# Patient Record
Sex: MALE | Race: WHITE | NOT HISPANIC OR LATINO | Employment: FULL TIME | URBAN - METROPOLITAN AREA
[De-identification: names, ages, dates, MRNs, and addresses within clinical notes are randomized per-mention and may not be internally consistent; named-entity substitution may affect disease eponyms.]

---

## 2021-07-27 ENCOUNTER — CONSULT (OUTPATIENT)
Dept: SURGERY | Facility: CLINIC | Age: 51
End: 2021-07-27
Payer: COMMERCIAL

## 2021-07-27 VITALS
DIASTOLIC BLOOD PRESSURE: 78 MMHG | HEIGHT: 71 IN | HEART RATE: 104 BPM | BODY MASS INDEX: 22.26 KG/M2 | TEMPERATURE: 98.3 F | WEIGHT: 159 LBS | SYSTOLIC BLOOD PRESSURE: 130 MMHG

## 2021-07-27 DIAGNOSIS — Z00.00 WELLNESS EXAMINATION: ICD-10-CM

## 2021-07-27 DIAGNOSIS — R22.42 SUBCUTANEOUS MASS OF LEFT LOWER LEG: Primary | ICD-10-CM

## 2021-07-27 PROCEDURE — 99203 OFFICE O/P NEW LOW 30 MIN: CPT | Performed by: SURGERY

## 2021-07-27 RX ORDER — IBUPROFEN 200 MG
200 TABLET ORAL EVERY 6 HOURS PRN
COMMUNITY

## 2021-07-27 NOTE — PROGRESS NOTES
Assessment/Plan:    There is a  Mobile rubbery mass, likely lipoma but also could be sebaceous cyst present just below his patella on the left  He would like this excised as it does cause him discomfort when he bumps into things  Can perform excision under local anesthetic in the office  Discussed risks of procedure including bleeding, infection, damage to surrounding structures, poor wound healing, postoperative seroma  He reports understanding and informed consent was obtained  Will schedule for in office procedure next week  He also has never had a colonoscopy and does not have primary care doctor currently so have placed a referral and given him several names for primary care physicians  Also discussed colonoscopy and will readdress this again next week  No problem-specific Assessment & Plan notes found for this encounter  Diagnoses and all orders for this visit:    Subcutaneous mass of left lower leg    Wellness examination  -     Ambulatory referral to [facility]; Future    Other orders  -     ibuprofen (MOTRIN) 200 mg tablet; Take 200 mg by mouth every 6 (six) hours as needed          Subjective:      Patient ID: Ghada George is a 48 y o  male  He presents with a cyst like mass on his left lower leg just  Inferior to the patella  It has been there for many years and does not have pain specifically but when he bumps it against something it is very painful  He said about a year ago it increased in size that has not changed in size since  No other cysts in that region  The following portions of the patient's history were reviewed and updated as appropriate:   He  has no past medical history on file  He There are no problems to display for this patient  He  has no past surgical history on file  His family history is not on file  He  reports that he has never smoked  He has never used smokeless tobacco  He reports previous alcohol use  He reports current drug use   Drug: Marijuana  Current Outpatient Medications   Medication Sig Dispense Refill    ibuprofen (MOTRIN) 200 mg tablet Take 200 mg by mouth every 6 (six) hours as needed       No current facility-administered medications for this visit  He is allergic to penicillins       Review of Systems   All other systems reviewed and are negative  Objective:      /78 (BP Location: Left arm, Patient Position: Sitting, Cuff Size: Adult)   Pulse 104   Temp 98 3 °F (36 8 °C) (Tympanic)   Ht 5' 11" (1 803 m)   Wt 72 1 kg (159 lb)   BMI 22 18 kg/m²          Physical Exam  Vitals reviewed  Constitutional:       General: He is not in acute distress  Appearance: Normal appearance  He is normal weight  HENT:      Head: Normocephalic and atraumatic  Eyes:      Extraocular Movements: Extraocular movements intact  Conjunctiva/sclera: Conjunctivae normal       Pupils: Pupils are equal, round, and reactive to light  Cardiovascular:      Rate and Rhythm: Normal rate and regular rhythm  Pulmonary:      Effort: Pulmonary effort is normal  No respiratory distress  Breath sounds: Normal breath sounds  Musculoskeletal:         General: No swelling or tenderness  Normal range of motion  Cervical back: Normal range of motion and neck supple  Skin:     General: Skin is warm and dry  Neurological:      General: No focal deficit present  Mental Status: He is alert and oriented to person, place, and time  Psychiatric:         Mood and Affect: Mood normal          Behavior: Behavior normal          Thought Content:  Thought content normal          Judgment: Judgment normal

## 2021-08-04 ENCOUNTER — PROCEDURE VISIT (OUTPATIENT)
Dept: SURGERY | Facility: CLINIC | Age: 51
End: 2021-08-04
Payer: COMMERCIAL

## 2021-08-04 VITALS — WEIGHT: 159 LBS | HEIGHT: 71 IN | BODY MASS INDEX: 22.26 KG/M2 | TEMPERATURE: 97.6 F

## 2021-08-04 DIAGNOSIS — R22.42 SUBCUTANEOUS MASS OF LEFT LOWER LEG: Primary | ICD-10-CM

## 2021-08-04 PROCEDURE — 88305 TISSUE EXAM BY PATHOLOGIST: CPT | Performed by: PATHOLOGY

## 2021-08-04 PROCEDURE — 88342 IMHCHEM/IMCYTCHM 1ST ANTB: CPT | Performed by: PATHOLOGY

## 2021-08-04 PROCEDURE — 88341 IMHCHEM/IMCYTCHM EA ADD ANTB: CPT | Performed by: PATHOLOGY

## 2021-08-04 PROCEDURE — 11402 EXC TR-EXT B9+MARG 1.1-2 CM: CPT | Performed by: SURGERY

## 2021-08-04 NOTE — PROGRESS NOTES
Excisional Biopsy Procedure Note    Pre-operative Diagnosis: follicular cyst    Post-operative Diagnosis: same    Locations: Left lower extremity    Indications: discomfort    Anesthesia: Lidocaine 1% with epinephrine without added sodium bicarbonate    Procedure Details   The risks, benefits, indications, potential complications, and alternatives were explained to the patient and informed consent obtained  The lesion and surrounding area was given a sterile prep using chlorhexidine and draped in the usual sterile fashion  A scalpel was used to create a linear incision over the mass  A hemostat was used to dissect around the lesion circumferentially  Scissors were used to sharply dissect the lesion from the surrounding tissue  The cyst was then placed in a specimen container, measuring 2 x 1 5 x 0 5 cm  The wound was closed with 4-0 monocryl using a running subcuticular stitch  Antibiotic ointment and a sterile dressing applied  The specimen was sent for pathologic examination  The patient tolerated the procedure well  EBL: minimal    Findings: sebaceous cyst    Condition: Stable    Complications:  None    Plan:  1  Instructed to keep the wound dry and covered for 24-48 hours and clean thereafter  2  Warning signs of infection were reviewed  3  Recommended that the patient use OTC acetaminophen and OTC ibuprofen as needed for pain     4  Return for wound check in 1-2 weeks

## 2021-08-24 ENCOUNTER — OFFICE VISIT (OUTPATIENT)
Dept: SURGERY | Facility: CLINIC | Age: 51
End: 2021-08-24

## 2021-08-24 VITALS — WEIGHT: 159 LBS | HEIGHT: 70 IN | TEMPERATURE: 97.6 F | BODY MASS INDEX: 22.76 KG/M2 | HEART RATE: 88 BPM

## 2021-08-24 DIAGNOSIS — Z98.890 POST-OPERATIVE STATE: Primary | ICD-10-CM

## 2021-08-24 PROCEDURE — 99024 POSTOP FOLLOW-UP VISIT: CPT | Performed by: SURGERY

## 2021-08-24 NOTE — PROGRESS NOTES
Assessment/Plan:   doing well status post excision left lower extremity lesion  Pathology was reviewed) out was given to him  Advised this is a benign finding that will not recur with complete excision  Advised him to use sunscreen when out in the sun as scars are more sensitive to UV rays  No problem-specific Assessment & Plan notes found for this encounter  Diagnoses and all orders for this visit:    Post-operative state          Subjective:      Patient ID: Margarito Vaz is a 48 y o  male  He presents for postop status post excision of left lower extremity lesion  He is doing well, reports some mild discomfort the 1st day but otherwise is doing fine  The following portions of the patient's history were reviewed and updated as appropriate: allergies, current medications, past family history, past medical history, past social history, past surgical history and problem list     Review of Systems   All other systems reviewed and are negative  Objective:      Pulse 88   Temp 97 6 °F (36 4 °C) (Tympanic)   Ht 5' 10" (1 778 m)   Wt 72 1 kg (159 lb)   BMI 22 81 kg/m²          Physical Exam  Vitals reviewed  Musculoskeletal:         General: Normal range of motion  Skin:     General: Skin is warm and dry

## 2023-01-21 ENCOUNTER — APPOINTMENT (EMERGENCY)
Dept: CT IMAGING | Facility: HOSPITAL | Age: 53
End: 2023-01-21

## 2023-01-21 ENCOUNTER — HOSPITAL ENCOUNTER (INPATIENT)
Facility: HOSPITAL | Age: 53
LOS: 12 days | Discharge: HOME HEALTH | End: 2023-02-03
Attending: INTERNAL MEDICINE | Admitting: INTERNAL MEDICINE

## 2023-01-21 ENCOUNTER — APPOINTMENT (EMERGENCY)
Dept: RADIOLOGY | Facility: HOSPITAL | Age: 53
End: 2023-01-21

## 2023-01-21 DIAGNOSIS — E87.1 HYPONATREMIA: ICD-10-CM

## 2023-01-21 DIAGNOSIS — L89.316 PRESSURE INJURY OF DEEP TISSUE OF RIGHT BUTTOCK: ICD-10-CM

## 2023-01-21 DIAGNOSIS — B19.20 HEPATITIS C VIRUS INFECTION WITHOUT HEPATIC COMA, UNSPECIFIED CHRONICITY: ICD-10-CM

## 2023-01-21 DIAGNOSIS — B18.2 HEPATIC CIRRHOSIS DUE TO CHRONIC HEPATITIS C INFECTION (HCC): ICD-10-CM

## 2023-01-21 DIAGNOSIS — K74.60 CIRRHOSIS OF LIVER WITH ASCITES, UNSPECIFIED HEPATIC CIRRHOSIS TYPE (HCC): ICD-10-CM

## 2023-01-21 DIAGNOSIS — K65.0 ACUTE PERITONITIS (HCC): Primary | ICD-10-CM

## 2023-01-21 DIAGNOSIS — R65.10 SIRS (SYSTEMIC INFLAMMATORY RESPONSE SYNDROME) (HCC): ICD-10-CM

## 2023-01-21 DIAGNOSIS — K74.60 HEPATIC CIRRHOSIS DUE TO CHRONIC HEPATITIS C INFECTION (HCC): ICD-10-CM

## 2023-01-21 DIAGNOSIS — R18.8 CIRRHOSIS OF LIVER WITH ASCITES, UNSPECIFIED HEPATIC CIRRHOSIS TYPE (HCC): ICD-10-CM

## 2023-01-21 LAB
ALBUMIN SERPL BCP-MCNC: 2.8 G/DL (ref 3.5–5)
ALP SERPL-CCNC: 100 U/L (ref 34–104)
ALT SERPL W P-5'-P-CCNC: 61 U/L (ref 7–52)
ANION GAP SERPL CALCULATED.3IONS-SCNC: 9 MMOL/L (ref 4–13)
APTT PPP: 28 SECONDS (ref 23–37)
AST SERPL W P-5'-P-CCNC: 63 U/L (ref 13–39)
BASOPHILS # BLD AUTO: 0.05 THOUSANDS/ÂΜL (ref 0–0.1)
BASOPHILS NFR BLD AUTO: 0 % (ref 0–1)
BILIRUB SERPL-MCNC: 2.17 MG/DL (ref 0.2–1)
BILIRUB UR QL STRIP: NEGATIVE
BUN SERPL-MCNC: 15 MG/DL (ref 5–25)
CALCIUM ALBUM COR SERPL-MCNC: 9.3 MG/DL (ref 8.3–10.1)
CALCIUM SERPL-MCNC: 8.3 MG/DL (ref 8.4–10.2)
CHLORIDE SERPL-SCNC: 84 MMOL/L (ref 96–108)
CLARITY UR: CLEAR
CO2 SERPL-SCNC: 24 MMOL/L (ref 21–32)
COLOR UR: ABNORMAL
CREAT SERPL-MCNC: 0.74 MG/DL (ref 0.6–1.3)
EOSINOPHIL # BLD AUTO: 0.06 THOUSAND/ÂΜL (ref 0–0.61)
EOSINOPHIL NFR BLD AUTO: 0 % (ref 0–6)
ERYTHROCYTE [DISTWIDTH] IN BLOOD BY AUTOMATED COUNT: 12.7 % (ref 11.6–15.1)
FLUAV RNA RESP QL NAA+PROBE: NEGATIVE
FLUBV RNA RESP QL NAA+PROBE: NEGATIVE
GFR SERPL CREATININE-BSD FRML MDRD: 106 ML/MIN/1.73SQ M
GLUCOSE SERPL-MCNC: 201 MG/DL (ref 65–140)
GLUCOSE UR STRIP-MCNC: NEGATIVE MG/DL
HCT VFR BLD AUTO: 37.7 % (ref 36.5–49.3)
HGB BLD-MCNC: 12.9 G/DL (ref 12–17)
HGB UR QL STRIP.AUTO: NEGATIVE
IMM GRANULOCYTES # BLD AUTO: 0.12 THOUSAND/UL (ref 0–0.2)
IMM GRANULOCYTES NFR BLD AUTO: 1 % (ref 0–2)
INR PPP: 1.38 (ref 0.84–1.19)
KETONES UR STRIP-MCNC: NEGATIVE MG/DL
LACTATE SERPL-SCNC: 1.5 MMOL/L (ref 0.5–2)
LACTATE SERPL-SCNC: 2.3 MMOL/L (ref 0.5–2)
LEUKOCYTE ESTERASE UR QL STRIP: NEGATIVE
LYMPHOCYTES # BLD AUTO: 1.21 THOUSANDS/ÂΜL (ref 0.6–4.47)
LYMPHOCYTES NFR BLD AUTO: 8 % (ref 14–44)
MCH RBC QN AUTO: 35 PG (ref 26.8–34.3)
MCHC RBC AUTO-ENTMCNC: 34.2 G/DL (ref 31.4–37.4)
MCV RBC AUTO: 102 FL (ref 82–98)
MONOCYTES # BLD AUTO: 1.11 THOUSAND/ÂΜL (ref 0.17–1.22)
MONOCYTES NFR BLD AUTO: 7 % (ref 4–12)
NEUTROPHILS # BLD AUTO: 13.48 THOUSANDS/ÂΜL (ref 1.85–7.62)
NEUTS SEG NFR BLD AUTO: 84 % (ref 43–75)
NITRITE UR QL STRIP: NEGATIVE
NRBC BLD AUTO-RTO: 0 /100 WBCS
PH UR STRIP.AUTO: 6 [PH]
PLATELET # BLD AUTO: 369 THOUSANDS/UL (ref 149–390)
PMV BLD AUTO: 9.8 FL (ref 8.9–12.7)
POTASSIUM SERPL-SCNC: 4.2 MMOL/L (ref 3.5–5.3)
PROCALCITONIN SERPL-MCNC: 0.42 NG/ML
PROT SERPL-MCNC: 6.8 G/DL (ref 6.4–8.4)
PROT UR STRIP-MCNC: NEGATIVE MG/DL
PROTHROMBIN TIME: 17 SECONDS (ref 11.6–14.5)
RBC # BLD AUTO: 3.69 MILLION/UL (ref 3.88–5.62)
RSV RNA RESP QL NAA+PROBE: NEGATIVE
SARS-COV-2 RNA RESP QL NAA+PROBE: NEGATIVE
SODIUM SERPL-SCNC: 117 MMOL/L (ref 135–147)
SP GR UR STRIP.AUTO: 1.01
UROBILINOGEN UR QL STRIP.AUTO: 0.2 E.U./DL
WBC # BLD AUTO: 16.03 THOUSAND/UL (ref 4.31–10.16)

## 2023-01-21 RX ORDER — METRONIDAZOLE 500 MG/1
500 TABLET ORAL ONCE
Status: COMPLETED | OUTPATIENT
Start: 2023-01-21 | End: 2023-01-21

## 2023-01-21 RX ORDER — FENTANYL CITRATE 50 UG/ML
50 INJECTION, SOLUTION INTRAMUSCULAR; INTRAVENOUS ONCE
Status: COMPLETED | OUTPATIENT
Start: 2023-01-21 | End: 2023-01-21

## 2023-01-21 RX ORDER — ONDANSETRON 2 MG/ML
4 INJECTION INTRAMUSCULAR; INTRAVENOUS ONCE
Status: COMPLETED | OUTPATIENT
Start: 2023-01-21 | End: 2023-01-21

## 2023-01-21 RX ORDER — LEVOFLOXACIN 5 MG/ML
750 INJECTION, SOLUTION INTRAVENOUS ONCE
Status: COMPLETED | OUTPATIENT
Start: 2023-01-21 | End: 2023-01-22

## 2023-01-21 RX ADMIN — LEVOFLOXACIN 750 MG: 750 INJECTION, SOLUTION INTRAVENOUS at 22:28

## 2023-01-21 RX ADMIN — SODIUM CHLORIDE 1000 ML: 0.9 INJECTION, SOLUTION INTRAVENOUS at 22:08

## 2023-01-21 RX ADMIN — ONDANSETRON 4 MG: 2 INJECTION INTRAMUSCULAR; INTRAVENOUS at 22:22

## 2023-01-21 RX ADMIN — FENTANYL CITRATE 50 MCG: 50 INJECTION, SOLUTION INTRAMUSCULAR; INTRAVENOUS at 23:37

## 2023-01-21 RX ADMIN — METRONIDAZOLE 500 MG: 500 TABLET ORAL at 22:36

## 2023-01-21 RX ADMIN — IOHEXOL 100 ML: 350 INJECTION, SOLUTION INTRAVENOUS at 23:06

## 2023-01-21 RX ADMIN — ONDANSETRON 4 MG: 2 INJECTION INTRAMUSCULAR; INTRAVENOUS at 21:28

## 2023-01-21 RX ADMIN — SODIUM CHLORIDE 1000 ML: 0.9 INJECTION, SOLUTION INTRAVENOUS at 21:24

## 2023-01-22 PROBLEM — K65.9 PERITONITIS (HCC): Status: ACTIVE | Noted: 2023-01-22

## 2023-01-22 PROBLEM — K74.60 CIRRHOSIS OF LIVER WITH ASCITES (HCC): Status: ACTIVE | Noted: 2023-01-22

## 2023-01-22 PROBLEM — E87.1 HYPONATREMIA: Status: ACTIVE | Noted: 2023-01-22

## 2023-01-22 PROBLEM — A41.9 SEPSIS (HCC): Status: ACTIVE | Noted: 2023-01-22

## 2023-01-22 PROBLEM — R18.8 CIRRHOSIS OF LIVER WITH ASCITES (HCC): Status: ACTIVE | Noted: 2023-01-22

## 2023-01-22 LAB
ALBUMIN SERPL BCP-MCNC: 2.4 G/DL (ref 3.5–5)
ALP SERPL-CCNC: 80 U/L (ref 34–104)
ALT SERPL W P-5'-P-CCNC: 63 U/L (ref 7–52)
ANION GAP SERPL CALCULATED.3IONS-SCNC: 6 MMOL/L (ref 4–13)
ANION GAP SERPL CALCULATED.3IONS-SCNC: 7 MMOL/L (ref 4–13)
AST SERPL W P-5'-P-CCNC: 74 U/L (ref 13–39)
ATRIAL RATE: 102 BPM
BILIRUB SERPL-MCNC: 1.32 MG/DL (ref 0.2–1)
BUN SERPL-MCNC: 11 MG/DL (ref 5–25)
BUN SERPL-MCNC: 12 MG/DL (ref 5–25)
CALCIUM ALBUM COR SERPL-MCNC: 8.9 MG/DL (ref 8.3–10.1)
CALCIUM SERPL-MCNC: 7.6 MG/DL (ref 8.4–10.2)
CALCIUM SERPL-MCNC: 7.7 MG/DL (ref 8.4–10.2)
CHLORIDE SERPL-SCNC: 91 MMOL/L (ref 96–108)
CHLORIDE SERPL-SCNC: 91 MMOL/L (ref 96–108)
CO2 SERPL-SCNC: 23 MMOL/L (ref 21–32)
CO2 SERPL-SCNC: 23 MMOL/L (ref 21–32)
CORTIS SERPL-MCNC: 31 UG/DL
CREAT SERPL-MCNC: 0.53 MG/DL (ref 0.6–1.3)
CREAT SERPL-MCNC: 0.63 MG/DL (ref 0.6–1.3)
ERYTHROCYTE [DISTWIDTH] IN BLOOD BY AUTOMATED COUNT: 12.6 % (ref 11.6–15.1)
EST. AVERAGE GLUCOSE BLD GHB EST-MCNC: 108 MG/DL
GFR SERPL CREATININE-BSD FRML MDRD: 113 ML/MIN/1.73SQ M
GFR SERPL CREATININE-BSD FRML MDRD: 121 ML/MIN/1.73SQ M
GLUCOSE SERPL-MCNC: 103 MG/DL (ref 65–140)
GLUCOSE SERPL-MCNC: 120 MG/DL (ref 65–140)
HBA1C MFR BLD: 5.4 %
HCT VFR BLD AUTO: 35.3 % (ref 36.5–49.3)
HGB BLD-MCNC: 12 G/DL (ref 12–17)
MCH RBC QN AUTO: 34.2 PG (ref 26.8–34.3)
MCHC RBC AUTO-ENTMCNC: 34 G/DL (ref 31.4–37.4)
MCV RBC AUTO: 101 FL (ref 82–98)
OSMOLALITY UR/SERPL-RTO: 259 MMOL/KG (ref 282–298)
OSMOLALITY UR: 361 MMOL/KG
P AXIS: 55 DEGREES
PLATELET # BLD AUTO: 291 THOUSANDS/UL (ref 149–390)
PMV BLD AUTO: 9 FL (ref 8.9–12.7)
POTASSIUM SERPL-SCNC: 3.8 MMOL/L (ref 3.5–5.3)
POTASSIUM SERPL-SCNC: 3.9 MMOL/L (ref 3.5–5.3)
PR INTERVAL: 154 MS
PROCALCITONIN SERPL-MCNC: 0.41 NG/ML
PROT SERPL-MCNC: 5.6 G/DL (ref 6.4–8.4)
QRS AXIS: 10 DEGREES
QRSD INTERVAL: 94 MS
QT INTERVAL: 334 MS
QTC INTERVAL: 435 MS
RBC # BLD AUTO: 3.51 MILLION/UL (ref 3.88–5.62)
SODIUM 24H UR-SCNC: <5 MOL/L
SODIUM SERPL-SCNC: 120 MMOL/L (ref 135–147)
SODIUM SERPL-SCNC: 121 MMOL/L (ref 135–147)
T WAVE AXIS: 73 DEGREES
TSH SERPL DL<=0.05 MIU/L-ACNC: 3.02 UIU/ML (ref 0.45–4.5)
VENTRICULAR RATE: 102 BPM
WBC # BLD AUTO: 12.47 THOUSAND/UL (ref 4.31–10.16)

## 2023-01-22 RX ORDER — LEVOFLOXACIN 5 MG/ML
750 INJECTION, SOLUTION INTRAVENOUS EVERY 24 HOURS
Status: DISCONTINUED | OUTPATIENT
Start: 2023-01-22 | End: 2023-01-24

## 2023-01-22 RX ORDER — OXYCODONE HYDROCHLORIDE 10 MG/1
10 TABLET ORAL EVERY 4 HOURS PRN
Status: DISCONTINUED | OUTPATIENT
Start: 2023-01-22 | End: 2023-01-28

## 2023-01-22 RX ORDER — ONDANSETRON 2 MG/ML
4 INJECTION INTRAMUSCULAR; INTRAVENOUS EVERY 6 HOURS PRN
Status: DISCONTINUED | OUTPATIENT
Start: 2023-01-22 | End: 2023-02-03 | Stop reason: HOSPADM

## 2023-01-22 RX ORDER — OXYCODONE HYDROCHLORIDE 5 MG/1
5 TABLET ORAL EVERY 4 HOURS PRN
Status: DISCONTINUED | OUTPATIENT
Start: 2023-01-22 | End: 2023-01-28

## 2023-01-22 RX ORDER — METRONIDAZOLE 500 MG/100ML
500 INJECTION, SOLUTION INTRAVENOUS EVERY 8 HOURS
Status: DISCONTINUED | OUTPATIENT
Start: 2023-01-22 | End: 2023-01-24

## 2023-01-22 RX ORDER — ACETAMINOPHEN 325 MG/1
650 TABLET ORAL EVERY 4 HOURS PRN
Status: DISCONTINUED | OUTPATIENT
Start: 2023-01-22 | End: 2023-01-28

## 2023-01-22 RX ORDER — HYDROMORPHONE HCL/PF 1 MG/ML
0.5 SYRINGE (ML) INJECTION EVERY 4 HOURS PRN
Status: DISCONTINUED | OUTPATIENT
Start: 2023-01-22 | End: 2023-01-29

## 2023-01-22 RX ORDER — MAGNESIUM HYDROXIDE/ALUMINUM HYDROXICE/SIMETHICONE 120; 1200; 1200 MG/30ML; MG/30ML; MG/30ML
30 SUSPENSION ORAL EVERY 4 HOURS PRN
Status: DISCONTINUED | OUTPATIENT
Start: 2023-01-22 | End: 2023-01-28

## 2023-01-22 RX ADMIN — METRONIDAZOLE 500 MG: 500 INJECTION, SOLUTION INTRAVENOUS at 21:31

## 2023-01-22 RX ADMIN — LEVOFLOXACIN 750 MG: 750 INJECTION, SOLUTION INTRAVENOUS at 22:19

## 2023-01-22 RX ADMIN — ALUMINUM HYDROXIDE, MAGNESIUM HYDROXIDE, AND SIMETHICONE 30 ML: 200; 200; 20 SUSPENSION ORAL at 20:02

## 2023-01-22 RX ADMIN — METRONIDAZOLE 500 MG: 500 INJECTION, SOLUTION INTRAVENOUS at 13:41

## 2023-01-22 RX ADMIN — METRONIDAZOLE 500 MG: 500 INJECTION, SOLUTION INTRAVENOUS at 06:10

## 2023-01-22 NOTE — CONSULTS
Acute Care Surgery  Consultation    Assessment:  46year old male with Hep C/ alcoholic cirrhosis presents with abdominal pain and findings of ascites  General surgery consulted due to CT read concerning for acute peritonitis  Patient resting comfortably in bed, in no significant distress  He reports minimal abdominal pain on my evaluation  Abdomen is mildly distended without guarding or rebound  WBC: 12 47 (16)  Hgb: 12 (12 )  Na: 120 (117)    Plan:  • Suspect CT read of peritonitis is likely due to irritation of peritoneum secondary to ascites  Patient does not clinically present with peritonitis, has no abdominal pain on exam this morning  No indication of spontaneous bacterial peritonitis  • Agree with GI consult, patient will likely require paracentesis this admission  o Recommended sending paracentesis fluid for cytology/culture for further characterization  • IV Levaquin/Flagyl per primary team  • Hyponatremia treatment per primary team  • No surgical intervention indicated at this time  General surgery will sign off  Please reach out with any questions or concerns  History of Present Illness   HPI:  Kalyan Soliz is a 46 y o  male with a past medical history of hepatitis C, former alcohol abuse (abstained greater than 20 years) who presented with abdominal pain over 1 week  Patient states he had a viral GI bug 2 weeks ago with associated nausea and vomiting  He states once this resolved he continues to have persistent abdominal pain that is waxed and waned over the week  He states this significantly worsened over the last couple of days prompting his presentation to the emergency department  Patient states at that time he was drinking significantly more water with decreased p o  intake but was also having decreased urine output bloating  Patient states the pain has been diffuse throughout the last week  He denies associated nausea or vomiting, reports 1 episode of diarrhea    He states he has not previously had bloating in his abdomen similar to this before, is unaware if he is at previous ascites and denies undergoing previous paracentesis  Patient initially presented with elevated lactic (2 3) which has since cleared  At this time, he denies SOB, chest pain, nausea, vomiting  He reports improvement in his abdominal pain since admission  Review of Systems   Constitutional: Positive for appetite change  Negative for activity change, chills, fatigue and fever  HENT: Negative  Respiratory: Negative for cough, chest tightness and shortness of breath  Cardiovascular: Negative for chest pain, palpitations and leg swelling  Gastrointestinal: Positive for abdominal distention, abdominal pain and diarrhea  Negative for blood in stool, constipation, nausea and vomiting  Genitourinary: Positive for decreased urine volume and difficulty urinating  Negative for dysuria  Musculoskeletal: Negative for arthralgias and myalgias  Skin: Negative for color change  Neurological: Negative for dizziness and weakness  Psychiatric/Behavioral: Negative for agitation, behavioral problems and confusion  All other systems reviewed and are negative        Historical Information   Past Medical History:   Diagnosis Date   • Hepatitis C      Past Surgical History:   Procedure Laterality Date   • HAND SURGERY     • WISDOM TOOTH EXTRACTION       Social History   Social History     Substance and Sexual Activity   Alcohol Use Not Currently    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     Social History     Substance and Sexual Activity   Drug Use Yes   • Types: Marijuana    Comment: medical marijuana     Social History     Tobacco Use   Smoking Status Never   Smokeless Tobacco Never     Family History   Problem Relation Age of Onset   • Diabetes Mother    • No Known Problems Father        Meds/Allergies   current meds:   Current Facility-Administered Medications   Medication Dose Route Frequency   • acetaminophen (TYLENOL) tablet 650 mg  650 mg Oral Q4H PRN   • HYDROmorphone (DILAUDID) injection 0 5 mg  0 5 mg Intravenous Q4H PRN   • influenza vaccine, recombinant, quadrivalent (FLUBLOK) IM injection 0 5 mL  0 5 mL Intramuscular Once   • levofloxacin (LEVAQUIN) IVPB (premix in dextrose) 750 mg 150 mL  750 mg Intravenous Q24H   • metroNIDAZOLE (FLAGYL) IVPB (premix) 500 mg 100 mL  500 mg Intravenous Q8H   • ondansetron (ZOFRAN) injection 4 mg  4 mg Intravenous Q6H PRN   • oxyCODONE (ROXICODONE) immediate release tablet 10 mg  10 mg Oral Q4H PRN   • oxyCODONE (ROXICODONE) IR tablet 5 mg  5 mg Oral Q4H PRN    and PTA meds:   None     Allergies   Allergen Reactions   • Penicillins Hives       Objective   First Vitals:   Blood Pressure: 163/100 (01/21/23 2041)  Pulse: (!) 125 (01/21/23 2041)  Temperature: 99 4 °F (37 4 °C) (01/21/23 2041)  Temp Source: Temporal (01/21/23 2041)  Respirations: 21 (01/21/23 2041)  Height: 5' 11" (180 3 cm) (01/21/23 2041)  Weight - Scale: 72 6 kg (160 lb) (01/21/23 2041)  SpO2: 94 % (01/21/23 2041)    Current Vitals:   Blood Pressure: 125/72 (01/22/23 0700)  Pulse: 86 (01/22/23 0700)  Temperature: 98 7 °F (37 1 °C) (01/22/23 0700)  Temp Source: Oral (01/22/23 0700)  Respirations: 21 (01/22/23 0700)  Height: 5' 11" (180 3 cm) (01/21/23 2041)  Weight - Scale: 72 6 kg (160 lb) (01/21/23 2041)  SpO2: 93 % (01/22/23 0700)      Intake/Output Summary (Last 24 hours) at 1/22/2023 0903  Last data filed at 1/22/2023 0800  Gross per 24 hour   Intake 3268 ml   Output --   Net 3268 ml       Invasive Devices     Peripheral Intravenous Line  Duration           Peripheral IV 01/21/23 Left;Proximal;Ventral (anterior) Forearm <1 day                Physical Exam  Vitals and nursing note reviewed  Constitutional:       General: He is not in acute distress  Appearance: Normal appearance  He is not ill-appearing  HENT:      Head: Normocephalic and atraumatic        Mouth/Throat:      Mouth: Mucous membranes are moist  Pharynx: Oropharynx is clear  Eyes:      Extraocular Movements: Extraocular movements intact  Conjunctiva/sclera: Conjunctivae normal    Cardiovascular:      Rate and Rhythm: Normal rate and regular rhythm  Pulmonary:      Effort: Pulmonary effort is normal  No respiratory distress  Abdominal:      General: Abdomen is flat  There is distension (minimal)  Palpations: Abdomen is soft  Tenderness: There is abdominal tenderness (non tender on my exam this morning)  There is no guarding or rebound  Hernia: No hernia is present  Musculoskeletal:         General: Normal range of motion  Cervical back: Normal range of motion and neck supple  Right lower leg: No edema  Left lower leg: No edema  Skin:     General: Skin is warm and dry  Coloration: Skin is not jaundiced  Neurological:      General: No focal deficit present  Mental Status: He is alert and oriented to person, place, and time  Mental status is at baseline  Psychiatric:         Mood and Affect: Mood normal          Behavior: Behavior normal          Thought Content:  Thought content normal          Judgment: Judgment normal          Lab Results:   CBC:   Lab Results   Component Value Date    WBC 12 47 (H) 01/22/2023    HGB 12 0 01/22/2023    HCT 35 3 (L) 01/22/2023     (H) 01/22/2023     01/22/2023    MCH 34 2 01/22/2023    MCHC 34 0 01/22/2023    RDW 12 6 01/22/2023    MPV 9 0 01/22/2023    NRBC 0 01/21/2023   , CMP:   Lab Results   Component Value Date    SODIUM 120 (L) 01/22/2023    K 3 9 01/22/2023    CL 91 (L) 01/22/2023    CO2 23 01/22/2023    BUN 11 01/22/2023    CREATININE 0 53 (L) 01/22/2023    CALCIUM 7 7 (L) 01/22/2023    AST 63 (H) 01/21/2023    ALT 61 (H) 01/21/2023    ALKPHOS 100 01/21/2023    EGFR 121 01/22/2023     Imaging:   CT abdomen pelvis with contrast   Final Result by Luly Hu MD (01/21 2326)      Moderate to large intra-abdominal and pelvic ascites with diffuse enhancement and thickening of the peritoneal lining most compatible with an acute peritonitis  Clinical correlation is recommended  Peritoneal fluid sampling could be performed for    further evaluation  Mildly nodularity of the liver surface contour suggestive of chronic cirrhotic changes  No free intraperitoneal air  No evidence of large or small bowel obstruction  Partially visualized bilateral pleural effusions with bibasilar atelectasis, left greater than right  I personally discussed this study with [facility] on 1/21/2023 at 11:17 PM                   Workstation performed: [id number]         XR chest 1 view portable   ED Interpretation by Monique Triplett DO (01/21 2220)   No active disease in the chest, no air under the diaphragm        EKG, Pathology, and Other Studies: I have personally reviewed pertinent reports  Counseling / Coordination of Care  Total floor / unit time spent today 25 minutes  Greater than 50% of total time was spent with the patient and / or family counseling and / or coordination of care      Vandana Jennings MD  1/22/2023 9:03 AM

## 2023-01-22 NOTE — ED PROVIDER NOTES
History  Chief Complaint   Patient presents with   • Abdominal Pain     Started about 3 days ago; Had a stomach flu about a week ago but that has subsided; Now has pain in the mid abdomen not radiating; hard to take a full breath in;   • Urinary Retention     States he has been drinking a good amount of fluids with not much urine return     59-year-old male has been sick for the last week with a "stomach bug" over the last 3 to 4 days he notes abdominal distention, he has been making him lose his breath  States he is lost 67 pounds during this interim     In the interim he says he is lost about 6 to 7 pounds  Said no real profuse diarrhea  Actually had a small normal bowel movement today  Just feels abdominal bloating and cramping distention  Alot of nausea but no vomiting  No real significant past medical history  Denies any recent travel  Denies any recent ingestion of what he thought might be bad food  Prior to Admission Medications   Prescriptions Last Dose Informant Patient Reported? Taking?   ibuprofen (MOTRIN) 200 mg tablet Not Taking  Yes No   Sig: Take 200 mg by mouth every 6 (six) hours as needed   Patient not taking: Reported on 8/24/2021      Facility-Administered Medications: None       History reviewed  No pertinent past medical history  History reviewed  No pertinent surgical history  History reviewed  No pertinent family history  I have reviewed and agree with the history as documented  E-Cigarette/Vaping   • E-Cigarette Use Current Every Day User      E-Cigarette/Vaping Substances   • Nicotine Yes      Social History     Tobacco Use   • Smoking status: Never   • Smokeless tobacco: Never   Vaping Use   • Vaping Use: Every day   • Substances: Nicotine   Substance Use Topics   • Alcohol use: Not Currently   • Drug use: Yes     Types: Marijuana       Review of Systems   Constitutional: Positive for chills  Negative for fever  HENT: Negative for rhinorrhea and sore throat  Eyes: Negative for visual disturbance  Respiratory: Negative for cough and shortness of breath  Cardiovascular: Negative for chest pain and leg swelling  Gastrointestinal: Positive for nausea  Negative for abdominal pain, diarrhea and vomiting  Genitourinary: Negative for dysuria  Musculoskeletal: Negative for back pain and myalgias  Skin: Negative for rash  Neurological: Negative for dizziness and headaches  Psychiatric/Behavioral: Negative for confusion  All other systems reviewed and are negative  Physical Exam  Physical Exam  Vitals and nursing note reviewed  Constitutional:       Appearance: He is ill-appearing  Comments: Chronically ill-appearing   HENT:      Head: Normocephalic and atraumatic  Comments: Appears to have some temporal wasting     Nose: Nose normal       Mouth/Throat:      Pharynx: No oropharyngeal exudate  Comments: Mucous membranes, dry oropharynx  Eyes:      General: No scleral icterus  Conjunctiva/sclera: Conjunctivae normal       Pupils: Pupils are equal, round, and reactive to light  Neck:      Vascular: No JVD  Trachea: No tracheal deviation  Cardiovascular:      Rate and Rhythm: Regular rhythm  Tachycardia present  Heart sounds: Normal heart sounds  No murmur heard  Pulmonary:      Effort: Pulmonary effort is normal  No respiratory distress  Breath sounds: Normal breath sounds  No wheezing or rales  Abdominal:      General: Abdomen is flat  Bowel sounds are normal  There is distension  Palpations: Abdomen is soft  Tenderness: There is no abdominal tenderness  There is no guarding  Comments: High-pitched bowel sounds, mild distention, no localized pain   Musculoskeletal:         General: No tenderness  Normal range of motion  Cervical back: Normal range of motion and neck supple  Skin:     General: Skin is warm and dry     Neurological:      Mental Status: He is alert and oriented to person, place, and time  Cranial Nerves: No cranial nerve deficit  Sensory: No sensory deficit  Motor: No abnormal muscle tone  Comments: 5/5 motor, nl sens   Psychiatric:         Behavior: Behavior normal          Vital Signs  ED Triage Vitals [01/21/23 2041]   Temperature Pulse Respirations Blood Pressure SpO2   99 4 °F (37 4 °C) (!) 125 21 163/100 94 %      Temp Source Heart Rate Source Patient Position - Orthostatic VS BP Location FiO2 (%)   Temporal Monitor Sitting Left arm --      Pain Score       6           Vitals:    01/21/23 2128 01/21/23 2158 01/21/23 2228 01/21/23 2258   BP: 147/73 170/89 162/87 165/91   Pulse: 96 96 92    Patient Position - Orthostatic VS:             Visual Acuity      ED Medications  Medications   sodium chloride 0 9 % bolus 1,000 mL (0 mL Intravenous Stopped 1/21/23 2200)     Followed by   sodium chloride 0 9 % bolus 1,000 mL (1,000 mL Intravenous New Bag 1/21/23 2208)     Followed by   sodium chloride 0 9 % bolus 1,000 mL (1,000 mL Intravenous New Bag 1/21/23 2208)   ondansetron (ZOFRAN) injection 4 mg (4 mg Intravenous Given 1/21/23 2128)   levofloxacin (LEVAQUIN) IVPB (premix in dextrose) 750 mg 150 mL (750 mg Intravenous New Bag 1/21/23 2228)   metroNIDAZOLE (FLAGYL) tablet 500 mg (500 mg Oral Given 1/21/23 2236)   ondansetron (ZOFRAN) injection 4 mg (4 mg Intravenous Given 1/21/23 2222)   iohexol (OMNIPAQUE) 350 MG/ML injection (SINGLE-DOSE) 100 mL (100 mL Intravenous Given 1/21/23 2306)   fentanyl citrate (PF) 100 MCG/2ML 50 mcg (50 mcg Intravenous Given 1/21/23 2337)       Diagnostic Studies  Results Reviewed     Procedure Component Value Units Date/Time    Sodium, urine, random [[id number]]     Lab Status: No result Specimen: Urine     Osmolality, urine [[id number]]     Lab Status: No result Specimen: Urine     Osmolality-"If this is regarding a toxic alcohol, STOP  Test is not routinely indicated   Please consult medical  on call for further guidance " [[id number]]     Lab Status: No result Specimen: Blood     Lactic acid 2 Hours [[id number]]  (Normal) Collected: 01/21/23 2324    Lab Status: Final result Specimen: Blood from Arm, Left Updated: 01/21/23 2347     LACTIC ACID 1 5 mmol/L     Narrative:      Result may be elevated if tourniquet was used during collection  Protime-INR [[id number]]  (Abnormal) Collected: 01/21/23 2226    Lab Status: Final result Specimen: Blood from Arm, Right Updated: 01/21/23 2245     Protime 17 0 seconds      INR 1 38    APTT [[id number]]  (Normal) Collected: 01/21/23 2226    Lab Status: Final result Specimen: Blood from Arm, Right Updated: 01/21/23 2245     PTT 28 seconds     UA w Reflex to Microscopic w Reflex to Culture [[id number]]  (Abnormal) Collected: 01/21/23 2226    Lab Status: Final result Specimen: Urine, Clean Catch Updated: 01/21/23 2231     Color, UA [place]     Clarity, UA Clear     Specific Gravity, UA 1 010     pH, UA 6 0     Leukocytes, UA Negative     Nitrite, UA Negative     Protein, UA Negative mg/dl      Glucose, UA Negative mg/dl      Ketones, UA Negative mg/dl      Urobilinogen, UA 0 2 E U /dl      Bilirubin, UA Negative     Occult Blood, UA Negative    FLU/RSV/COVID - if FLU/RSV clinically relevant [[id number]]  (Normal) Collected: 01/21/23 2117    Lab Status: Final result Specimen: Nares from Nasopharyngeal Swab Updated: 01/21/23 2207     SARS-CoV-2 Negative     INFLUENZA A PCR Negative     INFLUENZA B PCR Negative     RSV PCR Negative    Narrative:      FOR PEDIATRIC PATIENTS - copy/paste COVID Guidelines URL to browser: [email or web address] org/  ashx    SARS-CoV-2 assay is a Nucleic Acid Amplification assay intended for the  qualitative detection of nucleic acid from SARS-CoV-2 in nasopharyngeal  swabs  Results are for the presumptive identification of SARS-CoV-2 RNA      Positive results are indicative of infection with SARS-CoV-2, the virus  causing COVID-19, but do not rule out bacterial infection or co-infection  with other viruses  Laboratories within the [place] and its  territories are required to report all positive results to the appropriate  public health authorities  Negative results do not preclude SARS-CoV-2  infection and should not be used as the sole basis for treatment or other  patient management decisions  Negative results must be combined with  clinical observations, patient history, and epidemiological information  This test has not been FDA cleared or approved  This test has been authorized by FDA under an Emergency Use Authorization  (EUA)  This test is only authorized for the duration of time the  declaration that circumstances exist justifying the authorization of the  emergency use of an in vitro diagnostic tests for detection of SARS-CoV-2  virus and/or diagnosis of COVID-19 infection under section 564(b)(1) of  the Act, 21 U  S C  879CTO-6(L)(9), unless the authorization is terminated  or revoked sooner  The test has been validated but independent review by FDA  and CLIA is pending  Test performed using [employer] GeneXpert: This RT-PCR assay targets N2,  a region unique to SARS-CoV-2  A conserved region in the E-gene was chosen  for pan-Sarbecovirus detection which includes SARS-CoV-2  According to CMS-2020-01-R, this platform meets the definition of high-throughput technology  Procalcitonin [[id number]]  (Abnormal) Collected: 01/21/23 2117    Lab Status: Final result Specimen: Blood from Arm, Right Updated: 01/21/23 2155     Procalcitonin 0 42 ng/ml     Lactic acid [[id number]]  (Abnormal) Collected: 01/21/23 2117    Lab Status: Final result Specimen: Blood from Arm, Right Updated: 01/21/23 2154     LACTIC ACID 2 3 mmol/L     Narrative:      Result may be elevated if tourniquet was used during collection      Comprehensive metabolic panel [[id number]]  (Abnormal) Collected: 01/21/23 2117    Lab Status: Final result Specimen: Blood from Arm, Right Updated: 01/21/23 2150     Sodium 117 mmol/L      Potassium 4 2 mmol/L      Chloride 84 mmol/L      CO2 24 mmol/L      ANION GAP 9 mmol/L      BUN 15 mg/dL      Creatinine 0 74 mg/dL      Glucose 201 mg/dL      Calcium 8 3 mg/dL      Corrected Calcium 9 3 mg/dL      AST 63 U/L      ALT 61 U/L      Alkaline Phosphatase 100 U/L      Total Protein 6 8 g/dL      Albumin 2 8 g/dL      Total Bilirubin 2 17 mg/dL      eGFR 106 ml/min/1 73sq m     Narrative:      [place] guidelines for Chronic Kidney Disease (CKD):   •  Stage 1 with normal or high GFR (GFR > 90 mL/min/1 73 square meters)  •  Stage 2 Mild CKD (GFR = 60-89 mL/min/1 73 square meters)  •  Stage 3A Moderate CKD (GFR = 45-59 mL/min/1 73 square meters)  •  Stage 3B Moderate CKD (GFR = 30-44 mL/min/1 73 square meters)  •  Stage 4 Severe CKD (GFR = 15-29 mL/min/1 73 square meters)  •  Stage 5 End Stage CKD (GFR <15 mL/min/1 73 square meters)  Note: GFR calculation is accurate only with a steady state creatinine    CBC and differential [[id number]]  (Abnormal) Collected: 01/21/23 2117    Lab Status: Final result Specimen: Blood from Heel, Right Updated: 01/21/23 2129     WBC 16 03 Thousand/uL      RBC 3 69 Million/uL      Hemoglobin 12 9 g/dL      Hematocrit 37 7 %       fL      MCH 35 0 pg      MCHC 34 2 g/dL      RDW 12 7 %      MPV 9 8 fL      Platelets 498 Thousands/uL      nRBC 0 /100 WBCs      Neutrophils Relative 84 %      Immat GRANS % 1 %      Lymphocytes Relative 8 %      Monocytes Relative 7 %      Eosinophils Relative 0 %      Basophils Relative 0 %      Neutrophils Absolute 13 48 Thousands/µL      Immature Grans Absolute 0 12 Thousand/uL      Lymphocytes Absolute 1 21 Thousands/µL      Monocytes Absolute 1 11 Thousand/µL      Eosinophils Absolute 0 06 Thousand/µL      Basophils Absolute 0 05 Thousands/µL     Blood culture #2 [[id number]] Collected: 01/21/23 2117    Lab Status:  In process Specimen: Blood from Arm, Right Updated: 01/21/23 2124    Blood culture #1 [[id number]] Collected: 01/21/23 2117    Lab Status: In process Specimen: Blood from Arm, Left Updated: 01/21/23 2123                 CT abdomen pelvis with contrast   Final Result by Henok Peralta MD (01/21 2326)      Moderate to large intra-abdominal and pelvic ascites with diffuse enhancement and thickening of the peritoneal lining most compatible with an acute peritonitis  Clinical correlation is recommended  Peritoneal fluid sampling could be performed for    further evaluation  Mildly nodularity of the liver surface contour suggestive of chronic cirrhotic changes  No free intraperitoneal air  No evidence of large or small bowel obstruction  Partially visualized bilateral pleural effusions with bibasilar atelectasis, left greater than right                  I personally discussed this study with Arabella Rosales on 1/21/2023 at 11:17 PM                   Workstation performed: [id number]         XR chest 1 view portable   ED Interpretation by Haider Rayo DO (01/21 2220)   No active disease in the chest, no air under the diaphragm                 Procedures  ECG 12 Lead Documentation Only    Date/Time: 1/21/2023 8:46 PM  Performed by: Haider Rayo DO  Authorized by: Haider Rayo DO     Indications / Diagnosis:  Abdominal bloating, shortness of breath  ECG reviewed by me, the ED Provider: yes    Patient location:  ED  Previous ECG:     Previous ECG:  Unavailable    Comparison to cardiac monitor: Yes    Interpretation:     Interpretation: non-specific    Rate:     ECG rate:  102    ECG rate assessment: tachycardic    Rhythm:     Rhythm: sinus rhythm    Ectopy:     Ectopy: none    QRS:     QRS axis:  Normal    QRS intervals:  Normal  Conduction:     Conduction: normal    ST segments:     ST segments:  Non-specific  T waves:     T waves: normal    Other findings:     Other findings: poor R wave progression    Comments: Forearm progression, possible age-indeterminate septal infarct    CriticalCare Time  Performed by: Caroline Hill DO  Authorized by: Caroline Hill DO     Critical care provider statement:     Critical care time (minutes):  154    Critical care start time:  1/21/2023 9:26 PM    Critical care end time:  1/22/2023 12:00 AM    Critical care was necessary to treat or prevent imminent or life-threatening deterioration of the following conditions:  Sepsis and metabolic crisis    Critical care was time spent personally by me on the following activities:  Examination of patient, evaluation of patient's response to treatment, discussions with consultants, development of treatment plan with patient or surrogate, ordering and review of radiographic studies, re-evaluation of patient's condition, ordering and review of laboratory studies and ordering and performing treatments and interventions    I assumed direction of critical care for this patient from another provider in my specialty: no               ED Course  ED Course as of 01/22/23 0031   Sat Jan 21, 2023   2139 Patient has a white count of 16,000, tachycardia grade temperature  Screen is positive for sepsis  Appears he may have an intestinal obstruction opposed to infection, and therefore holding on antibiotics at this time with regards to administration  2207 Patient's procalcitonin elevated 0 42, will treat empirically for intra-abdominal infection with Levaquin and metronidazole given penicillin allergy   2313 Appears to have extensive ascites  Notably patient has not had a drink in over 20 years   2339 Blood Pressure: 163/100   Sun Jan 22, 2023   0009 Spoke with critical care  They evaluated the patient on the floor here  Stated the patient was okay for a stepdown level to bed  Reaching out to slim                                               [facility]    Disposition  Final diagnoses:   Acute peritonitis ([address] )   Hepatic cirrhosis due to chronic hepatitis C infection ([facility] )   Hyponatremia   SIRS (systemic inflammatory response syndrome) ([facility] )     Time reflects when diagnosis was documented in both MDM as applicable and the Disposition within this note     Time User Action Codes Description Comment    1/22/2023 12:12 AM Doraine Round Add [K65 0] Acute peritonitis ([facility] )     1/22/2023 12:13 AM Doraine Round Add [B18 2,  K74 60] Hepatic cirrhosis due to chronic hepatitis C infection ([facility] )     1/22/2023 12:13 AM Doraine Round Add [E87 1] Hyponatremia     1/22/2023 12:13 AM Doraine Round Add [R65 10] SIRS (systemic inflammatory response syndrome) [facility])       ED Disposition     ED Disposition   Admit    Condition   Stable    Date/Time   Sun Jan 22, 2023 12:12 AM    Comment   Case was discussed with Dr Laura Benedict and Watson Sanabria and the patient's admission status was agreed to be Admission Status: inpatient status to the service of Dr José Miguel Rodriguez   Follow-up Information    None         Patient's Medications   Discharge Prescriptions    No medications on file       No discharge procedures on file      PDMP Review     None          ED Provider  Electronically Signed by           Mushtaq Walker DO  01/22/23 0031

## 2023-01-22 NOTE — ASSESSMENT & PLAN NOTE
· Secondary peritonitis  · Noted by leukocytosis, tachycardia tachypnea mild lactic acidosis present on admission  · Lactic acidosis resolved on follow up  · Status post 3 L IV fluid  · Trend procalcitonin  · Lactic Acid 1 5  · F/u blood cultures

## 2023-01-22 NOTE — CONSULTS
Consultation - [facility]  Patricia Fraire 46 y o  male MRN: [id number]  Unit/Bed#: ICU 02-01 Encounter: [id number]      Inpatient consult to gastroenterology  Consult performed by: Jeffrey Alcala MD  Consult ordered by: Venice Altamirano PA-C          Reason for Consult: Ascites, peritonitis, possible cirrhosis    History of Present Illness:  47 yo male w/ hx of reported chronic HCV, former alcohol use disorder who is admitted with worsening abdominal pain and distension  GI consulted for question of cirrhosis and chronic HCV  Patient reports 2 weeks of nausea, poor appetite, weight loss, and subjective fevers  He reports worsening abdominal distension, bloating, and abdominal cramping over the last 4 days prompting ED evaluation  He was taking a large amount of NSAIDs during this time but denies Tylenol use  He used to drink heavily up until 20 years ago  Many decades ago, he did use IV drugs (possibly shared needles), and he says he was diagnosed with hepatitis C over 10 years ago  Here, vitals afebrile, , otherwise normal  Labs notable for Na 117, Cr 0 74, AST/ALT 63/61, , alb 2 8, Tbili 2 17, WBC 16 03, Hgb 12 9, plts 369, INR 1 38, lactate 2 3, and PCT 0 42  Flu and COVID PCR neg  CT A/P showed a nodular liver and large-volume ascites with peritoneal enhancement concerning for peritonitis      ROS: As per HPI      Historical Information   Past Medical History:   Diagnosis Date   • Hepatitis C      Past Surgical History:   Procedure Laterality Date   • HAND SURGERY     • WISDOM TOOTH EXTRACTION       Social History   Social History     Substance and Sexual Activity   Alcohol Use Not Currently    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     Social History     Substance and Sexual Activity   Drug Use Yes   • Types: Marijuana    Comment: medical marijuana     Social History     Tobacco Use   Smoking Status Never   Smokeless Tobacco Never     Family History   Problem Relation Age of Onset   • Diabetes Mother    • No Known Problems Father        Meds/Allergies     No medications prior to admission  Current Facility-Administered Medications   Medication Dose Route Frequency   • acetaminophen (TYLENOL) tablet 650 mg  650 mg Oral Q4H PRN   • HYDROmorphone (DILAUDID) injection 0 5 mg  0 5 mg Intravenous Q4H PRN   • influenza vaccine, recombinant, quadrivalent (FLUBLOK) IM injection 0 5 mL  0 5 mL Intramuscular Once   • levofloxacin (LEVAQUIN) IVPB (premix in dextrose) 750 mg 150 mL  750 mg Intravenous Q24H   • metroNIDAZOLE (FLAGYL) IVPB (premix) 500 mg 100 mL  500 mg Intravenous Q8H   • ondansetron (ZOFRAN) injection 4 mg  4 mg Intravenous Q6H PRN   • oxyCODONE (ROXICODONE) immediate release tablet 10 mg  10 mg Oral Q4H PRN   • oxyCODONE (ROXICODONE) IR tablet 5 mg  5 mg Oral Q4H PRN       Allergies   Allergen Reactions   • Penicillins Hives         Objective     Blood pressure 125/72, pulse 86, temperature 98 7 °F (37 1 °C), temperature source Oral, resp  rate 21, height 5' 11" (1 803 m), weight 72 6 kg (160 lb), SpO2 93 %        Intake/Output Summary (Last 24 hours) at 1/22/2023 1034  Last data filed at 1/22/2023 0800  Gross per 24 hour   Intake 3268 ml   Output --   Net 3268 ml         Physical Exam:      General: No acute distress  HEENT: No scleral icterus  Abdomen: Soft, non-tender, mild distension w/ tympany to percussion, normoactive bowel sounds, no hepatomegaly  Extremities: No lower extremity edema  Skin: No jaundice  Neuro: Awake, alert, oriented x 3, no asterixis      Lab Results:   Results from last 7 days   Lab Units 01/22/23  0516 01/21/23  2117   WBC Thousand/uL 12 47* 16 03*   HEMOGLOBIN g/dL 12 0 12 9   HEMATOCRIT % 35 3* 37 7   PLATELETS Thousands/uL 291 369   NEUTROS PCT %  --  84*   LYMPHS PCT %  --  8*   MONOS PCT %  --  7   EOS PCT %  --  0     Results from last 7 days   Lab Units 01/22/23  0516 01/21/23  2117   POTASSIUM mmol/L 3 9 4 2   CHLORIDE mmol/L 91* 84*   CO2 mmol/L 23 24   BUN mg/dL 11 15   CREATININE mg/dL 0 53* 0 74   CALCIUM mg/dL 7 7* 8 3*   ALK PHOS U/L  --  100   ALT U/L  --  61*   AST U/L  --  63*     Results from last 7 days   Lab Units 01/21/23  2226   INR  1 38*       Imaging Studies: I have personally reviewed pertinent imaging studies  CT A/P 1/21/23: Moderate to large intra-abdominal and pelvic ascites with diffuse enhancement and thickening of the peritoneal lining most compatible with an acute peritonitis  Clinical correlation is recommended  Peritoneal fluid sampling could be performed for   further evaluation      Mildly nodularity of the liver surface contour suggestive of chronic cirrhotic changes      No free intraperitoneal air      No evidence of large or small bowel obstruction  Assessment and Plan:    45 yo male w/ hx of reported chronic HCV, former alcohol use disorder who is admitted with worsening abdominal pain and distension  GI consulted for question of cirrhosis and chronic HCV  Patient does have hypoalbuminemia, elevated INR, and mild transaminases elevations in combination with a very mildly nodular liver on CT which could be supportive of a new diagnosis of cirrhosis  On the other hand, he is not thrombocytopenic which is somewhat discordant, but perhaps this represents elevation from acute phase reactant  He has new ascites with subjective fevers, leukocytosis, lactic acidosis, elevated PCT, and CT showing peritoneal enhancement concerning for peritonitis  While I agree with the surgical team that there is no indication for surgery, bland ascites typically does not cause peritoneal enhancement, and there is certainly concern for SBP (assuming he has cirrhosis)  He will need a diagnostic paracentesis with cell count, cultures, protein, and albumin  Ascitic fluid SAAG and protein count can be used to verify our suspicion of cirrhosis if it is consistent with cirrhotic portal hypertension      Given that patient does not have any prior liver-related work-up, it is reasonable to send them including hepatitis C     - Can continue levo/flagyl for now  - Follow up blood cultures  - Diagnostic paracentesis  - Iron studies, HCV ab/RNA/genotype, HBV serologies, A1AT  Will forego ceruloplasmin given normal ALP  Will forego autoimmune serologies given very low likelihood, especially if chronic HCV is confirmed  - Avoid NSAIDs    Please reach out with any questions or concerns via [employer]      Tiara Hickman MD  Division of Gastroenterology and Hepatology  [address] [place] [address]

## 2023-01-22 NOTE — QUICK NOTE
Progress Note - Triage Asssessment   Hany Lam 46 y o  male MRN: [id number]    Time Called ( Time): 6414  Date Called: 01/22/23  Room#: ED20  Person requesting evaluation: ED/SLIM    Situation:    Patient is a 47yo M w/ hx of untreated HCV (diagnosed approximately 15yrs ago), former ETOH abuse (hasnt drank in ~20yrs)  He presented to [facility] for evaluation of abdominal pain  Patient states he has been feeling unwell for ~1wk  He hasnt been able to eat anything due to nausea, but he has been drinking upwards of 1/2 gallon of water a day  He states he was urinating less than normal and was only going once or twice a day  He describes his abdominal pain as a bloating type of pain  Labs revealed a sodium level of 117 (corrected is 119), an elevated bilirubin of 2 17, and leukocytosis of 16  Imaging revealed moderate/large ascites w/ enhancement/thickening concerning for peritonitis, L>R pleural effusions and a cirrhotic liver  At time of seeing patient he is in no distress, hemodynamically stable on RA  Interventions:   -Hyponatremia likely 2/2 patient's cirrhosis and hypervolemic hyponatremia  Would send urine studies and consult nephrology for assistance in managing   Continue serial monitoring of Na  -Would consult GI/hepatology for further management of patient's chronic HCV and cirrhosis, and possible OLT candidacy going forward  -Continue treatment for likely peritonitis w/ abx and would consult IR for drainage         Triage Assessment:     Patient to be admitted to step down level of care (SD2)    Recommendations discussed with Dr Alea Quijano and Dr Gerhard Cho

## 2023-01-22 NOTE — ASSESSMENT & PLAN NOTE
· Known hep C  · CT: Mildly nodularity of the liver surface contour suggestive of chronic cirrhotic changes

## 2023-01-22 NOTE — PLAN OF CARE
Problem: Nutrition/Hydration-ADULT  Goal: Nutrient/Hydration intake appropriate for improving, restoring or maintaining nutritional needs  Description: Monitor and assess patient's nutrition/hydration status for malnutrition  Collaborate with interdisciplinary team and initiate plan and interventions as ordered  Monitor patient's weight and dietary intake as ordered or per policy  Utilize nutrition screening tool and intervene as necessary  Determine patient's food preferences and provide high-protein, high-caloric foods as appropriate       INTERVENTIONS:  - Monitor oral intake, urinary output, labs, and treatment plans  - Assess nutrition and hydration status and recommend course of action  - Evaluate amount of meals eaten  - Assist patient with eating if necessary   - Allow adequate time for meals  - Recommend/ encourage appropriate diets, oral nutritional supplements, and vitamin/mineral supplements  - Order, calculate, and assess calorie counts as needed  - Recommend, monitor, and adjust tube feedings and TPN/PPN based on assessed needs  - Assess need for intravenous fluids  - Provide specific nutrition/hydration education as appropriate  - Include patient/family/caregiver in decisions related to nutrition  Outcome: Progressing     Problem: PAIN - ADULT  Goal: Verbalizes/displays adequate comfort level or baseline comfort level  Description: Interventions:  - Encourage patient to monitor pain and request assistance  - Assess pain using appropriate pain scale  - Administer analgesics based on type and severity of pain and evaluate response  - Implement non-pharmacological measures as appropriate and evaluate response  - Consider cultural and social influences on pain and pain management  - Notify physician/advanced practitioner if interventions unsuccessful or patient reports new pain  Outcome: Progressing     Problem: SAFETY ADULT  Goal: Patient will remain free of falls  Description: INTERVENTIONS:  - Educate patient/family on patient safety including physical limitations  - Instruct patient to call for assistance with activity   - Consult OT/PT to assist with strengthening/mobility   - Keep Call bell within reach  - Keep bed low and locked with side rails adjusted as appropriate  - Keep care items and personal belongings within reach  - Initiate and maintain comfort rounds  - Make Fall Risk Sign visible to staff  - Offer Toileting every 2 Hours, in advance of need  - Initiate/Maintain nurse call alarm  - Apply yellow socks and bracelet for high fall risk patients  - Consider moving patient to room near nurses station  Outcome: Progressing  Goal: Maintain or return to baseline ADL function  Description: INTERVENTIONS:  -  Assess patient's ability to carry out ADLs; assess patient's baseline for ADL function and identify physical deficits which impact ability to perform ADLs (bathing, care of mouth/teeth, toileting, grooming, dressing, etc )  - Assess/evaluate cause of self-care deficits   - Assess range of motion  - Assess patient's mobility; develop plan if impaired  - Assess patient's need for assistive devices and provide as appropriate  - Encourage maximum independence but intervene and supervise when necessary  - Involve family in performance of ADLs  - Assess for home care needs following discharge   - Consider OT consult to assist with ADL evaluation and planning for discharge  - Provide patient education as appropriate  Outcome: Progressing  Goal: Maintains/Returns to pre admission functional level  Description: INTERVENTIONS:  - Perform BMAT or MOVE assessment daily    - Set and communicate daily mobility goal to care team and patient/family/caregiver  - Collaborate with rehabilitation services on mobility goals if consulted  - Perform Range of Motion 3 times a day    - Reposition patient, pt is independent   - Out of bed to chair 3 times a day   - Out of bed for meals 3 times a day  - Out of bed for toileting  - Record patient progress and toleration of activity level   Outcome: Progressing     Problem: DISCHARGE PLANNING  Goal: Discharge to home or other facility with appropriate resources  Description: INTERVENTIONS:  - Identify barriers to discharge w/patient and caregiver  - Arrange for needed discharge resources and transportation as appropriate  - Identify discharge learning needs (meds, wound care, etc )  - Arrange for interpretive services to assist at discharge as needed  - Refer to Case Management Department for coordinating discharge planning if the patient needs post-hospital services based on physician/advanced practitioner order or complex needs related to functional status, cognitive ability, or social support system  Outcome: Progressing

## 2023-01-22 NOTE — ASSESSMENT & PLAN NOTE
Patient reporting abdominal pain for 3 days and urinary retention, increased abdominal distention, nausea/vomiting  · Continue IV Levaquin  · Will need paracentesis Monday   · CT: Moderate to large intra-abdominal and pelvic ascites with diffuse enhancement and thickening of the peritoneal lining most compatible with an acute peritonitis   Clinical correlation is recommended     Peritoneal fluid sampling could be performed for   further evaluation

## 2023-01-22 NOTE — CONSULTS
CONSULTATION-NEPHROLOGY   Rhonda Leiva 46 y o  male MRN: [id number]  Unit/Bed#: ICU 02-01 Encounter: [id number]        Assessment and Plan:    1  Mild asymptomatic hyponatremia  · In part due to hypovolemia and this was treated with saline and appropriately discontinued due to growing ascites and pleural effusion  Additionally patient likely has impaired renal handling of water secondary to liver cirrhosis  Urine studies pending but suspect will show an elevated urine osmolality  Recommend fluid restriction and paracentesis which will likely be done tomorrow  Can use IV loop diuretic if symptoms of respiratory failure/pulmonary edema occur  At this point, no peripheral edema or hypoxia  Ultimately may require loop + spironolactone depending on clinical course  2  Sepsis POA 2/2 peritonitis  · Treatment per primary team  3  Suspected liver cirrhosis with ascites  · Pending GI consultation and paracentesis  4  HCV  · Outpatient follow-up with GI    HPI:    Rhonda Leiva is a 46 y o  male with history of alcohol abuse however last drink 20 years ago, HCV virus, who [facility] for 1 week GI complaints including nausea, poor appetite, fever  He noted abdominal bloating as well  Ridging upon arrival significant for moderate to large intra-abdominal and pelvic ascites, potential acute peritonitis, nodular liver, bilateral pleural effusion left greater than right  Initiated on IV antibiotics for suspected peritonitis  GI consulted and IR paracentesis planned for tomorrow  Nephrology consulted for hyponatremia  Upon discussion with the patient he relays HPI as above in addition: Patient states he had been drinking half a gallon of water a day prior to presentation and some soda  He noted that his urine is very dark despite hydrating  He noted a large abdomen which was becoming uncomfortable      From a nephrology perspective, patient has no history of chronic kidney disease or ever seeing a nephrologist   He had very mild hyponatremia and elevated LFTs and blood work done in August 2022  Kidney/bladder normal on imaging  Reason for Consult: Po natremia    Review of Systems:  A complete 10-point review of systems was performed  Aside from what was mentioned in the HPI, it is otherwise negative        Historical Information   Past Medical History:   Diagnosis Date   • Hepatitis C      Past Surgical History:   Procedure Laterality Date   • HAND SURGERY     • WISDOM TOOTH EXTRACTION       Social History   Social History     Substance and Sexual Activity   Alcohol Use Not Currently    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     Social History     Substance and Sexual Activity   Drug Use Yes   • Types: Marijuana    Comment: medical marijuana     Social History     Tobacco Use   Smoking Status Never   Smokeless Tobacco Never       Family History:   Family History   Problem Relation Age of Onset   • Diabetes Mother    • No Known Problems Father        Medications:  Pertinent medications were reviewed  Current Facility-Administered Medications   Medication Dose Route Frequency Provider Last Rate   • acetaminophen  650 mg Oral Q4H PRN Ivan Danay PA-C     • HYDROmorphone  0 5 mg Intravenous Q4H PRN Ivan Danay PA-C     • influenza vaccine  0 5 mL Intramuscular Once M[place], DO     • levofloxacin  750 mg Intravenous Q24H Ivan BJORN JonC     • metroNIDAZOLE  500 mg Intravenous Q8H Ivan Danay PA-C 500 mg (01/22/23 0610)   • ondansetron  4 mg Intravenous Q6H PRN Ivan MAYRA Jon-C     • oxyCODONE  10 mg Oral Q4H PRN Ivan Danay PA-C     • oxyCODONE  5 mg Oral Q4H PRN Ivan Danay PA-C           Allergies   Allergen Reactions   • Penicillins Hives         Vitals:   /72 (BP Location: Right arm)   Pulse 86   Temp 98 7 °F (37 1 °C) (Oral)   Resp 21   Ht 5' 11" (1 803 m)   Wt 72 6 kg (160 lb)   SpO2 93%   BMI 22 32 kg/m²   Body mass index is 22 32 kg/m²  SpO2: 93 %,   SpO2 Activity: At Rest,   O2 Device: None (Room air)      Intake/Output Summary (Last 24 hours) at 1/22/2023 6592  Last data filed at 1/22/2023 0800  Gross per 24 hour   Intake 3268 ml   Output --   Net 3268 ml     Invasive Devices     Peripheral Intravenous Line  Duration           Peripheral IV 01/21/23 Left;Proximal;Ventral (anterior) Forearm <1 day                Physical Exam:  General: conscious, cooperative, in no acute distress  Eyes: conjunctivae pink, anicteric sclerae  ENT: lips and mucous membranes moist  Neck: supple, no JVD, no masses  Chest: Diminished and tachypneic breath sounds bilateral, no crackles, ronchus or wheezings  CVS: S1 & S2, normal rate, regular rhythm  Abdomen: soft, non-tender, overt ascites, normoactive bowel sounds  Extremities: no edema of both legs  Skin: no rash  Neuro: awake, alert, oriented      Diagnostic Data:  Lab: I have personally reviewed pertinent lab results  ,   CBC:  Results from last 7 days   Lab Units 01/22/23  0516   WBC Thousand/uL 12 47*   HEMOGLOBIN g/dL 12 0   HEMATOCRIT % 35 3*   PLATELETS Thousands/uL 291      CMP:   Lab Results   Component Value Date    SODIUM 120 (L) 01/22/2023    K 3 9 01/22/2023    CL 91 (L) 01/22/2023    CO2 23 01/22/2023    BUN 11 01/22/2023    CREATININE 0 53 (L) 01/22/2023    CALCIUM 7 7 (L) 01/22/2023    AST 63 (H) 01/21/2023    ALT 61 (H) 01/21/2023    ALKPHOS 100 01/21/2023    EGFR 121 01/22/2023   ,   PT/INR:   Lab Results   Component Value Date    INR 1 38 (H) 01/21/2023   ,   Magnesium: No components found for: MAG,  Phosphorous: No results found for: PHOS    Microbiology:  @LABRCNTIP,(urinecx:7)@        MARQUIS Victor    Portions of the record may have been created with voice recognition software  Occasional wrong word or "sound a like" substitutions may have occurred due to the inherent limitations of voice recognition software   Read the chart carefully and recognize, using context, where substitutions have occurred

## 2023-01-22 NOTE — H&P
[place] 45  H&P- Odalis Stovall 1970, 46 y o  male MRN: [id number]  Unit/Bed#: ED 20 Encounter: [id number]  Primary Care Provider: Regine De Jesus DO   Date and time admitted to hospital: 1/21/2023  8:32 PM    * Peritonitis [facility])  Assessment & Plan  Patient reporting abdominal pain for 3 days and urinary retention, increased abdominal distention, nausea/vomiting  · Continue IV Levaquin  · Will need paracentesis Monday   · CT: Moderate to large intra-abdominal and pelvic ascites with diffuse enhancement and thickening of the peritoneal lining most compatible with an acute peritonitis   Clinical correlation is recommended     Peritoneal fluid sampling could be performed for   further evaluation  Sepsis ([facility] )  Assessment & Plan  · Secondary peritonitis  · Noted by leukocytosis, tachycardia tachypnea mild lactic acidosis present on admission  · Lactic acidosis resolved on follow up  · Status post 3 L IV fluid  · Trend procalcitonin  · Lactic Acid 1 5  · F/u blood cultures    Hyponatremia  Assessment & Plan  · Nephrology consult  · S/p 3L IVF  · Lab monitoring    Cirrhosis of liver with ascites ([facility] )  Assessment & Plan  · Known hep C  · CT: Mildly nodularity of the liver surface contour suggestive of chronic cirrhotic changes  Hepatitis C  Assessment & Plan  · Known hepatitis C   · f/u GI as OP for evaluation for treatment    VTE Pharmacologic Prophylaxis: VTE Score: 3 Moderate Risk (Score 3-4) - Pharmacological DVT Prophylaxis Contraindicated  Sequential Compression Devices Ordered  Code Status: Level 1 - Full Code   Discussion with patient    Anticipated Length of Stay: Patient will be admitted on an inpatient basis with an anticipated length of stay of greater than 2 midnights secondary to lab monitoring, iv abx, specialist input      Chief Complaint: abdominal pain    History of Present Illness:  Odalis Stovall is a 46 y o  male with a PMH of hepatitis C diagnosed 15 years ago, former alcohol abuse who presents with abdominal pain  Patient reports not feeling well for 1 week secondary to stomach bug, had nausea and decreased PO intake of solids, he was drinking water  He reports up to 10 pound weight loss  Past few days notes increased abdominal pain and bloating with decreased urine output  He feels SOB with increased abdominal distension  No vomiting or diarrhea  Pain starts umbilicus area and goes outward  Hx of ETOH abuse, sober 20 years  ED treatment: 3L NS, Flagyl 500 mg, Levaquin 750 mg, fentanyl 50 mcg    Review of Systems:  Review of Systems   Constitutional: Positive for appetite change  Negative for chills and fever  HENT: Negative for rhinorrhea, sore throat and trouble swallowing  Eyes: Negative for discharge and redness  Respiratory: Positive for shortness of breath  Negative for cough  Cardiovascular: Negative for chest pain and leg swelling  Gastrointestinal: Positive for abdominal pain and nausea  Negative for diarrhea and vomiting  Genitourinary: Positive for difficulty urinating  Negative for hematuria  Musculoskeletal: Negative for back pain and neck pain  Skin: Negative for rash and wound  Neurological: Negative for dizziness, weakness and headaches  Psychiatric/Behavioral: Negative for agitation and confusion  Past Medical and Surgical History:   Past Medical History:   Diagnosis Date   • Hepatitis C        Past Surgical History:   Procedure Laterality Date   • HAND SURGERY     • WISDOM TOOTH EXTRACTION         Meds/Allergies:  Prior to Admission medications    Medication Sig Start Date End Date Taking? Authorizing Provider   ibuprofen (MOTRIN) 200 mg tablet Take 200 mg by mouth every 6 (six) hours as needed  Patient not taking: Reported on 8/24/2021    Historical Provider, MD FAYE have reviewed home medications with patient personally  Allergies:    Allergies   Allergen Reactions   • Penicillins Hives       Social History:  Marital Status: Single   Occupation:   Patient Pre-hospital Living Situation: Home  Patient Pre-hospital Level of Mobility: walks  Patient Pre-hospital Diet Restrictions: none  Substance Use History:   Social History     Substance and Sexual Activity   Alcohol Use Not Currently    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     Social History     Tobacco Use   Smoking Status Never   Smokeless Tobacco Never     Social History     Substance and Sexual Activity   Drug Use Yes   • Types: Marijuana    Comment: medical marijuana       Family History:  Family History   Problem Relation Age of Onset   • Diabetes Mother    • No Known Problems Father        Physical Exam:     Vitals:   Blood Pressure: 165/91 (01/21/23 2258)  Pulse: 92 (01/21/23 2228)  Temperature: 99 4 °F (37 4 °C) (01/21/23 2041)  Temp Source: Temporal (01/21/23 2041)  Respirations: 21 (01/21/23 2041)  Height: 5' 11" (180 3 cm) (01/21/23 2041)  Weight - Scale: 72 6 kg (160 lb) (01/21/23 2041)  SpO2: 91 % (01/21/23 2228)    Physical Exam  Vitals reviewed  Constitutional:       General: He is not in acute distress  Appearance: Normal appearance  He is ill-appearing  HENT:      Head: Normocephalic and atraumatic  Right Ear: External ear normal       Left Ear: External ear normal       Nose: Nose normal       Mouth/Throat:      Mouth: Mucous membranes are dry  Eyes:      General:         Right eye: No discharge  Left eye: No discharge  Conjunctiva/sclera: Conjunctivae normal    Cardiovascular:      Rate and Rhythm: Regular rhythm  Tachycardia present  Heart sounds: Normal heart sounds  No murmur heard  Pulmonary:      Effort: Pulmonary effort is normal  No respiratory distress  Breath sounds: Normal breath sounds  No wheezing or rales  Abdominal:      General: Bowel sounds are increased  There is distension  Palpations: Abdomen is soft  There is fluid wave  Tenderness:  There is generalized abdominal tenderness  There is no guarding  Musculoskeletal:         General: Normal range of motion  Cervical back: Normal range of motion  Skin:     General: Skin is warm and dry  Neurological:      Mental Status: He is alert and oriented to person, place, and time  Mental status is at baseline  Psychiatric:         Mood and Affect: Mood normal          Behavior: Behavior normal          Thought Content: Thought content normal          Judgment: Judgment normal             Additional Data:     Lab Results:  Results from last 7 days   Lab Units 01/21/23 2117   WBC Thousand/uL 16 03*   HEMOGLOBIN g/dL 12 9   HEMATOCRIT % 37 7   PLATELETS Thousands/uL 369   NEUTROS PCT % 84*   LYMPHS PCT % 8*   MONOS PCT % 7   EOS PCT % 0     Results from last 7 days   Lab Units 01/21/23 2117   SODIUM mmol/L 117*   POTASSIUM mmol/L 4 2   CHLORIDE mmol/L 84*   CO2 mmol/L 24   BUN mg/dL 15   CREATININE mg/dL 0 74   ANION GAP mmol/L 9   CALCIUM mg/dL 8 3*   ALBUMIN g/dL 2 8*   TOTAL BILIRUBIN mg/dL 2 17*   ALK PHOS U/L 100   ALT U/L 61*   AST U/L 63*   GLUCOSE RANDOM mg/dL 201*     Results from last 7 days   Lab Units 01/21/23  2226   INR  1 38*             Results from last 7 days   Lab Units 01/21/23 2324 01/21/23 2117   LACTIC ACID mmol/L 1 5 2 3*   PROCALCITONIN ng/ml  --  0 42*       Lines/Drains:  Invasive Devices     Peripheral Intravenous Line  Duration           Peripheral IV 01/21/23 Left;Proximal;Ventral (anterior) Forearm <1 day                Imaging: Reviewed radiology reports from this admission including: abdominal/pelvic CT  CT abdomen pelvis with contrast   Final Result by Emilee Newman MD (01/21 2326)      Moderate to large intra-abdominal and pelvic ascites with diffuse enhancement and thickening of the peritoneal lining most compatible with an acute peritonitis  Clinical correlation is recommended  Peritoneal fluid sampling could be performed for    further evaluation        Mildly nodularity of the liver surface contour suggestive of chronic cirrhotic changes  No free intraperitoneal air  No evidence of large or small bowel obstruction  Partially visualized bilateral pleural effusions with bibasilar atelectasis, left greater than right  I personally discussed this study with Lynda Olivares on 1/21/2023 at 11:17 PM                   Workstation performed: [id number]         XR chest 1 view portable   ED Interpretation by Jelly Sullivan DO (01/21 2220)   No active disease in the chest, no air under the diaphragm          ** Please Note: This note has been constructed using a voice recognition system   **

## 2023-01-23 ENCOUNTER — APPOINTMENT (INPATIENT)
Dept: INTERVENTIONAL RADIOLOGY/VASCULAR | Facility: HOSPITAL | Age: 53
End: 2023-01-23
Attending: INTERNAL MEDICINE

## 2023-01-23 LAB
ALBUMIN FLD-MCNC: 1.6 G/DL
ALBUMIN SERPL BCP-MCNC: 2.6 G/DL (ref 3.5–5)
ALP SERPL-CCNC: 83 U/L (ref 34–104)
ALT SERPL W P-5'-P-CCNC: 74 U/L (ref 7–52)
ANION GAP SERPL CALCULATED.3IONS-SCNC: 7 MMOL/L (ref 4–13)
APPEARANCE FLD: ABNORMAL
AST SERPL W P-5'-P-CCNC: 84 U/L (ref 13–39)
BASOPHILS # BLD AUTO: 0.02 THOUSANDS/ÂΜL (ref 0–0.1)
BASOPHILS NFR BLD AUTO: 0 % (ref 0–1)
BILIRUB SERPL-MCNC: 1.54 MG/DL (ref 0.2–1)
BUN SERPL-MCNC: 11 MG/DL (ref 5–25)
CALCIUM ALBUM COR SERPL-MCNC: 9 MG/DL (ref 8.3–10.1)
CALCIUM SERPL-MCNC: 7.9 MG/DL (ref 8.4–10.2)
CHLORIDE SERPL-SCNC: 89 MMOL/L (ref 96–108)
CO2 SERPL-SCNC: 26 MMOL/L (ref 21–32)
COLOR FLD: YELLOW
CREAT SERPL-MCNC: 0.59 MG/DL (ref 0.6–1.3)
EOSINOPHIL # BLD AUTO: 0.01 THOUSAND/ÂΜL (ref 0–0.61)
EOSINOPHIL NFR BLD AUTO: 0 % (ref 0–6)
EOSINOPHIL NFR FLD MANUAL: 2 %
ERYTHROCYTE [DISTWIDTH] IN BLOOD BY AUTOMATED COUNT: 12.7 % (ref 11.6–15.1)
FERRITIN SERPL-MCNC: 762 NG/ML (ref 8–388)
GFR SERPL CREATININE-BSD FRML MDRD: 116 ML/MIN/1.73SQ M
GLUCOSE FLD-MCNC: 5 MG/DL
GLUCOSE SERPL-MCNC: 114 MG/DL (ref 65–140)
HBV CORE AB SER QL: NORMAL
HBV SURFACE AB SER-ACNC: 123.69 MIU/ML
HBV SURFACE AG SER QL: NORMAL
HCT VFR BLD AUTO: 36.6 % (ref 36.5–49.3)
HCV AB SER QL: ABNORMAL
HGB BLD-MCNC: 12.5 G/DL (ref 12–17)
HISTIOCYTES NFR FLD: 7 %
IMM GRANULOCYTES # BLD AUTO: 0.04 THOUSAND/UL (ref 0–0.2)
IMM GRANULOCYTES NFR BLD AUTO: 1 % (ref 0–2)
IRON SATN MFR SERPL: 8 % (ref 20–50)
IRON SERPL-MCNC: 19 UG/DL (ref 65–175)
LDH FLD L TO P-CCNC: NORMAL U/L
LYMPHOCYTES # BLD AUTO: 0.97 THOUSANDS/ÂΜL (ref 0.6–4.47)
LYMPHOCYTES NFR BLD AUTO: 13 % (ref 14–44)
LYMPHOCYTES NFR BLD AUTO: 36 %
MAGNESIUM SERPL-MCNC: 1.6 MG/DL (ref 1.9–2.7)
MCH RBC QN AUTO: 34.2 PG (ref 26.8–34.3)
MCHC RBC AUTO-ENTMCNC: 34.2 G/DL (ref 31.4–37.4)
MCV RBC AUTO: 100 FL (ref 82–98)
MONOCYTES # BLD AUTO: 0.92 THOUSAND/ÂΜL (ref 0.17–1.22)
MONOCYTES NFR BLD AUTO: 12 % (ref 4–12)
MONOCYTES NFR BLD AUTO: 8 %
NEUTROPHILS # BLD AUTO: 5.71 THOUSANDS/ÂΜL (ref 1.85–7.62)
NEUTS BAND NFR FLD MANUAL: 2 %
NEUTS SEG NFR BLD AUTO: 45 %
NEUTS SEG NFR BLD AUTO: 74 % (ref 43–75)
NRBC BLD AUTO-RTO: 0 /100 WBCS
PLATELET # BLD AUTO: 302 THOUSANDS/UL (ref 149–390)
PMV BLD AUTO: 9.3 FL (ref 8.9–12.7)
POTASSIUM SERPL-SCNC: 3.8 MMOL/L (ref 3.5–5.3)
PROCALCITONIN SERPL-MCNC: 0.35 NG/ML
PROT FLD-MCNC: 4.4 G/DL
PROT SERPL-MCNC: 6.3 G/DL (ref 6.4–8.4)
RBC # BLD AUTO: 3.66 MILLION/UL (ref 3.88–5.62)
SITE: ABNORMAL
SODIUM SERPL-SCNC: 122 MMOL/L (ref 135–147)
TIBC SERPL-MCNC: 229 UG/DL (ref 250–450)
TOTAL CELLS COUNTED SPEC: 100
WBC # BLD AUTO: 7.67 THOUSAND/UL (ref 4.31–10.16)
WBC # FLD MANUAL: ABNORMAL /UL

## 2023-01-23 PROCEDURE — 0W9G3ZZ DRAINAGE OF PERITONEAL CAVITY, PERCUTANEOUS APPROACH: ICD-10-PCS | Performed by: INTERNAL MEDICINE

## 2023-01-23 RX ORDER — LIDOCAINE HYDROCHLORIDE 10 MG/ML
INJECTION, SOLUTION EPIDURAL; INFILTRATION; INTRACAUDAL; PERINEURAL AS NEEDED
Status: COMPLETED | OUTPATIENT
Start: 2023-01-23 | End: 2023-01-23

## 2023-01-23 RX ORDER — LOPERAMIDE HYDROCHLORIDE 2 MG/1
2 CAPSULE ORAL 4 TIMES DAILY PRN
Status: DISCONTINUED | OUTPATIENT
Start: 2023-01-23 | End: 2023-01-28

## 2023-01-23 RX ORDER — MAGNESIUM SULFATE HEPTAHYDRATE 40 MG/ML
2 INJECTION, SOLUTION INTRAVENOUS ONCE
Status: COMPLETED | OUTPATIENT
Start: 2023-01-23 | End: 2023-01-23

## 2023-01-23 RX ADMIN — LEVOFLOXACIN 750 MG: 750 INJECTION, SOLUTION INTRAVENOUS at 23:35

## 2023-01-23 RX ADMIN — MAGNESIUM SULFATE HEPTAHYDRATE 2 G: 40 INJECTION, SOLUTION INTRAVENOUS at 11:58

## 2023-01-23 RX ADMIN — LIDOCAINE HYDROCHLORIDE 10 ML: 10 INJECTION, SOLUTION EPIDURAL; INFILTRATION; INTRACAUDAL at 14:33

## 2023-01-23 RX ADMIN — METRONIDAZOLE 500 MG: 500 INJECTION, SOLUTION INTRAVENOUS at 22:35

## 2023-01-23 RX ADMIN — METRONIDAZOLE 500 MG: 500 INJECTION, SOLUTION INTRAVENOUS at 16:00

## 2023-01-23 RX ADMIN — METRONIDAZOLE 500 MG: 500 INJECTION, SOLUTION INTRAVENOUS at 05:09

## 2023-01-23 RX ADMIN — LOPERAMIDE HYDROCHLORIDE 2 MG: 2 CAPSULE ORAL at 19:54

## 2023-01-23 NOTE — PROGRESS NOTES
Progress Note- Jorgito Escobar 46 y o  male MRN: [id number]    Unit/Bed#: ICU 02-01 Encounter: [id number]      Assessment and Plan:    Patient is a 46 y o  male with PMH significant for chronic HCV and former alcohol use disorder (last drink 20 yrs prior) admitted on 1/22 for worsening abdominal distention and discomfort with associated subjective fevers and leukocytosis  Evaluation upon admission with imaging notable for mild hepatic nodularity, large-volume ascites and possible peritonitis in addition to serologies notable for hypoalbuminemia, elevated INR, and mild transaminases suggestive of a new diagnosis of cirrhosis  1   Abdominal distention  2  Abdominal pain  3  Abnormal findings of liver on imaging  4  Chronic HCV  Patient remains afebrile, with resolved leukocytosis and without abdominal tenderness on exam  CMP with mildly up-trending liver function studies (AST 84, ALT 74, alk phos 83, T bili 1 54) and significant hyponatremia (121)  Patient is to undergo diagnostic/therapuetic paracentesis with albumin replacement per protocol for LVP  Ascitic fluid analysis will help determine if ascites is consistent with portal hypertension (SAAG/protine) in the setting of suspected cirrhosis and evaluate for SBP given possible peritonitis - Although may be indeterminate considering patient has been receiving ppx abx  Otherwise, patient is without other evidence of hepatic decompensation including jaundice, bleeding esophageal varices, or hepatic encephalopathy  Recommend close outpatient hepatology follow-up for further evaluation and management of suspected cirrhosis including routine serologies, outpatient EGD for variceal screening and hepatoma screening  He may also discuss HCV treatment, pending hepatitis B and C serologies for confirmation of chronic HCV       - Diet as tolerated;  Low-sodium (<2g daily)  - Diagnostic/therapeutic paracentesis with albumin replacement for LVP  - Continue IV fluids with lyte replacement  - Continue abx per primary team  - Await pending serologies and BCx2  - Monitor MELD labs daily (CBC, CMP, INR)  - Avoid NSAIDs  - Close outpatient follow-up with hepatology    GI will continue to follow  ______________________________________________________________________    Subjective:    Patient is found resting comfortably in bed  No acute overnight events  Patient is aware that he is to undergo diagnostic/therapeutic paracentesis  Denies jaundice, fever/chills, nausea/vomiting, abdominal pain, or rectal bleeding  Medication Administration - last 24 hours from 01/22/2023 0916 to 01/23/2023 9141       Date/Time Order Dose Route Action Action by     01/22/2023 2219 EST levofloxacin (LEVAQUIN) IVPB (premix in dextrose) 750 mg 150 mL 750 mg Intravenous [address] Kendrick Sorenson RN     01/23/2023 5835 EST metroNIDAZOLE (FLAGYL) IVPB (premix) 500 mg 100 mL 500 mg Intravenous [address] Kendrick Sorenson RN     01/22/2023 2131 EST metroNIDAZOLE (FLAGYL) IVPB (premix) 500 mg 100 mL 500 mg Intravenous [address] Kendrick Sorenson RN     01/22/2023 1341 EST metroNIDAZOLE (FLAGYL) IVPB (premix) 500 mg 100 mL 500 mg Intravenous [address] [place] [address]     01/22/2023 2002 EST aluminum-magnesium hydroxide-simethicone (MYLANTA) oral suspension 30 mL 30 mL Oral Given Kendrick Sorenson RN          Objective:     Vitals: Blood pressure 124/75, pulse 85, temperature (!) 97 4 °F (36 3 °C), temperature source Tympanic, resp  rate 21, height 5' 11" (1 803 m), weight 71 7 kg (158 lb 1 1 oz), SpO2 92 %  ,Body mass index is 22 05 kg/m²  Intake/Output Summary (Last 24 hours) at 1/23/2023 0916  Last data filed at 1/23/2023 0509  Gross per 24 hour   Intake 830 ml   Output 750 ml   Net 80 ml       Physical Exam:   General Appearance: Awake and alert, in no acute distress  Abdomen: +Moderate abd distention;  Soft, non-tender; bowel sounds normal; no masses or no organomegaly    Invasive Devices     Peripheral Intravenous Line  Duration           Peripheral IV 01/21/23 Left;Proximal;Ventral (anterior) Forearm 1 day                Lab Results:  Admission on 01/21/2023   Component Date Value   • Ventricular Rate 01/21/2023 102    • Atrial Rate 01/21/2023 102    • AL Interval 01/21/2023 154    • QRSD Interval 01/21/2023 94    • QT Interval 01/21/2023 334    • QTC Interval 01/21/2023 435    • P Axis 01/21/2023 55    • QRS Axis 01/21/2023 10    • T Wave Axis 01/21/2023 73    • WBC 01/21/2023 16 03 (H)    • RBC 01/21/2023 3 69 (L)    • Hemoglobin 01/21/2023 12 9    • Hematocrit 01/21/2023 37 7    • MCV 01/21/2023 102 (H)    • MCH 01/21/2023 35 0 (H)    • MCHC 01/21/2023 34 2    • RDW 01/21/2023 12 7    • MPV 01/21/2023 9 8    • Platelets 50/33/0905 369    • nRBC 01/21/2023 0    • Neutrophils Relative 01/21/2023 84 (H)    • Immat GRANS % 01/21/2023 1    • Lymphocytes Relative 01/21/2023 8 (L)    • Monocytes Relative 01/21/2023 7    • Eosinophils Relative 01/21/2023 0    • Basophils Relative 01/21/2023 0    • Neutrophils Absolute 01/21/2023 13 48 (H)    • Immature Grans Absolute 01/21/2023 0 12    • Lymphocytes Absolute 01/21/2023 1 21    • Monocytes Absolute 01/21/2023 1 11    • Eosinophils Absolute 01/21/2023 0 06    • Basophils Absolute 01/21/2023 0 05    • Sodium 01/21/2023 117 (L)    • Potassium 01/21/2023 4 2    • Chloride 01/21/2023 84 (L)    • CO2 01/21/2023 24    • ANION GAP 01/21/2023 9    • BUN 01/21/2023 15    • Creatinine 01/21/2023 0 74    • Glucose 01/21/2023 201 (H)    • Calcium 01/21/2023 8 3 (L)    • Corrected Calcium 01/21/2023 9 3    • AST 01/21/2023 63 (H)    • ALT 01/21/2023 61 (H)    • Alkaline Phosphatase 01/21/2023 100    • Total Protein 01/21/2023 6 8    • Albumin 01/21/2023 2 8 (L)    • Total Bilirubin 01/21/2023 2 17 (H)    • eGFR 01/21/2023 106    • LACTIC ACID 01/21/2023 2 3 (HH)    • Procalcitonin 01/21/2023 0 42 (H)    • Protime 01/21/2023 17 0 (H)    • INR 01/21/2023 1 38 (H)    • PTT 01/21/2023 28    • Blood Culture 01/21/2023 Received in Microbiology Lab  Culture in Progress  • Blood Culture 01/21/2023 Received in Microbiology Lab  Culture in Progress      • Color, UA 01/21/2023 Louisa (A)    • Clarity, UA 01/21/2023 Clear    • Specific Gravity, UA 01/21/2023 1 010    • pH, UA 01/21/2023 6 0    • Leukocytes, UA 01/21/2023 Negative    • Nitrite, UA 01/21/2023 Negative    • Protein, UA 01/21/2023 Negative    • Glucose, UA 01/21/2023 Negative    • Ketones, UA 01/21/2023 Negative    • Urobilinogen, UA 01/21/2023 0 2    • Bilirubin, UA 01/21/2023 Negative    • Occult Blood, UA 01/21/2023 Negative    • SARS-CoV-2 01/21/2023 Negative    • INFLUENZA A PCR 01/21/2023 Negative    • INFLUENZA B PCR 01/21/2023 Negative    • RSV PCR 01/21/2023 Negative    • LACTIC ACID 01/21/2023 1 5    • Sodium, Ur 01/21/2023 <5    • Osmolality, Ur 01/21/2023 361    • Osmolality Serum 01/21/2023 259 (L)    • TSH 3RD GENERATON 01/21/2023 3 019    • Cortisol, Random 01/21/2023 31 0    • WBC 01/22/2023 12 47 (H)    • RBC 01/22/2023 3 51 (L)    • Hemoglobin 01/22/2023 12 0    • Hematocrit 01/22/2023 35 3 (L)    • MCV 01/22/2023 101 (H)    • MCH 01/22/2023 34 2    • MCHC 01/22/2023 34 0    • RDW 01/22/2023 12 6    • Platelets 93/66/6326 291    • MPV 01/22/2023 9 0    • Sodium 01/22/2023 120 (L)    • Potassium 01/22/2023 3 9    • Chloride 01/22/2023 91 (L)    • CO2 01/22/2023 23    • ANION GAP 01/22/2023 6    • BUN 01/22/2023 11    • Creatinine 01/22/2023 0 53 (L)    • Glucose 01/22/2023 103    • Calcium 01/22/2023 7 7 (L)    • eGFR 01/22/2023 121    • Procalcitonin 01/22/2023 0 41 (H)    • Hemoglobin A1C 01/22/2023 5 4    • EAG 01/22/2023 108    • Sodium 01/22/2023 121 (L)    • Potassium 01/22/2023 3 8    • Chloride 01/22/2023 91 (L)    • CO2 01/22/2023 23    • ANION GAP 01/22/2023 7    • BUN 01/22/2023 12    • Creatinine 01/22/2023 0 63    • Glucose 01/22/2023 120    • Calcium 01/22/2023 7 6 (L)    • Corrected Calcium 01/22/2023 8 9 • AST 01/22/2023 74 (H)    • ALT 01/22/2023 63 (H)    • Alkaline Phosphatase 01/22/2023 80    • Total Protein 01/22/2023 5 6 (L)    • Albumin 01/22/2023 2 4 (L)    • Total Bilirubin 01/22/2023 1 32 (H)    • eGFR 01/22/2023 113    • Iron Saturation 01/22/2023 8 (L)    • TIBC 01/22/2023 229 (L)    • Iron 01/22/2023 19 (L)    • Ferritin 01/22/2023 762 (H)    • WBC 01/23/2023 7 67    • RBC 01/23/2023 3 66 (L)    • Hemoglobin 01/23/2023 12 5    • Hematocrit 01/23/2023 36 6    • MCV 01/23/2023 100 (H)    • MCH 01/23/2023 34 2    • MCHC 01/23/2023 34 2    • RDW 01/23/2023 12 7    • MPV 01/23/2023 9 3    • Platelets 28/91/6698 302    • nRBC 01/23/2023 0    • Neutrophils Relative 01/23/2023 74    • Immat GRANS % 01/23/2023 1    • Lymphocytes Relative 01/23/2023 13 (L)    • Monocytes Relative 01/23/2023 12    • Eosinophils Relative 01/23/2023 0    • Basophils Relative 01/23/2023 0    • Neutrophils Absolute 01/23/2023 5 71    • Immature Grans Absolute 01/23/2023 0 04    • Lymphocytes Absolute 01/23/2023 0 97    • Monocytes Absolute 01/23/2023 0 92    • Eosinophils Absolute 01/23/2023 0 01    • Basophils Absolute 01/23/2023 0 02    • Sodium 01/23/2023 122 (L)    • Potassium 01/23/2023 3 8    • Chloride 01/23/2023 89 (L)    • CO2 01/23/2023 26    • ANION GAP 01/23/2023 7    • BUN 01/23/2023 11    • Creatinine 01/23/2023 0 59 (L)    • Glucose 01/23/2023 114    • Calcium 01/23/2023 7 9 (L)    • Corrected Calcium 01/23/2023 9 0    • AST 01/23/2023 84 (H)    • ALT 01/23/2023 74 (H)    • Alkaline Phosphatase 01/23/2023 83    • Total Protein 01/23/2023 6 3 (L)    • Albumin 01/23/2023 2 6 (L)    • Total Bilirubin 01/23/2023 1 54 (H)    • eGFR 01/23/2023 116    • Procalcitonin 01/23/2023 0 35 (H)    • Magnesium 01/23/2023 1 6 (L)        Imaging Studies: I have personally reviewed pertinent imaging studies

## 2023-01-23 NOTE — PROGRESS NOTES
[address]  Progress Note Salvador Adkins 1970, 46 y o  male MRN: [id number]  Unit/Bed#: ICU 02-01 Encounter: [id number]  Primary Care Provider: Best Riojas DO   Date and time admitted to hospital: 1/21/2023  8:32 PM    * Peritonitis [facility])  Assessment & Plan  Patient reporting abdominal pain for 3 days and urinary retention, increased abdominal distention, nausea/vomiting  · Continue IV antibiotics with Levaquin/Flagyl  · Patient to have paracentesis today with IR  · Surgical and GI input appreciated, no acute intervention needed  · Will need to follow-up fluid analysis results    Cirrhosis of liver with ascites ([facility] [address] )  Assessment & Plan  · GI input appreciated  · Follow-up further work-up for cirrhosis causes, labs ordered by GI team  · Hepatitis C work-up pending  · IR consulted for paracentesis  · Continue IV antibiotics for possible SBP/peritonitis    Sepsis ([facility] [address] )  Assessment & Plan  · Secondary to peritonitis  · Noted by leukocytosis, tachycardia tachypnea mild lactic acidosis present on admission  · Lactic acidosis resolved with IV fluids  · Continue antibiotics with Levaquin/Flagyl  · Follow-up cultures  · IR consulted for paracentesis    Hyponatremia  Assessment & Plan  · Sodium level gradually improving  · Nephrology following  · Continue fluid restriction  · Repeat BMP in the morning    Hepatitis C  Assessment & Plan  · Patient with a history of hepatitis C untreated  · GI following  · Liver with cirrhotic appearance  We will follow-up paracentesis fluid results    VTE Prophylaxis:  Low risk, encourage ambulation  Continue SCDs    Patient Centered Rounds: I have performed bedside rounds with nursing staff today  Discussions with Specialists or Other Care Team Provider: yes  Education and Discussions with Family / Patient: Updated patient regarding plan of care    Current Length of Stay: 1 day(s)    Current Patient Status: Inpatient   Certification Statement:  The patient will continue to require additional inpatient hospital stay due to Ascites    Discharge Plan: Pending hospital course    Code Status: Level 1 - Full Code    Subjective:   No overnight events noted  Patient denies any abdominal pain  Objective:     Vitals:   Temp (24hrs), Av 8 °F (36 6 °C), Min:97 4 °F (36 3 °C), Max:98 2 °F (36 8 °C)    Temp:  [97 4 °F (36 3 °C)-98 2 °F (36 8 °C)] 98 2 °F (36 8 °C)  HR:  [] 105  Resp:  [18-30] 18  BP: (117-139)/(57-82) 128/77  SpO2:  [92 %-95 %] 94 %  Body mass index is 22 05 kg/m²  Input and Output Summary (last 24 hours): Intake/Output Summary (Last 24 hours) at 2023 1442  Last data filed at 2023 7509  Gross per 24 hour   Intake 830 ml   Output 750 ml   Net 80 ml       Physical Exam:   Physical Exam  Constitutional:       General: He is not in acute distress  HENT:      Head: Normocephalic and atraumatic  Nose: Nose normal       Mouth/Throat:      Mouth: Mucous membranes are moist    Eyes:      Extraocular Movements: Extraocular movements intact  Conjunctiva/sclera: Conjunctivae normal    Cardiovascular:      Rate and Rhythm: Normal rate and regular rhythm  Pulmonary:      Effort: Pulmonary effort is normal  No respiratory distress  Abdominal:      Palpations: Abdomen is soft  Tenderness: There is no abdominal tenderness  Musculoskeletal:         General: Normal range of motion  Cervical back: Normal range of motion and neck supple  Skin:     General: Skin is warm and dry  Neurological:      General: No focal deficit present  Mental Status: He is alert  Mental status is at baseline  Cranial Nerves: No cranial nerve deficit     Psychiatric:         Mood and Affect: Mood normal          Behavior: Behavior normal          Additional Data:     Labs:    Results from last 7 days   Lab Units 23  0742   WBC Thousand/uL 7 67   HEMOGLOBIN g/dL 12 5   HEMATOCRIT % 36 6   PLATELETS Thousands/uL 302 NEUTROS PCT % 74   LYMPHS PCT % 13*   MONOS PCT % 12   EOS PCT % 0     Results from last 7 days   Lab Units 01/23/23  0742   SODIUM mmol/L 122*   POTASSIUM mmol/L 3 8   CHLORIDE mmol/L 89*   CO2 mmol/L 26   BUN mg/dL 11   CREATININE mg/dL 0 59*   CALCIUM mg/dL 7 9*   ALK PHOS U/L 83   ALT U/L 74*   AST U/L 84*     Results from last 7 days   Lab Units 01/21/23  2226   INR  1 38*         Results from last 7 days   Lab Units 01/22/23  0516   HEMOGLOBIN A1C % 5 4       * I Have Reviewed All Lab Data Listed Above  * Additional Pertinent Lab Tests Reviewed: Lauren 66 Admission  Reviewed    Imaging:  Imaging Reports Reviewed Today Include: No new imaging    Recent Cultures (last 7 days):     Results from last 7 days   Lab Units 01/21/23  2117   BLOOD CULTURE  No Growth at 24 hrs  No Growth at 24 hrs  Last 24 Hours Medication List:   Current Facility-Administered Medications   Medication Dose Route Frequency Provider Last Rate   • acetaminophen  650 mg Oral Q4H PRN Vianneya BJORN SalvadorC     • aluminum-magnesium hydroxide-simethicone  30 mL Oral Q4H PRN Vola Field PA-C     • HYDROmorphone  0 5 mg Intravenous Q4H PRN Vianneya Field PA-C     • influenza vaccine  0 5 mL Intramuscular Once [place] Pearce, DO     • levofloxacin  750 mg Intravenous Q24H Vola Field PA-C 750 mg (01/22/23 2219)   • metroNIDAZOLE  500 mg Intravenous [facility], PA-C 500 mg (01/23/23 0509)   • ondansetron  4 mg Intravenous Q6H PRN Vola Field PA-C     • oxyCODONE  10 mg Oral Q4H PRN Vola Field PA-C     • oxyCODONE  5 mg Oral Q4H PRN Vola Field PA-C          Today, Patient Was Seen By: Kelle Pratt MD    ** Please Note: Dictation voice to text software may have been used in the creation of this document   **

## 2023-01-23 NOTE — PROCEDURES
Procedures   INTERVENTIONAL RADIOLOGY PROCEDURE NOTE    Date: 1/23/2023    Procedure: IR PARACENTESIS     Preoperative diagnosis:   1  Acute peritonitis ([facility] [address] )    2  Hepatic cirrhosis due to chronic hepatitis C infection ([facility] [address] )    3  Hyponatremia    4  SIRS (systemic inflammatory response syndrome) (HCC)    5  Cirrhosis of liver with ascites, unspecified hepatic cirrhosis type ([facility] [address] )    6  Hepatitis C virus infection without hepatic coma, unspecified chronicity         Postoperative diagnosis: Same  Surgeon: Lauren Romano MD     Assistant: None  No qualified resident was available  Blood loss: None    Specimens: Fluid sent for lab    Findings: When I put the probe on, the "fluid" was of diffuse low-level echogenicity  There were innumerable septations and loculations  I placed a 7 cm long catheter  I withdrew it slowly  I emptied multiple locules  I got about 20 cc  This was opaque and doreen to yellow in color  Given the enhancing rind on the margins of the fluid, on the CT, and the appearance on ultrasound, I believe this is probably infected  Complications: None immediate      Anesthesia: local

## 2023-01-23 NOTE — ASSESSMENT & PLAN NOTE
Patient reporting abdominal pain for 3 days and urinary retention, increased abdominal distention, nausea/vomiting    · Continue IV antibiotics with Levaquin/Flagyl  · Patient to have paracentesis today with IR  · Surgical and GI input appreciated, no acute intervention needed  · Will need to follow-up fluid analysis results

## 2023-01-23 NOTE — ASSESSMENT & PLAN NOTE
· Patient with a history of hepatitis C untreated  · GI following  · Liver with cirrhotic appearance    We will follow-up paracentesis fluid results

## 2023-01-23 NOTE — UTILIZATION REVIEW
NOTIFICATION OF INPATIENT ADMISSION   AUTHORIZATION REQUEST   SERVICING FACILITY:   [address] AFFILIATED WITH [facility], [address]  Tax ID: [id number]  NPI: [id number]   ATTENDING PROVIDER:  Attending Name and NPI#: Soniya Cuadra [[id number]]  Address: [address] [place] [address] AFFILIATED WITH [facility], [address]  Phone: [phone]     ADMISSION INFORMATION:  Place of Service: [place] [address]  Place of Service Code: 21  Inpatient Admission Date/Time: 1/22/23 12:14 AM  Discharge Date/Time: No discharge date for patient encounter  Admitting Diagnosis Code/Description:  Acute peritonitis ([place] [address] ) [K65 0]  Hyponatremia [E87 1]  Abdominal pain [R10 9]  SIRS (systemic inflammatory response syndrome) ([place] [address] ) [R65 10]  Hepatic cirrhosis due to chronic hepatitis C infection ([place] [address] ) [B18 2, K74 60]  Cirrhosis of liver with ascites, unspecified hepatic cirrhosis type ([place] [address] ) [K74 60, R18 8]  Hepatitis C virus infection without hepatic coma, unspecified chronicity [B19 20]     UTILIZATION REVIEW CONTACT:  Leny Strong, Utilization   Network Utilization Review Department  Phone: [phone]  Fax [phone]  Email: [email or web address]  Contact for approvals/pending authorizations, clinical reviews, and discharge  PHYSICIAN ADVISORY SERVICES:  Medical Necessity Denial & [id number] Review  Phone: [phone]  Fax: [phone]  Email: [email or web address]

## 2023-01-23 NOTE — CASE MANAGEMENT
Case Management Assessment & Discharge Planning Note    Patient name Harris Arciniega  Location ICU 02/ICU - MRN [id number]  : 1970 Date 2023       Current Admission Date: 2023  Current Admission Diagnosis:Peritonitis [facility])   Patient Active Problem List    Diagnosis Date Noted   • Peritonitis ([facility] [address] ) 2023   • Sepsis ([facility] [address] ) 2023   • Cirrhosis of liver with ascites ([facility] [address] ) 2023   • Hyponatremia 2023   • Hepatitis C 2023      LOS (days): 1  Geometric Mean LOS (GMLOS) (days):   Days to GMLOS:     OBJECTIVE:    Risk of Unplanned Readmission Score: 10 27     Current admission status: Inpatient    Preferred Pharmacy:   [address] [place] [address], [address] [place] [address]  Phone: [phone] Fax: [phone]    Primary Care Provider: Ba Eldridge DO    Primary Insurance: [place]  Secondary Insurance:     ASSESSMENT:  [address] Proxies    There are no active Health Care Proxies on file         Advance Directives  Does patient have a [address]?: No  Was patient offered paperwork?: Yes (Declined)  Does patient have Advance Directives?: No  Was patient offered paperwork?: Yes (Declined)  Primary Contact: Renetta Dumont friend    Readmission Root Cause  30 Day Readmission: No    Patient Information  Admitted from[de-identified] Home  Mental Status: Alert  During Assessment patient was accompanied by: Not accompanied during assessment  Assessment information provided by[de-identified] Patient  Primary Caregiver: Self  Support Systems: Keith Innocent of Residence: [address] [place] [address] do you live in?: [place]  Type of Current Residence: Apartment  Floor Level: 2  Upon entering residence, is there a bedroom on the main floor (no further steps)?: Yes  Upon entering residence, is there a bathroom on the main floor (no further steps)?: Yes  In the last 12 months, was there a time when you were not able to pay the mortgage or rent on time?: No  In the last 12 months, how many places have you lived?: 1  In the last 12 months, was there a time when you did not have a steady place to sleep or slept in a shelter (including now)?: No  Homeless/housing insecurity resource given?: N/A  Living Arrangements: Lives Alone  Is patient a ?: No    Activities of Daily Living Prior to Admission  Functional Status: Independent  Completes ADLs independently?: Yes  Ambulates independently?: Yes  Does patient use assisted devices?: No  Does patient currently own DME?: Yes  What DME does the patient currently own?: Crutches  Does patient have a history of Outpatient Therapy (PT/OT)?: Yes  Does the patient have a history of Short-Term Rehab?: No  Does patient have a history of HHC?: No  Does patient currently have [facility]?: No    Patient Information Continued  Income Source: Employed  Does patient have prescription coverage?: Yes  Within the past 12 months, you worried that your food would run out before you got the money to buy more : Never true  Within the past 12 months, the food you bought just didn't last and you didn't have money to get more : Never true  Food insecurity resource given?: N/A  Does patient receive dialysis treatments?: No  Does patient have a history of substance abuse?: No  Does patient have a history of Mental Health Diagnosis?: No    PHQ 2/9 Screening   Reviewed PHQ 2/9 Depression Screening Score?: No    Means of Transportation  Means of Transport to Appts[de-identified] Drives Self  In the past 12 months, has lack of transportation kept you from medical appointments or from getting medications?: No  In the past 12 months, has lack of transportation kept you from meetings, work, or from getting things needed for daily living?: No  Was application for public transport provided?: N/A  DISCHARGE DETAILS:    Discharge planning discussed with[de-identified] Pt    Contacts  Patient Contacts: Charlene Chinchilla friend  Relationship to Patient[de-identified] [address] [place] the patient interested in [facility] at discharge?: No    DME Referral Provided  Referral made for DME?: No    Would you like to participate in our [address] service program?  : No - Declined    Treatment Team Recommendation: Home  Discharge Destination Plan[de-identified] Home  Transport at Discharge : Self   Pt was IPA, drives and works  Pt drove to hospital from [facility]  Will follow for any care needs

## 2023-01-23 NOTE — ASSESSMENT & PLAN NOTE
· GI input appreciated  · Follow-up further work-up for cirrhosis causes, labs ordered by GI team  · Hepatitis C work-up pending  · IR consulted for paracentesis  · Continue IV antibiotics for possible SBP/peritonitis

## 2023-01-23 NOTE — ASSESSMENT & PLAN NOTE
· Secondary to peritonitis  · Noted by leukocytosis, tachycardia tachypnea mild lactic acidosis present on admission  · Lactic acidosis resolved with IV fluids  · Continue antibiotics with Levaquin/Flagyl  · Follow-up cultures  · IR consulted for paracentesis

## 2023-01-23 NOTE — PROGRESS NOTES
Progress Note - Nephrology   Rex Abo 46 y o  male MRN: [id number]  Unit/Bed#: ICU 02-01 Encounter: [id number]    A/P:  1  Hyponatremia   Serum sodium level continues to improve with fluid restriction and low-sodium diet  Continue with current care, serum sodium level was up to 122 mmol/liter  Continue fluid restriction which is currently fairly severe at 1 2 L   2   Hypomagnesemia   Patient given 2 g of IV magnesium, continue to monitor magnesium levels and add supplementation as indicated  3   Severe iron deficiency   Patient noted to have severe iron deficiency on iron stores evaluation, will benefit from intravenous iron supplementation when clinically appropriate  4   Peritonitis   Continue management according to hospitalist/gastroenterology recommendations  Patient appears to be improved from a clinical standpoint  5   Newly diagnosed cirrhosis of liver   Patient has known underlying hepatitis C, currently being genotyped, hepatitis B also being evaluated  Full work-up including autoimmune hepatitis currently being deferred due to history of chronic hepatitis C as well as previous history of alcoholism  Patient has been sober from alcohol for about 20 years  Further care and evaluation according to gastroenterology recommendations  Follow up reason for today's visit: Multiple electrolyte abnormalities    Peritonitis [facility])    Patient Active Problem List   Diagnosis   • Hepatitis C   • Peritonitis ([facility] [address] )   • Sepsis ([facility] [address] )   • Cirrhosis of liver with ascites ([facility] )   • Hyponatremia         Subjective:   Patient sleeping when examined, he is fatigued/tired did not sleep well overnight  Objective:     Vitals: Blood pressure 139/76, pulse 83, temperature 98 2 °F (36 8 °C), temperature source Tympanic, resp  rate 21, height 5' 11" (1 803 m), weight 71 7 kg (158 lb 1 1 oz), SpO2 92 %  ,Body mass index is 22 05 kg/m²      Weight (last 2 days)     Date/Time Weight    01/23/23 0600 71 7 (158 07) 01/21/23 2041 72 6 (160)            Intake/Output Summary (Last 24 hours) at 1/23/2023 1334  Last data filed at 1/23/2023 0509  Gross per 24 hour   Intake 830 ml   Output 750 ml   Net 80 ml     I/O last 3 completed shifts: In: 4098 [P O :598; IV Piggyback:3500]  Out: 750 [Urine:750]         Physical Exam: /76 (BP Location: Right arm)   Pulse 83   Temp 98 2 °F (36 8 °C) (Tympanic)   Resp 21   Ht 5' 11" (1 803 m)   Wt 71 7 kg (158 lb 1 1 oz)   SpO2 92%   BMI 22 05 kg/m²     General Appearance:    Alert, cooperative, no distress, appears stated age   Head:    Normocephalic, without obvious abnormality, atraumatic   Eyes:    Conjunctiva/corneas clear   Ears:    Normal external ears   Nose:   Nares normal, septum midline, mucosa normal, no drainage    or sinus tenderness   Throat:   Lips, mucosa, and tongue normal; teeth and gums normal   Neck:   Supple   Back:     Symmetric, no curvature, ROM normal, no CVA tenderness   Lungs:     Clear to auscultation bilaterally, respirations unlabored   Chest wall:    No tenderness or deformity   Heart:    Regular rate and rhythm, S1 and S2 normal, no murmur, rub   or gallop   Abdomen:     Soft, non-tender, bowel sounds active   Extremities:   Extremities normal, atraumatic, no cyanosis or edema   Skin:   Skin color, texture, turgor normal, no rashes or lesions   Lymph nodes:   Cervical normal   Neurologic:   CNII-XII intact            Lab, Imaging and other studies: I have personally reviewed pertinent labs    CBC:   Lab Results   Component Value Date    WBC 7 67 01/23/2023    HGB 12 5 01/23/2023    HCT 36 6 01/23/2023     (H) 01/23/2023     01/23/2023    MCH 34 2 01/23/2023    MCHC 34 2 01/23/2023    RDW 12 7 01/23/2023    MPV 9 3 01/23/2023    NRBC 0 01/23/2023     CMP:   Lab Results   Component Value Date    K 3 8 01/23/2023    CL 89 (L) 01/23/2023    CO2 26 01/23/2023    BUN 11 01/23/2023    CREATININE 0 59 (L) 01/23/2023    CALCIUM 7 9 (L) 01/23/2023 AST 84 (H) 01/23/2023    ALT 74 (H) 01/23/2023    ALKPHOS 83 01/23/2023    EGFR 116 01/23/2023         Results from last 7 days   Lab Units 01/23/23  0742 01/22/23  1557 01/22/23  0516 01/21/23  2117   POTASSIUM mmol/L 3 8 3 8 3 9 4 2   CHLORIDE mmol/L 89* 91* 91* 84*   CO2 mmol/L 26 23 23 24   BUN mg/dL 11 12 11 15   CREATININE mg/dL 0 59* 0 63 0 53* 0 74   CALCIUM mg/dL 7 9* 7 6* 7 7* 8 3*   ALK PHOS U/L 83 80  --  100   ALT U/L 74* 63*  --  61*   AST U/L 84* 74*  --  63*         Phosphorus: No results found for: PHOS  Magnesium:   Lab Results   Component Value Date    MG 1 6 (L) 01/23/2023     Urinalysis: No results found for: COLORU, CLARITYU, SPECGRAV, PHUR, LEUKOCYTESUR, NITRITE, PROTEINUA, GLUCOSEU, KETONESU, BILIRUBINUR, BLOODU  Ionized Calcium: No results found for: CAION  Coagulation: No results found for: PT, INR, APTT  Troponin: No results found for: TROPONINI  ABG: No results found for: PHART, VHO8FUD, PO2ART, OWO7OOK, F5JSJZFN, BEART, SOURCE  Radiology review:     IMAGING  Procedure: XR chest 1 view portable    Result Date: 1/22/2023  Narrative: CHEST INDICATION:   Shortness of breath  COMPARISON:  None EXAM PERFORMED/VIEWS:  XR CHEST PORTABLE  AP semierect Images: 1 FINDINGS: Cardiomediastinal silhouette appears unremarkable  There is haziness at the left lung base with some linear opacities  Minimal linear opacities at the right base also  Lungs otherwise clear  Lung volumes are low  No pneumothorax  Osseous structures appear within normal limits for patient age  Impression: Minimal bibasilar atelectasis  Probable small left effusion  Similar findings were described and reported in the current CT scan  Workstation performed: [id number]     Procedure: CT abdomen pelvis with contrast    Result Date: 1/21/2023  Narrative: CT ABDOMEN AND PELVIS WITH IV CONTRAST INDICATION:   Abdominal pain, acute, nonlocalized Abdominal bloating, nausea  COMPARISON:  None   TECHNIQUE:  CT examination of the abdomen and pelvis was performed  Axial, sagittal, and coronal 2D reformatted images were created from the source data and submitted for interpretation  Radiation dose length product (DLP) for this visit:  730 66 mGy-cm   This examination, like all CT scans performed in the [facility], was performed utilizing techniques to minimize radiation dose exposure, including the use of iterative  reconstruction and automated exposure control  IV Contrast:  100 mL of iohexol (OMNIPAQUE) Enteric Contrast:  Enteric contrast was administered  FINDINGS: ABDOMEN LOWER CHEST:  There are partially visualized bilateral pleural effusions with posterior bibasilar atelectasis, left greater than right  LIVER/BILIARY TREE:  There is mild nodularity of the liver surface contour suspicious for early cirrhotic changes  Within the limitations of this examination there is no evidence of suspicious hepatic mass  No biliary dilatation  Portal vein is patent  GALLBLADDER:  No calcified gallstones  No pericholecystic inflammatory change  There is diffuse gallbladder wall thickening  SPLEEN:  Unremarkable  PANCREAS:  Unremarkable  ADRENAL GLANDS:  Unremarkable  KIDNEYS/URETERS:  Unremarkable  No hydronephrosis  STOMACH AND BOWEL:  Unremarkable  APPENDIX:  No findings to suggest appendicitis  ABDOMINOPELVIC CAVITY:  There is a moderate to large amount of intra-abdominal and pelvic ascites with diffuse smooth thin enhancement of the peritoneum suspicious for an acute peritonitis  No pneumoperitoneum  No lymphadenopathy  VESSELS:  Unremarkable for patient's age  PELVIS REPRODUCTIVE ORGANS:  Unremarkable for patient's age  URINARY BLADDER:  Unremarkable  ABDOMINAL WALL/INGUINAL REGIONS:  Unremarkable  OSSEOUS STRUCTURES:  No acute fracture or destructive osseous lesion  There are bilateral L5 pars interarticularis defects with grade 1 anterolisthesis of L5 on S1  There is mild reversal of the normal lumbar lordosis  Impression: Moderate to large intra-abdominal and pelvic ascites with diffuse enhancement and thickening of the peritoneal lining most compatible with an acute peritonitis  Clinical correlation is recommended  Peritoneal fluid sampling could be performed for further evaluation  Mildly nodularity of the liver surface contour suggestive of chronic cirrhotic changes  No free intraperitoneal air  No evidence of large or small bowel obstruction  Partially visualized bilateral pleural effusions with bibasilar atelectasis, left greater than right  I personally discussed this study with Lexis Willa on 1/21/2023 at 11:17 PM  Workstation performed: [id number]       Current Facility-Administered Medications   Medication Dose Route Frequency   • acetaminophen (TYLENOL) tablet 650 mg  650 mg Oral Q4H PRN   • aluminum-magnesium hydroxide-simethicone (MYLANTA) oral suspension 30 mL  30 mL Oral Q4H PRN   • HYDROmorphone (DILAUDID) injection 0 5 mg  0 5 mg Intravenous Q4H PRN   • influenza vaccine, recombinant, quadrivalent (FLUBLOK) IM injection 0 5 mL  0 5 mL Intramuscular Once   • levofloxacin (LEVAQUIN) IVPB (premix in dextrose) 750 mg 150 mL  750 mg Intravenous Q24H   • magnesium sulfate 2 g/50 mL IVPB (premix) 2 g  2 g Intravenous Once   • metroNIDAZOLE (FLAGYL) IVPB (premix) 500 mg 100 mL  500 mg Intravenous Q8H   • ondansetron (ZOFRAN) injection 4 mg  4 mg Intravenous Q6H PRN   • oxyCODONE (ROXICODONE) immediate release tablet 10 mg  10 mg Oral Q4H PRN   • oxyCODONE (ROXICODONE) IR tablet 5 mg  5 mg Oral Q4H PRN     Medications Discontinued During This Encounter   Medication Reason   • ibuprofen (MOTRIN) 200 mg tablet        Shira Quispe, DO      This progress note was produced in part using a dictation device which may document imprecise wording from author's original intent

## 2023-01-23 NOTE — DISCHARGE INSTRUCTIONS
[address]  Interventional Radiology  [phone]             Abdominal Paracentesis     WHAT YOU NEED TO KNOW:   Abdominal paracentesis is a procedure to remove abnormal fluid buildup in your abdomen  Fluid builds up because of liver problems, such as swelling and scarring  Heart failure, kidney disease, a mass, or problems with your pancreas may also cause fluid buildup  DISCHARGE INSTRUCTIONS:     Follow up with your healthcare provider as directed: Write down your questions so you remember to ask them during your visits  Wound care: Remove dressing after 24 hours  Leave glue in place  Return to your normal activities    Contact Interventional Radiology at [phone] Rachid PATIENTS: Contact Interventional Radiology at [phone]) Savanna Maloney PATIENTS: Contact Interventional Radiology at [phone]) if:  You have a fever and your wound is red and swollen  You have yellow, green, or bad-smelling discharge coming from your wound  You have pain or swelling in your abdomen  You have an upset stomach or you vomit  You have sudden, sharp pain in your abdomen  You urinate very little or not at all  You feel confused and more tired than usual    Your arm or leg feels warm, tender, and painful  It may look swollen and red  You suddenly feel lightheaded and have trouble breathing

## 2023-01-23 NOTE — ASSESSMENT & PLAN NOTE
· Sodium level gradually improving  · Nephrology following  · Continue fluid restriction  · Repeat BMP in the morning

## 2023-01-24 LAB
A1AT SERPL-MCNC: 321 MG/DL (ref 101–187)
ALBUMIN SERPL BCP-MCNC: 2.6 G/DL (ref 3.5–5)
ALP SERPL-CCNC: 78 U/L (ref 34–104)
ALT SERPL W P-5'-P-CCNC: 75 U/L (ref 7–52)
ANION GAP SERPL CALCULATED.3IONS-SCNC: 8 MMOL/L (ref 4–13)
AST SERPL W P-5'-P-CCNC: 80 U/L (ref 13–39)
BASOPHILS # BLD AUTO: 0.03 THOUSANDS/ÂΜL (ref 0–0.1)
BASOPHILS NFR BLD AUTO: 0 % (ref 0–1)
BILIRUB SERPL-MCNC: 1.24 MG/DL (ref 0.2–1)
BUN SERPL-MCNC: 9 MG/DL (ref 5–25)
CALCIUM ALBUM COR SERPL-MCNC: 8.9 MG/DL (ref 8.3–10.1)
CALCIUM SERPL-MCNC: 7.8 MG/DL (ref 8.4–10.2)
CHLORIDE SERPL-SCNC: 91 MMOL/L (ref 96–108)
CO2 SERPL-SCNC: 23 MMOL/L (ref 21–32)
CREAT SERPL-MCNC: 0.57 MG/DL (ref 0.6–1.3)
EOSINOPHIL # BLD AUTO: 0.01 THOUSAND/ÂΜL (ref 0–0.61)
EOSINOPHIL NFR BLD AUTO: 0 % (ref 0–6)
ERYTHROCYTE [DISTWIDTH] IN BLOOD BY AUTOMATED COUNT: 12.8 % (ref 11.6–15.1)
GFR SERPL CREATININE-BSD FRML MDRD: 118 ML/MIN/1.73SQ M
GLUCOSE SERPL-MCNC: 109 MG/DL (ref 65–140)
HCT VFR BLD AUTO: 36.2 % (ref 36.5–49.3)
HGB BLD-MCNC: 12.3 G/DL (ref 12–17)
IMM GRANULOCYTES # BLD AUTO: 0.04 THOUSAND/UL (ref 0–0.2)
IMM GRANULOCYTES NFR BLD AUTO: 1 % (ref 0–2)
LYMPHOCYTES # BLD AUTO: 1.25 THOUSANDS/ÂΜL (ref 0.6–4.47)
LYMPHOCYTES NFR BLD AUTO: 15 % (ref 14–44)
MAGNESIUM SERPL-MCNC: 1.8 MG/DL (ref 1.9–2.7)
MCH RBC QN AUTO: 34.3 PG (ref 26.8–34.3)
MCHC RBC AUTO-ENTMCNC: 34 G/DL (ref 31.4–37.4)
MCV RBC AUTO: 101 FL (ref 82–98)
MONOCYTES # BLD AUTO: 1.02 THOUSAND/ÂΜL (ref 0.17–1.22)
MONOCYTES NFR BLD AUTO: 12 % (ref 4–12)
NEUTROPHILS # BLD AUTO: 6.21 THOUSANDS/ÂΜL (ref 1.85–7.62)
NEUTS SEG NFR BLD AUTO: 72 % (ref 43–75)
NRBC BLD AUTO-RTO: 0 /100 WBCS
PLATELET # BLD AUTO: 329 THOUSANDS/UL (ref 149–390)
PMV BLD AUTO: 9.2 FL (ref 8.9–12.7)
POTASSIUM SERPL-SCNC: 3.8 MMOL/L (ref 3.5–5.3)
PROCALCITONIN SERPL-MCNC: 0.34 NG/ML
PROT SERPL-MCNC: 6 G/DL (ref 6.4–8.4)
RBC # BLD AUTO: 3.59 MILLION/UL (ref 3.88–5.62)
SODIUM SERPL-SCNC: 122 MMOL/L (ref 135–147)
WBC # BLD AUTO: 8.56 THOUSAND/UL (ref 4.31–10.16)

## 2023-01-24 RX ORDER — CEFTRIAXONE 2 G/50ML
2000 INJECTION, SOLUTION INTRAVENOUS EVERY 24 HOURS
Status: DISCONTINUED | OUTPATIENT
Start: 2023-01-24 | End: 2023-02-03 | Stop reason: HOSPADM

## 2023-01-24 RX ADMIN — INFLUENZA A VIRUS A/WISCONSIN/588/2019 (H1N1) RECOMBINANT HEMAGGLUTININ ANTIGEN, INFLUENZA A VIRUS A/DARWIN/6/2021 (H3N2) RECOMBINANT HEMAGGLUTININ ANTIGEN, INFLUENZA B VIRUS B/AUSTRIA/1359417/2021 RECOMBINANT HEMAGGLUTININ ANTIGEN, AND INFLUENZA B VIRUS B/PHUKET/3073/2013 RECOMBINANT HEMAGGLUTININ ANTIGEN 0.5 ML: 45; 45; 45; 45 INJECTION INTRAMUSCULAR at 05:28

## 2023-01-24 RX ADMIN — METRONIDAZOLE 500 MG: 500 INJECTION, SOLUTION INTRAVENOUS at 13:54

## 2023-01-24 RX ADMIN — METRONIDAZOLE 500 MG: 500 INJECTION, SOLUTION INTRAVENOUS at 05:28

## 2023-01-24 RX ADMIN — ACETAMINOPHEN 650 MG: 325 TABLET ORAL at 14:00

## 2023-01-24 RX ADMIN — CEFTRIAXONE 2000 MG: 2 INJECTION, SOLUTION INTRAVENOUS at 16:30

## 2023-01-24 RX ADMIN — VANCOMYCIN HYDROCHLORIDE 1750 MG: 1 INJECTION, POWDER, LYOPHILIZED, FOR SOLUTION INTRAVENOUS at 17:27

## 2023-01-24 RX ADMIN — LOPERAMIDE HYDROCHLORIDE 2 MG: 2 CAPSULE ORAL at 17:37

## 2023-01-24 NOTE — PROGRESS NOTES
Progress Note- Edy Cobb 46 y o  male MRN: [id number]    Unit/Bed#: ICU 02-01 Encounter: [id number]      Assessment and Plan:    Patient is a 46 y o  male with PMH significant for chronic HCV and former alcohol use disorder (last drink 20 yrs prior) admitted on 1/22 for worsening abdominal distention and discomfort with associated subjective fevers and leukocytosis  CT demonstrated mild hepatic nodularity, large-volume ascites and possible peritonitis in addition to serologies notable for hypoalbuminemia, elevated INR, and mild transaminases suggestive of a new diagnosis of cirrhosis  BC with NGx24     1  Spontaneous bacterial peritonitis (SBP)  2  Abnormal findings of liver on imaging  3  Chronic HCV  Patient underwent diagnostic/therepeutic paracentesis, noted to have innumerable septations and loculations with approx 20 cc opaque fluid removed  Fluid analysis consistent with spontaneous bacterial peritonitis (SBP) but with low SAAG (<1 1) and high ascitic protein (4 4), discordant with portal hypertension secondary to cirrhosis and raising concern for primary perotinitis  Patient remains afebrile, with resolved leukocytosis and relatively benign abdominal exam  His initital CT with enteric contrast was without evidence of obvious perforation  General surgery team to reevaluate patient today, appreciate recommendations  Patient made NPO at their request  Currently receiving levofloxacin 750 mg once shahid and metronidazole 500 mg q8 hrs which is sufficient given his penicillin allergy limiting the use of third-generation cephalosporins      - NPO pending general surgery eval  - Continue IV fluids with lyte replacement  - Continue Levaquin/Flagyl per primary team  - Monitor WBC count, fever curve and vitals closely  - Monitor serial abdominal exams  - Monitor MELD labs daily (CBC, CMP, INR)  - Avoid NSAIDs      GI will continue to follow  ______________________________________________________________________    Subjective:     Patient found resting comfortably in bed  No acute overnight events  Patient feels well, with some relief and pressure following his paracentesis yesterday  Denies jaundice, fever/chills, nausea/vomiting, abdominal pain, or rectal bleeding  Reports last BM was yesterday morning  Tolerating diet without difficulty        Medication Administration - last 24 hours from 01/23/2023 0812 to 01/24/2023 5597       Date/Time Order Dose Route Action Action by     01/24/2023 0105 EST levofloxacin (LEVAQUIN) IVPB (premix in dextrose) 750 mg 150 mL 0 mg Intravenous Stopped Serena Addison RN     01/23/2023 2335 EST levofloxacin (LEVAQUIN) IVPB (premix in dextrose) 750 mg 150 mL 750 mg Intravenous [address] Serena Addison RN     01/24/2023 2017 EST metroNIDAZOLE (FLAGYL) IVPB (premix) 500 mg 100 mL 500 mg Intravenous [address] Serena Addison RN     01/23/2023 2305 EST metroNIDAZOLE (FLAGYL) IVPB (premix) 500 mg 100 mL 0 mg Intravenous Stopped Serena Addison RN     01/23/2023 2235 EST metroNIDAZOLE (FLAGYL) IVPB (premix) 500 mg 100 mL 500 mg Intravenous [address] Serena Addison RN     01/23/2023 1600 EST metroNIDAZOLE (FLAGYL) IVPB (premix) 500 mg 100 mL 500 mg Intravenous [address]     01/24/2023 6492 EST influenza vaccine, recombinant, quadrivalent (FLUBLOK) IM injection 0 5 mL 0 5 mL Intramuscular Given Serena Addison RN     01/23/2023 1200 EST magnesium sulfate 2 g/50 mL IVPB (premix) 2 g 0 g Intravenous Stopped Melissa Powell RN     01/23/2023 1158 EST magnesium sulfate 2 g/50 mL IVPB (premix) 2 g 2 g Intravenous [phone] [facility]     01/23/2023 1433 EST lidocaine (PF) (XYLOCAINE-MPF) 1 % injection 10 mL Infiltration Given Corey Luong MD     01/23/2023 1954 EST loperamide (IMODIUM) capsule 2 mg 2 mg Oral Given Serena Addison RN          Objective:     Vitals: Blood pressure 128/77, pulse 99, temperature 99 6 °F (37 6 °C), temperature source Tympanic, resp  rate (!) 24, height 5' 11" (1 803 m), weight 68 9 kg (151 lb 14 4 oz), SpO2 93 %  ,Body mass index is 21 19 kg/m²  Intake/Output Summary (Last 24 hours) at 1/24/2023 0845  Last data filed at 1/24/2023 0500  Gross per 24 hour   Intake 400 ml   Output 400 ml   Net 0 ml       Physical Exam:   General Appearance: Awake and alert, in no acute distress  Abdomen: +Mild abd distention; Soft, non-tender; bowel sounds normal; no masses or no organomegaly    Invasive Devices     Peripheral Intravenous Line  Duration           Peripheral IV 01/21/23 Left;Proximal;Ventral (anterior) Forearm 2 days                Lab Results:  No results displayed because visit has over 200 results  Imaging Studies: I have personally reviewed pertinent imaging studies

## 2023-01-24 NOTE — ASSESSMENT & PLAN NOTE
Patient reporting abdominal pain for 3 days and urinary retention, increased abdominal distention, nausea/vomiting  · Continue IV antibiotics with Levaquin/Flagyl  · Surgical and GI input appreciated, no acute intervention needed  · See sepsis

## 2023-01-24 NOTE — PROGRESS NOTES
Progress Note - Nephrology   Anne Oliveira 46 y o  male MRN: [id number]  Unit/Bed#: ICU 02-01 Encounter: [id number]    A/P:  1  Hyponatremia              Serum sodium level unchanged over the last 24 hours, remains at about 122 mmol/liter  Continue with fluid restriction of 1 2 L  If there is no significant improvement in sodium in the next 24 hours, will consider use of tolvaptan versus furosemide depending on the patient's clinical status  2   Hypomagnesemia              Continue to monitor magnesium levels from time to time, add IV magnesium as indicated  3   Severe iron deficiency              Patient has a severe iron deficiency, currently contraindicated to receive IV iron, may benefit from oral iron when he is in his usual state of health as well as IV iron supplementation if needed  4   Peritonitis              Continue to therapy according to hospitalist and infectious disease recommendations  5   Newly diagnosed cirrhosis of liver   Continue further work-up and evaluation according to recommendations from gastroenterology  Follow up reason for today's visit: Hyponatremia/hypomagnesemia    Peritonitis [facility])    Patient Active Problem List   Diagnosis   • Hepatitis C   • Peritonitis ([facility] )   • Sepsis ([facility] )   • Cirrhosis of liver with ascites ([facility] )   • Hyponatremia         Subjective:   No acute distress at this time, patient's abdominal pain is controlled  Objective:     Vitals: Blood pressure 138/83, pulse (!) 110, temperature 99 5 °F (37 5 °C), temperature source Tympanic, resp  rate (!) 27, height 5' 11" (1 803 m), weight 68 9 kg (151 lb 14 4 oz), SpO2 92 %  ,Body mass index is 21 19 kg/m²      Weight (last 2 days)     Date/Time Weight    01/24/23 0532 68 9 (151 9)    01/23/23 0600 71 7 (158 07)            Intake/Output Summary (Last 24 hours) at 1/24/2023 1539  Last data filed at 1/24/2023 1200  Gross per 24 hour   Intake 350 ml   Output 775 ml   Net -425 ml     I/O last 3 completed shifts: In: 200 [P O :480; IV Piggyback:750]  Out: 1150 [Urine:1150]         Physical Exam: /83   Pulse (!) 110   Temp 99 5 °F (37 5 °C) (Tympanic)   Resp (!) 27   Ht 5' 11" (1 803 m)   Wt 68 9 kg (151 lb 14 4 oz)   SpO2 92%   BMI 21 19 kg/m²     General Appearance:    Alert, cooperative, no distress, appears stated age   Head:    Normocephalic, without obvious abnormality, atraumatic   Eyes:    Conjunctiva/corneas clear   Ears:    Normal external ears   Nose:   Nares normal, septum midline, mucosa normal, no drainage    or sinus tenderness   Throat:   Lips, mucosa, and tongue normal; teeth and gums normal   Neck:   Supple   Back:     Symmetric, no curvature, ROM normal, no CVA tenderness   Lungs:     Clear to auscultation bilaterally, respirations unlabored   Chest wall:    No tenderness or deformity   Heart:    Regular rate and rhythm, S1 and S2 normal, no murmur, rub   or gallop   Abdomen:     Soft, non-tender, bowel sounds active   Extremities:   Extremities normal, atraumatic, no cyanosis, +1 bilateral lower extremity edema   Skin:   Skin color, texture, turgor normal, no rashes or lesions   Lymph nodes:   Cervical normal   Neurologic:   CNII-XII intact            Lab, Imaging and other studies: I have personally reviewed pertinent labs  CBC:   Lab Results   Component Value Date    WBC 8 56 01/24/2023    HGB 12 3 01/24/2023    HCT 36 2 (L) 01/24/2023     (H) 01/24/2023     01/24/2023    MCH 34 3 01/24/2023    MCHC 34 0 01/24/2023    RDW 12 8 01/24/2023    MPV 9 2 01/24/2023    NRBC 0 01/24/2023     CMP:   Lab Results   Component Value Date    K 3 8 01/24/2023    CL 91 (L) 01/24/2023    CO2 23 01/24/2023    BUN 9 01/24/2023    CREATININE 0 57 (L) 01/24/2023    CALCIUM 7 8 (L) 01/24/2023    AST 80 (H) 01/24/2023    ALT 75 (H) 01/24/2023    ALKPHOS 78 01/24/2023    EGFR 118 01/24/2023           Results from last 7 days   Lab Units 01/24/23  0455 01/23/23  0742 01/22/23  1557 POTASSIUM mmol/L 3 8 3 8 3 8   CHLORIDE mmol/L 91* 89* 91*   CO2 mmol/L 23 26 23   BUN mg/dL 9 11 12   CREATININE mg/dL 0 57* 0 59* 0 63   CALCIUM mg/dL 7 8* 7 9* 7 6*   ALK PHOS U/L 78 83 80   ALT U/L 75* 74* 63*   AST U/L 80* 84* 74*         Phosphorus: No results found for: PHOS  Magnesium:   Lab Results   Component Value Date    MG 1 8 (L) 01/24/2023     Urinalysis: No results found for: COLORU, CLARITYU, SPECGRAV, PHUR, LEUKOCYTESUR, NITRITE, PROTEINUA, GLUCOSEU, KETONESU, BILIRUBINUR, BLOODU  Ionized Calcium: No results found for: CAION  Coagulation: No results found for: PT, INR, APTT  Troponin: No results found for: TROPONINI  ABG: No results found for: PHART, AYO5JVL, PO2ART, BFN7JXA, P1OJYIGQ, BEART, SOURCE  Radiology review:     IMAGING  Procedure: XR chest 1 view portable    Result Date: 1/22/2023  Narrative: CHEST INDICATION:   Shortness of breath  COMPARISON:  None EXAM PERFORMED/VIEWS:  XR CHEST PORTABLE  AP semierect Images: 1 FINDINGS: Cardiomediastinal silhouette appears unremarkable  There is haziness at the left lung base with some linear opacities  Minimal linear opacities at the right base also  Lungs otherwise clear  Lung volumes are low  No pneumothorax  Osseous structures appear within normal limits for patient age  Impression: Minimal bibasilar atelectasis  Probable small left effusion  Similar findings were described and reported in the current CT scan  Workstation performed: [id number]     Procedure: IR INPATIENT Paracentesis    Result Date: 1/24/2023  Narrative: EXAMINATION: Paracentesis with sonographic guidance  HISTORY: Ascites refractory to diuretic therapy  Here for routine, interval, large volume paracentesis     Abdomen distention and discomfort  TECHNIQUE: The patient was brought to Radiology  Informed consent was obtained  The Left upper quadrant was prepped and draped in the usual sterile fashion  Timeout was performed  Lidocaine was given as local anesthesia  Using sonographic guidance, a 5 [place] centesis catheter was advanced into the fluid  The catheter was connected to a vacuum bottle  The fluid was drained in its entirety  A total volume of 35 cc of [place] appearing, [place] 2 yellow colored fluid was removed  The catheter was removed  Albumin was not transfused  Dose: 0 grams Specimens were sent to the laboratory for evaluation  Impression: Technically successful ultrasound-guided paracentesis  On ultrasound, the fluid was loculated  There were innumerable septations  I suspect this is chronically infected, given the enhancing rind on CT  This is the end of the clinically relevant portion of this report  Subsequent information is for compliance, documentation, and coding purposes  In the process of informed consent, information was communicated, verbally, to the patient regarding the procedure  The patient was informed of; the name of the procedure, nature of the procedure, nature of the condition, risks, benefits, alternatives, and potential complications, as well as the consequences of not having the procedure  All the patient's questions were answered  Informed consent was signed  Quoted risks include bleeding, and leak of ascitic fluid  Chlorhexidine and alcohol was used for cleansing and sterile preparation of the skin  This was allowed to dry prior to the application of sterile draping  Timeout was performed, with all team members present, and in agreement  Confirmation of patient, procedure, laterally, allergies, and all needed equipment was performed  PROCEDURE: Paracentesis PREPROCEDURE DIAGNOSIS: Please see "history ", above POSTPROCEDURE DIAGNOSIS: Same ANTIBIOTICS: None SPECIMEN: Ascitic fluid ESTIMATED BLOOD LOSS: None ANESTHESIA: Local FINAL DISPOSITION OF CATHETER: Out of patient's body Ultrasound was used to evaluate the ascitic fluid as a potential access site    Static and real-time images of needle entry were obtained, and archived  Workstation performed: [id number]     Procedure: CT abdomen pelvis with contrast    Result Date: 1/21/2023  Narrative: CT ABDOMEN AND PELVIS WITH IV CONTRAST INDICATION:   Abdominal pain, acute, nonlocalized Abdominal bloating, nausea  COMPARISON:  None  TECHNIQUE:  CT examination of the abdomen and pelvis was performed  Axial, sagittal, and coronal 2D reformatted images were created from the source data and submitted for interpretation  Radiation dose length product (DLP) for this visit:  730 66 mGy-cm   This examination, like all CT scans performed in the [facility], was performed utilizing techniques to minimize radiation dose exposure, including the use of iterative  reconstruction and automated exposure control  IV Contrast:  100 mL of iohexol (OMNIPAQUE) Enteric Contrast:  Enteric contrast was administered  FINDINGS: ABDOMEN LOWER CHEST:  There are partially visualized bilateral pleural effusions with posterior bibasilar atelectasis, left greater than right  LIVER/BILIARY TREE:  There is mild nodularity of the liver surface contour suspicious for early cirrhotic changes  Within the limitations of this examination there is no evidence of suspicious hepatic mass  No biliary dilatation  Portal vein is patent  GALLBLADDER:  No calcified gallstones  No pericholecystic inflammatory change  There is diffuse gallbladder wall thickening  SPLEEN:  Unremarkable  PANCREAS:  Unremarkable  ADRENAL GLANDS:  Unremarkable  KIDNEYS/URETERS:  Unremarkable  No hydronephrosis  STOMACH AND BOWEL:  Unremarkable  APPENDIX:  No findings to suggest appendicitis  ABDOMINOPELVIC CAVITY:  There is a moderate to large amount of intra-abdominal and pelvic ascites with diffuse smooth thin enhancement of the peritoneum suspicious for an acute peritonitis  No pneumoperitoneum  No lymphadenopathy  VESSELS:  Unremarkable for patient's age  PELVIS REPRODUCTIVE ORGANS:  Unremarkable for patient's age   URINARY BLADDER:  Unremarkable  ABDOMINAL WALL/INGUINAL REGIONS:  Unremarkable  OSSEOUS STRUCTURES:  No acute fracture or destructive osseous lesion  There are bilateral L5 pars interarticularis defects with grade 1 anterolisthesis of L5 on S1  There is mild reversal of the normal lumbar lordosis  Impression: Moderate to large intra-abdominal and pelvic ascites with diffuse enhancement and thickening of the peritoneal lining most compatible with an acute peritonitis  Clinical correlation is recommended  Peritoneal fluid sampling could be performed for further evaluation  Mildly nodularity of the liver surface contour suggestive of chronic cirrhotic changes  No free intraperitoneal air  No evidence of large or small bowel obstruction  Partially visualized bilateral pleural effusions with bibasilar atelectasis, left greater than right    I personally discussed this study with Nando Arteaga on 1/21/2023 at 11:17 PM  Workstation performed: [id number]       Current Facility-Administered Medications   Medication Dose Route Frequency   • acetaminophen (TYLENOL) tablet 650 mg  650 mg Oral Q4H PRN   • aluminum-magnesium hydroxide-simethicone (MYLANTA) oral suspension 30 mL  30 mL Oral Q4H PRN   • HYDROmorphone (DILAUDID) injection 0 5 mg  0 5 mg Intravenous Q4H PRN   • levofloxacin (LEVAQUIN) IVPB (premix in dextrose) 750 mg 150 mL  750 mg Intravenous Q24H   • loperamide (IMODIUM) capsule 2 mg  2 mg Oral 4x Daily PRN   • metroNIDAZOLE (FLAGYL) IVPB (premix) 500 mg 100 mL  500 mg Intravenous Q8H   • ondansetron (ZOFRAN) injection 4 mg  4 mg Intravenous Q6H PRN   • oxyCODONE (ROXICODONE) immediate release tablet 10 mg  10 mg Oral Q4H PRN   • oxyCODONE (ROXICODONE) IR tablet 5 mg  5 mg Oral Q4H PRN     Medications Discontinued During This Encounter   Medication Reason   • ibuprofen (MOTRIN) 200 mg tablet        Vria Ponce,       This progress note was produced in part using a dictation device which may document imprecise wording from author's original intent

## 2023-01-24 NOTE — ASSESSMENT & PLAN NOTE
· Secondary to peritonitis  · Noted by leukocytosis, tachycardia tachypnea mild lactic acidosis present on admission  · Lactic acidosis resolved with IV fluids  · Continue antibiotics with Levaquin/Flagyl  Ascites cx    4+ Polys Abnormal  P    2+ Gram positive cocci in pairs, chains and clusters Abnormal      · Reviewed with GI , not felt to have simple SBP based on ascitic studies, they recommend ID evaluation

## 2023-01-24 NOTE — ASSESSMENT & PLAN NOTE
· Sodium level gradually improving  · Nephrology following  · Continue fluid restriction  · Repeat BMP in the morning  Lab Results   Component Value Date    SODIUM 122 (L) 01/24/2023   ·   ·

## 2023-01-24 NOTE — PROGRESS NOTES
Progress Note - General Surgery   Migdalia Began 46 y o  male MRN: [id number]  Unit/Bed#: ICU 02-01 Encounter: [id number]    Assessment:  59-year-old male past medical history significant for hep C/alcoholic cirrhosis presenting with peritonitis  General surgery asked to reevaluate patient secondary to findings from paracentesis performed by IR 01/23/2023  -T-max 99 6F, tachycardia, tachypnea 20s to 30s, other vital signs stable on room air  - cc recorded, additional 225 cc so far this morning  -WBC 8 56  -Hgb 12 3  -  -CR 0 57  -Hyponatremia, 122  -Evaded LFTs with AST 80, ALT 75  -T bili 1 24  -Results from paracentesis positive for SBP     Plan:  No indication for acute surgical intervention at this time  Recommend close outpatient follow-up with general surgery and gastroenterology further evaluate source  Evaluated bedside with attending    Subjective/Objective   Subjective: No acute overnight events  Patient all doing well with no acute complaints  Objective:   Blood pressure 132/83, pulse (!) 107, temperature 99 6 °F (37 6 °C), temperature source Tympanic, resp  rate (!) 32, height 5' 11" (1 803 m), weight 68 9 kg (151 lb 14 4 oz), SpO2 93 %  ,Body mass index is 21 19 kg/m²  Intake/Output Summary (Last 24 hours) at 1/24/2023 1003  Last data filed at 1/24/2023 0800  Gross per 24 hour   Intake 400 ml   Output 625 ml   Net -225 ml       Invasive Devices     Peripheral Intravenous Line  Duration           Peripheral IV 01/21/23 Left;Proximal;Ventral (anterior) Forearm 2 days              Physical Exam  Vitals and nursing note reviewed  Constitutional:       General: He is not in acute distress  Appearance: Normal appearance  He is normal weight  He is not ill-appearing or toxic-appearing  HENT:      Head: Normocephalic and atraumatic  Cardiovascular:      Rate and Rhythm: Normal rate and regular rhythm  Pulses: Normal pulses  Heart sounds: Normal heart sounds  Pulmonary:      Effort: Pulmonary effort is normal       Breath sounds: Normal breath sounds  Abdominal:      General: Bowel sounds are normal  There is distension  Palpations: Abdomen is soft  Tenderness: There is no abdominal tenderness  There is no guarding or rebound  Musculoskeletal:         General: Normal range of motion  Right lower leg: No edema  Left lower leg: No edema  Skin:     General: Skin is warm and dry  Neurological:      General: No focal deficit present  Mental Status: He is alert and oriented to person, place, and time  Lab, Imaging and other studies:  I have personally reviewed pertinent lab results      CBC:   Lab Results   Component Value Date    WBC 8 56 01/24/2023    HGB 12 3 01/24/2023    HCT 36 2 (L) 01/24/2023     (H) 01/24/2023     01/24/2023    MCH 34 3 01/24/2023    MCHC 34 0 01/24/2023    RDW 12 8 01/24/2023    MPV 9 2 01/24/2023    NRBC 0 01/24/2023     CMP:   Lab Results   Component Value Date    SODIUM 122 (L) 01/24/2023    K 3 8 01/24/2023    CL 91 (L) 01/24/2023    CO2 23 01/24/2023    BUN 9 01/24/2023    CREATININE 0 57 (L) 01/24/2023    CALCIUM 7 8 (L) 01/24/2023    AST 80 (H) 01/24/2023    ALT 75 (H) 01/24/2023    ALKPHOS 78 01/24/2023    EGFR 118 01/24/2023     VTE Pharmacologic Prophylaxis: Reason for no pharmacologic prophylaxis per primary  VTE Mechanical Prophylaxis: sequential compression device    Chhaya Du PA-C

## 2023-01-24 NOTE — NURSING NOTE
Room air; VS and sats stable  LAC IV intact  Immodium per pt request(Hospitalist made aware and new order)  Denies pain  Awake and alert  Oriented

## 2023-01-24 NOTE — CONSULTS
Consultation - Infectious Disease   Owen Neumann 46 y o  male MRN: [id number]  Unit/Bed#: ICU 02-01 Encounter: [id number]      IMPRESSION & RECOMMENDATIONS:     1  Peritonitis  Patient with history of HCV and alcohol abuse, in remission x20 years  CT imaging is suggestive of cirrhosis  He presented with 1 week of worsening abdominal pain, distention, fevers and chills  CT imaging showed moderate to large intra-abdominal and pelvic ascites with diffuse enhancement and thickening of the peritoneal lining most compatible with acute peritonitis, nodularity of the liver surface suggestive of chronic cirrhosis, no intraperitoneal free air or abnormalities of the bowel  Status post paracentesis 1/23 with loculated ascites present; fluid studies consistent with infection with extremely elevated white blood cell count over 70,000K, elevated LDH  Fluid studies not entirely consistent with portal hypertension alone with SAG less than 1 1, protein 4 4  Peritoneal fluid gram stain shows 4+ polys, 2+ gram-positive cocci in pairs, chains, clusters  There is no obvious GI source of a secondary peritonitis  Consideration for malignant ascites in addition to infection  No obvious risk for TB  Blood cultures show no growth   -Follow-up peritoneal fluid culture, cytology   -Stop IV Levaquin and metronidazole   -Start IV ceftriaxone 2 g every 24 hours and IV vancomycin, dosing by pharmacy   -Adjust antibiotics pending culture results   -Outpatient malignancy screening per GI    -Recommend repeat paracentesis in 5 to 7 days if no significant response to antibiotics the patient may require surgical evaluation or peritoneal biopsy   -Appreciate GI and surgery recommendations    2  Sepsis, evolving since admission with leukocytosis and tachycardia, now with low-grade fever  Likely due to #1 above  Blood cultures show no growth    Patient is hemodynamically stable   -Antibiotics as above   -Repeat cultures if patient develops a fever   -Follow-up peritoneal fluid culture    3  Cirrhosis  Patient with history of chronic HCV, and former alcohol abuse and IVDU  In remission for 20 years  Has not been treated for hepatitis C  patient with ascites but fluid analysis not entirely consistent with portal hypertension   -Work-up and follow-up per GI   -Follow-up HCV PCR and genotype   -Check HIV    4  Transaminitis  With elevated T bili  Likely due to #3 above  Monitor LFTs, GI follow-up ongoing    5  Hyponatremia  May be due to liver disease   -management per nephrology    6  Penicillin allergy  Reports rash as a child, no anaphylaxis  Unlikely significant allergy at this point in life  Even if true allergy, risk of cross-reactivity with cephalosporins is incredibly low   -Start ceftriaxone and monitor for reaction    I have discussed the above management plan in detail with the primary service, RN, patient  ID will follow  I have performed an extensive review of the medical records in Epic including review of the notes, radiographs, and laboratory results     HISTORY OF PRESENT ILLNESS:  Reason for Consult: peritonitis   HPI: Kentrell Kraft is a 46year old man with a history of chronic HCV, former alcohol abuse and IVDU over 20 years prior, who presented to the [facility] ED with 1 week of abdominal pain and bloating, fever, chills  He reports that his abdomen became more distended over this period of time which has never happened before  The patient thought that he had the flu and was taking cold medications for this which were not helping  Patient had a 10 pound weight loss recently  He denies vomiting or diarrhea  No skin rashes or leg swelling  In the ED, the patient was afebrile but with tachycardia    Labs were significant for elevated AST and ALT, total bilirubin, white blood cell count of 16   CT A/P with contrast showed moderate to large intra-abdominal and pelvic ascites with diffuse enhancement and thickening of the peritoneal lining most compatible with acute peritonitis, nodularity of the liver surface suggestive of chronic cirrhosis, no intraperitoneal free air or abnormalities of the bowel  The patient states he was never treated for hepatitis C as he was unable to afford medications in the past   He has not had a colonoscopy  In the ED the patient was started on IV Levaquin and metronidazole due to a remote penicillin allergy  He was seen by GI and general surgery  The patient underwent diagnostic paracentesis 1/23; the fluid was loculated with innumerable septations, concerning for chronically infected fluid  Fluid studies show 70,000 white blood cells (31K PMNs), albumin 1 6 (SAAG <1 1), LDH over 10,000  Today the patient had worsening temperature to 100 9 and leukocytosis  ID is consulted for further evaluation  Peritoneal fluid culture shows no growth, gram stain shows 4+ polys, 2+ gram-positive cocci in pairs, chains, clusters  The patient states that his abdominal pain is improving but he feels warm today  Denies chills, shortness of breath, skin rashes  He states he has a history of penicillin allergy as a child which resulted in rash, no anaphylaxis or airway compromise  REVIEW OF SYSTEMS:  A complete review of systems is negative other than that noted in the HPI      PAST MEDICAL HISTORY:  Past Medical History:   Diagnosis Date   • Hepatitis C      Past Surgical History:   Procedure Laterality Date   • HAND SURGERY     • IR PARACENTESIS  1/23/2023   • WISDOM TOOTH EXTRACTION         FAMILY HISTORY:  Non-contributory    SOCIAL HISTORY:  Social History   Social History     Substance and Sexual Activity   Alcohol Use Not Currently    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     Social History     Substance and Sexual Activity   Drug Use Yes   • Types: Marijuana    Comment: medical marijuana     Social History     Tobacco Use   Smoking Status Never   Smokeless Tobacco Never       ALLERGIES:  Allergies Allergen Reactions   • Penicillins Hives       MEDICATIONS:  All current active medications have been reviewed  PHYSICAL EXAM:  Temp:  [98 1 °F (36 7 °C)-100 9 °F (38 3 °C)] 99 5 °F (37 5 °C)  HR:  [] 111  Resp:  [22-32] 31  BP: (113-143)/(68-92) 113/69  SpO2:  [90 %-94 %] 91 %  Temp (24hrs), Av 3 °F (37 4 °C), Min:98 1 °F (36 7 °C), Max:100 9 °F (38 3 °C)  Current: Temperature: 99 5 °F (37 5 °C)    Intake/Output Summary (Last 24 hours) at 2023 1604  Last data filed at 2023 1603  Gross per 24 hour   Intake 830 ml   Output 775 ml   Net 55 ml       General Appearance:  Appearing well, nontoxic, and in no distress   Head:  Normocephalic, without obvious abnormality, atraumatic   Eyes:  Conjunctiva pink and sclera anicteric, both eyes   Nose: Nares normal, mucosa normal, no drainage   Throat: Oropharynx moist without lesions   Neck: Supple, symmetrical, no adenopathy, no tenderness/mass/nodules   Back:   Symmetric, no curvature, ROM normal, no CVA tenderness   Lungs:   Clear to auscultation bilaterally, respirations unlabored   Chest Wall:  No tenderness or deformity   Heart:  RRR; no murmur, rub or gallop   Abdomen:   Soft, mild distension, no significant tenderness to palpation   Extremities: No cyanosis, clubbing or edema   Skin: No rashes or lesions  No draining wounds noted  Lymph nodes: Cervical, supraclavicular nodes normal   Neurologic: Alert and oriented times 3, extremity strength 5/5 and symmetric       LABS, IMAGING, & OTHER STUDIES:  Lab Results:  I have personally reviewed pertinent labs    Results from last 7 days   Lab Units 23  0455 23  0742 23  0516   WBC Thousand/uL 8 56 7 67 12 47*   HEMOGLOBIN g/dL 12 3 12 5 12 0   PLATELETS Thousands/uL 329 302 291     Results from last 7 days   Lab Units 23  0455 23  0742 23  1557   SODIUM mmol/L 122* 122* 121*   POTASSIUM mmol/L 3 8 3 8 3 8   CHLORIDE mmol/L 91* 89* 91*   CO2 mmol/L 23 26 23   BUN mg/dL 9 11 12   CREATININE mg/dL 0 57* 0 59* 0 63   EGFR ml/min/1 73sq m 118 116 113   CALCIUM mg/dL 7 8* 7 9* 7 6*   AST U/L 80* 84* 74*   ALT U/L 75* 74* 63*   ALK PHOS U/L 78 83 80     Results from last 7 days   Lab Units 01/23/23  1437 01/21/23 2117   BLOOD CULTURE   --  No Growth at 48 hrs  No Growth at 48 hrs  GRAM STAIN RESULT  4+ Polys*  2+ Gram positive cocci in pairs, chains and clusters*  --    BODY FLUID CULTURE, STERILE  No growth  --      Results from last 7 days   Lab Units 01/24/23  0455 01/23/23  0742 01/22/23  0551 01/21/23 2117   PROCALCITONIN ng/ml 0 34* 0 35* 0 41* 0 42*         Results from last 7 days   Lab Units 01/22/23  0516   FERRITIN ng/mL 762*           Imaging Studies:   I have personally reviewed pertinent imaging study reports and images in PACS  CT A/P 1/21: Moderate to large intra-abdominal and pelvic ascites with diffuse enhancement and thickening of the peritoneal lining most compatible with acute peritonitis, nodularity of the liver surface suggesting chronic cirrhotic changes, no free intraperitoneal air    Other Studies:   I have personally reviewed pertinent reports

## 2023-01-24 NOTE — PLAN OF CARE
Problem: Nutrition/Hydration-ADULT  Goal: Nutrient/Hydration intake appropriate for improving, restoring or maintaining nutritional needs  Description: Monitor and assess patient's nutrition/hydration status for malnutrition  Collaborate with interdisciplinary team and initiate plan and interventions as ordered  Monitor patient's weight and dietary intake as ordered or per policy  Utilize nutrition screening tool and intervene as necessary  Determine patient's food preferences and provide high-protein, high-caloric foods as appropriate       INTERVENTIONS:  - Monitor oral intake, urinary output, labs, and treatment plans  - Assess nutrition and hydration status and recommend course of action  - Evaluate amount of meals eaten  - Assist patient with eating if necessary   - Allow adequate time for meals  - Recommend/ encourage appropriate diets, oral nutritional supplements, and vitamin/mineral supplements  - Order, calculate, and assess calorie counts as needed  - Recommend, monitor, and adjust tube feedings and TPN/PPN based on assessed needs  - Assess need for intravenous fluids  - Provide specific nutrition/hydration education as appropriate  - Include patient/family/caregiver in decisions related to nutrition  1/24/2023 0948 by Stefan Watkins RN  Outcome: Progressing  1/24/2023 0947 by Stefan Watkins RN  Outcome: Progressing     Problem: PAIN - ADULT  Goal: Verbalizes/displays adequate comfort level or baseline comfort level  Description: Interventions:  - Encourage patient to monitor pain and request assistance  - Assess pain using appropriate pain scale  - Administer analgesics based on type and severity of pain and evaluate response  - Implement non-pharmacological measures as appropriate and evaluate response  - Consider cultural and social influences on pain and pain management  - Notify physician/advanced practitioner if interventions unsuccessful or patient reports new pain  1/24/2023 0948 by Maggie Donahue ADAM Motta  Outcome: Progressing  1/24/2023 0947 by Matthew Combs RN  Outcome: Progressing     Problem: SAFETY ADULT  Goal: Patient will remain free of falls  Description: INTERVENTIONS:  - Educate patient/family on patient safety including physical limitations  - Instruct patient to call for assistance with activity   - Consult OT/PT to assist with strengthening/mobility   - Keep Call bell within reach  - Keep bed low and locked with side rails adjusted as appropriate  - Keep care items and personal belongings within reach  - Initiate and maintain comfort rounds  - Make Fall Risk Sign visible to staff  - Offer Toileting every 2 Hours, in advance of need  - Initiate/Maintain alarm  - Obtain necessary fall risk management equipment:   - Apply yellow socks and bracelet for high fall risk patients  - Consider moving patient to room near nurses station  1/24/2023 0948 by Matthew Combs RN  Outcome: Progressing  1/24/2023 0947 by Matthew Combs RN  Outcome: Progressing  Goal: Maintain or return to baseline ADL function  Description: INTERVENTIONS:  -  Assess patient's ability to carry out ADLs; assess patient's baseline for ADL function and identify physical deficits which impact ability to perform ADLs (bathing, care of mouth/teeth, toileting, grooming, dressing, etc )  - Assess/evaluate cause of self-care deficits   - Assess range of motion  - Assess patient's mobility; develop plan if impaired  - Assess patient's need for assistive devices and provide as appropriate  - Encourage maximum independence but intervene and supervise when necessary  - Involve family in performance of ADLs  - Assess for home care needs following discharge   - Consider OT consult to assist with ADL evaluation and planning for discharge  - Provide patient education as appropriate  1/24/2023 0948 by Matthew Combs RN  Outcome: Progressing  1/24/2023 0947 by Matthew Combs RN  Outcome: Progressing  Goal: Maintains/Returns to pre admission functional level  Description: INTERVENTIONS:  - Perform BMAT or MOVE assessment daily    - Set and communicate daily mobility goal to care team and patient/family/caregiver  - Collaborate with rehabilitation services on mobility goals if consulted  - Perform Range of Motion 3 times a day  - Reposition patient every 2 hours    - Dangle patient 3 times a day  - Stand patient 3 Times a day  - Ambulate patient 3 times a day  - Out of bed to chair 3 times a day   - Out of bed for meals 3 times a day  - Out of bed for toileting  - Record patient progress and toleration of activity level   1/24/2023 0948 by Joe Lewis RN  Outcome: Progressing  1/24/2023 0947 by Joe Lewis RN  Outcome: Progressing     Problem: DISCHARGE PLANNING  Goal: Discharge to home or other facility with appropriate resources  Description: INTERVENTIONS:  - Identify barriers to discharge w/patient and caregiver  - Arrange for needed discharge resources and transportation as appropriate  - Identify discharge learning needs (meds, wound care, etc )  - Arrange for interpretive services to assist at discharge as needed  - Refer to Case Management Department for coordinating discharge planning if the patient needs post-hospital services based on physician/advanced practitioner order or complex needs related to functional status, cognitive ability, or social support system  1/24/2023 0948 by Joe Lewis RN  Outcome: Progressing  1/24/2023 0947 by Joe Lewis RN  Outcome: Progressing

## 2023-01-24 NOTE — PLAN OF CARE
Problem: Nutrition/Hydration-ADULT  Goal: Nutrient/Hydration intake appropriate for improving, restoring or maintaining nutritional needs  Description: Monitor and assess patient's nutrition/hydration status for malnutrition  Collaborate with interdisciplinary team and initiate plan and interventions as ordered  Monitor patient's weight and dietary intake as ordered or per policy  Utilize nutrition screening tool and intervene as necessary  Determine patient's food preferences and provide high-protein, high-caloric foods as appropriate       INTERVENTIONS:  - Monitor oral intake, urinary output, labs, and treatment plans  - Assess nutrition and hydration status and recommend course of action  - Evaluate amount of meals eaten  - Assist patient with eating if necessary   - Allow adequate time for meals  - Recommend/ encourage appropriate diets, oral nutritional supplements, and vitamin/mineral supplements  - Order, calculate, and assess calorie counts as needed  - Recommend, monitor, and adjust tube feedings and TPN/PPN based on assessed needs  - Assess need for intravenous fluids  - Provide specific nutrition/hydration education as appropriate  - Include patient/family/caregiver in decisions related to nutrition  Outcome: Progressing   Will be NPO after midnight for AM scope

## 2023-01-24 NOTE — PROGRESS NOTES
[address]  Progress Note Clair Mccain 1970, 46 y o  male MRN: [id number]  Unit/Bed#: ICU 02-01 Encounter: [id number]  Primary Care Provider: Paulette Albert DO   Date and time admitted to hospital: 1/21/2023  8:32 PM    Hyponatremia  Assessment & Plan  · Sodium level gradually improving  · Nephrology following  · Continue fluid restriction  · Repeat BMP in the morning  Lab Results   Component Value Date    SODIUM 122 (L) 01/24/2023   ·   ·     Cirrhosis of liver with ascites ([facility] )  Assessment & Plan  · GI input appreciated  · Follow-up further work-up for cirrhosis causes, labs ordered by GI team  · Hepatitis C work-up pending  · IR consulted for paracentesis  · Continue IV antibiotics for possible SBP/peritonitis    Sepsis ([facility] )  Assessment & Plan  · Secondary to peritonitis  · Noted by leukocytosis, tachycardia tachypnea mild lactic acidosis present on admission  · Lactic acidosis resolved with IV fluids  · Continue antibiotics with Levaquin/Flagyl  Ascites cx    4+ Polys Abnormal  P    2+ Gram positive cocci in pairs, chains and clusters Abnormal      · Reviewed with GI , not felt to have simple SBP based on ascitic studies, they recommend ID evaluation    Hepatitis C  Assessment & Plan  · Patient with a history of hepatitis C untreated  · GI following  · Liver with cirrhotic appearance  We will follow-up paracentesis fluid results    * Peritonitis [facility])  Assessment & Plan  Patient reporting abdominal pain for 3 days and urinary retention, increased abdominal distention, nausea/vomiting  · Continue IV antibiotics with Levaquin/Flagyl  · Surgical and GI input appreciated, no acute intervention needed  · See sepsis  VTE  Prophylaxis:   Pharmacologic: in place    Patient Centered Rounds: I have performed bedside rounds with nursing staff today      Discussions with Specialists or Other Care Team Provider: case management    Education and Discussions with Family / Patient: Pt     Current Length of Stay: 2 day(s)    Current Patient Status: Inpatient        Code Status: Level 1 - Full Code      Subjective:    Pt with fevers  States feels ok tolerating PO  No severe abd pain    Patient is seen and examined at bedside  All other ROS are negative  Objective:     Vitals:   Temp (24hrs), Av 3 °F (37 4 °C), Min:98 1 °F (36 7 °C), Max:100 9 °F (38 3 °C)    Temp:  [98 1 °F (36 7 °C)-100 9 °F (38 3 °C)] 100 9 °F (38 3 °C)  HR:  [] 110  Resp:  [22-32] 27  BP: (116-143)/(70-92) 138/83  SpO2:  [90 %-94 %] 92 %  Body mass index is 21 19 kg/m²  Input and Output Summary (last 24 hours):        Intake/Output Summary (Last 24 hours) at 2023 1507  Last data filed at 2023 1200  Gross per 24 hour   Intake 350 ml   Output 775 ml   Net -425 ml       Physical Exam:        GEN: No acute distress, comfortable  HEEENT: No JVD, PERRLA, no scleral icterus  RESP: Lungs clear to auscultation bilaterally  CV: RRR, +s1/s2   ABD: SOFT, mildly tender/ mild distention , BOWEL SOUNDS   PSYCH: CALM  NEURO: Mentation baseline, NO FOCAL DEFICITS  SKIN: NO RASH  EXTREM: NO EDEMA    Additional Data:     Labs:    Results from last 7 days   Lab Units 23  0455   WBC Thousand/uL 8 56   HEMOGLOBIN g/dL 12 3   HEMATOCRIT % 36 2*   PLATELETS Thousands/uL 329   NEUTROS PCT % 72   LYMPHS PCT % 15   MONOS PCT % 12   EOS PCT % 0     Results from last 7 days   Lab Units 23  0455   SODIUM mmol/L 122*   POTASSIUM mmol/L 3 8   CHLORIDE mmol/L 91*   CO2 mmol/L 23   BUN mg/dL 9   CREATININE mg/dL 0 57*   ANION GAP mmol/L 8   CALCIUM mg/dL 7 8*   ALBUMIN g/dL 2 6*   TOTAL BILIRUBIN mg/dL 1 24*   ALK PHOS U/L 78   ALT U/L 75*   AST U/L 80*   GLUCOSE RANDOM mg/dL 109     Results from last 7 days   Lab Units 23  2226   INR  1 38*         Results from last 7 days   Lab Units 23  0516   HEMOGLOBIN A1C % 5 4     Results from last 7 days   Lab Units 23  0455 23  0742 23  0551 01/21/23  2324 01/21/23 2117   LACTIC ACID mmol/L  --   --   --  1 5 2 3*   PROCALCITONIN ng/ml 0 34* 0 35* 0 41*  --  0 42*           * I Have Reviewed All Lab Data Listed Above  Imaging:     Results for orders placed during the hospital encounter of 01/21/23    XR chest 1 view portable    Narrative  CHEST    INDICATION:   Shortness of breath  COMPARISON:  None    EXAM PERFORMED/VIEWS:  XR CHEST PORTABLE  AP semierect  Images: 1    FINDINGS:    Cardiomediastinal silhouette appears unremarkable  There is haziness at the left lung base with some linear opacities  Minimal linear opacities at the right base also  Lungs otherwise clear  Lung volumes are low  No pneumothorax  Osseous structures appear within normal limits for patient age  Impression  Minimal bibasilar atelectasis  Probable small left effusion  Similar findings were described and reported in the current CT scan  Workstation performed: [id number]    No results found for this or any previous visit  *I have reviewed all imaging reports listed above      Recent Cultures (last 7 days):     Results from last 7 days   Lab Units 01/23/23  1437 01/21/23 2117   BLOOD CULTURE   --  No Growth at 48 hrs  No Growth at 48 hrs     GRAM STAIN RESULT  4+ Polys*  2+ Gram positive cocci in pairs, chains and clusters*  --    BODY FLUID CULTURE, STERILE  No growth  --        Last 24 Hours Medication List:   Current Facility-Administered Medications   Medication Dose Route Frequency Provider Last Rate   • acetaminophen  650 mg Oral Q4H PRN Luc Estes PA-C     • aluminum-magnesium hydroxide-simethicone  30 mL Oral Q4H PRN Luc Estes PA-C     • HYDROmorphone  0 5 mg Intravenous Q4H PRN Luc Estes PA-C     • levofloxacin  750 mg Intravenous Q24H Larry Perez PA-C Stopped (01/24/23 0105)   • loperamide  2 mg Oral 4x Daily PRN Deepthi Carrasquillo PA-C     • metroNIDAZOLE  500 mg Intravenous Q8H Loreprecious Xavier PA-C 500 mg (01/24/23 9699)   • ondansetron  4 mg Intravenous Q6H PRN MAYRA Ventura-C     • oxyCODONE  10 mg Oral Q4H PRN Loreprecious Xavier, PA-C     • oxyCODONE  5 mg Oral Q4H PRN Loreprecious Xavier, PA-C          Today, Patient Was Seen By: Gisella Chase MD    ** Please Note: Dictation voice to text software may have been used in the creation of this document   **

## 2023-01-24 NOTE — PROGRESS NOTES
Phuong Arroyo is a 46 y o  male who is currently ordered Vancomycin IV with management by the Pharmacy Consult service  Relevant clinical data and objective / subjective history reviewed  Vancomycin Assessment:  Indication and Goal AUC/Trough: Bacteremia (goal -600, trough >10), -600, trough >10  Clinical Status: stable  Micro:   Gram positive cocci in pairs, chains, and clusters   Renal Function:  SCr: 0 57 mg/dL  CrCl: 147 7 mL/min  Renal replacement: Not on dialysis  Days of Therapy: 1  Current Dose: 1750mg IV x1 dose  Vancomycin Plan:  New Dosinmg IV Q12H  Estimated AUC: 469 mcg*hr/mL  Estimated Trough: 12 9 mcg/mL  Next Level:  @ 0400  Renal Function Monitoring: Daily BMP and UOP  Pharmacy will continue to follow closely for s/sx of nephrotoxicity, infusion reactions and appropriateness of therapy  BMP and CBC will be ordered per protocol  We will continue to follow the patient’s culture results and clinical progress daily      Steven Craig, Pharmacist

## 2023-01-25 LAB
ALBUMIN SERPL BCP-MCNC: 2.4 G/DL (ref 3.5–5)
ALP SERPL-CCNC: 67 U/L (ref 34–104)
ALT SERPL W P-5'-P-CCNC: 54 U/L (ref 7–52)
ANION GAP SERPL CALCULATED.3IONS-SCNC: 10 MMOL/L (ref 4–13)
AST SERPL W P-5'-P-CCNC: 47 U/L (ref 13–39)
BASOPHILS # BLD AUTO: 0.05 THOUSANDS/ÂΜL (ref 0–0.1)
BASOPHILS NFR BLD AUTO: 1 % (ref 0–1)
BILIRUB SERPL-MCNC: 0.97 MG/DL (ref 0.2–1)
BUN SERPL-MCNC: 10 MG/DL (ref 5–25)
CALCIUM ALBUM COR SERPL-MCNC: 8.8 MG/DL (ref 8.3–10.1)
CALCIUM SERPL-MCNC: 7.5 MG/DL (ref 8.4–10.2)
CHLORIDE SERPL-SCNC: 90 MMOL/L (ref 96–108)
CO2 SERPL-SCNC: 23 MMOL/L (ref 21–32)
CREAT SERPL-MCNC: 0.5 MG/DL (ref 0.6–1.3)
EOSINOPHIL # BLD AUTO: 0.01 THOUSAND/ÂΜL (ref 0–0.61)
EOSINOPHIL NFR BLD AUTO: 0 % (ref 0–6)
ERYTHROCYTE [DISTWIDTH] IN BLOOD BY AUTOMATED COUNT: 12.8 % (ref 11.6–15.1)
GFR SERPL CREATININE-BSD FRML MDRD: 124 ML/MIN/1.73SQ M
GLUCOSE SERPL-MCNC: 118 MG/DL (ref 65–140)
HCT VFR BLD AUTO: 33.8 % (ref 36.5–49.3)
HCV RNA SERPL NAA+PROBE-ACNC: NORMAL IU/ML
HCV RNA SERPL NAA+PROBE-LOG IU: 5.88 LOG10 IU/ML
HGB BLD-MCNC: 11.7 G/DL (ref 12–17)
HIV 1+2 AB+HIV1 P24 AG SERPL QL IA: NORMAL
HIV 2 AB SERPL QL IA: NORMAL
HIV1 AB SERPL QL IA: NORMAL
HIV1 P24 AG SERPL QL IA: NORMAL
IMM GRANULOCYTES # BLD AUTO: 0.04 THOUSAND/UL (ref 0–0.2)
IMM GRANULOCYTES NFR BLD AUTO: 0 % (ref 0–2)
LYMPHOCYTES # BLD AUTO: 1.28 THOUSANDS/ÂΜL (ref 0.6–4.47)
LYMPHOCYTES NFR BLD AUTO: 14 % (ref 14–44)
MCH RBC QN AUTO: 34.4 PG (ref 26.8–34.3)
MCHC RBC AUTO-ENTMCNC: 34.6 G/DL (ref 31.4–37.4)
MCV RBC AUTO: 99 FL (ref 82–98)
MONOCYTES # BLD AUTO: 1.07 THOUSAND/ÂΜL (ref 0.17–1.22)
MONOCYTES NFR BLD AUTO: 12 % (ref 4–12)
NEUTROPHILS # BLD AUTO: 6.45 THOUSANDS/ÂΜL (ref 1.85–7.62)
NEUTS SEG NFR BLD AUTO: 73 % (ref 43–75)
NRBC BLD AUTO-RTO: 0 /100 WBCS
PLATELET # BLD AUTO: 281 THOUSANDS/UL (ref 149–390)
PMV BLD AUTO: 8.9 FL (ref 8.9–12.7)
POTASSIUM SERPL-SCNC: 3.9 MMOL/L (ref 3.5–5.3)
PROT SERPL-MCNC: 5.9 G/DL (ref 6.4–8.4)
RBC # BLD AUTO: 3.4 MILLION/UL (ref 3.88–5.62)
SODIUM SERPL-SCNC: 123 MMOL/L (ref 135–147)
TEST INFORMATION: NORMAL
VANCOMYCIN TROUGH SERPL-MCNC: 5 UG/ML (ref 10–20)
WBC # BLD AUTO: 8.9 THOUSAND/UL (ref 4.31–10.16)

## 2023-01-25 RX ADMIN — LOPERAMIDE HYDROCHLORIDE 2 MG: 2 CAPSULE ORAL at 08:56

## 2023-01-25 RX ADMIN — LOPERAMIDE HYDROCHLORIDE 2 MG: 2 CAPSULE ORAL at 17:34

## 2023-01-25 RX ADMIN — CEFTRIAXONE 2000 MG: 2 INJECTION, SOLUTION INTRAVENOUS at 17:02

## 2023-01-25 RX ADMIN — VANCOMYCIN HYDROCHLORIDE 1250 MG: 1 INJECTION, POWDER, LYOPHILIZED, FOR SOLUTION INTRAVENOUS at 05:00

## 2023-01-25 RX ADMIN — ACETAMINOPHEN 650 MG: 325 TABLET ORAL at 01:48

## 2023-01-25 RX ADMIN — VANCOMYCIN HYDROCHLORIDE 1500 MG: 1 INJECTION, POWDER, LYOPHILIZED, FOR SOLUTION INTRAVENOUS at 17:03

## 2023-01-25 NOTE — PROGRESS NOTES
[address]  Progress Note Finesse Taylor 1970, 46 y o  male MRN: [id number]  Unit/Bed#: ICU 02-01 Encounter: [id number]  Primary Care Provider: Justin Quiroz DO   Date and time admitted to hospital: 1/21/2023  8:32 PM    * Peritonitis [facility])  Assessment & Plan  Patient reporting abdominal pain for 3 days and urinary retention, increased abdominal distention, nausea/vomiting  · Patient was initially on Levaquin/Flagyl  Infectious disease was consulted and Levaquin/Flagyl were discontinued and patient was started on ceftriaxone/vancomycin  · We will continue ceftriaxone/vancomycin day 2  · Surgical and GI input appreciated, no acute intervention needed  · See sepsis       Cirrhosis of liver with ascites [facility])  Assessment & Plan  · GI input appreciated  · Follow-up further work-up for cirrhosis causes, labs ordered by GI team  · Hepatitis C work-up pending  · Patient had paracentesis performed on 1/23/2023  · Continue IV antibiotics for possible SBP/peritonitis  · Will need to follow-up cytology results    Sepsis [facility])  Assessment & Plan  · Secondary to peritonitis  · Noted by leukocytosis, tachycardia tachypnea mild lactic acidosis present on admission  · Lactic acidosis resolved with IV fluids  · Continue antibiotics with ceftriaxone/vancomycin  Ascites cx    4+ Polys Abnormal  P    2+ Gram positive cocci in pairs, chains and clusters Abnormal      · Reviewed with GI , not felt to have simple SBP based on ascitic studies  · ID consulted for further evaluation, antibiotics have been adjusted as mentioned above  · Follow-up final cultures  · Patient did have a fever spike overnight, will check repeat blood cultures    Hyponatremia  Assessment & Plan  · Sodium level gradually improving  · Nephrology following  · Continue fluid restriction  · Repeat BMP in the morning  Lab Results   Component Value Date    SODIUM 123 (L) 01/25/2023       Hepatitis C  Assessment & Plan  · Patient with a history of hepatitis C untreated  · GI following  · Liver with cirrhotic appearance    VTE Prophylaxis:  Low risk, continue SCDs and encourage ambulation    Patient Centered Rounds: I have performed bedside rounds with nursing staff today  Discussions with Specialists or Other Care Team Provider: yes  Education and Discussions with Family / Patient: Updated patient regarding plan of care    Current Length of Stay: 3 day(s)    Current Patient Status: Inpatient   Certification Statement: The patient will continue to require additional inpatient hospital stay due to Sepsis    Discharge Plan: Pending hospital course    Code Status: Level 1 - Full Code    Subjective:   Denies any abdominal pain  Tolerating diet  Did have a fever overnight    Objective:     Vitals:   Temp (24hrs), Av 3 °F (37 4 °C), Min:96 8 °F (36 °C), Max:101 °F (38 3 °C)    Temp:  [96 8 °F (36 °C)-101 °F (38 3 °C)] 96 8 °F (36 °C)  HR:  [] 98  Resp:  [18-31] 25  BP: (103-143)/(56-85) 122/74  SpO2:  [89 %-95 %] 95 %  Body mass index is 21 86 kg/m²  Input and Output Summary (last 24 hours): Intake/Output Summary (Last 24 hours) at 2023 1026  Last data filed at 2023 0800  Gross per 24 hour   Intake 630 ml   Output 650 ml   Net -20 ml       Physical Exam:   Physical Exam  Constitutional:       General: He is not in acute distress  HENT:      Head: Normocephalic and atraumatic  Nose: Nose normal       Mouth/Throat:      Mouth: Mucous membranes are moist    Eyes:      Extraocular Movements: Extraocular movements intact  Conjunctiva/sclera: Conjunctivae normal    Cardiovascular:      Rate and Rhythm: Normal rate and regular rhythm  Pulmonary:      Effort: Pulmonary effort is normal  No respiratory distress  Abdominal:      Palpations: Abdomen is soft  Tenderness: There is no abdominal tenderness  Musculoskeletal:         General: Normal range of motion        Cervical back: Normal range of motion and neck supple  Skin:     General: Skin is warm and dry  Neurological:      General: No focal deficit present  Mental Status: He is alert  Mental status is at baseline  Cranial Nerves: No cranial nerve deficit  Psychiatric:         Mood and Affect: Mood normal          Behavior: Behavior normal          Additional Data:     Labs:    Results from last 7 days   Lab Units 01/25/23  0503   WBC Thousand/uL 8 90   HEMOGLOBIN g/dL 11 7*   HEMATOCRIT % 33 8*   PLATELETS Thousands/uL 281   NEUTROS PCT % 73   LYMPHS PCT % 14   MONOS PCT % 12   EOS PCT % 0     Results from last 7 days   Lab Units 01/25/23  0503   SODIUM mmol/L 123*   POTASSIUM mmol/L 3 9   CHLORIDE mmol/L 90*   CO2 mmol/L 23   BUN mg/dL 10   CREATININE mg/dL 0 50*   CALCIUM mg/dL 7 5*   ALK PHOS U/L 67   ALT U/L 54*   AST U/L 47*     Results from last 7 days   Lab Units 01/21/23  2226   INR  1 38*         Results from last 7 days   Lab Units 01/22/23  0516   HEMOGLOBIN A1C % 5 4       * I Have Reviewed All Lab Data Listed Above  * Additional Pertinent Lab Tests Reviewed: Lauren 66 Admission  Reviewed    Imaging:  Imaging Reports Reviewed Today Include: No new imaging    Recent Cultures (last 7 days):     Results from last 7 days   Lab Units 01/23/23  1437 01/21/23  2117   BLOOD CULTURE   --  No Growth at 48 hrs  No Growth at 48 hrs     GRAM STAIN RESULT  4+ Polys*  2+ Gram positive cocci in pairs, chains and clusters*  --    BODY FLUID CULTURE, STERILE  No growth  --        Last 24 Hours Medication List:   Current Facility-Administered Medications   Medication Dose Route Frequency Provider Last Rate   • acetaminophen  650 mg Oral Q4H PRN Jj Lucio PA-C     • aluminum-magnesium hydroxide-simethicone  30 mL Oral Q4H PRN Jj Lucio PA-C     • cefTRIAXone  2,000 mg Intravenous Q24H Naomy Arora MD Stopped (01/24/23 1720)   • HYDROmorphone  0 5 mg Intravenous Q4H PRN Jj Lucio PA-C • loperamide  2 mg Oral 4x Daily PRN Mati Stern PA-C     • ondansetron  4 mg Intravenous Q6H PRN Lyndsey Garcia PA-C     • oxyCODONE  10 mg Oral Q4H PRN Lyndsey Garcia PA-C     • oxyCODONE  5 mg Oral Q4H PRN Lyndsey Garcia PA-C     • vancomycin  1,500 mg Intravenous Q12H Mellisa Nguyen MD          Today, Patient Was Seen By: Kashmir Barbour MD    ** Please Note: Dictation voice to text software may have been used in the creation of this document   **

## 2023-01-25 NOTE — PLAN OF CARE
Problem: METABOLIC, FLUID AND ELECTROLYTES - ADULT  Goal: Electrolytes maintained within normal limits  Description: INTERVENTIONS:  - Monitor labs and assess patient for signs and symptoms of electrolyte imbalances  - Administer electrolyte replacement as ordered  - Monitor response to electrolyte replacements, including repeat lab results as appropriate  - Instruct patient on fluid and nutrition as appropriate  Outcome: Progressing  Goal: Fluid balance maintained  Description: INTERVENTIONS:  - Monitor labs   - Monitor I/O and WT  - Instruct patient on fluid and nutrition as appropriate  - Assess for signs & symptoms of volume excess or deficit  Outcome: Progressing  Goal: Glucose maintained within target range  Description: INTERVENTIONS:  - Monitor Blood Glucose as ordered  - Assess for signs and symptoms of hyperglycemia and hypoglycemia  - Administer ordered medications to maintain glucose within target range  - Assess nutritional intake and initiate nutrition service referral as needed  Outcome: Progressing   Monitor labs as ordered

## 2023-01-25 NOTE — ASSESSMENT & PLAN NOTE
· Sodium level gradually improving  · Nephrology following  · Continue fluid restriction  · Repeat BMP in the morning  Lab Results   Component Value Date    SODIUM 123 (L) 01/25/2023

## 2023-01-25 NOTE — ASSESSMENT & PLAN NOTE
· Secondary to peritonitis  · Noted by leukocytosis, tachycardia tachypnea mild lactic acidosis present on admission  · Lactic acidosis resolved with IV fluids  · Continue antibiotics with ceftriaxone/vancomycin  Ascites cx    4+ Polys Abnormal  P    2+ Gram positive cocci in pairs, chains and clusters Abnormal      · Reviewed with GI , not felt to have simple SBP based on ascitic studies  · ID consulted for further evaluation, antibiotics have been adjusted as mentioned above  · Follow-up final cultures  · Patient did have a fever spike overnight, will check repeat blood cultures

## 2023-01-25 NOTE — PROGRESS NOTES
Progress Note- Stacy Posada 46 y o  male MRN: [id number]    Unit/Bed#: ICU 02-01 Encounter: [id number]      Assessment and Plan:    Patient is a 46 y o  male with PMH significant for chronic HCV and former alcohol use disorder (reported remission x20 yrs) admitted on 1/22 for worsening abdominal distention and discomfort with associated subjective fevers and leukocytosis  CT demonstrated mild hepatic nodularity, large-volume ascites and possible peritonitis in addition to serologies notable for hypoalbuminemia, elevated INR, and mild transaminases suggestive of a new diagnosis of cirrhosis  BC with NGx24      1  Spontaneous bacterial peritonitis (SBP)  2   Abnormal findings of liver on imaging  Patient underwent diagnostic/therepeutic paracentesis, noted to have innumerable septations and loculations with approx 20 cc opaque fluid removed  Fluid analysis with significant leukocytosis (>70K), low SAAG (<1 1) and high ascitic protein (4 4) discordant with portal hypertension and raising concern for primary perotinitis  Imaging without obvious source       Patient has been intermittently febrile and tachypenic, although abdominal exam remains relatively benign  General surgery and ID following, appreciate recommendations  Levo/Flagyl has been d/luci and patient started on IV ceftriaxone and vancomycin  Given low-grade fevers, repeat BC x2 ordered  If change in clinical status, recommend making patient NPO and would have low threshold for repeat contrasted cross-sectional imaging      - Diet as tolerated  - Follow-up repeat BCx2  - Continue IV fluids with lyte replacement  - Continue antibiotics as per ID  - Monitor WBC count, fever curve and vitals closely  - Monitor serial abdominal exams  - General surgery and ID following, appreciate recommendations  - If change in clinical status, NPO and consider repeat contrasted cross-sectional imaging    3   Suspected cirrhosis secondary to EtOH and chronic HCV  Patient with cirrhotic morphology and serologies notable for hepatic synthetic dysfunction suggestive of a new diagnosis of cirrhosis  Otherwise, patient is without other evidence of hepatic decompensation including jaundice, bleeding esophageal varices, or hepatic encephalopathy  Recommend close outpatient GI/hepatology follow-up for further evaluation and management of suspected cirrhosis including routine serologies, outpatient EGD for variceal screening and hepatoma screening in addition to discussing HCV treatment given positive HCV viral load  - Monitor MELD labs daily (CBC, CMP, INR)  - Close outpatient GI/heptology follow-up     GI will continue to follow      ______________________________________________________________________    Subjective:     Patient found resting comfortable in bed  Patient intermittently febrile overnight  Denies jaundice, nausea/vomiting, abdominal pain, or rectal bleeding  Reports last BM was yesterday morning  Tolerating diet without difficulty       Medication Administration - last 24 hours from 01/24/2023 0817 to 01/25/2023 5894       Date/Time Order Dose Route Action Action by     01/24/2023 1603 EST metroNIDAZOLE (FLAGYL) IVPB (premix) 500 mg 100 mL 0 mg Intravenous Stopped Polly Mcrae RN     01/24/2023 1354 EST metroNIDAZOLE (FLAGYL) IVPB (premix) 500 mg 100 mL 500 mg Intravenous [address] Polly Mcrae RN     01/25/2023 0148 EST acetaminophen (TYLENOL) tablet 650 mg 650 mg Oral Given Juan Ramon Sotelo RN     01/24/2023 1400 EST acetaminophen (TYLENOL) tablet 650 mg 650 mg Oral Given Polly Mcrae RN     01/24/2023 1737 EST loperamide (IMODIUM) capsule 2 mg 2 mg Oral Given Polly Mcrae RN     01/24/2023 1720 EST cefTRIAXone (ROCEPHIN) IVPB (premix in dextrose) 2,000 mg 50 mL 0 mg Intravenous Stopped Polly Mcrae RN     01/24/2023 1630 EST cefTRIAXone (ROCEPHIN) IVPB (premix in dextrose) 2,000 mg 50 mL 2,000 mg Intravenous [address] Polly Mcrae RN     01/24/2023 1727 EST vancomycin (VANCOCIN) 1,750 mg in sodium chloride 0 9 % 500 mL IVPB 1,750 mg Intravenous [address] Olya Castillo, ADAM     01/25/2023 0500 EST vancomycin (VANCOCIN) 1,250 mg in sodium chloride 0 9 % 250 mL IVPB 1,250 mg Intravenous [address] Marielle Mtz RN          Objective:     Vitals: Blood pressure 123/77, pulse 103, temperature (!) 96 8 °F (36 °C), temperature source Tympanic, resp  rate 20, height 5' 11" (1 803 m), weight 71 1 kg (156 lb 12 oz), SpO2 94 %  ,Body mass index is 21 86 kg/m²  Intake/Output Summary (Last 24 hours) at 1/25/2023 0817  Last data filed at 1/25/2023 0800  Gross per 24 hour   Intake 750 ml   Output 650 ml   Net 100 ml       Physical Exam:   General Appearance: Awake and alert, in no acute distress  Abdomen: +Mild-moderate abd distention; Soft, non-tender; bowel sounds normal; no masses or no organomegaly    Invasive Devices     Peripheral Intravenous Line  Duration           Peripheral IV 01/21/23 Left;Proximal;Ventral (anterior) Forearm 3 days                Lab Results:  No results displayed because visit has over 200 results  Imaging Studies: I have personally reviewed pertinent imaging studies

## 2023-01-25 NOTE — ASSESSMENT & PLAN NOTE
· GI input appreciated  · Follow-up further work-up for cirrhosis causes, labs ordered by GI team  · Hepatitis C work-up pending  · Patient had paracentesis performed on 1/23/2023  · Continue IV antibiotics for possible SBP/peritonitis  · Will need to follow-up cytology results

## 2023-01-25 NOTE — PROGRESS NOTES
Progress Note - Nephrology   Kentrell Kraft 46 y o  male MRN: [id number]  Unit/Bed#: ICU 02-01 Encounter: [id number]    A/P:  1   Hyponatremia              [place] sodium slightly improved now at 123 mmol/liter, continue with supportive and conservative care management at this time  Continue with fluid restriction currently at 1 2 L   2   Hypomagnesemia              Continue to monitor magnesium levels from time to time, add supplementation as indicated  3   Severe iron deficiency              Patient will benefit from iron supplementation when clinically able to proceed  4   Peritonitis              Continue with antibiotic for reported to hospitalist and gastroenterology  5   Newly diagnosed cirrhosis of liver              Continue care and treatment according to hospitalist and gastroenterology  6   Hepatitis C positive      Follow up reason for today's visit: Multiple electrolyte abnormalities    Peritonitis [facility])    Patient Active Problem List   Diagnosis   • Hepatitis C   • Peritonitis ([facility] [address] )   • Sepsis ([facility] [address] )   • [facility]   • Hyponatremia         Subjective:   No acute events, denies nausea or vomiting at this time  Objective:     Vitals: Blood pressure 133/79, pulse 104, temperature (!) 96 8 °F (36 °C), temperature source Tympanic, resp  rate (!) 29, height 5' 11" (1 803 m), weight 71 1 kg (156 lb 12 oz), SpO2 94 %  ,Body mass index is 21 86 kg/m²  Weight (last 2 days)     Date/Time Weight    01/25/23 0600 71 1 (156 75)    01/25/23 0526 71 1 (156 75)    01/24/23 0532 68 9 (151 9)    01/23/23 0600 71 7 (158 07)            Intake/Output Summary (Last 24 hours) at 1/25/2023 1103  Last data filed at 1/25/2023 0800  Gross per 24 hour   Intake 630 ml   Output 650 ml   Net -20 ml     I/O last 3 completed shifts:   In: 1000 [P O :600; IV Piggyback:400]  Out: 2825 [Urine:1075]         Physical Exam: /79   Pulse 104   Temp (!) 96 8 °F (36 °C) (Tympanic)   Resp (!) 29   Ht 5' 11" (1 803 m)   Wt 71 1 kg (156 lb 12 oz)   SpO2 94%   BMI 21 86 kg/m²     General Appearance:    Alert, cooperative, no distress, appears stated age   Head:    Normocephalic, without obvious abnormality, atraumatic   Eyes:    Conjunctiva/corneas clear   Ears:    Normal external ears   Nose:   Nares normal, septum midline, mucosa normal, no drainage    or sinus tenderness   Throat:   Lips, mucosa, and tongue normal; teeth and gums normal   Neck:   Supple   Back:     Symmetric, no curvature, ROM normal, no CVA tenderness   Lungs:     Clear to auscultation bilaterally, respirations unlabored   Chest wall:    No tenderness or deformity   Heart:    Regular rate and rhythm, S1 and S2 normal, no murmur, rub   or gallop   Abdomen:     Soft, non-tender, bowel sounds active   Extremities:   Extremities normal, atraumatic, no cyanosis, mild bilateral lower extremity edema   Skin:   Skin color, texture, turgor normal, no rashes or lesions   Lymph nodes:   Cervical normal   Neurologic:   CNII-XII intact            Lab, Imaging and other studies: I have personally reviewed pertinent labs  CBC:   Lab Results   Component Value Date    WBC 8 90 01/25/2023    HGB 11 7 (L) 01/25/2023    HCT 33 8 (L) 01/25/2023    MCV 99 (H) 01/25/2023     01/25/2023    MCH 34 4 (H) 01/25/2023    MCHC 34 6 01/25/2023    RDW 12 8 01/25/2023    MPV 8 9 01/25/2023    NRBC 0 01/25/2023     CMP:   Lab Results   Component Value Date    K 3 9 01/25/2023    CL 90 (L) 01/25/2023    CO2 23 01/25/2023    BUN 10 01/25/2023    CREATININE 0 50 (L) 01/25/2023    CALCIUM 7 5 (L) 01/25/2023    AST 47 (H) 01/25/2023    ALT 54 (H) 01/25/2023    ALKPHOS 67 01/25/2023    EGFR 124 01/25/2023           Results from last 7 days   Lab Units 01/25/23  0503 01/24/23  0455 01/23/23  0742   POTASSIUM mmol/L 3 9 3 8 3 8   CHLORIDE mmol/L 90* 91* 89*   CO2 mmol/L 23 23 26   BUN mg/dL 10 9 11   CREATININE mg/dL 0 50* 0 57* 0 59*   CALCIUM mg/dL 7 5* 7 8* 7 9*   ALK PHOS U/L 67 78 83   ALT U/L 54* 75* 74*   AST U/L 47* 80* 84*         Phosphorus: No results found for: PHOS  Magnesium: No results found for: MG  Urinalysis: No results found for: COLORU, CLARITYU, SPECGRAV, PHUR, LEUKOCYTESUR, NITRITE, PROTEINUA, GLUCOSEU, KETONESU, BILIRUBINUR, BLOODU  Ionized Calcium: No results found for: CAION  Coagulation: No results found for: PT, INR, APTT  Troponin: No results found for: TROPONINI  ABG: No results found for: PHART, KHR0CKO, PO2ART, SSR2MJK, B8EJPHPG, BEART, SOURCE  Radiology review:     IMAGING  Procedure: IR INPATIENT Paracentesis    Result Date: 1/24/2023  Narrative: EXAMINATION: Paracentesis with sonographic guidance  HISTORY: Ascites refractory to diuretic therapy  Here for routine, interval, large volume paracentesis     Abdomen distention and discomfort  TECHNIQUE: The patient was brought to Radiology  Informed consent was obtained  The Left upper quadrant was prepped and draped in the usual sterile fashion  Timeout was performed  Lidocaine was given as local anesthesia  Using sonographic guidance, a 5 [place] centesis catheter was advanced into the fluid  The catheter was connected to a vacuum bottle  The fluid was drained in its entirety  A total volume of 35 cc of [place] appearing, [place] 2 yellow colored fluid was removed  The catheter was removed  Albumin was not transfused  Dose: 0 grams Specimens were sent to the laboratory for evaluation  Impression: Technically successful ultrasound-guided paracentesis  On ultrasound, the fluid was loculated  There were innumerable septations  I suspect this is chronically infected, given the enhancing rind on CT  This is the end of the clinically relevant portion of this report  Subsequent information is for compliance, documentation, and coding purposes  In the process of informed consent, information was communicated, verbally, to the patient regarding the procedure    The patient was informed of; the name of the procedure, nature of the procedure, nature of the condition, risks, benefits, alternatives, and potential complications, as well as the consequences of not having the procedure  All the patient's questions were answered  Informed consent was signed  Quoted risks include bleeding, and leak of ascitic fluid  Chlorhexidine and alcohol was used for cleansing and sterile preparation of the skin  This was allowed to dry prior to the application of sterile draping  Timeout was performed, with all team members present, and in agreement  Confirmation of patient, procedure, laterally, allergies, and all needed equipment was performed  PROCEDURE: Paracentesis PREPROCEDURE DIAGNOSIS: Please see "history ", above POSTPROCEDURE DIAGNOSIS: Same ANTIBIOTICS: None SPECIMEN: Ascitic fluid ESTIMATED BLOOD LOSS: None ANESTHESIA: Local FINAL DISPOSITION OF CATHETER: Out of patient's body Ultrasound was used to evaluate the ascitic fluid as a potential access site  Static and real-time images of needle entry were obtained, and archived   Workstation performed: [id number]       Current Facility-Administered Medications   Medication Dose Route Frequency   • acetaminophen (TYLENOL) tablet 650 mg  650 mg Oral Q4H PRN   • aluminum-magnesium hydroxide-simethicone (MYLANTA) oral suspension 30 mL  30 mL Oral Q4H PRN   • cefTRIAXone (ROCEPHIN) IVPB (premix in dextrose) 2,000 mg 50 mL  2,000 mg Intravenous Q24H   • HYDROmorphone (DILAUDID) injection 0 5 mg  0 5 mg Intravenous Q4H PRN   • loperamide (IMODIUM) capsule 2 mg  2 mg Oral 4x Daily PRN   • ondansetron (ZOFRAN) injection 4 mg  4 mg Intravenous Q6H PRN   • oxyCODONE (ROXICODONE) immediate release tablet 10 mg  10 mg Oral Q4H PRN   • oxyCODONE (ROXICODONE) IR tablet 5 mg  5 mg Oral Q4H PRN   • vancomycin (VANCOCIN) 1500 mg in sodium chloride 0 9% 250 mL IVPB  1,500 mg Intravenous Q12H     Medications Discontinued During This Encounter   Medication Reason   • ibuprofen (MOTRIN) 200 mg tablet    • levofloxacin (LEVAQUIN) IVPB (premix in dextrose) 750 mg 150 mL    • metroNIDAZOLE (FLAGYL) IVPB (premix) 500 mg 100 mL    • vancomycin (VANCOCIN) 1,250 mg in sodium chloride 0 9 % 250 mL IVPB        Andi Arcos, DO      This progress note was produced in part using a dictation device which may document imprecise wording from author's original intent

## 2023-01-25 NOTE — PROGRESS NOTES
Progress Note - [facility] Infectious Disease   Rhonda Leiva 46 y o  male MRN: [id number]  Unit/Bed#: ICU 02-01 Encounter: [id number]      IMPRESSION & RECOMMENDATIONS:   1  Sepsis, evolving from admission, evidenced by leukocytosis and tachycardia now with low-grade fevers  T-max 101 °F   Likely due to peritonitis  Blood cultures are negative for growth  Patient remains hemodynamically stable  -Continue antibiotics as below  -Follow-up cultures and adjust antibiotics as needed  -Monitor temperature and hemodynamics  -Trend daily CBC differential and chemistry     2  Acute peritonitis  Patient presented with 1 week of worsening abdominal pain, distention, fevers and chills  CT showed moderate to large intra-abdominal and pelvic ascites with diffuse enhancement and thickening of the peritoneal lining compatible with acute peritonitis  S/p paracentesis 1/23 with loculated ascites present and fluid studies consistent with infection with extremely elevated WBC >70,000, with elevated LDH  Fluid studies not entirely consistent with portal hypertension, SAAG < 1 1 and Protein 4 4  Peritoneal fluid gram stain shows 4+ polys, 2+ GPC in pairs, chains, clusters  Unknown source  No evidence of GI source for a secondary peritonitis  Consider possibility of malignant ascites with cytology pending  Cultures are prelim negative   -Continue IV ceftriaxone 2 g q24  -Continue IV vancomycin with pharmacy cx for trough management   -Continue to follow-up peritoneal fluid culture and cytology  -Recommend repeat paracentesis Friday to evaluate for response to treatment   -Consider surgical intervention or peritoneal biopsy if no improvement on repeat peritoneal fluid studies   -Serial abdominal exams  -Close gastroenterology surgery follow-up     3  Cirrhosis  CT shows nodularity of the liver surface suggestive of chronic cirrhosis  History of chronic hepatitis C, former alcohol abuse and IV drug abuse    In remission for 20 years  Patient not treated for hepatitis C   -Ongoing follow-up with GI  -Follow-up HCV PCR and genotype  -Follow-up HIV     4  Transaminitis, hyponatremia  Likely due to cirrhosis  Nephrology and gastroenterology are following   -Monitor LFTs, chemistry    5  Antibiotic Allergy  Reports history of rash with penicillin and denies anaphylaxis  At this point unlikely this is a significant allergy  Low risk for cross-reactivity with cephalosporins  He is tolerating ceftriaxone   -Monitor for adverse drug reactions     Antibiotics:  Ceftriaxone day 2  Day 5 antibiotic    I have discussed the above management plan in detail with patient  I have discussed the above management plan in detail with patient's RN, and the primary service, SLIM  Subjective:  Patient has no fever, chills, sweats; no nausea, vomiting, diarrhea; no cough, shortness of breath; minimal abdominal pain  Feels antibiotics are working  Objective:  Vitals:  Temp:  [97 9 °F (36 6 °C)-101 °F (38 3 °C)] 97 9 °F (36 6 °C)  HR:  [] 101  Resp:  [18-32] 27  BP: (103-143)/(56-88) 103/56  SpO2:  [89 %-94 %] 93 %  Temp (24hrs), Av 7 °F (37 6 °C), Min:97 9 °F (36 6 °C), Max:101 °F (38 3 °C)  Current: Temperature: 97 9 °F (36 6 °C)    PHYSICAL EXAM:  General Appearance:  Appearing chronically ill, though nontoxic, and in no distress   HEENT: Normocephalic, without obvious abnormality, atraumatic  Conjunctiva pink and sclera anicteric  Oropharynx dry without lesions  Lungs:   Clear to auscultation bilaterally, respirations unlabored   Heart:  RRR; no murmur, rub or gallop   Abdomen:   Soft, non-tender, slightly distended, positive bowel sounds  Extremities: No cyanosis, clubbing or edema   Musculoskeletal: Back symmetric without curvature, ROM normal     Skin: No rashes or lesions  No draining wounds noted  Peripheral IV intact without evidence of erythema, warmth, or exudate  LABS, IMAGING, & OTHER STUDIES:  Lab Results:   I have personally reviewed pertinent labs  Results from last 7 days   Lab Units 01/25/23  0503 01/24/23  0455 01/23/23  0742   WBC Thousand/uL 8 90 8 56 7 67   HEMOGLOBIN g/dL 11 7* 12 3 12 5   PLATELETS Thousands/uL 281 329 302     Results from last 7 days   Lab Units 01/25/23  0503 01/24/23  0455 01/23/23  0742   SODIUM mmol/L 123* 122* 122*   POTASSIUM mmol/L 3 9 3 8 3 8   CHLORIDE mmol/L 90* 91* 89*   CO2 mmol/L 23 23 26   BUN mg/dL 10 9 11   CREATININE mg/dL 0 50* 0 57* 0 59*   EGFR ml/min/1 73sq m 124 118 116   CALCIUM mg/dL 7 5* 7 8* 7 9*   AST U/L 47* 80* 84*   ALT U/L 54* 75* 74*   ALK PHOS U/L 67 78 83     Results from last 7 days   Lab Units 01/23/23  1437 01/21/23 2117   BLOOD CULTURE   --  No Growth at 48 hrs  No Growth at 48 hrs     GRAM STAIN RESULT  4+ Polys*  2+ Gram positive cocci in pairs, chains and clusters*  --    BODY FLUID CULTURE, STERILE  No growth  --      Results from last 7 days   Lab Units 01/24/23  0455 01/23/23  0742 01/22/23  0551 01/21/23 2117   PROCALCITONIN ng/ml 0 34* 0 35* 0 41* 0 42*         Results from last 7 days   Lab Units 01/22/23  0516   FERRITIN ng/mL 762*

## 2023-01-25 NOTE — UTILIZATION REVIEW
Continued Stay Review    Date: 1/25/23                          Current Patient Class: inpatient  Current Level of Care: ICU    HPI:52 y o  male initially admitted on 1/22/23 Peritonitis, Sepsis    Assessment/Plan: Tolerating diet, had fever overnight  Continue IV ABXs, day 2  Surgical and GI state no acute intervention needed at this time  Paracentesis done on 1/23  Ascites cx:  4+ Polys, 2+ Gram positive cocci in pairs, chains and clusters  ID consulted, continue to fu on final cxs  Check repeat blood cxs dt fever spike  Continue fluid restriction, nephrology and GI following  Continue ICU level of care      Vital Signs:   Date/Time Temp Pulse Resp BP MAP (mmHg) SpO2 O2 Device Patient Position - Orthostatic VS   01/25/23 0900 -- 104 29 Abnormal  133/79 100 94 % -- --   01/25/23 0800 -- 98 25 Abnormal  122/74 93 95 % -- --   01/25/23 0700 96 8 °F (36 °C) Abnormal  103 20 123/77 85 94 % -- --   01/25/23 0600 -- 101 27 Abnormal  103/56 73 93 % None (Room air) Lying   01/25/23 0500 -- 105 26 Abnormal  119/73 90 93 % None (Room air) Lying   01/25/23 0400 97 9 °F (36 6 °C) 101 24 Abnormal  119/69 88 91 % None (Room air) Lying   01/25/23 0300 -- 100 24 Abnormal  122/70 89 90 % None (Room air) Lying   01/25/23 0200 100 9 °F (38 3 °C) Abnormal  107 Abnormal  28 Abnormal  127/74 94 91 % None (Room air) Lying   01/25/23 0100 101 °F (38 3 °C) Abnormal  103 26 Abnormal  120/73 90 90 % None (Room air) Lying   01/25/23 0000 -- 102 25 Abnormal  124/72 91 89 % Abnormal  None (Room air) Lying   01/24/23 2300 -- 100 24 Abnormal  135/72 95 92 % None (Room air) Lying   01/24/23 2200 -- 114 Abnormal  29 Abnormal  141/85 107 89 % Abnormal  None (Room air) Lying   01/24/23 2100 99 °F (37 2 °C) 96 24 Abnormal  120/71 91 90 % None (Room air) Lying   01/24/23 1915 -- 98 27 Abnormal  -- -- 92 % -- --   01/24/23 1900 98 6 °F (37 °C) 107 Abnormal  29 Abnormal  127/76 97 94 % None (Room air) Lying   01/24/23 1800 -- 101 22 115/73 88 93 % -- -- 01/24/23 1729 99 4 °F (37 4 °C) 104 20 -- -- 93 % -- --   01/24/23 1700 -- 104 29 Abnormal  116/76 90 92 % -- --   01/24/23 1629 -- 104 18 -- -- 92 % -- --   01/24/23 1600 -- 111 Abnormal  31 Abnormal  113/69 84 91 % -- --   01/24/23 1559 -- 110 Abnormal  22 117/68 86 90 % None (Room air) Lying   01/24/23 1500 99 5 °F (37 5 °C) -- -- -- -- -- -- --   01/24/23 1400 -- 111 Abnormal  30 Abnormal  115/81 93 91 % -- --   01/24/23 1356 100 9 °F (38 3 °C) Abnormal  110 Abnormal  27 Abnormal  -- -- 92 % -- --   01/24/23 1300 -- 108 Abnormal  31 Abnormal  138/83 105 93 % -- --   01/24/23 1200 -- 100 26 Abnormal  135/77 100 92 % -- --   01/24/23 1100 -- 101 31 Abnormal  143/76 103 93 % -- --   01/24/23 1000 -- 107 Abnormal  29 Abnormal  136/88 108 94 % -- --   01/24/23 0900 -- 107 Abnormal  32 Abnormal  132/83 102 93 % -- --   01/24/23 0800 -- 99 29 Abnormal  124/77 97 92 % None (Room air) --   01/24/23 0700 99 6 °F (37 6 °C) 99 24 Abnormal  128/77 96 93 % None (Room air) Lying   01/24/23 0500 -- 102 29 Abnormal  138/92 110 90 % None (Room air) Lying   01/24/23 0400 98 1 °F (36 7 °C) 86 22 130/75 97 92 % None (Room air) Lying   01/24/23 0300 -- 89 22 128/77 97 92 % None (Room air) Lying   01/24/23 0200 -- 92 22 128/77 96 92 % None (Room air) Lying   01/24/23 0100 -- 101 29 Abnormal  125/74 93 93 % None (Room air) Lying   01/24/23 0000 99 2 °F (37 3 °C) 99 26 Abnormal  131/73 95 92 % None (Room air) Lying   01/23/23 2300 -- 91 23 Abnormal  124/72 92 91 % None (Room air) Lying   01/23/23 2200 -- 90 23 Abnormal  123/70 91 92 % None (Room air) Lying   01/23/23 2100 -- 93 22 137/75 99 92 % None (Room air) Lying   01/23/23 2000 -- 98 32 Abnormal  140/75 102 94 % None (Room air) Lying   01/23/23 1900 98 6 °F (37 °C) 97 30 Abnormal  116/78 93 92 % None (Room air) Lying   01/23/23 14:26:10 -- 105 18 128/77 -- 94 % -- --   01/23/23 0700 98 2 °F (36 8 °C) 83 21 139/76 100 92 % -- Lying       Pertinent Labs/Diagnostic Results: Results from last 7 days   Lab Units 01/21/23 2117   SARS-COV-2  Negative     Results from last 7 days   Lab Units 01/25/23  0503 01/24/23  0455 01/23/23  0742 01/22/23  0516 01/21/23  2117   WBC Thousand/uL 8 90 8 56 7 67 12 47* 16 03*   HEMOGLOBIN g/dL 11 7* 12 3 12 5 12 0 12 9   HEMATOCRIT % 33 8* 36 2* 36 6 35 3* 37 7   PLATELETS Thousands/uL 281 329 302 291 369   NEUTROS ABS Thousands/µL 6 45 6 21 5 71  --  13 48*         Results from last 7 days   Lab Units 01/25/23  0503 01/24/23  0455 01/23/23  0742 01/22/23  1557 01/22/23  0516   SODIUM mmol/L 123* 122* 122* 121* 120*   POTASSIUM mmol/L 3 9 3 8 3 8 3 8 3 9   CHLORIDE mmol/L 90* 91* 89* 91* 91*   CO2 mmol/L 23 23 26 23 23   ANION GAP mmol/L 10 8 7 7 6   BUN mg/dL 10 9 11 12 11   CREATININE mg/dL 0 50* 0 57* 0 59* 0 63 0 53*   EGFR ml/min/1 73sq m 124 118 116 113 121   CALCIUM mg/dL 7 5* 7 8* 7 9* 7 6* 7 7*   MAGNESIUM mg/dL  --  1 8* 1 6*  --   --      Results from last 7 days   Lab Units 01/25/23  0503 01/24/23  0455 01/23/23  0742 01/22/23  1557 01/21/23 2117   AST U/L 47* 80* 84* 74* 63*   ALT U/L 54* 75* 74* 63* 61*   ALK PHOS U/L 67 78 83 80 100   TOTAL PROTEIN g/dL 5 9* 6 0* 6 3* 5 6* 6 8   ALBUMIN g/dL 2 4* 2 6* 2 6* 2 4* 2 8*   TOTAL BILIRUBIN mg/dL 0 97 1 24* 1 54* 1 32* 2 17*         Results from last 7 days   Lab Units 01/25/23  0503 01/24/23  0455 01/23/23  0742 01/22/23  1557 01/22/23  0516 01/21/23  2117   GLUCOSE RANDOM mg/dL 118 109 114 120 103 201*     Results from last 7 days   Lab Units 01/21/23  2226   OSMOLALITY, SERUM mmol/*     Results from last 7 days   Lab Units 01/22/23  0516   HEMOGLOBIN A1C % 5 4   EAG mg/dl 108     Results from last 7 days   Lab Units 01/21/23  2226   PROTIME seconds 17 0*   INR  1 38*   PTT seconds 28     Results from last 7 days   Lab Units 01/21/23  2117   TSH 3RD GENERATON uIU/mL 3 019     Results from last 7 days   Lab Units 01/24/23  0455 01/23/23  0742 01/22/23  0551 01/21/23 2117 PROCALCITONIN ng/ml 0 34* 0 35* 0 41* 0 42*     Results from last 7 days   Lab Units 01/21/23  2324 01/21/23 2117   LACTIC ACID mmol/L 1 5 2 3*     Results from last 7 days   Lab Units 01/22/23  0516   FERRITIN ng/mL 762*     Results from last 7 days   Lab Units 01/23/23  0742   HEP B S AG  Non-reactive   HEP C AB  High Reactive*   HEP B C TOTAL AB  Non-reactive     Results from last 7 days   Lab Units 01/21/23  2226   OSMOLALITY, SERUM mmol/*   OSMO UR mmol/     Results from last 7 days   Lab Units 01/21/23  2226   CLARITY UA  Clear   COLOR UA  Louisa*   SPEC GRAV UA  1 010   PH UA  6 0   GLUCOSE UA mg/dl Negative   KETONES UA mg/dl Negative   BLOOD UA  Negative   PROTEIN UA mg/dl Negative   NITRITE UA  Negative   BILIRUBIN UA  Negative   UROBILINOGEN UA E U /dl 0 2   LEUKOCYTES UA  Negative   SODIUM UR  <5     Results from last 7 days   Lab Units 01/21/23 2117   INFLUENZA A PCR  Negative   INFLUENZA B PCR  Negative   RSV PCR  Negative     Results from last 7 days   Lab Units 01/23/23  1437 01/21/23 2117   BLOOD CULTURE   --  No Growth at 48 hrs  No Growth at 48 hrs  GRAM STAIN RESULT  4+ Polys*  2+ Gram positive cocci in pairs, chains and clusters*  --    BODY FLUID CULTURE, STERILE  Culture results to follow    --      Results from last 7 days   Lab Units 01/23/23 1437   TOTAL COUNTED  100   WBC FLUID /ul 70,410     Medications:   Scheduled Medications:  cefTRIAXone, 2,000 mg, Intravenous, Q24H  vancomycin, 1,500 mg, Intravenous, Q12H      Continuous IV Infusions: none     PRN Meds:  acetaminophen, 650 mg, Oral, Q4H PRN  aluminum-magnesium hydroxide-simethicone, 30 mL, Oral, Q4H PRN  HYDROmorphone, 0 5 mg, Intravenous, Q4H PRN  loperamide, 2 mg, Oral, 4x Daily PRN  ondansetron, 4 mg, Intravenous, Q6H PRN  oxyCODONE, 10 mg, Oral, Q4H PRN  oxyCODONE, 5 mg, Oral, Q4H PRN        Discharge Plan: d    Network Utilization Review Department  ATTENTION: Please call with any questions or concerns to [phone] and carefully listen to the prompts so that you are directed to the right person  All voicemails are confidential   Lamar Payor all requests for admission clinical reviews, approved or denied determinations and any other requests to dedicated fax number below belonging to the campus where the patient is receiving treatment   List of dedicated fax numbers for the Facilities:  [address] DENIALS (Administrative/Medical Necessity) [phone]   [address] (Maternity/NICU/Pediatrics) [phone]   0 Rosalina Truong [phone]   [facility] [phone]   [address] [place] [address] [facility] [phone]   [facility] Amena Page [facility] 134 815 [facility] [phone]

## 2023-01-25 NOTE — NURSING NOTE
Room air; VS and sats stabel  Denies pain; but continues to c/o abdominal distention/bloating  Denies nausea  Bowel sounds present   Treatment plan reviewed with pt(regarding antbx, possibility of surgery)

## 2023-01-25 NOTE — ASSESSMENT & PLAN NOTE
Patient reporting abdominal pain for 3 days and urinary retention, increased abdominal distention, nausea/vomiting  · Patient was initially on Levaquin/Flagyl  Infectious disease was consulted and Levaquin/Flagyl were discontinued and patient was started on ceftriaxone/vancomycin  · We will continue ceftriaxone/vancomycin day 2  · Surgical and GI input appreciated, no acute intervention needed  · See sepsis

## 2023-01-25 NOTE — ASSESSMENT & PLAN NOTE
· Patient with a history of hepatitis C untreated  · GI following  · Liver with cirrhotic appearance

## 2023-01-25 NOTE — PLAN OF CARE
Problem: Nutrition/Hydration-ADULT  Goal: Nutrient/Hydration intake appropriate for improving, restoring or maintaining nutritional needs  Description: Monitor and assess patient's nutrition/hydration status for malnutrition  Collaborate with interdisciplinary team and initiate plan and interventions as ordered  Monitor patient's weight and dietary intake as ordered or per policy  Utilize nutrition screening tool and intervene as necessary  Determine patient's food preferences and provide high-protein, high-caloric foods as appropriate       INTERVENTIONS:  - Monitor oral intake, urinary output, labs, and treatment plans  - Assess nutrition and hydration status and recommend course of action  - Evaluate amount of meals eaten  - Assist patient with eating if necessary   - Allow adequate time for meals  - Recommend/ encourage appropriate diets, oral nutritional supplements, and vitamin/mineral supplements  - Order, calculate, and assess calorie counts as needed  - Recommend, monitor, and adjust tube feedings and TPN/PPN based on assessed needs  - Assess need for intravenous fluids  - Provide specific nutrition/hydration education as appropriate  - Include patient/family/caregiver in decisions related to nutrition  Outcome: Progressing     Problem: PAIN - ADULT  Goal: Verbalizes/displays adequate comfort level or baseline comfort level  Description: Interventions:  - Encourage patient to monitor pain and request assistance  - Assess pain using appropriate pain scale  - Administer analgesics based on type and severity of pain and evaluate response  - Implement non-pharmacological measures as appropriate and evaluate response  - Consider cultural and social influences on pain and pain management  - Notify physician/advanced practitioner if interventions unsuccessful or patient reports new pain  Outcome: Progressing     Problem: SAFETY ADULT  Goal: Patient will remain free of falls  Description: INTERVENTIONS:  - Educate patient/family on patient safety including physical limitations  - Instruct patient to call for assistance with activity   - Consult OT/PT to assist with strengthening/mobility   - Keep Call bell within reach  - Keep bed low and locked with side rails adjusted as appropriate  - Keep care items and personal belongings within reach  - Initiate and maintain comfort rounds  - Make Fall Risk Sign visible to staff  - Offer Toileting every 2 Hours, in advance of need  - Initiate/Maintain alarm  - Obtain necessary fall risk management equipment:   - Apply yellow socks and bracelet for high fall risk patients  - Consider moving patient to room near nurses station  Outcome: Progressing  Goal: Maintain or return to baseline ADL function  Description: INTERVENTIONS:  -  Assess patient's ability to carry out ADLs; assess patient's baseline for ADL function and identify physical deficits which impact ability to perform ADLs (bathing, care of mouth/teeth, toileting, grooming, dressing, etc )  - Assess/evaluate cause of self-care deficits   - Assess range of motion  - Assess patient's mobility; develop plan if impaired  - Assess patient's need for assistive devices and provide as appropriate  - Encourage maximum independence but intervene and supervise when necessary  - Involve family in performance of ADLs  - Assess for home care needs following discharge   - Consider OT consult to assist with ADL evaluation and planning for discharge  - Provide patient education as appropriate  Outcome: Progressing  Goal: Maintains/Returns to pre admission functional level  Description: INTERVENTIONS:  - Perform BMAT or MOVE assessment daily    - Set and communicate daily mobility goal to care team and patient/family/caregiver  - Collaborate with rehabilitation services on mobility goals if consulted  - Perform Range of Motion 3 times a day  - Reposition patient every 2 hours    - Dangle patient 3 times a day  - Stand patient 3 Times a day  - Ambulate patient 3 times a day  - Out of bed to chair 3 times a day   - Out of bed for meals 3 times a day  - Out of bed for toileting  - Record patient progress and toleration of activity level   Outcome: Progressing     Problem: DISCHARGE PLANNING  Goal: Discharge to home or other facility with appropriate resources  Description: INTERVENTIONS:  - Identify barriers to discharge w/patient and caregiver  - Arrange for needed discharge resources and transportation as appropriate  - Identify discharge learning needs (meds, wound care, etc )  - Arrange for interpretive services to assist at discharge as needed  - Refer to Case Management Department for coordinating discharge planning if the patient needs post-hospital services based on physician/advanced practitioner order or complex needs related to functional status, cognitive ability, or social support system  Outcome: Progressing     Problem: METABOLIC, FLUID AND ELECTROLYTES - ADULT  Goal: Electrolytes maintained within normal limits  Description: INTERVENTIONS:  - Monitor labs and assess patient for signs and symptoms of electrolyte imbalances  - Administer electrolyte replacement as ordered  - Monitor response to electrolyte replacements, including repeat lab results as appropriate  - Instruct patient on fluid and nutrition as appropriate  Outcome: Progressing  Goal: Fluid balance maintained  Description: INTERVENTIONS:  - Monitor labs   - Monitor I/O and WT  - Instruct patient on fluid and nutrition as appropriate  - Assess for signs & symptoms of volume excess or deficit  Outcome: Progressing  Goal: Glucose maintained within target range  Description: INTERVENTIONS:  - Monitor Blood Glucose as ordered  - Assess for signs and symptoms of hyperglycemia and hypoglycemia  - Administer ordered medications to maintain glucose within target range  - Assess nutritional intake and initiate nutrition service referral as needed  Outcome: Progressing

## 2023-01-25 NOTE — PROGRESS NOTES
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, trough >10  Clinical Status: stable  Micro:     Renal Function:  SCr: 0 5 mg/dL  CrCl: 173 6 mL/min  Renal replacement: Not on dialysis  Days of Therapy: 2  Current Dose: 1250 mg IV Q12H  Vancomycin Plan:  New Dosin mg IV Q12H  Estimated AUC: 462 mcg*hr/mL  Estimated Trough: 11 6 mcg/mL  Next Level:  @ 0400  Renal Function Monitoring: Daily BMP and UOP  Pharmacy will continue to follow closely for s/sx of nephrotoxicity, infusion reactions and appropriateness of therapy  BMP and CBC will be ordered per protocol  We will continue to follow the patient’s culture results and clinical progress daily      Sherren Niemann, Pharmacist

## 2023-01-26 LAB
ALBUMIN SERPL BCP-MCNC: 2.4 G/DL (ref 3.5–5)
ALP SERPL-CCNC: 66 U/L (ref 34–104)
ALT SERPL W P-5'-P-CCNC: 42 U/L (ref 7–52)
ANION GAP SERPL CALCULATED.3IONS-SCNC: 6 MMOL/L (ref 4–13)
ANION GAP SERPL CALCULATED.3IONS-SCNC: 6 MMOL/L (ref 4–13)
ANION GAP SERPL CALCULATED.3IONS-SCNC: 7 MMOL/L (ref 4–13)
AST SERPL W P-5'-P-CCNC: 39 U/L (ref 13–39)
BASOPHILS # BLD AUTO: 0.03 THOUSANDS/ÂΜL (ref 0–0.1)
BASOPHILS NFR BLD AUTO: 0 % (ref 0–1)
BILIRUB SERPL-MCNC: 0.9 MG/DL (ref 0.2–1)
BUN SERPL-MCNC: 10 MG/DL (ref 5–25)
CALCIUM ALBUM COR SERPL-MCNC: 9 MG/DL (ref 8.3–10.1)
CALCIUM SERPL-MCNC: 7.1 MG/DL (ref 8.4–10.2)
CALCIUM SERPL-MCNC: 7.5 MG/DL (ref 8.4–10.2)
CALCIUM SERPL-MCNC: 7.7 MG/DL (ref 8.4–10.2)
CHLORIDE SERPL-SCNC: 88 MMOL/L (ref 96–108)
CHLORIDE SERPL-SCNC: 89 MMOL/L (ref 96–108)
CHLORIDE SERPL-SCNC: 89 MMOL/L (ref 96–108)
CO2 SERPL-SCNC: 23 MMOL/L (ref 21–32)
CO2 SERPL-SCNC: 24 MMOL/L (ref 21–32)
CO2 SERPL-SCNC: 25 MMOL/L (ref 21–32)
CREAT SERPL-MCNC: 0.5 MG/DL (ref 0.6–1.3)
CREAT SERPL-MCNC: 0.51 MG/DL (ref 0.6–1.3)
CREAT SERPL-MCNC: 0.52 MG/DL (ref 0.6–1.3)
EOSINOPHIL # BLD AUTO: 0.02 THOUSAND/ÂΜL (ref 0–0.61)
EOSINOPHIL NFR BLD AUTO: 0 % (ref 0–6)
ERYTHROCYTE [DISTWIDTH] IN BLOOD BY AUTOMATED COUNT: 13 % (ref 11.6–15.1)
GFR SERPL CREATININE-BSD FRML MDRD: 122 ML/MIN/1.73SQ M
GFR SERPL CREATININE-BSD FRML MDRD: 123 ML/MIN/1.73SQ M
GFR SERPL CREATININE-BSD FRML MDRD: 124 ML/MIN/1.73SQ M
GLUCOSE SERPL-MCNC: 117 MG/DL (ref 65–140)
GLUCOSE SERPL-MCNC: 143 MG/DL (ref 65–140)
GLUCOSE SERPL-MCNC: 143 MG/DL (ref 65–140)
HCT VFR BLD AUTO: 34.1 % (ref 36.5–49.3)
HCV GENTYP SERPL NAA+PROBE: NORMAL
HCV PLEASE NOTE: NORMAL
HGB BLD-MCNC: 11.8 G/DL (ref 12–17)
IMM GRANULOCYTES # BLD AUTO: 0.04 THOUSAND/UL (ref 0–0.2)
IMM GRANULOCYTES NFR BLD AUTO: 1 % (ref 0–2)
LYMPHOCYTES # BLD AUTO: 1.37 THOUSANDS/ÂΜL (ref 0.6–4.47)
LYMPHOCYTES NFR BLD AUTO: 16 % (ref 14–44)
MCH RBC QN AUTO: 34.2 PG (ref 26.8–34.3)
MCHC RBC AUTO-ENTMCNC: 34.6 G/DL (ref 31.4–37.4)
MCV RBC AUTO: 99 FL (ref 82–98)
MONOCYTES # BLD AUTO: 0.87 THOUSAND/ÂΜL (ref 0.17–1.22)
MONOCYTES NFR BLD AUTO: 10 % (ref 4–12)
NEUTROPHILS # BLD AUTO: 6.33 THOUSANDS/ÂΜL (ref 1.85–7.62)
NEUTS SEG NFR BLD AUTO: 73 % (ref 43–75)
NRBC BLD AUTO-RTO: 0 /100 WBCS
PLATELET # BLD AUTO: 238 THOUSANDS/UL (ref 149–390)
PMV BLD AUTO: 9 FL (ref 8.9–12.7)
POTASSIUM SERPL-SCNC: 3.6 MMOL/L (ref 3.5–5.3)
POTASSIUM SERPL-SCNC: 3.6 MMOL/L (ref 3.5–5.3)
POTASSIUM SERPL-SCNC: 3.9 MMOL/L (ref 3.5–5.3)
PREALB SERPL-MCNC: 3.9 MG/DL (ref 18–40)
PROT SERPL-MCNC: 6.2 G/DL (ref 6.4–8.4)
RBC # BLD AUTO: 3.45 MILLION/UL (ref 3.88–5.62)
SODIUM SERPL-SCNC: 118 MMOL/L (ref 135–147)
SODIUM SERPL-SCNC: 119 MMOL/L (ref 135–147)
SODIUM SERPL-SCNC: 120 MMOL/L (ref 135–147)
WBC # BLD AUTO: 8.66 THOUSAND/UL (ref 4.31–10.16)

## 2023-01-26 RX ORDER — XYLITOL/YERBA SANTA
5 AEROSOL, SPRAY WITH PUMP (ML) MUCOUS MEMBRANE 4 TIMES DAILY PRN
Status: DISCONTINUED | OUTPATIENT
Start: 2023-01-26 | End: 2023-01-28

## 2023-01-26 RX ORDER — ENOXAPARIN SODIUM 100 MG/ML
40 INJECTION SUBCUTANEOUS
Status: DISCONTINUED | OUTPATIENT
Start: 2023-01-26 | End: 2023-02-03 | Stop reason: HOSPADM

## 2023-01-26 RX ORDER — POTASSIUM CHLORIDE 20 MEQ/1
40 TABLET, EXTENDED RELEASE ORAL ONCE
Status: COMPLETED | OUTPATIENT
Start: 2023-01-26 | End: 2023-01-26

## 2023-01-26 RX ORDER — 3% SODIUM CHLORIDE 3 G/100ML
20 INJECTION, SOLUTION INTRAVENOUS CONTINUOUS
Status: DISPENSED | OUTPATIENT
Start: 2023-01-26 | End: 2023-01-27

## 2023-01-26 RX ADMIN — SODIUM CHLORIDE 15 ML/HR: 3 INJECTION, SOLUTION INTRAVENOUS at 18:30

## 2023-01-26 RX ADMIN — VANCOMYCIN HYDROCHLORIDE 1500 MG: 1 INJECTION, POWDER, LYOPHILIZED, FOR SOLUTION INTRAVENOUS at 05:42

## 2023-01-26 RX ADMIN — SODIUM CHLORIDE 1000 ML: 0.9 INJECTION, SOLUTION INTRAVENOUS at 11:43

## 2023-01-26 RX ADMIN — ENOXAPARIN SODIUM 40 MG: 100 INJECTION SUBCUTANEOUS at 09:20

## 2023-01-26 RX ADMIN — CEFTRIAXONE 2000 MG: 2 INJECTION, SOLUTION INTRAVENOUS at 16:46

## 2023-01-26 RX ADMIN — LOPERAMIDE HYDROCHLORIDE 2 MG: 2 CAPSULE ORAL at 09:20

## 2023-01-26 RX ADMIN — POTASSIUM CHLORIDE 40 MEQ: 1500 TABLET, EXTENDED RELEASE ORAL at 16:46

## 2023-01-26 NOTE — ASSESSMENT & PLAN NOTE
· GI input appreciated  · Follow-up further work-up for cirrhosis causes, labs ordered by GI team  · Hepatitis C work-up pending  · Patient had paracentesis performed on 1/23/2023 ; plan for repeat paracentesis on 1/27/2023  · Continue IV antibiotics for possible SBP/peritonitis  · Will need to follow-up cytology results

## 2023-01-26 NOTE — PROGRESS NOTES
Progress Note- Edy Cobb 46 y o  male MRN: [id number]    Unit/Bed#: ICU 02-01 Encounter: [id number]      Assessment and Plan:    Patient is a 46 y o  male with PMH significant for chronic HCV and former alcohol use disorder (reported remission x20 yrs) admitted on 1/22 for worsening abdominal distention and discomfort with associated subjective fevers and leukocytosis  CT demonstrated mild hepatic nodularity, large-volume ascites and possible peritonitis in addition to serologies notable for hypoalbuminemia, elevated INR, and mild transaminases suggestive of a new diagnosis of cirrhosis  BC with NGx24      1  Spontaneous bacterial peritonitis (SBP)  2   Abnormal findings of liver on imaging  Patient underwent diagnostic/therepeutic paracentesis, noted to have innumerable septations and loculations with approx 20 cc opaque fluid removed  Fluid analysis with significant leukocytosis (>70K), low SAAG (<1 1) and high ascitic protein (4 4) discordant with portal hypertension and raising concern for secondary perotinitis  Imaging without obvious source        Patient continues to be intermittently febrile, tachycardic, tachypneic to receive fluid bolus as discussed with SLIM  Abdominal exam remains benign  Continues to receive IV ceftriaxone and vancomycin as per ID, appreciate recommendations  Repeat BCx2 collected and in process  Plan for repeat paracentesis tomorrow (1/27)  If patient continues to be febrile, also recommend repeat contrasted cross-sectional imaging tomorrow      - Diet as tolerated; Nutritional supplements  - Follow-up repeat BCx2 and prealbumin  - Continue IV fluids with lyte replacement; Fluid bolus  - Continue antibiotics as per ID  - Monitor WBC count, fever curve and vitals closely  - Monitor serial abdominal exams  - General surgery and ID following, appreciate recommendations  - If patient continue to be febrile, recommend repeat contrasted cross-sectional imaging tomorrow     3   Suspected cirrhosis secondary to EtOH and chronic HCV  Patient with cirrhotic morphology and serologies notable for hepatic synthetic dysfunction suggestive of a new diagnosis of cirrhosis  Otherwise, patient is without other evidence of hepatic decompensation including jaundice, bleeding esophageal varices, or hepatic encephalopathy  Recommend close outpatient GI/hepatology follow-up for further evaluation and management of suspected cirrhosis including routine serologies, outpatient EGD for variceal screening and hepatoma screening in addition to discussing HCV treatment given positive HCV viral load  - Monitor MELD labs daily (CBC, CMP, INR)  - Close outpatient GI/heptology follow-up     GI will continue to follow      ______________________________________________________________________    Subjective:     Patient resting comfortable in bed  Remains intermittently febrile, tachycardic and tachypneic  States he feels well but "dry"  Denies jaundice, nausea/vomiting, abdominal pain, or rectal bleeding  Tolerating diet without difficulty         Medication Administration - last 24 hours from 01/25/2023 0911 to 01/26/2023 8182       Date/Time Order Dose Route Action Action by     01/25/2023 1734 EST loperamide (IMODIUM) capsule 2 mg 2 mg Oral Given Gemma Guardado RN     01/25/2023 1702 EST cefTRIAXone (ROCEPHIN) IVPB (premix in dextrose) 2,000 mg 50 mL 2,000 mg Intravenous [address] Gemma Guardado RN     01/26/2023 3888 EST vancomycin (VANCOCIN) 1500 mg in sodium chloride 0 9% 250 mL IVPB 0 mg Intravenous Stopped Angella Mendoza RN     01/26/2023 0542 EST vancomycin (VANCOCIN) 1500 mg in sodium chloride 0 9% 250 mL IVPB 1,500 mg Intravenous [address] Michelle Beach RN     01/25/2023 1903 EST vancomycin (VANCOCIN) 1500 mg in sodium chloride 0 9% 250 mL IVPB 0 mg Intravenous Stopped Michelle Beach RN     01/25/2023 1703 EST vancomycin (VANCOCIN) 1500 mg in sodium chloride 0 9% 250 mL IVPB 1,500 mg Intravenous New Bag Gemma Guardado RN          Objective:     Vitals: Blood pressure 124/71, pulse (!) 109, temperature 98 3 °F (36 8 °C), temperature source Tympanic, resp  rate (!) 26, height 5' 11" (1 803 m), weight 71 3 kg (157 lb 3 oz), SpO2 92 %  ,Body mass index is 21 92 kg/m²  Intake/Output Summary (Last 24 hours) at 1/26/2023 0911  Last data filed at 1/26/2023 0830  Gross per 24 hour   Intake 557 ml   Output 647 ml   Net -90 ml       Physical Exam:   General Appearance: Awake and alert, in no acute distress  Abdomen: +Mild-moderate abd distention; Soft, non-tender; bowel sounds normal; no masses or no organomegaly    Invasive Devices     Peripheral Intravenous Line  Duration           Peripheral IV 01/21/23 Left;Proximal;Ventral (anterior) Forearm 4 days                Lab Results:  No results displayed because visit has over 200 results  Imaging Studies: I have personally reviewed pertinent imaging studies

## 2023-01-26 NOTE — QUICK NOTE
Recommend HTS 15ml/hr x 6 hr     Goal Na improvement 4-6/24 hr   Check bmp q 4  If Na > 123 stop hypertonic saline  Update Na at 20:00 is 118, 3% just started 1 5 hr prior  Increase dose to 20ml/hr  Based on repeat chem will decide on changes from there

## 2023-01-26 NOTE — PROGRESS NOTES
[address]  Progress Note Chavo Greenfield 1970, 46 y o  male MRN: [id number]  Unit/Bed#: ICU 02-01 Encounter: [id number]  Primary Care Provider: Prakash Lagunas DO   Date and time admitted to hospital: 1/21/2023  8:32 PM    * Peritonitis [facility])  Assessment & Plan  · Patient reporting abdominal pain for 3 days and urinary retention, increased abdominal distention, nausea/vomiting  · Patient was initially on Levaquin/Flagyl  · Infectious disease was consulted and Levaquin/Flagyl were discontinued and patient was started on ceftriaxone/vancomycin  · We will continue ceftriaxone/vancomycin day 3  · Surgical and GI input appreciated, no acute intervention needed  · See sepsis    Cirrhosis of liver with ascites ([facility] [address] )  Assessment & Plan  · GI input appreciated  · Follow-up further work-up for cirrhosis causes, labs ordered by GI team  · Hepatitis C work-up pending  · Patient had paracentesis performed on 1/23/2023 ; plan for repeat paracentesis on 1/27/2023  · Continue IV antibiotics for possible SBP/peritonitis  · Will need to follow-up cytology results    Sepsis [facility])  Assessment & Plan  · Secondary to peritonitis  · Noted by leukocytosis, tachycardia tachypnea mild lactic acidosis present on admission  · Lactic acidosis resolved with IV fluids  · Continue antibiotics with ceftriaxone/vancomycin  Ascites cx    4+ Polys Abnormal  P    2+ Gram positive cocci in pairs, chains and clusters Abnormal      · Reviewed with GI , not felt to have simple SBP based on ascitic studies  · ID consulted for further evaluation, antibiotics have been adjusted as mentioned above  · Follow-up final cultures  · Patient did have a fever spike overnight, will check repeat blood cultures    Hepatitis C  Assessment & Plan  · Patient with a history of hepatitis C untreated  · GI following  · Liver with cirrhotic appearance    Hyponatremia  Assessment & Plan  · Sodium level gradually improving  · Nephrology following  · Continue fluid restriction  · Repeat BMP in the morning  Lab Results   Component Value Date    SODIUM 123 (L) 2023     VTE Pharmacologic Prophylaxis: Lovenox    Patient Centered Rounds:  Patient care rounds were performed with nursing    Discussions with Specialists or Other Care Team Provider: Gastroenterology, Infectious Diseases    Education and Discussions with Family / Patient: Spoke with patient at bedside    Time Spent for Care: 30  More than 50% of total time spent on counseling and coordination of care as described above  Current Length of Stay: 4 day(s)    Current Patient Status: Inpatient     Certification Statement: The patient will continue to require additional inpatient hospital stay due to peritonitis    Discharge Plan: Home with family support once medically stable    Code Status: Level 1 - Full Code      Subjective:   Patient seen and examined at bedside  No acute events overnight  Plan for repeat paracentesis on 2022    Objective:     Vitals:   Temp (24hrs), Av 5 °F (37 5 °C), Min:98 3 °F (36 8 °C), Max:100 9 °F (38 3 °C)    Temp:  [98 3 °F (36 8 °C)-100 9 °F (38 3 °C)] 98 3 °F (36 8 °C)  HR:  [] 103  Resp:  [22-29] 22  BP: (122-141)/(67-79) 141/67  SpO2:  [89 %-95 %] 89 %  Body mass index is 21 92 kg/m²  Input and Output Summary (last 24 hours): Intake/Output Summary (Last 24 hours) at 2023 0731  Last data filed at 2023  Gross per 24 hour   Intake 120 ml   Output 847 ml   Net -727 ml       Physical Exam:     Physical Exam  Vitals and nursing note reviewed  Constitutional:       General: He is not in acute distress  Appearance: He is well-developed  HENT:      Head: Normocephalic and atraumatic  Eyes:      Conjunctiva/sclera: Conjunctivae normal    Cardiovascular:      Rate and Rhythm: Normal rate and regular rhythm  Heart sounds: No murmur heard    Pulmonary:      Effort: Pulmonary effort is normal  No respiratory distress  Breath sounds: Normal breath sounds  Abdominal:      Palpations: Abdomen is soft  Tenderness: There is no abdominal tenderness  Musculoskeletal:         General: No swelling  Cervical back: Neck supple  Skin:     General: Skin is warm and dry  Capillary Refill: Capillary refill takes less than 2 seconds  Neurological:      Mental Status: He is alert  Psychiatric:         Mood and Affect: Mood normal          Additional Data:     Labs: I have reviewed pertinent results     Results from last 7 days   Lab Units 01/25/23  0503   WBC Thousand/uL 8 90   HEMOGLOBIN g/dL 11 7*   HEMATOCRIT % 33 8*   PLATELETS Thousands/uL 281   NEUTROS PCT % 73   LYMPHS PCT % 14   MONOS PCT % 12   EOS PCT % 0     Results from last 7 days   Lab Units 01/25/23  0503   SODIUM mmol/L 123*   POTASSIUM mmol/L 3 9   CHLORIDE mmol/L 90*   CO2 mmol/L 23   BUN mg/dL 10   CREATININE mg/dL 0 50*   ANION GAP mmol/L 10   CALCIUM mg/dL 7 5*   ALBUMIN g/dL 2 4*   TOTAL BILIRUBIN mg/dL 0 97   ALK PHOS U/L 67   ALT U/L 54*   AST U/L 47*   GLUCOSE RANDOM mg/dL 118     Results from last 7 days   Lab Units 01/21/23  2226   INR  1 38*         Results from last 7 days   Lab Units 01/22/23  0516   HEMOGLOBIN A1C % 5 4     Results from last 7 days   Lab Units 01/24/23  0455 01/23/23  0742 01/22/23  0551 01/21/23  2324 01/21/23  2117   LACTIC ACID mmol/L  --   --   --  1 5 2 3*   PROCALCITONIN ng/ml 0 34* 0 35* 0 41*  --  0 42*         Imaging: I have reviewed pertinent imaging       Recent Cultures (last 7 days):     Results from last 7 days   Lab Units 01/25/23  1201 01/23/23  1437 01/21/23  2117   BLOOD CULTURE  Received in Microbiology Lab  Culture in Progress  Received in Microbiology Lab  Culture in Progress  --  No Growth at 72 hrs  No Growth at 72 hrs  GRAM STAIN RESULT   --  4+ Polys*  2+ Gram positive cocci in pairs, chains and clusters*  --    BODY FLUID CULTURE, STERILE   --  Culture results to follow    -- Last 24 Hours Medication List:   Current Facility-Administered Medications   Medication Dose Route Frequency Provider Last Rate   • acetaminophen  650 mg Oral Q4H PRN Vesta Folds, PAVielkaC     • aluminum-magnesium hydroxide-simethicone  30 mL Oral Q4H PRN Cleveland Folds, PA-C     • cefTRIAXone  2,000 mg Intravenous Q24H Apple Beauchamp MD 2,000 mg (01/25/23 0013)   • enoxaparin  40 mg Subcutaneous Q24H [address] Elise Martinez DO     • HYDROmorphone  0 5 mg Intravenous Q4H PRN Vesta Folds, PA-C     • loperamide  2 mg Oral 4x Daily PRN Ilya Gomez PA-C     • ondansetron  4 mg Intravenous Q6H PRN Vesta Folds, PAVielkaC     • oxyCODONE  10 mg Oral Q4H PRN Cleveland Folds, PA-C     • oxyCODONE  5 mg Oral Q4H PRN Cleveland Folds, PA-C     • vancomycin  1,500 mg Intravenous Q12H Apple Beauchamp MD Stopped (01/26/23 8995)        Today, Patient Was Seen By: Elise Martinez DO    ** Please Note: Dictation voice to text software may have been used in the creation of this document   **

## 2023-01-26 NOTE — PROGRESS NOTES
Progress Note - Nephrology   Ananda Love 46 y o  male MRN: [id number]  Unit/Bed#: ICU 02-01 Encounter: [id number]    Assessment and Plan    1  Hyponatremia  Patient apparently has been difficult stick  Labs were delayed and have not resulted at serum sodium 120 mmol/L on 1/26/2023, worsened from 123 mmol/L on 1/25/2023  Patient has thus far been treated conservatively with 1200 mL fluid restriction  Urine sodium less than 5, which can be seen with cirrhosis, urine osmolality 361  Discussed case with attending nephrologist, Dr Kendrick Rajan  Plan is for consideration of hypertonic saline  Dr Kendrick Rajan to discuss this with pharmacy  Dr Kendrick Rajan to see patient onsite later today  Further plan on going that will be deferred to attending physician  2  Xerostomia  Will provide mouth Kote  I did advise patient that this will be a as needed medication so he should ask the nurse for the dry mouth medication  He verbalized understanding  3  Hypomagnesemia  Continue to monitor and replete for magnesium goal 2 0 mg/dL and potassium 4 0 millimole per L     4  Newly diagnosed cirrhosis of liver  Suspected secondary to alcohol and chronic hepatitis C virus  Management per gastroenterology colleagues  5  Hepatitis C positive  Management per gastroenterology colleagues  6  Peritonitis  Status post diagnostic/therapeutic paracentesis which revealed innumerable septations and loculations 1/23/2023  Plan is for repeat Black paracentesis Friday, 1/27/2023 (per gastroenterology progress note 1/26/2023)  On antibiotic per hospitalist and gastroenterology  Follow up reason for today's visit:     Peritonitis [facility])    Patient Active Problem List   Diagnosis   • Hepatitis C   • Peritonitis ([facility] [address] )   • Sepsis ([facility] [address] )   • Cirrhosis of liver with ascites ([facility] [address] )   • Hyponatremia         Subjective:   Reports some dry mouth and complains of gas   A complete 10 point review of systems was performed and is otherwise negative  Objective:     Vitals: Blood pressure 124/71, pulse (!) 109, temperature 98 3 °F (36 8 °C), temperature source Tympanic, resp  rate (!) 26, height 5' 11" (1 803 m), weight 71 3 kg (157 lb 3 oz), SpO2 92 %  ,Body mass index is 21 92 kg/m²  Weight (last 2 days)     Date/Time Weight    01/26/23 0548 71 3 (157 19)    01/25/23 0600 71 1 (156 75)    01/25/23 0526 71 1 (156 75)    01/24/23 0532 68 9 (151 9)            Intake/Output Summary (Last 24 hours) at 1/26/2023 1148  Last data filed at 1/26/2023 0830  Gross per 24 hour   Intake 557 ml   Output 847 ml   Net -290 ml     I/O last 3 completed shifts: In: 120 [P O :120]  Out: 1147 [Urine:1147]         Physical Exam: /71   Pulse (!) 109   Temp 98 3 °F (36 8 °C) (Tympanic)   Resp (!) 26   Ht 5' 11" (1 803 m)   Wt 71 3 kg (157 lb 3 oz)   SpO2 92%   BMI 21 92 kg/m²     General Appearance:    No acute distress  Cooperative  Appears stated age  Head:    Normocephalic  Atraumatic  Normal jaw occlusion  Eyes:    Lids, conjunctiva normal  No scleral icterus  Ears:    Normal external ears  Nose:   Nares normal  No drainage  Mouth:   Lips, tongue normal  Mucosa normal  Phonation normal    Neck:   Supple  Symmetrical    Back:     Symmetric  No CVA tenderness  Lungs:     Normal respiratory effort  Clear to auscultation bilaterally  Chest wall:    No tenderness or deformity  Heart:    Regular rate and rhythm  Normal S1 and S2  No murmur  No JVD  No edema  Abdomen:     Soft  Non-tender  Bowel sounds active  Genitourinary:   No Mendez catheter present  Extremities:   Extremities normal  Atraumatic  No cyanosis  Skin:   Warm and dry  No pallor, jaundice, rash, ecchymoses  Neurologic:   Alert and oriented to person, place, time  No focal deficit  Lab, Imaging and other studies: I have personally reviewed pertinent labs    CBC:   Lab Results   Component Value Date    WBC 8 66 01/26/2023    HGB 11 8 (L) 01/26/2023    HCT 34 1 (L) 01/26/2023    MCV 99 (H) 01/26/2023     01/26/2023    MCH 34 2 01/26/2023    MCHC 34 6 01/26/2023    RDW 13 0 01/26/2023    MPV 9 0 01/26/2023    NRBC 0 01/26/2023     CMP:   Lab Results   Component Value Date    K 3 6 01/26/2023    CL 88 (L) 01/26/2023    CO2 25 01/26/2023    BUN 10 01/26/2023    CREATININE 0 52 (L) 01/26/2023    CALCIUM 7 7 (L) 01/26/2023    AST 39 01/26/2023    ALT 42 01/26/2023    ALKPHOS 66 01/26/2023    EGFR 122 01/26/2023         Results from last 7 days   Lab Units 01/26/23  0956 01/25/23  0503 01/24/23  0455   POTASSIUM mmol/L 3 6 3 9 3 8   CHLORIDE mmol/L 88* 90* 91*   CO2 mmol/L 25 23 23   BUN mg/dL 10 10 9   CREATININE mg/dL 0 52* 0 50* 0 57*   CALCIUM mg/dL 7 7* 7 5* 7 8*   ALK PHOS U/L 66 67 78   ALT U/L 42 54* 75*   AST U/L 39 47* 80*         Phosphorus: No results found for: PHOS  Magnesium: No results found for: MG  Urinalysis: No results found for: COLORU, CLARITYU, SPECGRAV, PHUR, LEUKOCYTESUR, NITRITE, PROTEINUA, GLUCOSEU, KETONESU, BILIRUBINUR, BLOODU  Ionized Calcium: No results found for: CAION  Coagulation: No results found for: PT, INR, APTT  Troponin: No results found for: TROPONINI  ABG: No results found for: PHART, TNQ8QPR, PO2ART, TRF7MDT, P4EYMGIA, BEART, SOURCE  Radiology review:     IMAGING  Procedure: IR INPATIENT Paracentesis    Result Date: 1/24/2023  Narrative: EXAMINATION: Paracentesis with sonographic guidance  HISTORY: Ascites refractory to diuretic therapy  Here for routine, interval, large volume paracentesis     Abdomen distention and discomfort  TECHNIQUE: The patient was brought to Radiology  Informed consent was obtained  The Left upper quadrant was prepped and draped in the usual sterile fashion  Timeout was performed  Lidocaine was given as local anesthesia  Using sonographic guidance, a 5 Romanian centesis catheter was advanced into the fluid  The catheter was connected to a vacuum bottle    The fluid was drained in its entirety  A total volume of 35 cc of [place] appearing, Louisa 2 yellow colored fluid was removed  The catheter was removed  Albumin was not transfused  Dose: 0 grams Specimens were sent to the laboratory for evaluation  Impression: Technically successful ultrasound-guided paracentesis  On ultrasound, the fluid was loculated  There were innumerable septations  I suspect this is chronically infected, given the enhancing rind on CT  This is the end of the clinically relevant portion of this report  Subsequent information is for compliance, documentation, and coding purposes  In the process of informed consent, information was communicated, verbally, to the patient regarding the procedure  The patient was informed of; the name of the procedure, nature of the procedure, nature of the condition, risks, benefits, alternatives, and potential complications, as well as the consequences of not having the procedure  All the patient's questions were answered  Informed consent was signed  Quoted risks include bleeding, and leak of ascitic fluid  Chlorhexidine and alcohol was used for cleansing and sterile preparation of the skin  This was allowed to dry prior to the application of sterile draping  Timeout was performed, with all team members present, and in agreement  Confirmation of patient, procedure, laterally, allergies, and all needed equipment was performed  PROCEDURE: Paracentesis PREPROCEDURE DIAGNOSIS: Please see "history ", above POSTPROCEDURE DIAGNOSIS: Same ANTIBIOTICS: None SPECIMEN: Ascitic fluid ESTIMATED BLOOD LOSS: None ANESTHESIA: Local FINAL DISPOSITION OF CATHETER: Out of patient's body Ultrasound was used to evaluate the ascitic fluid as a potential access site  Static and real-time images of needle entry were obtained, and archived   Workstation performed: [id number]       Current Facility-Administered Medications   Medication Dose Route Frequency   • acetaminophen (TYLENOL) tablet 650 mg  650 mg Oral Q4H PRN   • aluminum-magnesium hydroxide-simethicone (MYLANTA) oral suspension 30 mL  30 mL Oral Q4H PRN   • cefTRIAXone (ROCEPHIN) IVPB (premix in dextrose) 2,000 mg 50 mL  2,000 mg Intravenous Q24H   • enoxaparin (LOVENOX) subcutaneous injection 40 mg  40 mg Subcutaneous Q24H JAMSHID   • HYDROmorphone (DILAUDID) injection 0 5 mg  0 5 mg Intravenous Q4H PRN   • loperamide (IMODIUM) capsule 2 mg  2 mg Oral 4x Daily PRN   • ondansetron (ZOFRAN) injection 4 mg  4 mg Intravenous Q6H PRN   • oxyCODONE (ROXICODONE) immediate release tablet 10 mg  10 mg Oral Q4H PRN   • oxyCODONE (ROXICODONE) IR tablet 5 mg  5 mg Oral Q4H PRN   • sodium chloride 0 9 % bolus 1,000 mL  1,000 mL Intravenous Once     Medications Discontinued During This Encounter   Medication Reason   • ibuprofen (MOTRIN) 200 mg tablet    • levofloxacin (LEVAQUIN) IVPB (premix in dextrose) 750 mg 150 mL    • metroNIDAZOLE (FLAGYL) IVPB (premix) 500 mg 100 mL    • vancomycin (VANCOCIN) 1,250 mg in sodium chloride 0 9 % 250 mL IVPB    • vancomycin (VANCOCIN) 1500 mg in sodium chloride 0 9% 250 mL IVPB        Ede Olguin PA-C    Portions of the record may have been created with voice recognition software  Occasional wrong word or "sound a like" substitutions may have occurred due to the inherent limitations of voice recognition software  Read the chart carefully and recognize, using context, where substitutions have occurred

## 2023-01-26 NOTE — UTILIZATION REVIEW
Continued Stay Review    Date: 1/26/23                          Current Patient Class: Inpatient  Current Level of Care: ICU    HPI:52 y o  male initially admitted on 1/22/23 Peritonitis, Sepsis     Assessment/Plan:  No new complaints today  Abd soft, NT  Plan for repeat paracentesis on 1/27/2023  Continue IV ABX, fu on cytology results, ID and GI following, continue fluid restriction and monitor labs including BMP  Fever spike yesterday afternoon, fu on repeat blood cxs      Vital Signs:   Date/Time Temp Pulse Resp BP MAP (mmHg) SpO2 O2 Device Patient Position - Orthostatic VS   01/26/23 0800 -- 109 Abnormal  26 Abnormal  124/71 91 92 % -- --   01/26/23 0700 98 3 °F (36 8 °C) 101 23 Abnormal  -- -- 92 % -- --   01/26/23 0400 99 2 °F (37 3 °C) 103 22 141/67 96 89 % Abnormal  None (Room air) Lying   01/26/23 0000 -- 108 Abnormal  25 Abnormal  -- -- 89 % Abnormal  -- --   01/25/23 2300 -- 110 Abnormal  27 Abnormal  -- -- 89 % Abnormal  -- --   01/25/23 2000 99 5 °F (37 5 °C) 109 Abnormal  25 Abnormal  -- -- 90 % None (Room air) Lying   01/25/23 1500 100 9 °F (38 3 °C) Abnormal  113 Abnormal  29 Abnormal  141/72 100 91 % None (Room air) Lying       Pertinent Labs/Diagnostic Results:   Results from last 7 days   Lab Units 01/21/23 2117   SARS-COV-2  Negative     Results from last 7 days   Lab Units 01/25/23  0503 01/24/23  0455 01/23/23  0742 01/22/23  0516 01/21/23  2117   WBC Thousand/uL 8 90 8 56 7 67 12 47* 16 03*   HEMOGLOBIN g/dL 11 7* 12 3 12 5 12 0 12 9   HEMATOCRIT % 33 8* 36 2* 36 6 35 3* 37 7   PLATELETS Thousands/uL 281 329 302 291 369   NEUTROS ABS Thousands/µL 6 45 6 21 5 71  --  13 48*         Results from last 7 days   Lab Units 01/25/23  0503 01/24/23  0455 01/23/23  0742 01/22/23  1557 01/22/23  0516   SODIUM mmol/L 123* 122* 122* 121* 120*   POTASSIUM mmol/L 3 9 3 8 3 8 3 8 3 9   CHLORIDE mmol/L 90* 91* 89* 91* 91*   CO2 mmol/L 23 23 26 23 23   ANION GAP mmol/L 10 8 7 7 6   BUN mg/dL 10 9 11 12 11 CREATININE mg/dL 0 50* 0 57* 0 59* 0 63 0 53*   EGFR ml/min/1 73sq m 124 118 116 113 121   CALCIUM mg/dL 7 5* 7 8* 7 9* 7 6* 7 7*   MAGNESIUM mg/dL  --  1 8* 1 6*  --   --      Results from last 7 days   Lab Units 01/25/23  0503 01/24/23  0455 01/23/23  0742 01/22/23  1557 01/21/23  2117   AST U/L 47* 80* 84* 74* 63*   ALT U/L 54* 75* 74* 63* 61*   ALK PHOS U/L 67 78 83 80 100   TOTAL PROTEIN g/dL 5 9* 6 0* 6 3* 5 6* 6 8   ALBUMIN g/dL 2 4* 2 6* 2 6* 2 4* 2 8*   TOTAL BILIRUBIN mg/dL 0 97 1 24* 1 54* 1 32* 2 17*         Results from last 7 days   Lab Units 01/25/23  0503 01/24/23  0455 01/23/23  0742 01/22/23  1557 01/22/23  0516 01/21/23 2117   GLUCOSE RANDOM mg/dL 118 109 114 120 103 201*     Results from last 7 days   Lab Units 01/21/23  2226   OSMOLALITY, SERUM mmol/*     Results from last 7 days   Lab Units 01/22/23  0516   HEMOGLOBIN A1C % 5 4   EAG mg/dl 108     Results from last 7 days   Lab Units 01/21/23  2226   PROTIME seconds 17 0*   INR  1 38*   PTT seconds 28     Results from last 7 days   Lab Units 01/21/23  2117   TSH 3RD GENERATON uIU/mL 3 019     Results from last 7 days   Lab Units 01/24/23  0455 01/23/23  0742 01/22/23  0551 01/21/23 2117   PROCALCITONIN ng/ml 0 34* 0 35* 0 41* 0 42*     Results from last 7 days   Lab Units 01/21/23  2324 01/21/23 2117   LACTIC ACID mmol/L 1 5 2 3*     Results from last 7 days   Lab Units 01/22/23  0516   FERRITIN ng/mL 762*     Results from last 7 days   Lab Units 01/23/23  0742   HEP B S AG  Non-reactive   HEP C AB  High Reactive*   HEP B C TOTAL AB  Non-reactive     Results from last 7 days   Lab Units 01/21/23  2226   OSMOLALITY, SERUM mmol/*   OSMO UR mmol/     Results from last 7 days   Lab Units 01/21/23  2226   CLARITY UA  Clear   COLOR UA  Louisa*   SPEC GRAV UA  1 010   PH UA  6 0   GLUCOSE UA mg/dl Negative   KETONES UA mg/dl Negative   BLOOD UA  Negative   PROTEIN UA mg/dl Negative   NITRITE UA  Negative   BILIRUBIN UA  Negative UROBILINOGEN UA E U /dl 0 2   LEUKOCYTES UA  Negative   SODIUM UR  <5     Results from last 7 days   Lab Units 01/21/23  2117   INFLUENZA A PCR  Negative   INFLUENZA B PCR  Negative   RSV PCR  Negative     Results from last 7 days   Lab Units 01/25/23  1201 01/23/23  1437 01/21/23  2117   BLOOD CULTURE  Received in Microbiology Lab  Culture in Progress  Received in Microbiology Lab  Culture in Progress  --  No Growth at 72 hrs  No Growth at 72 hrs  GRAM STAIN RESULT   --  4+ Polys*  2+ Gram positive cocci in pairs, chains and clusters*  --    BODY FLUID CULTURE, STERILE   --  Culture results to follow  --      Results from last 7 days   Lab Units 01/23/23  1437   TOTAL COUNTED  100   WBC FLUID /ul 70,410     Medications:   Scheduled Medications:  cefTRIAXone, 2,000 mg, Intravenous, Q24H  enoxaparin, 40 mg, Subcutaneous, Q24H JAMSHID  vancomycin, 1,500 mg, Intravenous, Q12H      Continuous IV Infusions: none     PRN Meds:  acetaminophen, 650 mg, Oral, Q4H PRN  aluminum-magnesium hydroxide-simethicone, 30 mL, Oral, Q4H PRN  HYDROmorphone, 0 5 mg, Intravenous, Q4H PRN  loperamide, 2 mg, Oral, 4x Daily PRN  ondansetron, 4 mg, Intravenous, Q6H PRN  oxyCODONE, 10 mg, Oral, Q4H PRN  oxyCODONE, 5 mg, Oral, Q4H PRN        Discharge Plan: d    Network Utilization Review Department  ATTENTION: Please call with any questions or concerns to [phone] and carefully listen to the prompts so that you are directed to the right person  All voicemails are confidential   Savi Elliott all requests for admission clinical reviews, approved or denied determinations and any other requests to dedicated fax number below belonging to the campus where the patient is receiving treatment   List of dedicated fax numbers for the Facilities:  [address] DENIALS (Administrative/Medical Necessity) [phone]   [address] (Maternity/NICU/Pediatrics) Petrona Cisse [facility] [address] [place] [place] [address] [phone]   [address] [place] [address] [facility] [phone]   [address] Angel [place] [address] 815 [facility] [phone]

## 2023-01-26 NOTE — PLAN OF CARE
Problem: PAIN - ADULT  Goal: Verbalizes/displays adequate comfort level or baseline comfort level  Description: Interventions:  - Encourage patient to monitor pain and request assistance  - Assess pain using appropriate pain scale  - Administer analgesics based on type and severity of pain and evaluate response  - Implement non-pharmacological measures as appropriate and evaluate response  - Consider cultural and social influences on pain and pain management  - Notify physician/advanced practitioner if interventions unsuccessful or patient reports new pain  Outcome: Progressing     Problem: METABOLIC, FLUID AND ELECTROLYTES - ADULT  Goal: Electrolytes maintained within normal limits  Description: INTERVENTIONS:  - Monitor labs and assess patient for signs and symptoms of electrolyte imbalances  - Administer electrolyte replacement as ordered  - Monitor response to electrolyte replacements, including repeat lab results as appropriate  - Instruct patient on fluid and nutrition as appropriate  Outcome: Progressing     Problem: Prexisting or High Potential for Compromised Skin Integrity  Goal: Skin integrity is maintained or improved  Description: INTERVENTIONS:  - Identify patients at risk for skin breakdown  - Assess and monitor skin integrity  - Assess and monitor nutrition and hydration status  - Monitor labs   - Assess for incontinence   - Turn and reposition patient  - Assist with mobility/ambulation  - Relieve pressure over bony prominences  - Avoid friction and shearing  - Provide appropriate hygiene as needed including keeping skin clean and dry  - Evaluate need for skin moisturizer/barrier cream  - Collaborate with interdisciplinary team   - Patient/family teaching  - Consider wound care consult   Outcome: Progressing

## 2023-01-26 NOTE — PROGRESS NOTES
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, trough >10  Clinical Status: stable  Micro:     Renal Function:  SCr: 0 5 mg/dL  CrCl: 174 3 mL/min  Renal replacement: Not on dialysis  Days of Therapy: 3  Current Dose: 1500 mg q12  Vancomycin Plan:  New Dosing: continue with 1500 mg q12  Estimated AUC: 463 mcg*hr/mL  Estimated Trough: 11 6 mcg/mL  Next Level: 1/28 4am  Renal Function Monitoring: Daily BMP and [place] will continue to follow closely for s/sx of nephrotoxicity, infusion reactions and appropriateness of therapy  BMP and CBC will be ordered per protocol  We will continue to follow the patient’s culture results and clinical progress daily      Vida Branch, Pharmacist

## 2023-01-26 NOTE — ASSESSMENT & PLAN NOTE
· Patient reporting abdominal pain for 3 days and urinary retention, increased abdominal distention, nausea/vomiting  · Patient was initially on Levaquin/Flagyl  · Infectious disease was consulted and Levaquin/Flagyl were discontinued and patient was started on ceftriaxone/vancomycin  · We will continue ceftriaxone/vancomycin day 3  · Surgical and GI input appreciated, no acute intervention needed  · See sepsis

## 2023-01-26 NOTE — PLAN OF CARE
Problem: Nutrition/Hydration-ADULT  Goal: Nutrient/Hydration intake appropriate for improving, restoring or maintaining nutritional needs  Description: Monitor and assess patient's nutrition/hydration status for malnutrition  Collaborate with interdisciplinary team and initiate plan and interventions as ordered  Monitor patient's weight and dietary intake as ordered or per policy  Utilize nutrition screening tool and intervene as necessary  Determine patient's food preferences and provide high-protein, high-caloric foods as appropriate       INTERVENTIONS:  - Monitor oral intake, urinary output, labs, and treatment plans  - Assess nutrition and hydration status and recommend course of action  - Evaluate amount of meals eaten  - Assist patient with eating if necessary   - Allow adequate time for meals  - Recommend/ encourage appropriate diets, oral nutritional supplements, and vitamin/mineral supplements  - Order, calculate, and assess calorie counts as needed  - Recommend, monitor, and adjust tube feedings and TPN/PPN based on assessed needs  - Assess need for intravenous fluids  - Provide specific nutrition/hydration education as appropriate  - Include patient/family/caregiver in decisions related to nutrition  Outcome: Progressing     Problem: PAIN - ADULT  Goal: Verbalizes/displays adequate comfort level or baseline comfort level  Description: Interventions:  - Encourage patient to monitor pain and request assistance  - Assess pain using appropriate pain scale  - Administer analgesics based on type and severity of pain and evaluate response  - Implement non-pharmacological measures as appropriate and evaluate response  - Consider cultural and social influences on pain and pain management  - Notify physician/advanced practitioner if interventions unsuccessful or patient reports new pain  Outcome: Progressing     Problem: SAFETY ADULT  Goal: Patient will remain free of falls  Description: INTERVENTIONS:  - Educate patient/family on patient safety including physical limitations  - Instruct patient to call for assistance with activity   - Consult OT/PT to assist with strengthening/mobility   - Keep Call bell within reach  - Keep bed low and locked with side rails adjusted as appropriate  - Keep care items and personal belongings within reach  - Initiate and maintain comfort rounds  - Make Fall Risk Sign visible to staff  - Offer Toileting every 2 Hours, in advance of need  - Initiate/Maintain alarm  - Obtain necessary fall risk management equipment:   - Apply yellow socks and bracelet for high fall risk patients  - Consider moving patient to room near nurses station  Outcome: Progressing  Goal: Maintain or return to baseline ADL function  Description: INTERVENTIONS:  -  Assess patient's ability to carry out ADLs; assess patient's baseline for ADL function and identify physical deficits which impact ability to perform ADLs (bathing, care of mouth/teeth, toileting, grooming, dressing, etc )  - Assess/evaluate cause of self-care deficits   - Assess range of motion  - Assess patient's mobility; develop plan if impaired  - Assess patient's need for assistive devices and provide as appropriate  - Encourage maximum independence but intervene and supervise when necessary  - Involve family in performance of ADLs  - Assess for home care needs following discharge   - Consider OT consult to assist with ADL evaluation and planning for discharge  - Provide patient education as appropriate  Outcome: Progressing  Goal: Maintains/Returns to pre admission functional level  Description: INTERVENTIONS:  - Perform BMAT or MOVE assessment daily    - Set and communicate daily mobility goal to care team and patient/family/caregiver  - Collaborate with rehabilitation services on mobility goals if consulted  - Perform Range of Motion 3 times a day  - Reposition patient every 2 hours    - Dangle patient 3 times a day  - Stand patient 3 Times a day  - Ambulate patient 3 times a day  - Out of bed to chair 3 times a day   - Out of bed for meals 3 times a day  - Out of bed for toileting  - Record patient progress and toleration of activity level   Outcome: Progressing     Problem: DISCHARGE PLANNING  Goal: Discharge to home or other facility with appropriate resources  Description: INTERVENTIONS:  - Identify barriers to discharge w/patient and caregiver  - Arrange for needed discharge resources and transportation as appropriate  - Identify discharge learning needs (meds, wound care, etc )  - Arrange for interpretive services to assist at discharge as needed  - Refer to Case Management Department for coordinating discharge planning if the patient needs post-hospital services based on physician/advanced practitioner order or complex needs related to functional status, cognitive ability, or social support system  Outcome: Progressing     Problem: METABOLIC, FLUID AND ELECTROLYTES - ADULT  Goal: Electrolytes maintained within normal limits  Description: INTERVENTIONS:  - Monitor labs and assess patient for signs and symptoms of electrolyte imbalances  - Administer electrolyte replacement as ordered  - Monitor response to electrolyte replacements, including repeat lab results as appropriate  - Instruct patient on fluid and nutrition as appropriate  Outcome: Progressing  Goal: Fluid balance maintained  Description: INTERVENTIONS:  - Monitor labs   - Monitor I/O and WT  - Instruct patient on fluid and nutrition as appropriate  - Assess for signs & symptoms of volume excess or deficit  Outcome: Progressing  Goal: Glucose maintained within target range  Description: INTERVENTIONS:  - Monitor Blood Glucose as ordered  - Assess for signs and symptoms of hyperglycemia and hypoglycemia  - Administer ordered medications to maintain glucose within target range  - Assess nutritional intake and initiate nutrition service referral as needed  Outcome: Progressing     Problem: Prexisting or High Potential for Compromised Skin Integrity  Goal: Skin integrity is maintained or improved  Description: INTERVENTIONS:  - Identify patients at risk for skin breakdown  - Assess and monitor skin integrity  - Assess and monitor nutrition and hydration status  - Monitor labs   - Assess for incontinence   - Turn and reposition patient  - Assist with mobility/ambulation  - Relieve pressure over bony prominences  - Avoid friction and shearing  - Provide appropriate hygiene as needed including keeping skin clean and dry  - Evaluate need for skin moisturizer/barrier cream  - Collaborate with interdisciplinary team   - Patient/family teaching  - Consider wound care consult   Outcome: Progressing

## 2023-01-27 ENCOUNTER — ANESTHESIA EVENT (INPATIENT)
Dept: PERIOP | Facility: HOSPITAL | Age: 53
End: 2023-01-27

## 2023-01-27 LAB
ANION GAP SERPL CALCULATED.3IONS-SCNC: 10 MMOL/L (ref 4–13)
ANION GAP SERPL CALCULATED.3IONS-SCNC: 6 MMOL/L (ref 4–13)
ANION GAP SERPL CALCULATED.3IONS-SCNC: 7 MMOL/L (ref 4–13)
ANION GAP SERPL CALCULATED.3IONS-SCNC: 8 MMOL/L (ref 4–13)
BACTERIA BLD CULT: NORMAL
BACTERIA BLD CULT: NORMAL
BASOPHILS # BLD AUTO: 0.02 THOUSANDS/ÂΜL (ref 0–0.1)
BASOPHILS NFR BLD AUTO: 0 % (ref 0–1)
BUN SERPL-MCNC: 10 MG/DL (ref 5–25)
BUN SERPL-MCNC: 9 MG/DL (ref 5–25)
CALCIUM SERPL-MCNC: 7.2 MG/DL (ref 8.4–10.2)
CALCIUM SERPL-MCNC: 7.2 MG/DL (ref 8.4–10.2)
CALCIUM SERPL-MCNC: 7.3 MG/DL (ref 8.4–10.2)
CALCIUM SERPL-MCNC: 7.4 MG/DL (ref 8.4–10.2)
CALCIUM SERPL-MCNC: 7.5 MG/DL (ref 8.4–10.2)
CALCIUM SERPL-MCNC: 7.6 MG/DL (ref 8.4–10.2)
CALCIUM SERPL-MCNC: 7.6 MG/DL (ref 8.4–10.2)
CHLORIDE SERPL-SCNC: 87 MMOL/L (ref 96–108)
CHLORIDE SERPL-SCNC: 87 MMOL/L (ref 96–108)
CHLORIDE SERPL-SCNC: 88 MMOL/L (ref 96–108)
CHLORIDE SERPL-SCNC: 88 MMOL/L (ref 96–108)
CHLORIDE SERPL-SCNC: 89 MMOL/L (ref 96–108)
CHLORIDE SERPL-SCNC: 90 MMOL/L (ref 96–108)
CHLORIDE SERPL-SCNC: 91 MMOL/L (ref 96–108)
CO2 SERPL-SCNC: 23 MMOL/L (ref 21–32)
CO2 SERPL-SCNC: 24 MMOL/L (ref 21–32)
CO2 SERPL-SCNC: 25 MMOL/L (ref 21–32)
CREAT SERPL-MCNC: 0.45 MG/DL (ref 0.6–1.3)
CREAT SERPL-MCNC: 0.45 MG/DL (ref 0.6–1.3)
CREAT SERPL-MCNC: 0.49 MG/DL (ref 0.6–1.3)
CREAT SERPL-MCNC: 0.51 MG/DL (ref 0.6–1.3)
CREAT SERPL-MCNC: 0.51 MG/DL (ref 0.6–1.3)
CREAT SERPL-MCNC: 0.52 MG/DL (ref 0.6–1.3)
CREAT SERPL-MCNC: 0.63 MG/DL (ref 0.6–1.3)
EOSINOPHIL # BLD AUTO: 0.01 THOUSAND/ÂΜL (ref 0–0.61)
EOSINOPHIL NFR BLD AUTO: 0 % (ref 0–6)
ERYTHROCYTE [DISTWIDTH] IN BLOOD BY AUTOMATED COUNT: 12.9 % (ref 11.6–15.1)
GFR SERPL CREATININE-BSD FRML MDRD: 113 ML/MIN/1.73SQ M
GFR SERPL CREATININE-BSD FRML MDRD: 122 ML/MIN/1.73SQ M
GFR SERPL CREATININE-BSD FRML MDRD: 123 ML/MIN/1.73SQ M
GFR SERPL CREATININE-BSD FRML MDRD: 123 ML/MIN/1.73SQ M
GFR SERPL CREATININE-BSD FRML MDRD: 125 ML/MIN/1.73SQ M
GFR SERPL CREATININE-BSD FRML MDRD: 130 ML/MIN/1.73SQ M
GFR SERPL CREATININE-BSD FRML MDRD: 130 ML/MIN/1.73SQ M
GLUCOSE SERPL-MCNC: 113 MG/DL (ref 65–140)
GLUCOSE SERPL-MCNC: 118 MG/DL (ref 65–140)
GLUCOSE SERPL-MCNC: 118 MG/DL (ref 65–140)
GLUCOSE SERPL-MCNC: 120 MG/DL (ref 65–140)
GLUCOSE SERPL-MCNC: 120 MG/DL (ref 65–140)
GLUCOSE SERPL-MCNC: 139 MG/DL (ref 65–140)
GLUCOSE SERPL-MCNC: 189 MG/DL (ref 65–140)
HCT VFR BLD AUTO: 33.2 % (ref 36.5–49.3)
HGB BLD-MCNC: 11.4 G/DL (ref 12–17)
IMM GRANULOCYTES # BLD AUTO: 0.04 THOUSAND/UL (ref 0–0.2)
IMM GRANULOCYTES NFR BLD AUTO: 1 % (ref 0–2)
LYMPHOCYTES # BLD AUTO: 1.25 THOUSANDS/ÂΜL (ref 0.6–4.47)
LYMPHOCYTES NFR BLD AUTO: 16 % (ref 14–44)
MAGNESIUM SERPL-MCNC: 1.5 MG/DL (ref 1.9–2.7)
MCH RBC QN AUTO: 33.7 PG (ref 26.8–34.3)
MCHC RBC AUTO-ENTMCNC: 34.3 G/DL (ref 31.4–37.4)
MCV RBC AUTO: 98 FL (ref 82–98)
MONOCYTES # BLD AUTO: 0.8 THOUSAND/ÂΜL (ref 0.17–1.22)
MONOCYTES NFR BLD AUTO: 10 % (ref 4–12)
NEUTROPHILS # BLD AUTO: 5.68 THOUSANDS/ÂΜL (ref 1.85–7.62)
NEUTS SEG NFR BLD AUTO: 73 % (ref 43–75)
NRBC BLD AUTO-RTO: 0 /100 WBCS
PHOSPHATE SERPL-MCNC: 2.7 MG/DL (ref 2.7–4.5)
PLATELET # BLD AUTO: 216 THOUSANDS/UL (ref 149–390)
PMV BLD AUTO: 8.8 FL (ref 8.9–12.7)
POTASSIUM SERPL-SCNC: 3 MMOL/L (ref 3.5–5.3)
POTASSIUM SERPL-SCNC: 3.3 MMOL/L (ref 3.5–5.3)
POTASSIUM SERPL-SCNC: 3.5 MMOL/L (ref 3.5–5.3)
POTASSIUM SERPL-SCNC: 3.7 MMOL/L (ref 3.5–5.3)
POTASSIUM SERPL-SCNC: 3.8 MMOL/L (ref 3.5–5.3)
POTASSIUM SERPL-SCNC: 3.9 MMOL/L (ref 3.5–5.3)
POTASSIUM SERPL-SCNC: 4 MMOL/L (ref 3.5–5.3)
RBC # BLD AUTO: 3.38 MILLION/UL (ref 3.88–5.62)
SODIUM SERPL-SCNC: 118 MMOL/L (ref 135–147)
SODIUM SERPL-SCNC: 119 MMOL/L (ref 135–147)
SODIUM SERPL-SCNC: 119 MMOL/L (ref 135–147)
SODIUM SERPL-SCNC: 121 MMOL/L (ref 135–147)
SODIUM SERPL-SCNC: 122 MMOL/L (ref 135–147)
SODIUM SERPL-SCNC: 122 MMOL/L (ref 135–147)
SODIUM SERPL-SCNC: 123 MMOL/L (ref 135–147)
WBC # BLD AUTO: 7.8 THOUSAND/UL (ref 4.31–10.16)

## 2023-01-27 RX ORDER — 3% SODIUM CHLORIDE 3 G/100ML
30 INJECTION, SOLUTION INTRAVENOUS CONTINUOUS
Status: DISCONTINUED | OUTPATIENT
Start: 2023-01-27 | End: 2023-01-27

## 2023-01-27 RX ORDER — FAMOTIDINE 10 MG/ML
20 INJECTION, SOLUTION INTRAVENOUS ONCE
Status: CANCELLED | OUTPATIENT
Start: 2023-01-27 | End: 2023-01-27

## 2023-01-27 RX ORDER — BUMETANIDE 0.25 MG/ML
1 INJECTION, SOLUTION INTRAMUSCULAR; INTRAVENOUS ONCE
Status: COMPLETED | OUTPATIENT
Start: 2023-01-27 | End: 2023-01-27

## 2023-01-27 RX ORDER — SODIUM CHLORIDE AND POTASSIUM CHLORIDE 150; 900 MG/100ML; MG/100ML
125 INJECTION, SOLUTION INTRAVENOUS CONTINUOUS
Status: DISCONTINUED | OUTPATIENT
Start: 2023-01-27 | End: 2023-01-28

## 2023-01-27 RX ORDER — POTASSIUM CHLORIDE 20 MEQ/1
40 TABLET, EXTENDED RELEASE ORAL DAILY
Status: DISCONTINUED | OUTPATIENT
Start: 2023-01-28 | End: 2023-01-27

## 2023-01-27 RX ORDER — METRONIDAZOLE 500 MG/1
500 TABLET ORAL EVERY 8 HOURS SCHEDULED
Status: DISCONTINUED | OUTPATIENT
Start: 2023-01-27 | End: 2023-02-03 | Stop reason: HOSPADM

## 2023-01-27 RX ORDER — BUMETANIDE 0.25 MG/ML
2 INJECTION, SOLUTION INTRAMUSCULAR; INTRAVENOUS ONCE
Status: COMPLETED | OUTPATIENT
Start: 2023-01-27 | End: 2023-01-27

## 2023-01-27 RX ORDER — PRAMIPEXOLE DIHYDROCHLORIDE 0.5 MG/1
0.25 TABLET ORAL ONCE
Status: COMPLETED | OUTPATIENT
Start: 2023-01-27 | End: 2023-01-27

## 2023-01-27 RX ORDER — POTASSIUM CHLORIDE 20MEQ/15ML
40 LIQUID (ML) ORAL ONCE
Status: COMPLETED | OUTPATIENT
Start: 2023-01-27 | End: 2023-01-27

## 2023-01-27 RX ORDER — BUMETANIDE 0.25 MG/ML
3 INJECTION, SOLUTION INTRAMUSCULAR; INTRAVENOUS ONCE
Status: DISCONTINUED | OUTPATIENT
Start: 2023-01-27 | End: 2023-01-27

## 2023-01-27 RX ADMIN — CEFTRIAXONE 2000 MG: 2 INJECTION, SOLUTION INTRAVENOUS at 16:17

## 2023-01-27 RX ADMIN — Medication 5 SPRAY: at 00:34

## 2023-01-27 RX ADMIN — METRONIDAZOLE 500 MG: 500 TABLET ORAL at 21:47

## 2023-01-27 RX ADMIN — POTASSIUM CHLORIDE 40 MEQ: 1.5 SOLUTION ORAL at 23:10

## 2023-01-27 RX ADMIN — BUMETANIDE 2 MG: 0.25 INJECTION INTRAMUSCULAR; INTRAVENOUS at 16:51

## 2023-01-27 RX ADMIN — METRONIDAZOLE 500 MG: 500 TABLET ORAL at 17:34

## 2023-01-27 RX ADMIN — BUMETANIDE 1 MG: 0.25 INJECTION INTRAMUSCULAR; INTRAVENOUS at 10:47

## 2023-01-27 RX ADMIN — PRAMIPEXOLE DIHYDROCHLORIDE 0.25 MG: 0.5 TABLET ORAL at 23:09

## 2023-01-27 RX ADMIN — SODIUM CHLORIDE AND POTASSIUM CHLORIDE 125 ML/HR: .9; .15 SOLUTION INTRAVENOUS at 23:22

## 2023-01-27 RX ADMIN — ENOXAPARIN SODIUM 40 MG: 100 INJECTION SUBCUTANEOUS at 08:49

## 2023-01-27 NOTE — CONSULTS
The patient’s Vancomycin therapy has been completed / discontinued  Thank you for this consult; Pharmacy will sign-off now      Kunal Hyatt PharmD

## 2023-01-27 NOTE — ANESTHESIA PREPROCEDURE EVALUATION
Procedure:  LAPAROSCOPY DIAGNOSTIC (Abdomen)    #Peritonitis/sepsis s/p paracentesis 1/24, 1/27 - procedure above; on ceftriaxone and flagyl  #Cirrhosis c/b ascites; not decompensated; likely 2/2 alcoholism (in remission) and hepatitis C from IVDU  #Hepatitis C, PCR with 758K copies  #Hyponatremia, Na 110s to low 120s     Smoking Status: Never   Smokeless Tobacco Status: Never   Alcohol use: Not Currently   Comments: sober 20 years   Drug use: Marijuana   Comments: medical marijuana           Chemistry        Component Value Date/Time    K 3 3 (L) 01/27/2023 1549    CL 88 (L) 01/27/2023 1549    CO2 25 01/27/2023 1549    BUN 10 01/27/2023 1549    CREATININE 0 52 (L) 01/27/2023 1549        Component Value Date/Time    CALCIUM 7 6 (L) 01/27/2023 1549    ALKPHOS 66 01/26/2023 0956    AST 39 01/26/2023 0956    ALT 42 01/26/2023 0956        Na 121, stable in the 110s to 120s since 1/21  K 3 3  Cl 88  Cr 0 52, eGFR >100    Lab Results   Component Value Date    WBC 7 80 01/27/2023    HGB 11 4 (L) 01/27/2023    HCT 33 2 (L) 01/27/2023    MCV 98 01/27/2023     01/27/2023     EKG (01/2023): Sinus tachycardia  Low voltage QRS  Septal infarct , age undetermined  Abnormal ECG  No previous ECGs available  Confirmed by Rex Lopes ([id number]) on 1/22/2023 7:29:44 AM      Physical Exam    Airway    Mallampati score: II  TM Distance: >3 FB  Neck ROM: full     Dental   No notable dental hx     Cardiovascular  Rhythm: regular, Rate: normal,     Pulmonary  Breath sounds clear to auscultation,     Other Findings  Intercisor Distance > 3cm          Anesthesia Plan  ASA Score- 3     Anesthesia Type- general with ASA Monitors  Additional Monitors:   Airway Plan: ETT      Comment: Discussed benefits/risks of general anesthesia including possibility of mouth/throat pain, injury to lips/teeth, nausea/vomiting, and surgical pain along with more rare complications such as stroke, MI, pneumonia, aspiration, and injury to blood vessels  Patient understands and wishes to proceed  All questions answered          Plan Factors-Exercise tolerance (METS): >4 METS  Chart reviewed  EKG reviewed  Existing labs reviewed  Induction- intravenous and rapid sequence induction  Postoperative Plan- Plan for postoperative opioid use  Planned trial extubation    Informed Consent- Anesthetic plan and risks discussed with patient  I personally reviewed this patient with the CRNA  Discussed and agreed on the Anesthesia Plan with the CRNA  Yesika Man

## 2023-01-27 NOTE — QUICK NOTE
IR note    Patient referred for repeat paracentesis    Imaging and chart reviewed   Given The material removed during recent aspiration and very high white count compatible with near purulence as well as innumerable loculations a paracentesis will not be of therapeutic benefit  It may provide further diagnostic information but I do not expect the white count to clear  This clinical picture, even in the setting of newly diagnosed cirrhosis is more compatible with peritonitis with bacterial infection rather than a spontaneous bacterial peritonitis in the setting of ascites for example  We could contemplate drain placement in an attempt to get more fluid out but this is unlikely to be therapeutic and solve the problem sometimes however this allows for more fluid removal     Patient is being evaluated by the surgical service for other options      I discussed his case personally with the primary team, gastroenterology service, surgical service  Appreciate everyone'sinvolvement    I did not examine the patient today     33 minutes time spent    Addendum  I will cancel the paracentesis order  Please contact IR if any assistance required

## 2023-01-27 NOTE — PROGRESS NOTES
Progress Note - Nephrology   Tatiana Bunch 46 y o  male MRN: [id number]  Unit/Bed#: ICU 02-01 Encounter: [id number]    Patient is a 53-year old male admitted 1/22/2023 with chief complaint of decreased oral intake  Patient has history of hepatitis C diagnosed 15 years ago and has history of alcohol abuse, sober 20 years  Patient found to have newly diagnosed cirrhosis of liver with ascites  Nephrology following for hyponatremia  Assessment and Plan    1  Hyponatremia  Serum sodium 122 mmol/L on 1/27/2023  Volume status is hypervolemic  We will give bumetanide 1 mg IV once as below  Case discussed with attending Dr Daphne Wayne, who agrees with plan for loop diuretic for hyponatremia in volume overloaded state       2  Volume overload  Will give bumetanide 1 mg IV once  Redosed as needed      3  Hypomagnesemia  Continue to monitor and replete for magnesium goal 2 0 mg/dL and potassium 4 0 millimole per L      4  Newly diagnosed cirrhosis of liver  Suspected secondary to alcohol and chronic hepatitis C virus  Continue management directed by gastroenterology colleagues      5  Hepatitis C positive  Defer management to gastroenterology colleagues      6  Peritonitis  Status post diagnostic/therapeutic paracentesis which revealed innumerable septations and loculations 1/23/2023  To have repeat paracentesis today, Friday, 1/27/2023  Follow up reason for today's visit:     Peritonitis [facility])    Patient Active Problem List   Diagnosis   • Hepatitis C   • Peritonitis ([facility] )   • Sepsis ([facility] )   • Cirrhosis of liver with ascites ([facility] )   • Hyponatremia         Subjective:   Denies complaints  Reports eating and drinking well  Denies vomiting/diarrhea  A complete 10 point review of systems was performed and is otherwise negative  Objective:     Vitals: Blood pressure 125/79, pulse (!) 109, temperature 99 °F (37 2 °C), temperature source Tympanic, resp   rate 20, height 5' 11" (1 803 m), weight 73 7 kg (162 lb 7 7 oz), SpO2 92 %  ,Body mass index is 22 66 kg/m²  Weight (last 2 days)     Date/Time Weight    01/27/23 0600 73 7 (162 48)    01/27/23 0437 73 7 (162 48)    01/26/23 0548 71 3 (157 19)    01/25/23 0600 71 1 (156 75)    01/25/23 0526 71 1 (156 75)            Intake/Output Summary (Last 24 hours) at 1/27/2023 1037  Last data filed at 1/27/2023 0800  Gross per 24 hour   Intake 1338 ml   Output 1675 ml   Net -337 ml     I/O last 3 completed shifts: In: 7988 [P O :1657]  Out: 2907 [Urine:2225]         Physical Exam: /79   Pulse (!) 109   Temp 99 °F (37 2 °C) (Tympanic)   Resp 20   Ht 5' 11" (1 803 m)   Wt 73 7 kg (162 lb 7 7 oz)   SpO2 92%   BMI 22 66 kg/m²     General Appearance:    No acute distress  Cooperative  Appears stated age  Head:    Normocephalic  Atraumatic  Normal jaw occlusion  Eyes:    Lids, conjunctiva normal  No scleral icterus  Ears:    Normal external ears  Nose:   Nares normal  No drainage  Mouth:   Lips, tongue normal  Mucosa normal  Phonation normal    Neck:   Supple  Symmetrical    Back:     Symmetric  No CVA tenderness  Lungs:     Normal respiratory effort  Clear to auscultation bilaterally  Chest wall:    No tenderness or deformity  Heart:    Regular rate and rhythm  Normal S1 and S2  No murmur  No JVD  1+ pretibial edema  Abdomen:     Soft  Non-tender  Bowel sounds active  Genitourinary:   No Mendez catheter present  Extremities:   Extremities normal  Atraumatic  No cyanosis  Skin:   Warm and dry  No pallor, jaundice, rash, ecchymoses  Neurologic:   Alert and oriented to person, place, time  No focal deficit  Lab, Imaging and other studies: I have personally reviewed pertinent labs    CBC:   Lab Results   Component Value Date    WBC 7 80 01/27/2023    HGB 11 4 (L) 01/27/2023    HCT 33 2 (L) 01/27/2023    MCV 98 01/27/2023     01/27/2023    MCH 33 7 01/27/2023    MCHC 34 3 01/27/2023    RDW 12 9 01/27/2023    MPV 8 8 (L) 01/27/2023 NRBC 0 01/27/2023     CMP:   Lab Results   Component Value Date    K 3 7 01/27/2023    CL 91 (L) 01/27/2023    CO2 25 01/27/2023    BUN 9 01/27/2023    CREATININE 0 45 (L) 01/27/2023    CALCIUM 7 4 (L) 01/27/2023    EGFR 130 01/27/2023         Results from last 7 days   Lab Units 01/27/23  0810 01/27/23  0427 01/27/23  0030 01/26/23  1649 01/26/23  0956 01/25/23  0503 01/24/23  0455   POTASSIUM mmol/L 3 7 3 9  4 0 3 8   < > 3 6 3 9 3 8   CHLORIDE mmol/L 91* 89*  90* 88*   < > 88* 90* 91*   CO2 mmol/L 25 24  25 23   < > 25 23 23   BUN mg/dL 9 9  9 10   < > 10 10 9   CREATININE mg/dL 0 45* 0 49*  0 51* 0 45*   < > 0 52* 0 50* 0 57*   CALCIUM mg/dL 7 4* 7 3*  7 2* 7 5*   < > 7 7* 7 5* 7 8*   ALK PHOS U/L  --   --   --   --  66 67 78   ALT U/L  --   --   --   --  42 54* 75*   AST U/L  --   --   --   --  39 47* 80*    < > = values in this interval not displayed  Phosphorus:   Lab Results   Component Value Date    PHOS 2 7 01/27/2023     Magnesium:   Lab Results   Component Value Date    MG 1 5 (L) 01/27/2023     Urinalysis: No results found for: Sandre Shove, SPECGRAV, PHUR, LEUKOCYTESUR, NITRITE, PROTEINUA, GLUCOSEU, KETONESU, BILIRUBINUR, BLOODU  Ionized Calcium: No results found for: CAION  Coagulation: No results found for: PT, INR, APTT  Troponin: No results found for: TROPONINI  ABG: No results found for: PHART, RUM1ABV, PO2ART, WWH4DTS, M1XGLAJH, BEART, SOURCE  Radiology review:     IMAGING  No results found      Current Facility-Administered Medications   Medication Dose Route Frequency   • acetaminophen (TYLENOL) tablet 650 mg  650 mg Oral Q4H PRN   • aluminum-magnesium hydroxide-simethicone (MYLANTA) oral suspension 30 mL  30 mL Oral Q4H PRN   • bumetanide (BUMEX) injection 1 mg  1 mg Intravenous Once   • cefTRIAXone (ROCEPHIN) IVPB (premix in dextrose) 2,000 mg 50 mL  2,000 mg Intravenous Q24H   • enoxaparin (LOVENOX) subcutaneous injection 40 mg  40 mg Subcutaneous Q24H [facility]   • HYDROmorphone (DILAUDID) injection 0 5 mg  0 5 mg Intravenous Q4H PRN   • loperamide (IMODIUM) capsule 2 mg  2 mg Oral 4x Daily PRN   • ondansetron (ZOFRAN) injection 4 mg  4 mg Intravenous Q6H PRN   • oxyCODONE (ROXICODONE) immediate release tablet 10 mg  10 mg Oral Q4H PRN   • oxyCODONE (ROXICODONE) IR tablet 5 mg  5 mg Oral Q4H PRN   • saliva substitute (MOUTH KOTE) mucosal solution 5 spray  5 spray Mouth/Throat 4x Daily PRN     Medications Discontinued During This Encounter   Medication Reason   • ibuprofen (MOTRIN) 200 mg tablet    • levofloxacin (LEVAQUIN) IVPB (premix in dextrose) 750 mg 150 mL    • metroNIDAZOLE (FLAGYL) IVPB (premix) 500 mg 100 mL    • vancomycin (VANCOCIN) 1,250 mg in sodium chloride 0 9 % 250 mL IVPB    • vancomycin (VANCOCIN) 1500 mg in sodium chloride 0 9% 250 mL IVPB    • sodium chloride (HYPERTONIC) 3 % infusion        Saravanan Thao PA-C    Portions of the record may have been created with voice recognition software  Occasional wrong word or "sound a like" substitutions may have occurred due to the inherent limitations of voice recognition software  Read the chart carefully and recognize, using context, where substitutions have occurred

## 2023-01-27 NOTE — PROGRESS NOTES
Progress Note - [facility] Infectious Disease   Chelsea Simon 46 y o  male MRN: [id number]  Unit/Bed#: ICU 02-01 Encounter: [id number]      IMPRESSION & RECOMMENDATIONS:   1  Sepsis, evolving from admission, evidenced by leukocytosis and tachycardia now with intermittent low-grade fevers  T-max 100 1°F  Likely due to peritonitis  Initial blood cultures are negative for growth  Repeat blood cultures negative >24h  Patient remains hemodynamically stable  -Continue antibiotics as below  -Follow-up repeat blood cultures and adjust antibiotics as needed  -Monitor temperature and hemodynamics  -Low threshold to re-image A/P if fevers persist    -Trend daily CBC differential and chemistry     2  Acute peritonitis  Patient presented with 1 week of worsening abdominal pain, distention, fevers and chills   CT showed moderate to large intra-abdominal and pelvic ascites with diffuse enhancement and thickening of the peritoneal lining compatible with acute peritonitis  S/p paracentesis 1/23 with loculated ascites present and fluid studies consistent with infection with extremely elevated WBC >70,000, with elevated LDH   Fluid studies not entirely consistent with portal hypertension, SAAG < 1 1 and Protein 4 4  Peritoneal fluid gram stain shows 4+ polys, 2+ GPC in pairs, chains, clusters  Unknown source  No evidence of GI source for a secondary peritonitis on initial CT A/P  Unlikely malignant with negative cytology   Ascitic fluid culture positive for strep intermedius, which has the propensity to produce abscesses, particularly of the liver   -Continue IV Ceftriaxone 2g q24  -Will ask micro to check sensi for S  intermedius  -Recommend repeat paracentesis today with fluid analysis to evaluate for response to treatment   -Consider surgical intervention or peritoneal biopsy if no improvement on repeat peritoneal fluid studies   -Anticipate repeat CT A/P w/ contrast early next week pending hospital course   -Tentative plan for longer course of treatment given presence of loculated fluid   -If no source identified and acute illness resolved, would recommend secondary prophylaxis with once daily abx like Cefdinir   -Serial abdominal exams and daily liver chemistries   -Close gastroenterology/surgery follow-up     3  Cirrhosis   CT shows nodularity of the liver surface suggestive of chronic cirrhosis   History of chronic hepatitis C, former alcohol abuse and IV drug abuse   In remission for 20 years   Patient not treated for hepatitis C  HIV negative  HCV PCR quant [id number]    -Ongoing follow-up with GI for tx     4  Transaminitis, hyponatremia  Jojoe Rossel due to cirrhosis   Nephrology and gastroenterology are following   -Monitor LFTs, chemistry     5  Antibiotic Allergy   Reports history of rash with penicillin and denies anaphylaxis   At this point unlikely this is a significant allergy   Low risk for cross-reactivity with cephalosporins   He is tolerating ceftriaxone   -Monitor for adverse drug reactions     Antibiotics:  Ceftriaxone D4  D7 abx     We will see patient again 23 if here  Please call ID on call physician in meantime if questions  I have discussed the above management plan in detail with patient  I have discussed the above management plan in detail with patient's RN, and the primary service, SLIM attending  Subjective:  Patient has no fever, chills, sweats; no nausea, vomiting, diarrhea; no cough, shortness of breath; reports abdominal pain and bloating after eating but does think he may be filling up with fluid again  L leg always more swollen than right       Objective:  Vitals:  Temp:  [98 1 °F (36 7 °C)-100 3 °F (37 9 °C)] 99 °F (37 2 °C)  HR:  [105-118] 105  Resp:  [16-29] 20  BP: (124-128)/(71-77) 127/72  SpO2:  [89 %-94 %] 92 %  Temp (24hrs), Av 2 °F (37 3 °C), Min:98 1 °F (36 7 °C), Max:100 3 °F (37 9 °C)  Current: Temperature: 99 °F (37 2 °C)    PHYSICAL EXAM:  General Appearance:  Appearing well, nontoxic, and in no distress   HEENT: Normocephalic, without obvious abnormality, atraumatic  Conjunctiva pink and sclera anicteric  Oropharynx dry without lesions  Lungs:   Clear to auscultation bilaterally, respirations unlabored   Heart:  tachycardic; no murmur, rub or gallop   Abdomen:   Soft, non-tender, mildly distended, positive bowel sounds    Extremities: No cyanosis, clubbing or edema   Musculoskeletal: Back symmetric without curvature, ROM normal     Skin: No rashes or lesions  No draining wounds noted  Peripheral IV intact without evidence of erythema, warmth, or exudate  LABS, IMAGING, & OTHER STUDIES:  Lab Results:  I have personally reviewed pertinent labs  Results from last 7 days   Lab Units 01/27/23  0427 01/26/23  0956 01/25/23  0503   WBC Thousand/uL 7 80 8 66 8 90   HEMOGLOBIN g/dL 11 4* 11 8* 11 7*   PLATELETS Thousands/uL 216 238 281     Results from last 7 days   Lab Units 01/27/23  0427 01/27/23  0030 01/26/23  2020 01/26/23  1649 01/26/23  0956 01/25/23  0503 01/24/23  0455   SODIUM mmol/L 119*  122* 118* 118*   < > 120* 123* 122*   POTASSIUM mmol/L 3 9  4 0 3 8 3 9   < > 3 6 3 9 3 8   CHLORIDE mmol/L 89*  90* 88* 89*   < > 88* 90* 91*   CO2 mmol/L 24  25 23 23   < > 25 23 23   BUN mg/dL 9  9 10 10   < > 10 10 9   CREATININE mg/dL 0 49*  0 51* 0 45* 0 51*   < > 0 52* 0 50* 0 57*   EGFR ml/min/1 73sq m 125  123 130 123   < > 122 124 118   CALCIUM mg/dL 7 3*  7 2* 7 5* 7 1*   < > 7 7* 7 5* 7 8*   AST U/L  --   --   --   --  39 47* 80*   ALT U/L  --   --   --   --  42 54* 75*   ALK PHOS U/L  --   --   --   --  66 67 78    < > = values in this interval not displayed  Results from last 7 days   Lab Units 01/25/23  1201 01/23/23  1437 01/21/23 2117   BLOOD CULTURE  No Growth at 24 hrs  No Growth at 24 hrs   --  No Growth After 4 Days  No Growth After 4 Days     GRAM STAIN RESULT   --  4+ Polys*  2+ Gram positive cocci in pairs, chains and clusters*  --    BODY FLUID CULTURE, STERILE   --  3+ Growth of Streptococcus intermedius*  --      Results from last 7 days   Lab Units 01/24/23  0455 01/23/23  0742 01/22/23  0551 01/21/23  2117   PROCALCITONIN ng/ml 0 34* 0 35* 0 41* 0 42*         Results from last 7 days   Lab Units 01/22/23  0516   FERRITIN ng/mL 762*

## 2023-01-27 NOTE — ASSESSMENT & PLAN NOTE
· GI input appreciated  · Follow-up further work-up for cirrhosis causes, labs ordered by GI team  · Hepatitis C work-up pending  · Patient had paracentesis performed on 1/23/2023 ; plan for repeat paracentesis on 1/27/2023  · Will need to follow-up cytology results  · Does not appear to be in acute decompensated cirrhosis

## 2023-01-27 NOTE — UTILIZATION REVIEW
Continued Stay Review    Date: 1/27/23                          Current Patient Class: Inpatient  Current Level of Care: ICU    HPI:52 y o  male initially admitted on 1/22/23 Peritonitis, Sepsis    1/23 Paracentesis completed    Assessment/Plan:  Plan for repeat paracentesis today  FU on cytology results and final cxs  Hyponatremia worsening; Nephrology following  IVF per nephrology  Ceftriaxone continued for peritonitis as per ID       Vital Signs:   Date/Time Temp Pulse Resp BP MAP (mmHg) SpO2 O2 Device Patient Position - Orthostatic VS   01/27/23 1100 98 °F (36 7 °C) -- -- -- -- -- -- --   01/27/23 0900 -- 109 Abnormal  20 125/79 97 92 % -- --   01/27/23 0800 -- 108 Abnormal  28 Abnormal  125/79 97 93 % None (Room air) Lying   01/27/23 0700 99 °F (37 2 °C) -- -- -- -- -- -- --   01/27/23 0400 98 1 °F (36 7 °C) 105 20 127/72 93 92 % None (Room air) Lying   01/26/23 2013 98 4 °F (36 9 °C) 106 Abnormal  29 Abnormal  -- -- 89 % Abnormal  -- --   01/26/23 1900 100 1 °F (37 8 °C) 105 19 125/76 93 90 % None (Room air) Lying   01/26/23 1500 100 3 °F (37 9 °C) 118 Abnormal  16 128/77 97 94 % None (Room air) Lying       Pertinent Labs/Diagnostic Results:   Results from last 7 days   Lab Units 01/21/23 2117   SARS-COV-2  Negative     Results from last 7 days   Lab Units 01/27/23  0427 01/26/23  0956 01/25/23  0503 01/24/23  0455 01/23/23  0742   WBC Thousand/uL 7 80 8 66 8 90 8 56 7 67   HEMOGLOBIN g/dL 11 4* 11 8* 11 7* 12 3 12 5   HEMATOCRIT % 33 2* 34 1* 33 8* 36 2* 36 6   PLATELETS Thousands/uL 216 238 281 329 302   NEUTROS ABS Thousands/µL 5 68 6 33 6 45 6 21 5 71         Results from last 7 days   Lab Units 01/27/23  0810 01/27/23  0427 01/27/23  0030 01/26/23 2020 01/26/23  1649 01/25/23  0503 01/24/23  0455 01/23/23  0742   SODIUM mmol/L 123* 119*  122* 118* 118* 119*   < > 122* 122*   POTASSIUM mmol/L 3 7 3 9  4 0 3 8 3 9 3 6   < > 3 8 3 8   CHLORIDE mmol/L 91* 89*  90* 88* 89* 89*   < > 91* 89*   CO2 mmol/L 25 24 25 23 23 24   < > 23 26   ANION GAP mmol/L 7 6  7 7 6 6   < > 8 7   BUN mg/dL 9 9  9 10 10 10   < > 9 11   CREATININE mg/dL 0 45* 0 49*  0 51* 0 45* 0 51* 0 50*   < > 0 57* 0 59*   EGFR ml/min/1 73sq m 130 125  123 130 123 124   < > 118 116   CALCIUM mg/dL 7 4* 7 3*  7 2* 7 5* 7 1* 7 5*   < > 7 8* 7 9*   MAGNESIUM mg/dL  --  1 5*  --   --   --   --  1 8* 1 6*   PHOSPHORUS mg/dL  --  2 7  --   --   --   --   --   --     < > = values in this interval not displayed       Results from last 7 days   Lab Units 01/26/23  0956 01/25/23  0503 01/24/23  0455 01/23/23  0742 01/22/23  1557   AST U/L 39 47* 80* 84* 74*   ALT U/L 42 54* 75* 74* 63*   ALK PHOS U/L 66 67 78 83 80   TOTAL PROTEIN g/dL 6 2* 5 9* 6 0* 6 3* 5 6*   ALBUMIN g/dL 2 4* 2 4* 2 6* 2 6* 2 4*   TOTAL BILIRUBIN mg/dL 0 90 0 97 1 24* 1 54* 1 32*         Results from last 7 days   Lab Units 01/27/23  0810 01/27/23  0427 01/27/23  0030 01/26/23  2020 01/26/23  1649 01/26/23  0956 01/25/23  0503 01/24/23  0455 01/23/23  0742 01/22/23  1557 01/22/23  0516 01/21/23  2117   GLUCOSE RANDOM mg/dL 113 118  120 118 143* 117 143* 118 109 114 120 103 201*     Results from last 7 days   Lab Units 01/21/23  2226   OSMOLALITY, SERUM mmol/*     Results from last 7 days   Lab Units 01/22/23  0516   HEMOGLOBIN A1C % 5 4   EAG mg/dl 108     Results from last 7 days   Lab Units 01/21/23  2226   PROTIME seconds 17 0*   INR  1 38*   PTT seconds 28     Results from last 7 days   Lab Units 01/21/23  2117   TSH 3RD GENERATON uIU/mL 3 019     Results from last 7 days   Lab Units 01/24/23  0455 01/23/23  0742 01/22/23  0551 01/21/23 2117   PROCALCITONIN ng/ml 0 34* 0 35* 0 41* 0 42*     Results from last 7 days   Lab Units 01/21/23  2324 01/21/23 2117   LACTIC ACID mmol/L 1 5 2 3*     Results from last 7 days   Lab Units 01/22/23  0516   FERRITIN ng/mL 762*     Results from last 7 days   Lab Units 01/23/23  0742   HEP B S AG  Non-reactive   HEP C AB  High Reactive*   HEP B C TOTAL AB  Non-reactive     Results from last 7 days   Lab Units 01/21/23  2226   OSMOLALITY, SERUM mmol/*   OSMO UR mmol/     Results from last 7 days   Lab Units 01/21/23  2226   CLARITY UA  Clear   COLOR UA  Louisa*   SPEC GRAV UA  1 010   PH UA  6 0   GLUCOSE UA mg/dl Negative   KETONES UA mg/dl Negative   BLOOD UA  Negative   PROTEIN UA mg/dl Negative   NITRITE UA  Negative   BILIRUBIN UA  Negative   UROBILINOGEN UA E U /dl 0 2   LEUKOCYTES UA  Negative   SODIUM UR  <5     Results from last 7 days   Lab Units 01/21/23  2117   INFLUENZA A PCR  Negative   INFLUENZA B PCR  Negative   RSV PCR  Negative     Results from last 7 days   Lab Units 01/25/23  1201 01/23/23  1437 01/21/23 2117   BLOOD CULTURE  No Growth at 24 hrs  No Growth at 24 hrs   --  No Growth After 4 Days  No Growth After 4 Days  GRAM STAIN RESULT   --  4+ Polys*  2+ Gram positive cocci in pairs, chains and clusters*  --    BODY FLUID CULTURE, STERILE   --  3+ Growth of Streptococcus intermedius*  --      Results from last 7 days   Lab Units 01/23/23  1437   TOTAL COUNTED  100   WBC FLUID /ul 70,410     Medications:   Scheduled Medications:  cefTRIAXone, 2,000 mg, Intravenous, Q24H  enoxaparin, 40 mg, Subcutaneous, Q24H JAMSHID      Continuous IV Infusions: none     PRN Meds:  acetaminophen, 650 mg, Oral, Q4H PRN  aluminum-magnesium hydroxide-simethicone, 30 mL, Oral, Q4H PRN  HYDROmorphone, 0 5 mg, Intravenous, Q4H PRN  loperamide, 2 mg, Oral, 4x Daily PRN  ondansetron, 4 mg, Intravenous, Q6H PRN  oxyCODONE, 10 mg, Oral, Q4H PRN  oxyCODONE, 5 mg, Oral, Q4H PRN  saliva substitute, 5 spray, Mouth/Throat, 4x Daily PRN        Discharge Plan: d    Network Utilization Review Department  ATTENTION: Please call with any questions or concerns to [phone] and carefully listen to the prompts so that you are directed to the right person   All voicemails are confidential   Brendan Whitaker all requests for admission clinical reviews, approved or denied determinations and any other requests to dedicated fax number below belonging to the campus where the patient is receiving treatment   List of dedicated fax numbers for the Facilities:  [address] [place] [address] DENIALS (Administrative/Medical Necessity) [phone]   [address] (Maternity/NICU/Pediatrics) [phone]   [address] [phone]   Merlin Finnegan [address] [phone]   [address] [address] [address] [facility] 28 [phone]   [address]52  [address] [facility] [phone]

## 2023-01-27 NOTE — ASSESSMENT & PLAN NOTE
· Patient reporting abdominal pain for 3 days and urinary retention, increased abdominal distention, nausea/vomiting  · Patient was initially on Levaquin/Flagyl  · Infectious Disease was consulted and Levaquin/Flagyl were discontinued and patient was started on ceftriaxone/vancomycin  · Vancomycin has been discontinued as per ID ; continue ceftriaxone day 4  · Plan for repeat paracentesis today  · Surgical and GI input appreciated, no acute intervention needed  · See sepsis

## 2023-01-27 NOTE — PROGRESS NOTES
[address] [address]  Progress Note Bryon Peaks 1970, 46 y o  male MRN: [id number]  Unit/Bed#: ICU 02-01 Encounter: [id number]  Primary Care Provider: Diana Gonsalves DO   Date and time admitted to hospital: 1/21/2023  8:32 PM    * Peritonitis [facility])  Assessment & Plan  · Patient reporting abdominal pain for 3 days and urinary retention, increased abdominal distention, nausea/vomiting  · Patient was initially on Levaquin/Flagyl  · Infectious Disease was consulted and Levaquin/Flagyl were discontinued and patient was started on ceftriaxone/vancomycin  · Vancomycin has been discontinued as per ID ; continue ceftriaxone day 4  · Plan for repeat paracentesis today  · Surgical and GI input appreciated, no acute intervention needed  · See sepsis    Cirrhosis of liver with ascites ([facility] )  Assessment & Plan  · GI input appreciated  · Follow-up further work-up for cirrhosis causes, labs ordered by GI team  · Hepatitis C work-up pending  · Patient had paracentesis performed on 1/23/2023 ; plan for repeat paracentesis on 1/27/2023  · Will need to follow-up cytology results  · Does not appear to be in acute decompensated cirrhosis    Sepsis ([facility] )  Assessment & Plan  · Secondary to peritonitis  · Noted by leukocytosis, tachycardia tachypnea mild lactic acidosis present on admission  · Lactic acidosis resolved with IV fluids  · Continue antibiotics with ceftriaxone/vancomycin  Ascites cx    4+ Polys Abnormal  P    2+ Gram positive cocci in pairs, chains and clusters Abnormal      · Reviewed with GI , not felt to have simple SBP based on ascitic studies  · ID consulted for further evaluation, antibiotics have been adjusted as mentioned above  · Follow-up final cultures  · Patient did have a fever spike overnight, will check repeat blood cultures    Hepatitis C  Assessment & Plan  · Patient with a history of hepatitis C untreated  · GI following  · Liver with cirrhotic appearance    Hyponatremia  Assessment & Plan  · Sodium level worsening  · Nephrology following  · Continue fluid restriction  · BMP every 4 hours  · IV fluids as per nephrology    VTE Pharmacologic Prophylaxis: Lovenox    Patient Centered Rounds:  Patient care rounds were performed with nursing    Discussions with Specialists or Other Care Team Provider: Nephrology, Gastroenterology    Education and Discussions with Family / Patient: Spoke with patient at bedside    Time Spent for Care: 30  More than 50% of total time spent on counseling and coordination of care as described above  Current Length of Stay: 5 day(s)    Current Patient Status: Inpatient     Certification Statement: The patient will continue to require additional inpatient hospital stay due to peritonitis    Discharge Plan: Home with family support once medically stable    Code Status: Level 1 - Full Code      Subjective:   Patient seen and examined at bedside  No acute events overnight  Plan for repeat paracentesis today  Hyponatremia worsening; Nephrology following  Ceftriaxone continued for peritonitis as per ID  Objective:     Vitals:   Temp (24hrs), Av 2 °F (37 3 °C), Min:98 1 °F (36 7 °C), Max:100 3 °F (37 9 °C)    Temp:  [98 1 °F (36 7 °C)-100 3 °F (37 9 °C)] 98 1 °F (36 7 °C)  HR:  [105-118] 105  Resp:  [16-29] 20  BP: (124-128)/(71-77) 127/72  SpO2:  [89 %-94 %] 92 %  Body mass index is 22 66 kg/m²  Input and Output Summary (last 24 hours): Intake/Output Summary (Last 24 hours) at 2023 0720  Last data filed at 2023 0437  Gross per 24 hour   Intake 1657 ml   Output 1675 ml   Net -18 ml       Physical Exam:     Physical Exam  Vitals and nursing note reviewed  Constitutional:       General: He is not in acute distress  Appearance: He is well-developed  HENT:      Head: Normocephalic and atraumatic  Eyes:      Conjunctiva/sclera: Conjunctivae normal    Cardiovascular:      Rate and Rhythm: Normal rate and regular rhythm  Heart sounds:  No murmur heard  Pulmonary:      Effort: Pulmonary effort is normal  No respiratory distress  Breath sounds: Normal breath sounds  Abdominal:      Palpations: Abdomen is soft  Tenderness: There is no abdominal tenderness  Musculoskeletal:         General: No swelling  Cervical back: Neck supple  Skin:     General: Skin is warm and dry  Capillary Refill: Capillary refill takes less than 2 seconds  Neurological:      Mental Status: He is alert  Psychiatric:         Mood and Affect: Mood normal          Additional Data:     Labs: I have reviewed pertinent results     Results from last 7 days   Lab Units 01/27/23  0427   WBC Thousand/uL 7 80   HEMOGLOBIN g/dL 11 4*   HEMATOCRIT % 33 2*   PLATELETS Thousands/uL 216   NEUTROS PCT % 73   LYMPHS PCT % 16   MONOS PCT % 10   EOS PCT % 0     Results from last 7 days   Lab Units 01/27/23  0427 01/26/23  1649 01/26/23  0956   SODIUM mmol/L 119*  122*   < > 120*   POTASSIUM mmol/L 3 9  4 0   < > 3 6   CHLORIDE mmol/L 89*  90*   < > 88*   CO2 mmol/L 24  25   < > 25   BUN mg/dL 9  9   < > 10   CREATININE mg/dL 0 49*  0 51*   < > 0 52*   ANION GAP mmol/L 6  7   < > 7   CALCIUM mg/dL 7 3*  7 2*   < > 7 7*   ALBUMIN g/dL  --   --  2 4*   TOTAL BILIRUBIN mg/dL  --   --  0 90   ALK PHOS U/L  --   --  66   ALT U/L  --   --  42   AST U/L  --   --  39   GLUCOSE RANDOM mg/dL 118  120   < > 143*    < > = values in this interval not displayed       Results from last 7 days   Lab Units 01/21/23  2226   INR  1 38*         Results from last 7 days   Lab Units 01/22/23  0516   HEMOGLOBIN A1C % 5 4     Results from last 7 days   Lab Units 01/24/23  0455 01/23/23  0742 01/22/23  0551 01/21/23  2324 01/21/23  2117   LACTIC ACID mmol/L  --   --   --  1 5 2 3*   PROCALCITONIN ng/ml 0 34* 0 35* 0 41*  --  0 42*         Imaging: I have reviewed pertinent imaging       Recent Cultures (last 7 days):     Results from last 7 days   Lab Units 01/25/23  1201 01/23/23  1437 01/21/23 2117   BLOOD CULTURE  No Growth at 24 hrs  No Growth at 24 hrs   --  No Growth After 4 Days  No Growth After 4 Days  GRAM STAIN RESULT   --  4+ Polys*  2+ Gram positive cocci in pairs, chains and clusters*  --    BODY FLUID CULTURE, STERILE   --  3+ Growth of Streptococcus intermedius*  --        Last 24 Hours Medication List:   Current Facility-Administered Medications   Medication Dose Route Frequency Provider Last Rate   • acetaminophen  650 mg Oral Q4H PRN Becky Salvador PA-C     • aluminum-magnesium hydroxide-simethicone  30 mL Oral Q4H PRN Becky Salvador PA-C     • cefTRIAXone  2,000 mg Intravenous Q24H Breana Acuna MD 2,000 mg (01/26/23 1646)   • enoxaparin  40 mg Subcutaneous Q24H [address] Mely Burr DO     • HYDROmorphone  0 5 mg Intravenous Q4H PRN Vianneya SOLE Salvador     • loperamide  2 mg Oral 4x Daily PRN John Hernandez PA-C     • ondansetron  4 mg Intravenous Q6H PRN Vola SOLE Salvador     • oxyCODONE  10 mg Oral Q4H PRN Vianneya SOLE Salvador     • oxyCODONE  5 mg Oral Q4H PRN Vola SOLE Salvador     • saliva substitute  5 spray Mouth/Throat 4x Daily PRN Vazquez Garcia PA-C          Today, Patient Was Seen By: Mely Burr DO    ** Please Note: Dictation voice to text software may have been used in the creation of this document   **

## 2023-01-27 NOTE — ASSESSMENT & PLAN NOTE
· Sodium level worsening  · Nephrology following  · Continue fluid restriction  · BMP every 4 hours  · IV fluids as per nephrology

## 2023-01-28 ENCOUNTER — APPOINTMENT (INPATIENT)
Dept: RADIOLOGY | Facility: HOSPITAL | Age: 53
End: 2023-01-28

## 2023-01-28 ENCOUNTER — ANESTHESIA (INPATIENT)
Dept: PERIOP | Facility: HOSPITAL | Age: 53
End: 2023-01-28

## 2023-01-28 PROBLEM — K65.0 ACUTE PERITONITIS (HCC): Status: ACTIVE | Noted: 2023-01-22

## 2023-01-28 PROBLEM — T88.4XXA DIFFICULT INTUBATION: Status: ACTIVE | Noted: 2023-01-28

## 2023-01-28 LAB
ANION GAP SERPL CALCULATED.3IONS-SCNC: 6 MMOL/L (ref 4–13)
BASOPHILS # BLD AUTO: 0.04 THOUSANDS/ÂΜL (ref 0–0.1)
BASOPHILS NFR BLD AUTO: 1 % (ref 0–1)
BUN SERPL-MCNC: 10 MG/DL (ref 5–25)
BUN SERPL-MCNC: 13 MG/DL (ref 5–25)
BUN SERPL-MCNC: 14 MG/DL (ref 5–25)
CA-I BLD-SCNC: 0.77 MMOL/L (ref 1.12–1.32)
CA-I BLD-SCNC: 1.09 MMOL/L (ref 1.12–1.32)
CALCIUM SERPL-MCNC: 7.5 MG/DL (ref 8.4–10.2)
CALCIUM SERPL-MCNC: 7.6 MG/DL (ref 8.4–10.2)
CALCIUM SERPL-MCNC: 8.1 MG/DL (ref 8.4–10.2)
CHLORIDE SERPL-SCNC: 88 MMOL/L (ref 96–108)
CHLORIDE SERPL-SCNC: 93 MMOL/L (ref 96–108)
CHLORIDE SERPL-SCNC: 93 MMOL/L (ref 96–108)
CO2 SERPL-SCNC: 24 MMOL/L (ref 21–32)
CO2 SERPL-SCNC: 25 MMOL/L (ref 21–32)
CO2 SERPL-SCNC: 25 MMOL/L (ref 21–32)
CREAT SERPL-MCNC: 0.48 MG/DL (ref 0.6–1.3)
CREAT SERPL-MCNC: 0.48 MG/DL (ref 0.6–1.3)
CREAT SERPL-MCNC: 0.54 MG/DL (ref 0.6–1.3)
EOSINOPHIL # BLD AUTO: 0.02 THOUSAND/ÂΜL (ref 0–0.61)
EOSINOPHIL NFR BLD AUTO: 0 % (ref 0–6)
ERYTHROCYTE [DISTWIDTH] IN BLOOD BY AUTOMATED COUNT: 13 % (ref 11.6–15.1)
GFR SERPL CREATININE-BSD FRML MDRD: 120 ML/MIN/1.73SQ M
GFR SERPL CREATININE-BSD FRML MDRD: 126 ML/MIN/1.73SQ M
GFR SERPL CREATININE-BSD FRML MDRD: 126 ML/MIN/1.73SQ M
GLUCOSE SERPL-MCNC: 136 MG/DL (ref 65–140)
GLUCOSE SERPL-MCNC: 146 MG/DL (ref 65–140)
GLUCOSE SERPL-MCNC: 153 MG/DL (ref 65–140)
HCT VFR BLD AUTO: 34.5 % (ref 36.5–49.3)
HGB BLD-MCNC: 11.9 G/DL (ref 12–17)
IMM GRANULOCYTES # BLD AUTO: 0.07 THOUSAND/UL (ref 0–0.2)
IMM GRANULOCYTES NFR BLD AUTO: 1 % (ref 0–2)
INR PPP: 1.18 (ref 0.84–1.19)
LYMPHOCYTES # BLD AUTO: 1.1 THOUSANDS/ÂΜL (ref 0.6–4.47)
LYMPHOCYTES NFR BLD AUTO: 13 % (ref 14–44)
MAGNESIUM SERPL-MCNC: 1.5 MG/DL (ref 1.9–2.7)
MAGNESIUM SERPL-MCNC: 1.9 MG/DL (ref 1.9–2.7)
MAGNESIUM SERPL-MCNC: 2.2 MG/DL (ref 1.9–2.7)
MCH RBC QN AUTO: 34.1 PG (ref 26.8–34.3)
MCHC RBC AUTO-ENTMCNC: 34.5 G/DL (ref 31.4–37.4)
MCV RBC AUTO: 99 FL (ref 82–98)
MONOCYTES # BLD AUTO: 0.96 THOUSAND/ÂΜL (ref 0.17–1.22)
MONOCYTES NFR BLD AUTO: 11 % (ref 4–12)
NEUTROPHILS # BLD AUTO: 6.57 THOUSANDS/ÂΜL (ref 1.85–7.62)
NEUTS SEG NFR BLD AUTO: 74 % (ref 43–75)
NRBC BLD AUTO-RTO: 0 /100 WBCS
PHOSPHATE SERPL-MCNC: 3.1 MG/DL (ref 2.7–4.5)
PHOSPHATE SERPL-MCNC: 4 MG/DL (ref 2.7–4.5)
PHOSPHATE SERPL-MCNC: 4 MG/DL (ref 2.7–4.5)
PLATELET # BLD AUTO: 208 THOUSANDS/UL (ref 149–390)
PMV BLD AUTO: 8.9 FL (ref 8.9–12.7)
POTASSIUM SERPL-SCNC: 3.8 MMOL/L (ref 3.5–5.3)
POTASSIUM SERPL-SCNC: 4.6 MMOL/L (ref 3.5–5.3)
POTASSIUM SERPL-SCNC: 4.8 MMOL/L (ref 3.5–5.3)
PROCALCITONIN SERPL-MCNC: 0.3 NG/ML
PROTHROMBIN TIME: 15 SECONDS (ref 11.6–14.5)
RBC # BLD AUTO: 3.49 MILLION/UL (ref 3.88–5.62)
SODIUM SERPL-SCNC: 119 MMOL/L (ref 135–147)
SODIUM SERPL-SCNC: 123 MMOL/L (ref 135–147)
SODIUM SERPL-SCNC: 124 MMOL/L (ref 135–147)
WBC # BLD AUTO: 8.76 THOUSAND/UL (ref 4.31–10.16)

## 2023-01-28 PROCEDURE — 0DN80ZZ RELEASE SMALL INTESTINE, OPEN APPROACH: ICD-10-PCS | Performed by: INTERNAL MEDICINE

## 2023-01-28 PROCEDURE — 0DQ80ZZ REPAIR SMALL INTESTINE, OPEN APPROACH: ICD-10-PCS | Performed by: INTERNAL MEDICINE

## 2023-01-28 PROCEDURE — 0DBW0ZX EXCISION OF PERITONEUM, OPEN APPROACH, DIAGNOSTIC: ICD-10-PCS | Performed by: INTERNAL MEDICINE

## 2023-01-28 PROCEDURE — 0DJV4ZZ INSPECTION OF MESENTERY, PERCUTANEOUS ENDOSCOPIC APPROACH: ICD-10-PCS | Performed by: INTERNAL MEDICINE

## 2023-01-28 RX ORDER — MAGNESIUM SULFATE HEPTAHYDRATE 40 MG/ML
2 INJECTION, SOLUTION INTRAVENOUS ONCE
Status: COMPLETED | OUTPATIENT
Start: 2023-01-28 | End: 2023-01-28

## 2023-01-28 RX ORDER — SUCCINYLCHOLINE/SOD CL,ISO/PF 100 MG/5ML
SYRINGE (ML) INTRAVENOUS AS NEEDED
Status: DISCONTINUED | OUTPATIENT
Start: 2023-01-28 | End: 2023-01-28

## 2023-01-28 RX ORDER — CALCIUM GLUCONATE 20 MG/ML
1 INJECTION, SOLUTION INTRAVENOUS ONCE
Status: COMPLETED | OUTPATIENT
Start: 2023-01-28 | End: 2023-01-29

## 2023-01-28 RX ORDER — SODIUM CHLORIDE 9 MG/ML
INJECTION, SOLUTION INTRAVENOUS CONTINUOUS PRN
Status: DISCONTINUED | OUTPATIENT
Start: 2023-01-28 | End: 2023-01-28

## 2023-01-28 RX ORDER — PROPOFOL 10 MG/ML
INJECTION, EMULSION INTRAVENOUS AS NEEDED
Status: DISCONTINUED | OUTPATIENT
Start: 2023-01-28 | End: 2023-01-28

## 2023-01-28 RX ORDER — PROPOFOL 10 MG/ML
5-50 INJECTION, EMULSION INTRAVENOUS
Status: DISCONTINUED | OUTPATIENT
Start: 2023-01-28 | End: 2023-01-29

## 2023-01-28 RX ORDER — KETAMINE HCL IN NACL, ISO-OSM 100MG/10ML
SYRINGE (ML) INJECTION AS NEEDED
Status: DISCONTINUED | OUTPATIENT
Start: 2023-01-28 | End: 2023-01-28

## 2023-01-28 RX ORDER — ROCURONIUM BROMIDE 10 MG/ML
INJECTION, SOLUTION INTRAVENOUS AS NEEDED
Status: DISCONTINUED | OUTPATIENT
Start: 2023-01-28 | End: 2023-01-28

## 2023-01-28 RX ORDER — DEXAMETHASONE SODIUM PHOSPHATE 10 MG/ML
INJECTION, SOLUTION INTRAMUSCULAR; INTRAVENOUS AS NEEDED
Status: DISCONTINUED | OUTPATIENT
Start: 2023-01-28 | End: 2023-01-28

## 2023-01-28 RX ORDER — CHLORHEXIDINE GLUCONATE 0.12 MG/ML
15 RINSE ORAL EVERY 12 HOURS SCHEDULED
Status: DISCONTINUED | OUTPATIENT
Start: 2023-01-28 | End: 2023-02-03 | Stop reason: HOSPADM

## 2023-01-28 RX ORDER — CEFAZOLIN SODIUM 1 G/3ML
INJECTION, POWDER, FOR SOLUTION INTRAMUSCULAR; INTRAVENOUS AS NEEDED
Status: DISCONTINUED | OUTPATIENT
Start: 2023-01-28 | End: 2023-01-28

## 2023-01-28 RX ORDER — FENTANYL CITRATE 50 UG/ML
INJECTION, SOLUTION INTRAMUSCULAR; INTRAVENOUS AS NEEDED
Status: DISCONTINUED | OUTPATIENT
Start: 2023-01-28 | End: 2023-01-28

## 2023-01-28 RX ORDER — FENTANYL CITRATE 50 UG/ML
50 INJECTION, SOLUTION INTRAMUSCULAR; INTRAVENOUS
Status: DISCONTINUED | OUTPATIENT
Start: 2023-01-28 | End: 2023-01-29

## 2023-01-28 RX ORDER — BUPIVACAINE HYDROCHLORIDE AND EPINEPHRINE 2.5; 5 MG/ML; UG/ML
INJECTION, SOLUTION INFILTRATION; PERINEURAL AS NEEDED
Status: DISCONTINUED | OUTPATIENT
Start: 2023-01-28 | End: 2023-01-28 | Stop reason: HOSPADM

## 2023-01-28 RX ORDER — SODIUM CHLORIDE AND POTASSIUM CHLORIDE 150; 900 MG/100ML; MG/100ML
50 INJECTION, SOLUTION INTRAVENOUS CONTINUOUS
Status: DISCONTINUED | OUTPATIENT
Start: 2023-01-28 | End: 2023-01-28

## 2023-01-28 RX ORDER — HYDROMORPHONE HCL/PF 1 MG/ML
SYRINGE (ML) INJECTION AS NEEDED
Status: DISCONTINUED | OUTPATIENT
Start: 2023-01-28 | End: 2023-01-28

## 2023-01-28 RX ORDER — MIDAZOLAM HYDROCHLORIDE 2 MG/2ML
INJECTION, SOLUTION INTRAMUSCULAR; INTRAVENOUS AS NEEDED
Status: DISCONTINUED | OUTPATIENT
Start: 2023-01-28 | End: 2023-01-28

## 2023-01-28 RX ORDER — LIDOCAINE HYDROCHLORIDE 20 MG/ML
INJECTION, SOLUTION EPIDURAL; INFILTRATION; INTRACAUDAL; PERINEURAL AS NEEDED
Status: DISCONTINUED | OUTPATIENT
Start: 2023-01-28 | End: 2023-01-28

## 2023-01-28 RX ORDER — SODIUM CHLORIDE 9 MG/ML
50 INJECTION, SOLUTION INTRAVENOUS CONTINUOUS
Status: DISCONTINUED | OUTPATIENT
Start: 2023-01-28 | End: 2023-01-28

## 2023-01-28 RX ADMIN — MAGNESIUM SULFATE HEPTAHYDRATE 2 G: 40 INJECTION, SOLUTION INTRAVENOUS at 17:45

## 2023-01-28 RX ADMIN — CEFTRIAXONE 2000 MG: 2 INJECTION, SOLUTION INTRAVENOUS at 16:12

## 2023-01-28 RX ADMIN — SODIUM CHLORIDE: 0.9 INJECTION, SOLUTION INTRAVENOUS at 08:13

## 2023-01-28 RX ADMIN — LIDOCAINE HYDROCHLORIDE 80 MG: 20 INJECTION, SOLUTION EPIDURAL; INFILTRATION; INTRACAUDAL; PERINEURAL at 08:18

## 2023-01-28 RX ADMIN — PROPOFOL 5 MCG/KG/MIN: 10 INJECTION, EMULSION INTRAVENOUS at 12:07

## 2023-01-28 RX ADMIN — MAGNESIUM SULFATE HEPTAHYDRATE 2 G: 40 INJECTION, SOLUTION INTRAVENOUS at 05:37

## 2023-01-28 RX ADMIN — PHENYLEPHRINE HYDROCHLORIDE 30 MCG/MIN: 10 INJECTION INTRAVENOUS at 08:29

## 2023-01-28 RX ADMIN — ROCURONIUM BROMIDE 10 MG: 50 INJECTION, SOLUTION INTRAVENOUS at 09:26

## 2023-01-28 RX ADMIN — ROCURONIUM BROMIDE 20 MG: 50 INJECTION, SOLUTION INTRAVENOUS at 09:51

## 2023-01-28 RX ADMIN — CHLORHEXIDINE GLUCONATE 0.12% ORAL RINSE 15 ML: 1.2 LIQUID ORAL at 21:51

## 2023-01-28 RX ADMIN — ROCURONIUM BROMIDE 10 MG: 50 INJECTION, SOLUTION INTRAVENOUS at 10:38

## 2023-01-28 RX ADMIN — MIDAZOLAM 2 MG: 1 INJECTION INTRAMUSCULAR; INTRAVENOUS at 08:13

## 2023-01-28 RX ADMIN — ROCURONIUM BROMIDE 40 MG: 50 INJECTION, SOLUTION INTRAVENOUS at 08:29

## 2023-01-28 RX ADMIN — HYDROMORPHONE HYDROCHLORIDE 0.75 MG: 1 INJECTION, SOLUTION INTRAMUSCULAR; INTRAVENOUS; SUBCUTANEOUS at 08:57

## 2023-01-28 RX ADMIN — METRONIDAZOLE 500 MG: 500 TABLET ORAL at 05:37

## 2023-01-28 RX ADMIN — METRONIDAZOLE 500 MG: 500 TABLET ORAL at 14:19

## 2023-01-28 RX ADMIN — SODIUM CHLORIDE: 9 INJECTION, SOLUTION INTRAVENOUS at 08:26

## 2023-01-28 RX ADMIN — SODIUM CHLORIDE AND POTASSIUM CHLORIDE 125 ML/HR: .9; .15 SOLUTION INTRAVENOUS at 14:19

## 2023-01-28 RX ADMIN — PROPOFOL 40 MCG/KG/MIN: 10 INJECTION, EMULSION INTRAVENOUS at 21:30

## 2023-01-28 RX ADMIN — PROPOFOL 40 MCG/KG/MIN: 10 INJECTION, EMULSION INTRAVENOUS at 16:11

## 2023-01-28 RX ADMIN — HYDROMORPHONE HYDROCHLORIDE 1 MG: 1 INJECTION, SOLUTION INTRAMUSCULAR; INTRAVENOUS; SUBCUTANEOUS at 09:55

## 2023-01-28 RX ADMIN — HYDROMORPHONE HYDROCHLORIDE 0.25 MG: 1 INJECTION, SOLUTION INTRAMUSCULAR; INTRAVENOUS; SUBCUTANEOUS at 08:55

## 2023-01-28 RX ADMIN — FENTANYL CITRATE 100 MCG: 50 INJECTION, SOLUTION INTRAMUSCULAR; INTRAVENOUS at 08:18

## 2023-01-28 RX ADMIN — ENOXAPARIN SODIUM 40 MG: 100 INJECTION SUBCUTANEOUS at 12:12

## 2023-01-28 RX ADMIN — DEXAMETHASONE SODIUM PHOSPHATE 10 MG: 10 INJECTION, SOLUTION INTRAMUSCULAR; INTRAVENOUS at 08:25

## 2023-01-28 RX ADMIN — Medication 30 MG: at 08:58

## 2023-01-28 RX ADMIN — CALCIUM GLUCONATE 3 G: 98 INJECTION, SOLUTION INTRAVENOUS at 17:38

## 2023-01-28 RX ADMIN — CHLORHEXIDINE GLUCONATE 0.12% ORAL RINSE 15 ML: 1.2 LIQUID ORAL at 12:14

## 2023-01-28 RX ADMIN — METRONIDAZOLE 500 MG: 500 TABLET ORAL at 21:32

## 2023-01-28 RX ADMIN — CALCIUM GLUCONATE 1 G: 20 INJECTION, SOLUTION INTRAVENOUS at 23:23

## 2023-01-28 RX ADMIN — PROPOFOL 200 MG: 10 INJECTION, EMULSION INTRAVENOUS at 08:18

## 2023-01-28 RX ADMIN — Medication 80 MG: at 08:18

## 2023-01-28 RX ADMIN — CEFAZOLIN 1000 MG: 1 INJECTION, POWDER, FOR SOLUTION INTRAMUSCULAR; INTRAVENOUS at 08:26

## 2023-01-28 NOTE — ASSESSMENT & PLAN NOTE
· Patient is status post post OR4 enterotomy, he was reportedly a difficult airway  · Wean to extubation

## 2023-01-28 NOTE — OP NOTE
OPERATIVE REPORT  PATIENT NAME: Edy Cobb    :  1970  MRN: [id number]  Pt Location: [facility] OR ROOM 01    SURGERY DATE: 2023    Surgeon(s) and Role:     * Chris English MD - Primary     * Lenny Medrano PA-C - Assisting    Preop Diagnosis:  Acute peritonitis ([address] ) [K65 0]    Post-Op Diagnosis Codes:     * Acute peritonitis ([address] ) [K65 0]    Procedure(s):  LAPAROSCOPY DIAGNOSTIC CONVERTED TO OPEN  LAPAROTOMY  REPAIR ENTEROTOMY X2  EXTENSIVE LYSIS OF ADHESIONS  PERITONEAL BIOPSIES  PERITONEAL LAVAGE    Specimen(s):  ID Type Source Tests Collected by Time Destination   1 : peritoneal biopsies Tissue Peritoneum TISSUE EXAM Chris English MD 2023 1015    2 : peritoneal biopsies with acid fast stain Tissue Peritoneum TISSUE EXAM, AFB CULTURE WITH STAIN Chris English MD 2023 1015    3 : peritoneal fluid in Lukens trap for cytology and culture Body Fluid Peritoneal Fluid NON-GYNECOLOGIC CYTOLOGY, BODY FLUID CULTURE AND GRAM STAIN Chris English MD 2023 1036    A : aerobic culture peritoneal fluid Body Fluid Peritoneal Fluid BODY FLUID CULTURE AND GRAM STAIN Chris English MD 2023 1035    B : anaerobic culture peritoneal fluid Body Fluid Peritoneal Fluid ANAEROBIC CULTURE AND GRAM STAIN Chris English MD 2023 1035        Estimated Blood Loss:   500 mL    Drains:  Closed/Suction Drain Right;Left Abdomen Bulb 19 Fr  (Active)   Number of days: 0       NG/OG/Enteral Tube Nasogastric 18 Fr Left nare (Active)   Number of days: 0       Anesthesia Type:   General    Operative Indications:  Acute peritonitis ([facility] ) [K65 0]  The patient is a 55-year-old white male with a distant history of alcohol use, sober for 20 years, presenting with abdominal distention and abdominal pain  Patient is found to have the appearance of a cirrhotic liver, and loculated infected ascites  Percutaneous cultures grew strep species  Distention, pain and fevers persisted  Surgery was reconsulted for washout of his abdomen  This was attempted laparoscopically, the abdomen was found to be frozen, there were 2 unavoidable enterotomies in the attempt, it was converted to an open procedure, extensive lysis of adhesions undertaken, the abscess wall, peritoneal fluid and peritoneal surfaces involved with the abscess were submitted for specimen  Two closed suction drains were placed, and Surgicel was underlaid in order to prevent adhesions to the midline, to facilitate a second-look operation  Operative Findings:  Extensive intra-abdominal abscess, with multiple loculations, extensive rind, extremely friable abscess wall, no odor, or enteric contents to suggest perforated bowel as the source  Complications:   Enterotomy x 2 involving proximal small bowel, repaired in 2 layers with chromic and silk  Procedure and Technique:  The patient was identified by myself taken to the operating room and placed in a supine position on the operating table  After induction of satisfactory general endotracheal anesthesia the left arm was tucked and he was prepped and draped in the usual sterile fashion using ChloraPrep solution and including an Ioban drape  A timeout was called the patient identified as Awais Cali, IV antibiotics were confirmed as given sequential compression devices were on and functioning, all parties agreed this was the appropriate patient for the proposed procedure  Local anesthetic consisting of quarter 0 25% Marcaine with epinephrine was infiltrated following which a small transverse incision was placed above and to the left of the umbilicus using the #10 scalpel and deepened through this skin and subcutaneous tissue using the scalpel  The abdominal wall was elevated with a perforating towel clip, and a Verris needle was introduced    The saline drop test was performed, the was slow instillation of the saline, there was no enteric contents noted on withdrawing on the syringe  However, pneumoperitoneum could not be established, and another site was selected  A small transverse incision was placed 2 fingerbreadths below the midclavicular line at the costal margin on the left side, and a 5 mm optical trocar with a 5 mm 0 degree scope within was slowly advanced  Going through the subcutaneous tissue, rectus sheath and rectus muscle, there was no warning as the trocar entered bowel, and the lumen of the small bowel was noted  The trocar was left in position to avoid soiling the peritoneum further, and further laparoscopy was abandoned  A midline incision was performed using a #10 scalpel and deepened through the subcutaneous tissue using electrocautery  The fascia was divided with electrocautery, and the preperitoneal fat and peritoneum found to be absolutely fused to the surface of the viscus below  There was a second unavoidable enterotomy of the midline small bowel noted after attempting to enter the midline above the umbilicus  The incision was extended below the umbilicus and the abscess cavity entered  Fluid was submitted and cultures taken  Working back proximately it was possible to begin to separate the adhesed small bowel from the midline, and what ensued was an extremely tedious dissection freeing up adhesions in the upper midline  Finally the loop of small bowel that had been injured and was able to be dissected from the matted small bowel, and found to be viable with the exception of 2 small isolated clean enterotomies  Each was repaired in 2 layers using 3-0 chromic all coats layer, reinforced by seromuscular 3-0 silk suture  Small bowel contents were removed back and forth with the bowel ends compressed, and there was no leak noted  The omentum was actually found to be uninvolved with the adhesions, and in the left upper quadrant  The stomach is not separately dissected, but was decompressed by a nasogastric tube    There were no enteric contents soiling the abdomen  The wound edges were protected as much as possible with moist lap pads during the procedure, but using a wound protector was not feasible  The patient was found to have an extensive horseshoe shaped abdominal abscess with multiple loculations, this extended up to the left upper quadrant, and above the liver edge anterior to the abdominal wall on the right side  The abscess contents were removed as much as feasible using irrigation with saline, and moist gauze lap pads to deliver the contents  The walls of the abscess were covered with a thick rind which was quite friable, and there was fairly persistent ooze from the entire abscess wall in the process of debriding the abscess cavity  On the right side a 23 [place] Madhu drain was placed through stab wound lateral to the umbilicus, and directed to the right upper quadrant into the abscess cavity anterior to the liver  On the left side the trocar site below the left costal margin was used to deliver a second 19 [address] Madhu drain into the abscess cavity on the left side, which was laid in the lateral aspect of the abscess, and directed into the pelvis  The abdomen was then further irrigated with several liters of warm saline  There was no omentum available to protect the distended exposed small bowel, and a sheet of Surgicel was placed over this bowel to act as an adhesion barrier  The midline was then closed using a mass closure of looped 0 PDS suture  The skin was then tacked open, and Telfa kaiser impregnated with Betadine were placed between interrupted 3-0 nylon skin sutures  The Ioban drape was removed  Bulky gauze dressings and drain sponges were applied  This was followed by an abdominal binder  Prior to the patient being moved from the OR table a Mendez catheter was placed  He was then moved to his waiting ICU bed and taken directly to the intensive care unit intubated and hemodynamically stable       I was present for the entire procedure, A qualified resident physician was not available and A physician assistant was required during the procedure for retraction tissue handling,dissection and suturing    Patient Disposition:  Critical Care Unit, intubated and hemodynamically stable   and patient will return to OR for planned surgery as a staged procedure    This procedure was not performed to treat colon cancer through resection      SIGNATURE: Collins Carballo MD  DATE: January 28, 2023  TIME: 1:11 PM

## 2023-01-28 NOTE — ASSESSMENT & PLAN NOTE
· Sodium level worsening to 119  · Nephrology following  · Continue fluid restriction  · BMP every 4 hours  · Patient received Bumex 2 mg x1 on 1/27/2023

## 2023-01-28 NOTE — ASSESSMENT & PLAN NOTE
· Patient with a history of hepatitis C untreated  · GI following  · Liver with cirrhotic appearance  · Outpatient follow-up with Gastroenterology for treatment

## 2023-01-28 NOTE — ASSESSMENT & PLAN NOTE
· Likely 2/2 volume overload   · Continue PRN diuretics  · strict I/O w/ leon catheter  · Nephrology following  · Neuro assessment Q4  · Continue to trend labs  ·

## 2023-01-28 NOTE — PLAN OF CARE
Problem: Nutrition/Hydration-ADULT  Goal: Nutrient/Hydration intake appropriate for improving, restoring or maintaining nutritional needs  Description: Monitor and assess patient's nutrition/hydration status for malnutrition  Collaborate with interdisciplinary team and initiate plan and interventions as ordered  Monitor patient's weight and dietary intake as ordered or per policy  Utilize nutrition screening tool and intervene as necessary  Determine patient's food preferences and provide high-protein, high-caloric foods as appropriate       INTERVENTIONS:  - Monitor oral intake, urinary output, labs, and treatment plans  - Assess nutrition and hydration status and recommend course of action  - Evaluate amount of meals eaten  - Assist patient with eating if necessary   - Allow adequate time for meals  - Recommend/ encourage appropriate diets, oral nutritional supplements, and vitamin/mineral supplements  - Order, calculate, and assess calorie counts as needed  - Recommend, monitor, and adjust tube feedings and TPN/PPN based on assessed needs  - Assess need for intravenous fluids  - Provide specific nutrition/hydration education as appropriate  - Include patient/family/caregiver in decisions related to nutrition  Outcome: Progressing     Problem: PAIN - ADULT  Goal: Verbalizes/displays adequate comfort level or baseline comfort level  Description: Interventions:  - Encourage patient to monitor pain and request assistance  - Assess pain using appropriate pain scale  - Administer analgesics based on type and severity of pain and evaluate response  - Implement non-pharmacological measures as appropriate and evaluate response  - Consider cultural and social influences on pain and pain management  - Notify physician/advanced practitioner if interventions unsuccessful or patient reports new pain  Outcome: Progressing     Problem: SAFETY ADULT  Goal: Patient will remain free of falls  Description: INTERVENTIONS:  - Educate patient/family on patient safety including physical limitations  - Instruct patient to call for assistance with activity   - Consult OT/PT to assist with strengthening/mobility   - Keep Call bell within reach  - Keep bed low and locked with side rails adjusted as appropriate  - Keep care items and personal belongings within reach  - Initiate and maintain comfort rounds  - Make Fall Risk Sign visible to staff  - Offer Toileting every 2 Hours, in advance of need  - Initiate/Maintain alarm  - Obtain necessary fall risk management equipment:   - Apply yellow socks and bracelet for high fall risk patients  - Consider moving patient to room near nurses station  Outcome: Progressing  Goal: Maintain or return to baseline ADL function  Description: INTERVENTIONS:  -  Assess patient's ability to carry out ADLs; assess patient's baseline for ADL function and identify physical deficits which impact ability to perform ADLs (bathing, care of mouth/teeth, toileting, grooming, dressing, etc )  - Assess/evaluate cause of self-care deficits   - Assess range of motion  - Assess patient's mobility; develop plan if impaired  - Assess patient's need for assistive devices and provide as appropriate  - Encourage maximum independence but intervene and supervise when necessary  - Involve family in performance of ADLs  - Assess for home care needs following discharge   - Consider OT consult to assist with ADL evaluation and planning for discharge  - Provide patient education as appropriate  Outcome: Progressing

## 2023-01-28 NOTE — INTERIM OP NOTE
LAPAROSCOPY DIAGNOSTIC CONVERTED TO OPEN, LAPAROTOMY, REPAIR ENTEROTOMY X2, EXTENSIVE LYSIS OF ADHESIONS, PERITONEAL BIOPSIES, PERITONEAL LAVAGE      Postoperative Note  PATIENT NAME: Harris Arciniega  : 1970  MRN: [id number]  [facility] OR ROOM 01    Surgery Date: 2023    Preop Diagnosis:  Acute peritonitis ([facility] ) [K65 0]    Post-Op Diagnosis Codes:     * Acute peritonitis ([facility] [facility] ) [K65 0]    Procedure(s) (LRB):  LAPAROSCOPY DIAGNOSTIC CONVERTED TO OPEN, LAPAROTOMY, REPAIR ENTEROTOMY X2, EXTENSIS LYSIS OF ADHESIONS, PERITONEAL BIOPSIES, PERITONEAL LAVAGE (N/A)    Surgeon(s) and Role:     * Jaimie Watkins MD - Primary     * Delio Glover PA-C - Assisting    Specimens:  ID Type Source Tests Collected by Time Destination   1 : peritoneal biopsies Tissue Peritoneum TISSUE EXAM Jaimie Watkins MD 2023 1015    2 : peritoneal biopsies with acid fast stain Tissue Peritoneum TISSUE EXAM, AFB CULTURE WITH STAIN Jaimie Watkins MD 2023 1015    3 : peritoneal fluid in Lukens trap for cytology and culture Body Fluid Peritoneal Fluid NON-GYNECOLOGIC CYTOLOGY, BODY FLUID CULTURE AND GRAM STAIN Jaimie Watkins MD 2023 1036    A : aerobic culture peritoneal fluid Body Fluid Peritoneal Fluid BODY FLUID CULTURE AND GRAM STAIN Jaimie Watkins MD 2023 1035    B : anaerobic culture peritoneal fluid Body Fluid Peritoneal Fluid ANAEROBIC CULTURE AND GRAM STAIN Jaimie Watkins MD 2023 1035        Estimated Blood Loss:   500 mL    Anesthesia Type:   General     Findings:    Frozen abdomen, unavoidable enterotomy x 2 in proximal small bowel repaired in 2 layers with chromic and silk  Extensive lysis of adhesions  Drainage of intra-abdominal abscesses  Multiple peritoneal biopsies  Peritoneal lavage      Complications:   Unavoidable enterotomy x 2      SIGNATURE: Jaimie Watkins MD   DATE: 2023   TIME: 11:40 AM

## 2023-01-28 NOTE — PLAN OF CARE
Problem: DISCHARGE PLANNING  Goal: Discharge to home or other facility with appropriate resources  Description: INTERVENTIONS:  - Identify barriers to discharge w/patient and caregiver  - Arrange for needed discharge resources and transportation as appropriate  - Identify discharge learning needs (meds, wound care, etc )  - Arrange for interpretive services to assist at discharge as needed  - Refer to Case Management Department for coordinating discharge planning if the patient needs post-hospital services based on physician/advanced practitioner order or complex needs related to functional status, cognitive ability, or social support system  Outcome: Progressing   Concerned regarding CHAY from work; will have Case Mqanagemwnt speak with pt

## 2023-01-28 NOTE — RESPIRATORY THERAPY NOTE
01/28/23 1222   Respiratory Assessment   Assessment Type Assess only   General Appearance Sedated   Respiratory Pattern Normal   Chest Assessment Chest expansion symmetrical   Bilateral Breath Sounds Clear   Resp Comments pt recieved from OR intubated  pt placed on C3 ventilator   Vent Information   Vent ID [id number]   Vent type Porras C3   Porras C3/G5 Vent Mode SPONT   $ Vent Charge-INITIAL Yes   Ventilator Start Yes   $ Pulse Oximetry Spot Check Charge Completed   SpO2 97 %   SPONT Settings   FIO2 (%) 50 %   PEEP (cmH2O) 6 cmH2O   Pressure Support (cmH2O) 10 cmH20   Flow Trigger (LPM) 5 LPM   P-ramp (ms) 100 ms   ETS  (%) 25 %   Humidification Heater   Heater Temp 95 °F (35 °C)   SPONT Actuals   Resp Rate (BPM) 10 BPM   VT (mL) 665 mL   MV (Obs) 7 4   MAP (cmH2O) 5 4 cmH2O   Peak Pressure (cmH2O) 16 cmH2O   I:E Ratio (Obs) 1:4 6   RSBI (f/vt) 12 f/Vt   Heater Temperature (Obs) 93 2 °F (34 °C)   SPONT Alarms   High Peak Pressure (cmH20) 40 cmH2O   High Resp Rate (BPM) 40 BPM   High MV (L/min) 20 L/min   Low MV (L/min) 4 L/min   High Spont VTE (mL) 1200 mL   Low Spont VTE (mL) 300 mL   Apnea Time (S) 20 S   SPONT Apnea Settings   Resp Rate (BPM) 12 BPM   FIO2 (%) 100 %   %TI (%) 1 %   Apnea Time (S) 20 S   Maintenance   Alarm (pink) cable attached No   Resuscitation bag with peep valve at bedside Yes   Water bag changed Yes   Circuit changed No   Daily Screen   Patient safety screen outcome: Failed   Not Ready for Weaning due to: Underline problem not resolved   IHI Ventilator Associated Pneumonia Bundle   Head of Bed Elevated HOB 30   Oral Care Mouth suctioned   ETT  Cuffed; Hi-Lo 7 5 mm   Placement Date/Time: 01/28/23 0824   Mask Ventilation: Ventilated by mask (1)  Preoxygenated: Yes  Technique: Video laryngoscopy  Type: Cuffed; Hi-Lo  Tube Size: 7 5 mm  Laryngoscope: Susan  Blade Size: 3  Location: Oral  Grade View: Arytenoids or po       Secured at (cm) 23   Measured from [facility] [address] Repositioned Right to [address] [place] [address] by Commercial tube wahl   Site Condition Dry   Cuff Pressure (cm H2O) 28 cm H2O   HI-LO Suction  Continuous low suction   HI-LO Secretions Scant   HI-LO Intervention Patent     RT Ventilator Management Note  Shaheed Campos 46 y o  male MRN: [id number]  Unit/Bed#: ICU 02-01 Encounter: [id number]      Daily Screen         1/28/2023  1222             Patient safety screen outcome[de-identified] Failed (P)     Not Ready for Weaning due to[de-identified] Underline problem not resolved (P)               Physical Exam:   Assessment Type: Assess only  General Appearance: Sedated  Respiratory Pattern: Normal  Chest Assessment: Chest expansion symmetrical  Bilateral Breath Sounds: Clear      Resp Comments: pt recieved from OR intubated   pt placed on C3 ventilator

## 2023-01-28 NOTE — ASSESSMENT & PLAN NOTE
· Patient reporting abdominal pain for 3 days and urinary retention, increased abdominal distention, nausea/vomiting  · Patient was initially on Levaquin/Flagyl  · Infectious Disease was consulted and Levaquin/Flagyl were discontinued and patient was started on ceftriaxone/vancomycin  · Vancomycin has been discontinued as per ID  · Continue ceftriaxone day 5  · Plan for lysis of multiple septations in the OR today with General Surgery

## 2023-01-28 NOTE — ASSESSMENT & PLAN NOTE
· Pt with hst of untreated hep c  · Likely led to newly diagnosed cirrhosis  · CT ABD: Mildly nodularity of the liver surface contour suggestive of chronic cirrhotic changes    · GI following  · Follow daily INR and coags   · OP follow up

## 2023-01-28 NOTE — PROGRESS NOTES
Renal quick note    Came to see patient, however, he is in the OR  Ordered urine studies due to profound hyponatremia

## 2023-01-28 NOTE — ASSESSMENT & PLAN NOTE
· Evident on initial admission w/ leukocytosis, tachycardia, lactic acidosis  · Received fluids and ABX  · Likely 2/2 SBP w/ paracentesis cultures:  Streptococcus intermedius   · ID following  · Continue ceftriaxone and Flagyl  · Monitor WBC and fever curve  · PT is s/p OR for enterotomy, lysis of adhesions, drainage of large abscess 1/28

## 2023-01-28 NOTE — CONSULTS
Myranda 1970, 46 y o  male MRN: [id number]  Unit/Bed#: ICU 02-01 Encounter: [id number]  Primary Care Provider: Yevgeniy Orta DO   Date and time admitted to hospital: 1/21/2023  8:32 PM    Consults    * Acute peritonitis [facility])  Assessment & Plan    • Likely 2/2  Liver cirrhosis  • Admitted to [facility] service 1/22 w/ complaints of abdominal pain for 3 days and urinary retention, increased abdominal distention, nausea/vomiting  •  CT ABD Pelvis:  Moderate to large intra-abdominal and pelvic ascites with diffuse enhancement and thickening of the peritoneal lining most compatible with an acute peritonitis  Clinical correlation is recommended  Peritoneal fluid sampling could be performed for further evaluation  Mildly nodularity of the liver surface contour suggestive of chronic cirrhotic changes  No free intraperitoneal air  No evidence of large or small bowel obstruction  Partially visualized bilateral pleural effusions with bibasilar atelectasis, left greater than right  • S/p Paracentesis- Cultures w/strep intermedius  • ID following   • Taken to OR 1/28 for attempted EX lap  Frozen abdomen precipitated enterotomy for small bowel repair, lysis of adhesions, and drainage of large intraabdominal abscess   • Currently intubated, but will attempt wean to extubation  • Currently sedated w/ propofol  • Continue PRN pain meds  • Continue ceftriaxone and flagyl  • Surgery following  • GI following        Cirrhosis of liver with ascites (HCC)  Assessment & Plan  · Pt presented w/ worsening abdominal distention and abd pain  · CT ABD/Pelvis: Moderate to large intra-abdominal and pelvic ascites with diffuse enhancement and thickening of the peritoneal lining most compatible with an acute peritonitis      · Pt is s/p paracentesis  · Continue diuresis PRN  · GI following    Hyponatremia  Assessment & Plan  · Likely 2/2 volume overload   · Continue PRN diuretics  · strict I/O w/ leon catheter  · Nephrology following  · Neuro assessment Q4  · Continue to trend labs  ·       Difficult intubation  Assessment & Plan  · Patient is status post post OR4 enterotomy, he was reportedly a difficult airway  · Wean to extubation    Sepsis ([address] )  Assessment & Plan  · Evident on initial admission w/ leukocytosis, tachycardia, lactic acidosis  · Received fluids and ABX  · Likely 2/2 SBP w/ paracentesis cultures:  Streptococcus intermedius   · ID following  · Continue ceftriaxone and Flagyl  · Monitor WBC and fever curve  · PT is s/p OR for enterotomy, lysis of adhesions, drainage of large abscess 1/28      Hepatitis C  Assessment & Plan  · Pt with hst of untreated hep c  · Likely led to newly diagnosed cirrhosis  · CT ABD: Mildly nodularity of the liver surface contour suggestive of chronic cirrhotic changes  · GI following  · Follow daily INR and coags   · OP follow up    ----------------------------------------------------------------------------------------  HPI/24hr events: Patient admitted on 1/22 for acute peritonitis, sepsis, hyponatremia, new diagnosis of liver cirrhosis with ascites, untreated hep C  He was treated under [facility] service initially  Nephrology following for hyponatremia   GI following for cirrhosis, peritonitis  ID following for antibiotic recommendations  1/28 patient transferred to ICU service status post OR for enterotomy, small bowel repair, lysis of adhesions, drainage of intra-abdominal abscess with biopsy and lavage  Patient returned from the OR intubated and sedated on propofol       Patient appropriate for transfer out of the ICU today?: No  Disposition: Transfer to Critical Care   Code Status: Level 1 - Full Code  ---------------------------------------------------------------------------------------  SUBJECTIVE  Unable to assess/intubated    Review of Systems   Unable to perform ROS: Intubated     Review of systems was unable to be performed secondary to Intubation  ---------------------------------------------------------------------------------------  OBJECTIVE    Vitals   Vitals:    23 1545 23 1600 23 1615 23 1633   BP: 110/79 112/79 107/80    BP Location:       Pulse: 91 91 90    Resp: 16 20 16    Temp:       TempSrc:       SpO2: 98% 98% 98% 98%   Weight:       Height:         Temp (24hrs), Av °F (36 7 °C), Min:96 8 °F (36 °C), Max:98 7 °F (37 1 °C)  Current: Temperature: (!) 96 8 °F (36 °C)          Respiratory:  SpO2: SpO2: 98 %       Invasive/non-invasive ventilation settings   Respiratory    Lab Data (Last 4 hours)    None         O2/Vent Data (Last 4 hours)    None                Physical Exam  Vitals and nursing note reviewed  Constitutional:       General: He is not in acute distress  HENT:      Head: Normocephalic  Mouth/Throat:      Mouth: Mucous membranes are moist       Pharynx: Oropharynx is clear  Eyes:      Pupils: Pupils are equal, round, and reactive to light  Cardiovascular:      Rate and Rhythm: Normal rate and regular rhythm  Pulses: Normal pulses  Heart sounds: Normal heart sounds  No murmur heard  Pulmonary:      Effort: Pulmonary effort is normal  No respiratory distress  Breath sounds: Normal breath sounds  No wheezing or rhonchi  Abdominal:      General: Abdomen is flat  There is distension  Tenderness: There is abdominal tenderness  Skin:     General: Skin is warm and dry  Capillary Refill: Capillary refill takes less than 2 seconds                 Laboratory and Diagnostics:  Results from last 7 days   Lab Units 23  0402 23  0427 23  4959 23  0503 23  0455 23  0742 23  0516 23  2117   WBC Thousand/uL 8 76 7 80 8 66 8 90 8 56 7 67 12 47* 16 03*   HEMOGLOBIN g/dL 11 9* 11 4* 11 8* 11 7* 12 3 12 5 12 0 12 9   HEMATOCRIT % 34 5* 33 2* 34 1* 33 8* 36 2* 36 6 35 3* 37 7   PLATELETS Thousands/uL 208 216 238 281 329 302 291 369 NEUTROS PCT % 74 73 73 73 72 74  --  84*   MONOS PCT % 11 10 10 12 12 12  --  7     Results from last 7 days   Lab Units 01/28/23  0402 01/27/23  2035 01/27/23  1549 01/27/23  1203 01/27/23  0810 01/27/23  0427 01/27/23  0030 01/26/23  1649 01/26/23  0956 01/25/23  0503 01/24/23  0455 01/23/23  0742 01/22/23  1557 01/22/23  0516 01/21/23  2117   SODIUM mmol/L 119* 119* 121* 122* 123* 119*  122* 118*   < > 120* 123* 122* 122* 121*   < > 117*   POTASSIUM mmol/L 3 8 3 0* 3 3* 3 5 3 7 3 9  4 0 3 8   < > 3 6 3 9 3 8 3 8 3 8   < > 4 2   CHLORIDE mmol/L 88* 87* 88* 87* 91* 89*  90* 88*   < > 88* 90* 91* 89* 91*   < > 84*   CO2 mmol/L 25 25 25 25 25 24  25 23   < > 25 23 23 26 23   < > 24   ANION GAP mmol/L 6 7 8 10 7 6  7 7   < > 7 10 8 7 7   < > 9   BUN mg/dL 10 10 10 9 9 9  9 10   < > 10 10 9 11 12   < > 15   CREATININE mg/dL 0 54* 0 63 0 52* 0 51* 0 45* 0 49*  0 51* 0 45*   < > 0 52* 0 50* 0 57* 0 59* 0 63   < > 0 74   CALCIUM mg/dL 7 5* 7 2* 7 6* 7 6* 7 4* 7 3*  7 2* 7 5*   < > 7 7* 7 5* 7 8* 7 9* 7 6*   < > 8 3*   GLUCOSE RANDOM mg/dL 136 189* 120 139 113 118  120 118   < > 143* 118 109 114 120   < > 201*   ALT U/L  --   --   --   --   --   --   --   --  42 54* 75* 74* 63*  --  61*   AST U/L  --   --   --   --   --   --   --   --  39 47* 80* 84* 74*  --  63*   ALK PHOS U/L  --   --   --   --   --   --   --   --  66 67 78 83 80  --  100   ALBUMIN g/dL  --   --   --   --   --   --   --   --  2 4* 2 4* 2 6* 2 6* 2 4*  --  2 8*   TOTAL BILIRUBIN mg/dL  --   --   --   --   --   --   --   --  0 90 0 97 1 24* 1 54* 1 32*  --  2 17*    < > = values in this interval not displayed       Results from last 7 days   Lab Units 01/28/23  0402 01/27/23  0427 01/24/23  0455 01/23/23  0742   MAGNESIUM mg/dL 1 5* 1 5* 1 8* 1 6*   PHOSPHORUS mg/dL 3 1 2 7  --   --       Results from last 7 days   Lab Units 01/21/23 2226   INR  1 38*   PTT seconds 28          Results from last 7 days   Lab Units 01/21/23  2324 01/21/23  7441 LACTIC ACID mmol/L 1 5 2 3*     ABG:    VBG:    Results from last 7 days   Lab Units 01/28/23  0401 01/24/23  0455 01/23/23  0742 01/22/23  0551 01/21/23 2117   PROCALCITONIN ng/ml 0 30* 0 34* 0 35* 0 41* 0 42*       Micro  Results from last 7 days   Lab Units 01/25/23  1201 01/23/23  1437 01/21/23 2117   BLOOD CULTURE  No Growth at 48 hrs  No Growth at 48 hrs  --  No Growth After 5 Days  No Growth After 5 Days  GRAM STAIN RESULT   --  4+ Polys*  2+ Gram positive cocci in pairs, chains and clusters*  --    BODY FLUID CULTURE, STERILE   --  3+ Growth of Streptococcus intermedius*  --        EKG: NSR  Imaging: I have personally reviewed pertinent reports  Intake and Output  I/O       01/26 0701 01/27 0700 01/27 0701  01/28 0700 01/28 0701  01/29 0700    P  O  1657 654     I V  (mL/kg)  781 3 (11 2) 1726 2 (24 8)    IV Piggyback  200     Total Intake(mL/kg) 1657 (22 5) 1635 3 (23 5) 1726 2 (24 8)    Urine (mL/kg/hr) 1875 (1 1) 4175 (2 5) 420 (0 7)    Blood   500    Total Output 1875 4175 920    Net -218 -0489 8 +806 2                 Height and Weights   Height: 5' 11" (180 3 cm)  IBW (Ideal Body Weight): 75 3 kg  Body mass index is 21 4 kg/m²  Weight (last 2 days)     Date/Time Weight    01/28/23 0600 69 6 (153 44)    01/27/23 0600 73 7 (162 48)    01/27/23 0437 73 7 (162 48)    01/26/23 0548 71 3 (157 19)            Nutrition       Diet Orders   (From admission, onward)             Start     Ordered    01/28/23 1324  Diet NPO  Diet effective midnight        References:    Nutrtion Support Algorithm Enteral vs  Parenteral   Question Answer Comment   Diet Type NPO    RD to adjust diet per protocol?  Yes        01/28/23 1323                  Active Medications  Scheduled Meds:  Current Facility-Administered Medications   Medication Dose Route Frequency Provider Last Rate   • calcium gluconate  3 g Intravenous Once [employer], CRNP     • cefTRIAXone  2,000 mg Intravenous Q24H [employer], CRNP 2,000 mg (01/28/23 1612)   • chlorhexidine  15 mL Mouth/Throat Q12H [facility] MARQUIS Whitehead     • enoxaparin  40 mg Subcutaneous Q24H [facility] GUILLAUME WhiteheadNP     • fentanyl citrate (PF)  50 mcg Intravenous Q1H PRN Gregorio Constant, CRNP     • HYDROmorphone  0 5 mg Intravenous Q4H PRN Gregorio Constant, CRNP     • metroNIDAZOLE  500 mg Oral Q8H [facility] GUILLAUME WhiteheadNP     • ondansetron  4 mg Intravenous Q6H PRN Gregorio Constant, CRNP     • propofol  5-50 mcg/kg/min Intravenous Titrated Gregorio Constant, CRNP 40 mcg/kg/min (01/28/23 1611)   • sodium chloride 0 9 % with KCl 20 mEq/L  50 mL/hr Intravenous Continuous Bettye Singh, CRNP 50 mL/hr (01/28/23 1534)     Continuous Infusions:  propofol, 5-50 mcg/kg/min, Last Rate: 40 mcg/kg/min (01/28/23 1611)  sodium chloride 0 9 % with KCl 20 mEq/L, 50 mL/hr, Last Rate: 50 mL/hr (01/28/23 1534)      PRN Meds:   fentanyl citrate (PF), 50 mcg, Q1H PRN  HYDROmorphone, 0 5 mg, Q4H PRN  ondansetron, 4 mg, Q6H PRN        Invasive Devices Review  Invasive Devices     Peripheral Intravenous Line  Duration           Peripheral IV 01/28/23 Left;Ventral (anterior) Forearm <1 day    Peripheral IV 01/28/23 Right Hand <1 day          Drain  Duration           Closed/Suction Drain Right;Left Abdomen Bulb 19 Fr  <1 day    NG/OG/Enteral Tube Nasogastric 18 Fr Left nare <1 day    Urethral Catheter <1 day          Airway  Duration           ETT  Cuffed; Hi-Lo 7 5 mm <1 day                Rationale for remaining devices:  Mendez catheter for strict I and O   ---------------------------------------------------------------------------------------  Advance Directive and Living Will:      Power of :    POLST:    ---------------------------------------------------------------------------------------  Care Time Delivered:   Upon my evaluation, this patient had a high probability of imminent or life-threatening deterioration due to Acute peritonitis, which required my direct attention, intervention, and personal management  I have personally provided 30 minutes (1500 to 1530) of critical care time, exclusive of procedures, teaching, family meetings, and any prior time recorded by providers other than myself  MARQUIS Mcdaniels      Portions of the record may have been created with voice recognition software  Occasional wrong word or "sound a like" substitutions may have occurred due to the inherent limitations of voice recognition software    Read the chart carefully and recognize, using context, where substitutions have occurred

## 2023-01-28 NOTE — ANESTHESIA POSTPROCEDURE EVALUATION
Post-Op Assessment Note    CV Status:  Stable  Pain Score: 0    Pain management: adequate  Multimodal analgesia used between 6 hours prior to anesthesia start to PACU discharge    Post-procedure mental status: sedated    Hydration Status:  Euvolemic   PONV Controlled:  Controlled  Airway: intubated      Post Op Vitals Reviewed: Yes      Staff: Anesthesiologist, CRNA    Difficult intubation letter: given to patient        No notable events documented      BP   121/83   Temp   97 4   Pulse  101   Resp  16   SpO2   96 on vent

## 2023-01-28 NOTE — NURSING NOTE
Oral potassium elixir given; IVF w/Kcl started per Hospitalist(aware of earlier labs); will not do midinght labs as repletement just started

## 2023-01-28 NOTE — ASSESSMENT & PLAN NOTE
· Pt presented w/ worsening abdominal distention and abd pain  · CT ABD/Pelvis: Moderate to large intra-abdominal and pelvic ascites with diffuse enhancement and thickening of the peritoneal lining most compatible with an acute peritonitis      · Pt is s/p paracentesis  · Continue diuresis PRN  · GI following

## 2023-01-28 NOTE — ASSESSMENT & PLAN NOTE
· Secondary to peritonitis  · Noted by leukocytosis, tachycardia tachypnea mild lactic acidosis present on admission  · Lactic acidosis resolved with IV fluids  · Continue antibiotics with ceftriaxone/vancomycin  Ascites cx    4+ Polys Abnormal  P    2+ Gram positive cocci in pairs, chains and clusters Abnormal      · Reviewed with GI , not felt to have simple SBP based on ascitic studies  · ID consulted for further evaluation, antibiotics have been adjusted as mentioned above  · Follow-up final cultures  · Blood cultures have remained with no growth to date

## 2023-01-28 NOTE — PROGRESS NOTES
[address]  Progress Note Ruiz  1970, 46 y o  male MRN: [id number]  Unit/Bed#: ICU 02-01 Encounter: [id number]  Primary Care Provider: Govind Holliday DO   Date and time admitted to hospital: 1/21/2023  8:32 PM    * Peritonitis [facility])  Assessment & Plan  · Patient reporting abdominal pain for 3 days and urinary retention, increased abdominal distention, nausea/vomiting  · Patient was initially on Levaquin/Flagyl  · Infectious Disease was consulted and Levaquin/Flagyl were discontinued and patient was started on ceftriaxone/vancomycin  · Vancomycin has been discontinued as per ID  · Continue ceftriaxone day 5  · Plan for lysis of multiple septations in the OR today with General Surgery    Cirrhosis of liver with ascites ([facility] [address] )  Assessment & Plan  · Does not appear to be in acute decompensation  · GI input appreciated  · Patient had paracentesis performed on 1/23/2023  · Will need to follow-up cytology results    Sepsis [facility])  Assessment & Plan  · Secondary to peritonitis  · Noted by leukocytosis, tachycardia tachypnea mild lactic acidosis present on admission  · Lactic acidosis resolved with IV fluids  · Continue antibiotics with ceftriaxone/vancomycin  Ascites cx    4+ Polys Abnormal  P    2+ Gram positive cocci in pairs, chains and clusters Abnormal      · Reviewed with GI , not felt to have simple SBP based on ascitic studies  · ID consulted for further evaluation, antibiotics have been adjusted as mentioned above  · Follow-up final cultures  · Blood cultures have remained with no growth to date    Hepatitis C  Assessment & Plan  · Patient with a history of hepatitis C untreated  · GI following  · Liver with cirrhotic appearance  · Outpatient follow-up with Gastroenterology for treatment    Hyponatremia  Assessment & Plan  · Sodium level worsening to 119  · Nephrology following  · Continue fluid restriction  · BMP every 4 hours  · Patient received Bumex 2 mg x1 on 2023      VTE Pharmacologic Prophylaxis: Lovenox    Patient Centered Rounds:  Patient care rounds were performed with nursing    Discussions with Specialists or Other Care Team Provider: IR, general surgery, infectious diseases    Education and Discussions with Family / Patient: Spoke with patient at bedside    Time Spent for Care: 30  More than 50% of total time spent on counseling and coordination of care as described above  Current Length of Stay: 6 day(s)    Current Patient Status: Inpatient     Certification Statement: The patient will continue to require additional inpatient hospital stay due to bacterial peritonitis    Discharge Plan: Home with family support once medically stable    Code Status: Level 1 - Full Code      Subjective:   Patient seen and examined at bedside  No acute events overnight  Plan for OR today with general surgery for lysis and drainage of bacterial septations  Objective:     Vitals:   Temp (24hrs), Av 5 °F (36 9 °C), Min:97 6 °F (36 4 °C), Max:100 °F (37 8 °C)    Temp:  [97 6 °F (36 4 °C)-100 °F (37 8 °C)] 97 6 °F (36 4 °C)  HR:  [] 87  Resp:  [20-37] 31  BP: (110-125)/(64-79) 118/74  SpO2:  [90 %-94 %] 93 %  Body mass index is 21 4 kg/m²  Input and Output Summary (last 24 hours): Intake/Output Summary (Last 24 hours) at 2023 0759  Last data filed at 2023 8812  Gross per 24 hour   Intake 1635 25 ml   Output 4175 ml   Net -2539 75 ml       Physical Exam:     Physical Exam  Vitals and nursing note reviewed  Constitutional:       General: He is not in acute distress  Appearance: He is well-developed  HENT:      Head: Normocephalic and atraumatic  Eyes:      Conjunctiva/sclera: Conjunctivae normal    Cardiovascular:      Rate and Rhythm: Normal rate and regular rhythm  Heart sounds: No murmur heard  Pulmonary:      Effort: Pulmonary effort is normal  No respiratory distress  Breath sounds: Normal breath sounds     Abdominal: Palpations: Abdomen is soft  Tenderness: There is no abdominal tenderness  Musculoskeletal:         General: No swelling  Cervical back: Neck supple  Skin:     General: Skin is warm and dry  Capillary Refill: Capillary refill takes less than 2 seconds  Neurological:      Mental Status: He is alert  Psychiatric:         Mood and Affect: Mood normal          Additional Data:     Labs: I have reviewed pertinent results     Results from last 7 days   Lab Units 01/28/23  0402   WBC Thousand/uL 8 76   HEMOGLOBIN g/dL 11 9*   HEMATOCRIT % 34 5*   PLATELETS Thousands/uL 208   NEUTROS PCT % 74   LYMPHS PCT % 13*   MONOS PCT % 11   EOS PCT % 0     Results from last 7 days   Lab Units 01/28/23  0402 01/26/23  1649 01/26/23  0956   SODIUM mmol/L 119*   < > 120*   POTASSIUM mmol/L 3 8   < > 3 6   CHLORIDE mmol/L 88*   < > 88*   CO2 mmol/L 25   < > 25   BUN mg/dL 10   < > 10   CREATININE mg/dL 0 54*   < > 0 52*   ANION GAP mmol/L 6   < > 7   CALCIUM mg/dL 7 5*   < > 7 7*   ALBUMIN g/dL  --   --  2 4*   TOTAL BILIRUBIN mg/dL  --   --  0 90   ALK PHOS U/L  --   --  66   ALT U/L  --   --  42   AST U/L  --   --  39   GLUCOSE RANDOM mg/dL 136   < > 143*    < > = values in this interval not displayed  Results from last 7 days   Lab Units 01/21/23  2226   INR  1 38*         Results from last 7 days   Lab Units 01/22/23  0516   HEMOGLOBIN A1C % 5 4     Results from last 7 days   Lab Units 01/28/23  0401 01/24/23  0455 01/23/23  0742 01/22/23  0551 01/21/23  2324 01/21/23  2117   LACTIC ACID mmol/L  --   --   --   --  1 5 2 3*   PROCALCITONIN ng/ml 0 30* 0 34* 0 35* 0 41*  --  0 42*         Imaging: I have reviewed pertinent imaging       Recent Cultures (last 7 days):     Results from last 7 days   Lab Units 01/25/23  1201 01/23/23  1437 01/21/23  2117   BLOOD CULTURE  No Growth at 48 hrs  No Growth at 48 hrs  --  No Growth After 5 Days  No Growth After 5 Days     GRAM STAIN RESULT   --  4+ Polys*  2+ Gram positive cocci in pairs, chains and clusters*  --    BODY FLUID CULTURE, STERILE   --  3+ Growth of Streptococcus intermedius*  --        Last 24 Hours Medication List:   Current Facility-Administered Medications   Medication Dose Route Frequency Provider Last Rate   • acetaminophen  650 mg Oral Q4H PRN Quin Molina PA-C     • aluminum-magnesium hydroxide-simethicone  30 mL Oral Q4H PRN Quin Molina PA-C     • cefTRIAXone  2,000 mg Intravenous Q24H Cheri Clark MD 2,000 mg (01/27/23 1617)   • enoxaparin  40 mg Subcutaneous Q24H [facility] Misty Galarza DO     • HYDROmorphone  0 5 mg Intravenous Q4H PRN Quin Molina PA-C     • loperamide  2 mg Oral 4x Daily PRN Avie Dancer, PA-C     • metroNIDAZOLE  500 mg Oral Q8H [facility] Cheri Clark MD     • ondansetron  4 mg Intravenous Q6H PRN Quin Molina PA-C     • oxyCODONE  10 mg Oral Q4H PRN Quin Molina PA-C     • oxyCODONE  5 mg Oral Q4H PRN Quin Molina PA-C     • saliva substitute  5 spray Mouth/Throat 4x Daily PRN Moshe Grove PA-C     • sodium chloride 0 9 % with KCl 20 mEq/L  125 mL/hr Intravenous Continuous Avie Dancer, PA-C 125 mL/hr (01/28/23 0490)        Today, Patient Was Seen By: Misty Galarza DO    ** Please Note: Dictation voice to text software may have been used in the creation of this document   **

## 2023-01-28 NOTE — NURSING NOTE
Room Air; VS and sats stable  IV intact  Awake and alert, oriented x4  Denies pain   Voiding inurinal

## 2023-01-28 NOTE — ASSESSMENT & PLAN NOTE
· Does not appear to be in acute decompensation  · GI input appreciated  · Patient had paracentesis performed on 1/23/2023  · Will need to follow-up cytology results

## 2023-01-29 LAB
ALBUMIN SERPL BCP-MCNC: 2.2 G/DL (ref 3.5–5)
ANION GAP SERPL CALCULATED.3IONS-SCNC: 7 MMOL/L (ref 4–13)
BACTERIA SPEC BFLD CULT: ABNORMAL
BASOPHILS # BLD AUTO: 0.03 THOUSANDS/ÂΜL (ref 0–0.1)
BASOPHILS NFR BLD AUTO: 0 % (ref 0–1)
BUN SERPL-MCNC: 15 MG/DL (ref 5–25)
CA-I BLD-SCNC: 1.09 MMOL/L (ref 1.12–1.32)
CALCIUM SERPL-MCNC: 7.9 MG/DL (ref 8.4–10.2)
CHLORIDE SERPL-SCNC: 92 MMOL/L (ref 96–108)
CO2 SERPL-SCNC: 26 MMOL/L (ref 21–32)
CREAT SERPL-MCNC: 0.5 MG/DL (ref 0.6–1.3)
CREAT UR-MCNC: 117 MG/DL
CREAT UR-MCNC: 117 MG/DL
EOSINOPHIL # BLD AUTO: 0 THOUSAND/ÂΜL (ref 0–0.61)
EOSINOPHIL NFR BLD AUTO: 0 % (ref 0–6)
ERYTHROCYTE [DISTWIDTH] IN BLOOD BY AUTOMATED COUNT: 13.2 % (ref 11.6–15.1)
GFR SERPL CREATININE-BSD FRML MDRD: 124 ML/MIN/1.73SQ M
GLUCOSE SERPL-MCNC: 110 MG/DL (ref 65–140)
GLUCOSE SERPL-MCNC: 130 MG/DL (ref 65–140)
GRAM STN SPEC: ABNORMAL
GRAM STN SPEC: ABNORMAL
HCT VFR BLD AUTO: 32.6 % (ref 36.5–49.3)
HGB BLD-MCNC: 10.8 G/DL (ref 12–17)
IMM GRANULOCYTES # BLD AUTO: 0.1 THOUSAND/UL (ref 0–0.2)
IMM GRANULOCYTES NFR BLD AUTO: 1 % (ref 0–2)
INR PPP: 1.21 (ref 0.84–1.19)
LYMPHOCYTES # BLD AUTO: 2.35 THOUSANDS/ÂΜL (ref 0.6–4.47)
LYMPHOCYTES NFR BLD AUTO: 18 % (ref 14–44)
MAGNESIUM SERPL-MCNC: 2 MG/DL (ref 1.9–2.7)
MCH RBC QN AUTO: 33.6 PG (ref 26.8–34.3)
MCHC RBC AUTO-ENTMCNC: 33.1 G/DL (ref 31.4–37.4)
MCV RBC AUTO: 102 FL (ref 82–98)
MONOCYTES # BLD AUTO: 0.88 THOUSAND/ÂΜL (ref 0.17–1.22)
MONOCYTES NFR BLD AUTO: 7 % (ref 4–12)
NEUTROPHILS # BLD AUTO: 9.92 THOUSANDS/ÂΜL (ref 1.85–7.62)
NEUTS SEG NFR BLD AUTO: 74 % (ref 43–75)
NRBC BLD AUTO-RTO: 0 /100 WBCS
OSMOLALITY UR/SERPL-RTO: 267 MMOL/KG (ref 282–298)
PHOSPHATE SERPL-MCNC: 3.8 MG/DL (ref 2.7–4.5)
PLATELET # BLD AUTO: 223 THOUSANDS/UL (ref 149–390)
PMV BLD AUTO: 10 FL (ref 8.9–12.7)
POTASSIUM SERPL-SCNC: 4.6 MMOL/L (ref 3.5–5.3)
PROT UR-MCNC: 46 MG/DL
PROT/CREAT UR: 0.39 MG/G{CREAT} (ref 0–0.1)
PROTHROMBIN TIME: 15.3 SECONDS (ref 11.6–14.5)
RBC # BLD AUTO: 3.21 MILLION/UL (ref 3.88–5.62)
SODIUM 24H UR-SCNC: 8 MOL/L
SODIUM SERPL-SCNC: 125 MMOL/L (ref 135–147)
WBC # BLD AUTO: 13.28 THOUSAND/UL (ref 4.31–10.16)

## 2023-01-29 RX ORDER — OXYCODONE HYDROCHLORIDE 5 MG/1
5 TABLET ORAL EVERY 4 HOURS PRN
Status: DISCONTINUED | OUTPATIENT
Start: 2023-01-29 | End: 2023-02-03 | Stop reason: HOSPADM

## 2023-01-29 RX ORDER — HYDROMORPHONE HCL IN WATER/PF 6 MG/30 ML
0.2 PATIENT CONTROLLED ANALGESIA SYRINGE INTRAVENOUS
Status: DISCONTINUED | OUTPATIENT
Start: 2023-01-29 | End: 2023-01-29

## 2023-01-29 RX ORDER — CALCIUM GLUCONATE 20 MG/ML
INJECTION, SOLUTION INTRAVENOUS
Status: COMPLETED
Start: 2023-01-29 | End: 2023-01-29

## 2023-01-29 RX ORDER — OXYCODONE HYDROCHLORIDE 10 MG/1
10 TABLET ORAL EVERY 4 HOURS PRN
Status: DISCONTINUED | OUTPATIENT
Start: 2023-01-29 | End: 2023-02-03 | Stop reason: HOSPADM

## 2023-01-29 RX ORDER — HYDROMORPHONE HCL/PF 1 MG/ML
0.5 SYRINGE (ML) INJECTION
Status: DISCONTINUED | OUTPATIENT
Start: 2023-01-29 | End: 2023-02-03 | Stop reason: HOSPADM

## 2023-01-29 RX ORDER — CALCIUM GLUCONATE 20 MG/ML
2 INJECTION, SOLUTION INTRAVENOUS ONCE
Status: COMPLETED | OUTPATIENT
Start: 2023-01-29 | End: 2023-01-29

## 2023-01-29 RX ADMIN — METRONIDAZOLE 500 MG: 500 TABLET ORAL at 21:34

## 2023-01-29 RX ADMIN — OXYCODONE HYDROCHLORIDE 10 MG: 10 TABLET ORAL at 14:35

## 2023-01-29 RX ADMIN — CEFTRIAXONE 2000 MG: 2 INJECTION, SOLUTION INTRAVENOUS at 16:53

## 2023-01-29 RX ADMIN — CALCIUM GLUCONATE 2 G: 20 INJECTION, SOLUTION INTRAVENOUS at 06:33

## 2023-01-29 RX ADMIN — PROPOFOL 35 MCG/KG/MIN: 10 INJECTION, EMULSION INTRAVENOUS at 02:20

## 2023-01-29 RX ADMIN — HYDROMORPHONE HYDROCHLORIDE 0.5 MG: 1 INJECTION, SOLUTION INTRAMUSCULAR; INTRAVENOUS; SUBCUTANEOUS at 14:43

## 2023-01-29 RX ADMIN — HYDROMORPHONE HYDROCHLORIDE 0.5 MG: 1 INJECTION, SOLUTION INTRAMUSCULAR; INTRAVENOUS; SUBCUTANEOUS at 01:06

## 2023-01-29 RX ADMIN — OXYCODONE HYDROCHLORIDE 5 MG: 5 TABLET ORAL at 21:39

## 2023-01-29 RX ADMIN — Medication 1 SPRAY: at 13:11

## 2023-01-29 RX ADMIN — METRONIDAZOLE 500 MG: 500 TABLET ORAL at 13:11

## 2023-01-29 RX ADMIN — CHLORHEXIDINE GLUCONATE 0.12% ORAL RINSE 15 ML: 1.2 LIQUID ORAL at 08:02

## 2023-01-29 RX ADMIN — METRONIDAZOLE 500 MG: 500 TABLET ORAL at 06:34

## 2023-01-29 RX ADMIN — ENOXAPARIN SODIUM 40 MG: 100 INJECTION SUBCUTANEOUS at 08:02

## 2023-01-29 RX ADMIN — HYDROMORPHONE HYDROCHLORIDE 0.5 MG: 1 INJECTION, SOLUTION INTRAMUSCULAR; INTRAVENOUS; SUBCUTANEOUS at 11:15

## 2023-01-29 RX ADMIN — CHLORHEXIDINE GLUCONATE 0.12% ORAL RINSE 15 ML: 1.2 LIQUID ORAL at 20:30

## 2023-01-29 NOTE — ASSESSMENT & PLAN NOTE
• Likely 2/2  Liver cirrhosis  • Admitted to [facility] service 1/22 w/ complaints of abdominal pain for 3 days and urinary retention, increased abdominal distention, nausea/vomiting  •  CT ABD Pelvis:  Moderate to large intra-abdominal and pelvic ascites with diffuse enhancement and thickening of the peritoneal lining most compatible with an acute peritonitis  Clinical correlation is recommended  Peritoneal fluid sampling could be performed for further evaluation  Mildly nodularity of the liver surface contour suggestive of chronic cirrhotic changes  No free intraperitoneal air  No evidence of large or small bowel obstruction  Partially visualized bilateral pleural effusions with bibasilar atelectasis, left greater than right  • S/p Paracentesis- Cultures w/strep intermedius  • ID following   • Taken to OR 1/28 for attempted EX lap   Frozen abdomen precipitated enterotomy for small bowel repair, lysis of adhesions, and drainage of large intraabdominal abscess   • Currently intubated, but will attempt wean to extubation  • Currently sedated w/ propofol  • Continue PRN pain meds  • Continue ceftriaxone and flagyl  • Surgery following  • GI following

## 2023-01-29 NOTE — PLAN OF CARE
Problem: METABOLIC, FLUID AND ELECTROLYTES - ADULT  Goal: Electrolytes maintained within normal limits  Description: INTERVENTIONS:  - Monitor labs and assess patient for signs and symptoms of electrolyte imbalances  - Administer electrolyte replacement as ordered  - Monitor response to electrolyte replacements, including repeat lab results as appropriate  - Instruct patient on fluid and nutrition as appropriate  Outcome: Progressing  Goal: Fluid balance maintained  Description: INTERVENTIONS:  - Monitor labs   - Monitor I/O and WT  - Instruct patient on fluid and nutrition as appropriate  - Assess for signs & symptoms of volume excess or deficit  Outcome: Progressing  Goal: Glucose maintained within target range  Description: INTERVENTIONS:  - Monitor Blood Glucose as ordered  - Assess for signs and symptoms of hyperglycemia and hypoglycemia  - Administer ordered medications to maintain glucose within target range  - Assess nutritional intake and initiate nutrition service referral as needed  Outcome: Progressing   Check labs as ordered  Lyte replacements as ordered

## 2023-01-29 NOTE — ASSESSMENT & PLAN NOTE
· Patient is status post post OR4 enterotomy, he was reportedly a difficult airway  · Wean to extubation in AM

## 2023-01-29 NOTE — PROGRESS NOTES
Patient seen and evaluated by Critical Care today and deemed to be appropriate for transfer to Med-Surg   Spoke to Dr Marline Monroy from [facility] to accept patient transfer  Patient did not move from ICU on the day of transfer  See progress note from 1/29/23 for the most up-to-date treatment therapy plans  Critical care can be contacted via [employer] with any questions or concerns

## 2023-01-29 NOTE — PROGRESS NOTES
Progress Note - Nephrology   Awais Cali 46 y o  male MRN: [id number]  Unit/Bed#: ICU 02-01 Encounter: [id number]    A/P:  1  Hyponatremia   - serum sodium has improved from 119 --> 125 mmol/liter over past 24 hours    - receiving NG feeds at 10 ml/hr without water flushes at this time    Watch fluid balance with strict monitoring of I/O   - FeNa low at 0 03% cons/with volume depletion/cirrhosis   - recheck urine and serum osmolalities  May have element of ADH secretion due to pain   - does not appear volume overloaded today on exam   - continue tube feeds without supplemental water    2  Acute peritonitis   - case discussed with Dr Ayala Suarez and Rafiq Bergman of [facility]  Procedure described  He had white  exudative material suctioned and peritoneal cavity was lavaged  He has a cirrhotic liver   - Initial culture on 1/23 demonstrated strep intermedius   - he is receiving ceftriaxone and Flagyl with a  WBC of 13 28    3  Hepatitis C   - he states he drank beer heavily over 20 years ago and now has hepatitis C  - will need outpatient treatment with GI    4  Hypomagnesemia   - resolved        Follow up reason for today's visit: Hyponatremia  Acute peritonitis [facility])    Patient Active Problem List   Diagnosis   • Hepatitis C   • Acute peritonitis ([facility] [address] )   • Sepsis ([facility] [address] )   • Cirrhosis of liver with ascites ([facility] [address] )   • Hyponatremia   • Difficult intubation         Subjective:   He has abdominal discomfort radiating to chest  Remainder of ROS is neg  He is very tired  Has Mendez draining orangy ourine    Objective:     Vitals: Blood pressure 104/75, pulse 96, temperature (!) 96 7 °F (35 9 °C), temperature source Tympanic, resp  rate 12, height 5' 11" (1 803 m), weight 71 5 kg (157 lb 10 1 oz), SpO2 95 %  ,Body mass index is 21 98 kg/m²      Weight (last 2 days)     Date/Time Weight    01/29/23 0600 71 5 (157 63)    01/28/23 0600 69 6 (153 44)    01/27/23 0600 73 7 (162 48)    01/27/23 0437 73 7 (162 48)            Intake/Output Summary (Last 24 hours) at 1/29/2023 1051  Last data filed at 1/29/2023 0800  Gross per 24 hour   Intake 2856 49 ml   Output 1690 ml   Net 1166 49 ml     I/O last 3 completed shifts: In: 5166 6 [P O :300; I V :4456  6; NG/GT:110; IV Piggyback:300]  Out: 5457 [Urine:2285; Drains:490; Blood:500]    Urethral Catheter 16 Fr   (Active)   Reasons to continue Urinary Catheter  Accurate I&O assessment in critically ill patients (48 hr  max) 01/29/23 0800   Goal for Removal Remove after 48 hrs of I/O monitoring 01/29/23 0800   Site Assessment Clean;Skin intact 01/29/23 0800   Mendez Care Done 01/29/23 0846   Collection Container Standard drainage bag 01/29/23 0800   Securement Method Securing device (Describe) 01/29/23 0800   Output (mL) 65 mL 01/29/23 0800       Physical Exam: /75   Pulse 96   Temp (!) 96 7 °F (35 9 °C) (Tympanic)   Resp 12   Ht 5' 11" (1 803 m)   Wt 71 5 kg (157 lb 10 1 oz)   SpO2 95%   BMI 21 98 kg/m²     General Appearance:    Alert, cachectic and pale  cooperative, no distress, appears stated age   Head:    Normocephalic, without obvious abnormality, atraumatic   Eyes:    Conjunctiva/corneas clear   Ears:    Normal external ears   Nose:   Nares normal, septum midline, mucosa normal, no drainage    or sinus tenderness   Throat:   Lips, mucosa, and tongue normal; teeth and gums normal   Neck:   Supple, symmetrical, trachea midline, no adenopathy;        thyroid:  No enlargement/tenderness/nodules; no carotid    bruit or JVD   Back:     Symmetric, no curvature, ROM normal, no CVA tenderness   Lungs:     Decreased to auscultation bilaterally, respirations unlabored   Chest wall:    No tenderness or deformity   Heart:    Regular rate and rhythm, S1 and S2 normal, no murmur, rub   or gallop   Abdomen:     Wearing a binder limiting exam   Extremities:   Extremities normal, atraumatic, no cyanosis  Has 1+ edema   Skin:   Skin color, texture, turgor normal, no rashes or lesions   Lymph nodes: Cervical normal   Neurologic:   CNII-XII intact            Lab, Imaging and other studies: I have personally reviewed pertinent labs  CBC:   Lab Results   Component Value Date    WBC 13 28 (H) 01/29/2023    HGB 10 8 (L) 01/29/2023    HCT 32 6 (L) 01/29/2023     (H) 01/29/2023     01/29/2023    MCH 33 6 01/29/2023    MCHC 33 1 01/29/2023    RDW 13 2 01/29/2023    MPV 10 0 01/29/2023    NRBC 0 01/29/2023     CMP:   Lab Results   Component Value Date    K 4 6 01/29/2023    CL 92 (L) 01/29/2023    CO2 26 01/29/2023    BUN 15 01/29/2023    CREATININE 0 50 (L) 01/29/2023    CALCIUM 7 9 (L) 01/29/2023    EGFR 124 01/29/2023         Results from last 7 days   Lab Units 01/29/23  0457 01/28/23  2228 01/28/23  1616 01/26/23  1649 01/26/23  0956 01/25/23  0503 01/24/23  0455   POTASSIUM mmol/L 4 6 4 8 4 6   < > 3 6 3 9 3 8   CHLORIDE mmol/L 92* 93* 93*   < > 88* 90* 91*   CO2 mmol/L 26 24 25   < > 25 23 23   BUN mg/dL 15 14 13   < > 10 10 9   CREATININE mg/dL 0 50* 0 48* 0 48*   < > 0 52* 0 50* 0 57*   CALCIUM mg/dL 7 9* 8 1* 7 6*   < > 7 7* 7 5* 7 8*   ALK PHOS U/L  --   --   --   --  66 67 78   ALT U/L  --   --   --   --  42 54* 75*   AST U/L  --   --   --   --  39 47* 80*    < > = values in this interval not displayed           Phosphorus:   Lab Results   Component Value Date    PHOS 3 8 01/29/2023     Magnesium:   Lab Results   Component Value Date    MG 2 0 01/29/2023     Urinalysis: No results found for: Eliezer Retort, SPECGRAV, PHUR, LEUKOCYTESUR, NITRITE, PROTEINUA, GLUCOSEU, KETONESU, BILIRUBINUR, BLOODU  Ionized Calcium: No results found for: CAION  Coagulation:   Lab Results   Component Value Date    INR 1 21 (H) 01/29/2023     Troponin: No results found for: TROPONINI  ABG: No results found for: PHART, TJX4TAX, PO2ART, NMK4GWX, H6TYDJYP, BEART, SOURCE  Radiology review:     IMAGING  Procedure: XR chest portable ICU    Result Date: 1/29/2023  Narrative: CHEST INDICATION:   Endotracheal tube positioning  COMPARISON:  None EXAM PERFORMED/VIEWS:  XR CHEST PORTABLE ICU FINDINGS: ETT 6 7 cm of valentin  NGT tip in gastric fundus  Upper lateral right abdomen drain versus superimposed tube Cardiomediastinal silhouette appears unremarkable  The lungs are clear  Bibasilar atelectasis  Bilateral pleural effusions  No pneumothorax  Osseous structures appear within normal limits for patient age  Impression: ETT 6 7 cm above valentin   NGT tip gastric fundus Workstation performed: [id number]       Current Facility-Administered Medications   Medication Dose Route Frequency   • cefTRIAXone (ROCEPHIN) IVPB (premix in dextrose) 2,000 mg 50 mL  2,000 mg Intravenous Q24H   • chlorhexidine (PERIDEX) 0 12 % oral rinse 15 mL  15 mL Mouth/Throat Q12H [facility]   • enoxaparin (LOVENOX) subcutaneous injection 40 mg  40 mg Subcutaneous Q24H JAMSHID   • fentanyl citrate (PF) 100 MCG/2ML 50 mcg  50 mcg Intravenous Q1H PRN   • HYDROmorphone (DILAUDID) injection 0 5 mg  0 5 mg Intravenous Q4H PRN   • metroNIDAZOLE (FLAGYL) tablet 500 mg  500 mg Oral Q8H [facility]   • ondansetron (ZOFRAN) injection 4 mg  4 mg Intravenous Q6H PRN     Medications Discontinued During This Encounter   Medication Reason   • ibuprofen (MOTRIN) 200 mg tablet    • levofloxacin (LEVAQUIN) IVPB (premix in dextrose) 750 mg 150 mL    • metroNIDAZOLE (FLAGYL) IVPB (premix) 500 mg 100 mL    • vancomycin (VANCOCIN) 1,250 mg in sodium chloride 0 9 % 250 mL IVPB    • vancomycin (VANCOCIN) 1500 mg in sodium chloride 0 9% 250 mL IVPB    • sodium chloride (HYPERTONIC) 3 % infusion    • bumetanide (BUMEX) injection 3 mg    • potassium chloride (K-DUR,KLOR-CON) CR tablet 40 mEq    • bupivacaine-epinephrine (MARCAINE/EPINEPHRINE) 0 25 %-1:[id number] injection Patient Discharge   • acetaminophen (TYLENOL) tablet 650 mg    • oxyCODONE (ROXICODONE) IR tablet 5 mg    • oxyCODONE (ROXICODONE) immediate release tablet 10 mg    • aluminum-magnesium hydroxide-simethicone (MYLANTA) oral suspension 30 mL    • loperamide (IMODIUM) capsule 2 mg    • saliva substitute (MOUTH KOTE) mucosal solution 5 spray    • sodium chloride 0 9 % with KCl 20 mEq/L infusion (premix)    • sodium chloride 0 9 % with KCl 20 mEq/L infusion (premix)    • sodium chloride 0 9 % infusion    • propofol (DIPRIVAN) 1000 mg in 100 mL infusion (premix)        Momo aC MD      This progress note was produced in part using a dictation device which may document imprecise wording from author's original intent

## 2023-01-29 NOTE — QUICK NOTE
Discussed with ICU Team, Amanda James; in person sign out performed; and Hospitalist will be accepting the patient on SLIM service starting tomorrow 01/30/2023 7 AM

## 2023-01-29 NOTE — PROGRESS NOTES
Progress Note - General Surgery   Lender Rota 46 y o  male MRN: [id number]  Unit/Bed#: ICU 02-01 Encounter: [id number]    Assessment:  71-year-old male with past medical history significant for hep C/alcoholic cirrhosis presenting with peritonitis  POD#1 s/p diagnostic pleuroscopy converted to exploratory laparotomy with repair of enterotomy x2, extensive CHAY, peritoneal biopsy, peritoneal lavage  -Afebrile, soft BPs, extubated this morning by critical care  -UO 1 1 L via Mendez  -MAURICE x2 490 cc  -WBC 13 28  -Hgb 10 8  -  -CR 0 50  -Hyponatremia, 125  -Intraoperative cultures/biopsies pending    Plan:  Extubated this morning by critical care  NPO/NGT  IV ceftriaxone/flagyl  Follow-up intraoperative cultures/biopsies  Medical management per critical care, appreciate commendations  Nephrology following, appreciate recommendations  Antiemetics and analgesics as needed  Strict I/O  Drain care  Wound care to midline incision, kaiser remain in place but may change overlying dressings as needed for saturation  Abdominal binder  To be evaluated at bedside with attending    Subjective/Objective   Subjective: Patient was status post extubation upon evaluation this morning  Overall with no acute complaints at this time  Objective:   Blood pressure 93/66, pulse 98, temperature (!) 96 7 °F (35 9 °C), temperature source Tympanic, resp  rate 18, height 5' 11" (1 803 m), weight 71 5 kg (157 lb 10 1 oz), SpO2 98 %  ,Body mass index is 21 98 kg/m²        Intake/Output Summary (Last 24 hours) at 1/29/2023 0753  Last data filed at 1/29/2023 3703  Gross per 24 hour   Intake 4035 32 ml   Output 2125 ml   Net 1910 32 ml       Invasive Devices     Peripheral Intravenous Line  Duration           Peripheral IV 01/28/23 Left;Ventral (anterior) Forearm 1 day    Peripheral IV 01/28/23 Left Antecubital <1 day    Peripheral IV 01/28/23 Right Hand <1 day          Drain  Duration           Closed/Suction Drain Lateral LUQ <1 day Closed/Suction Drain Right Abdomen Bulb <1 day    NG/OG/Enteral Tube Nasogastric 18 Fr Left nare <1 day    Urethral Catheter 16 Fr  <1 day          Airway  Duration           ETT  Cuffed; Hi-Lo 7 5 mm <1 day              Physical Exam  Vitals and nursing note reviewed  Constitutional:       General: He is not in acute distress  Appearance: Normal appearance  He is normal weight  He is not ill-appearing or toxic-appearing  Comments: NGT in place, bilious output   HENT:      Head: Normocephalic and atraumatic  Cardiovascular:      Rate and Rhythm: Normal rate and regular rhythm  Pulses: Normal pulses  Heart sounds: Normal heart sounds  Pulmonary:      Effort: Pulmonary effort is normal       Breath sounds: Normal breath sounds  Abdominal:      General: Bowel sounds are decreased  There is no distension  Palpations: Abdomen is soft  Tenderness: There is no abdominal tenderness  Comments: Midline incision is clean, dry, intact with overlying dressings in place, with 7 Betadine soaked kaiser in place; MAURICE x2 with serosang/sang output   Musculoskeletal:         General: Normal range of motion  Right lower leg: No edema  Left lower leg: No edema  Skin:     General: Skin is warm and dry  Neurological:      Mental Status: He is alert  Lab, Imaging and other studies:  I have personally reviewed pertinent lab results      CBC:   Lab Results   Component Value Date    WBC 13 28 (H) 01/29/2023    HGB 10 8 (L) 01/29/2023    HCT 32 6 (L) 01/29/2023     (H) 01/29/2023     01/29/2023    MCH 33 6 01/29/2023    MCHC 33 1 01/29/2023    RDW 13 2 01/29/2023    MPV 10 0 01/29/2023    NRBC 0 01/29/2023     CMP:   Lab Results   Component Value Date    SODIUM 125 (L) 01/29/2023    K 4 6 01/29/2023    CL 92 (L) 01/29/2023    CO2 26 01/29/2023    BUN 15 01/29/2023    CREATININE 0 50 (L) 01/29/2023    CALCIUM 7 9 (L) 01/29/2023    EGFR 124 01/29/2023     Coagulation:   Lab Results   Component Value Date    INR 1 21 (H) 01/29/2023     VTE Pharmacologic Prophylaxis: Enoxaparin (Lovenox)  VTE Mechanical Prophylaxis: sequential compression device    Lenny Medrano PA-C

## 2023-01-29 NOTE — RESPIRATORY THERAPY NOTE
01/28/23 2028   Respiratory Assessment   Assessment Type Assess only   General Appearance Sedated   Respiratory Pattern Assisted;Symmetrical   Chest Assessment Chest expansion symmetrical   Bilateral Breath Sounds Diminished;Clear   Cough Weak;Productive   Suction ET Tube   Resp Comments pt post op vent day 1, found on settings 400 14 50% +6 ORDER FOR cmv 16 450 +6 50% changed to ordered settings, Ett 7 5 23 @ teeth, moved frm   Center to left, skin and strap intact with two finger pass to back of head, pt per CRNA difficult airway and pt will remain intubated til am, no other changes made will cont to monitor, BS dim CTA sxn x 1 scant thin clear   O2 Device vent   Subjective Data no prn tx needed   Vent Information   Vent ID [id number]   Vent type Porras C3   Porras C3/G5 Vent Mode (S)CMV   $ Pulse Oximetry Spot Check Charge Completed   SpO2 97 %   (S)CMV Settings   Resp Rate (BPM) 165 BPM   VT (mL) 450 mL   FIO2 (%) 50 %   PEEP (cmH2O) 6 cmH2O   I:E Ratio 1:2 8   Insp Time (%) 1 %   Flow Trigger (LPM) 5   Humidification Heater   Heater Temperature (Set) 95 °F (35 °C)   (S)CMV Actuals   Resp Rate (BPM) 17 BPM   VT (mL) 451   MV 7 4   MAP (cmH2O) 8 7 cmH2O   Peak Pressure (cmH2O) 17 cmH2O   I:E Ratio (Obs) 1:2 4   Insp Resistance 6   Heater Temperature (Obs) 95 °F (35 °C)   Static Compliance (mL/cmH20) 51 mL/cmH2O   Plateau Pressure (cm H2O) 18 4 cm H2O   (S)CMV Alarms   High Peak Pressure (cmH2O) 40   Low Pressure (cmH2O) 5 cm H2O   High Resp Rate (BPM) 40 BPM   Low Resp Rate (BPM) 6 BPM   High MV (L/min) 20 L/min   Low MV (L/min) 4 L/min   High VT (mL) 1000 mL   Low VT (mL) 300 mL   Apnea Time (s) 20 S   Maintenance   Alarm (pink) cable attached No   Resuscitation bag with peep valve at bedside Yes   Water bag changed No   Circuit changed No   IHI Ventilator Associated Pneumonia Bundle   Head of Bed Elevated HOB 30   ETT  Cuffed; Hi-Lo 7 5 mm   Placement Date/Time: 01/28/23 0824   Mask Ventilation: Ventilated by mask (1)  Preoxygenated: Yes  Technique: Video laryngoscopy  Type: Cuffed; Hi-Lo  Tube Size: 7 5 mm  Laryngoscope: Davis  Blade Size: 3  Location: Oral  Grade View: Arytenoids or po       Secured at (cm) 23   Measured from Teeth   Secured Location (S)  Left   Repositioned (S)  Center to Left   Secured by Commercial tube wahl  (skin and strap intact with two finger pass to back of head)   Site Condition Dry   Cuff Pressure (cm H2O) 24 cm H2O   HI-LO Suction  Continuous low suction   HI-LO Secretions Scant;Thin;Clear   HI-LO Intervention Patent   RT Ventilator Management Note  Kentrell Kraft 46 y o  male MRN: [id number]  Unit/Bed#: ICU 02-01 Encounter: [id number]      Daily Screen         1/28/2023  1222             Patient safety screen outcome[de-identified] Failed    Not Ready for Weaning due to[de-identified] Underline problem not resolved              Physical Exam:   Assessment Type: Assess only  General Appearance: Sedated  Respiratory Pattern: Assisted, Symmetrical  Chest Assessment: Chest expansion symmetrical  Bilateral Breath Sounds: Diminished, Clear  Cough: Weak, Productive  Suction: ET Tube  O2 Device: vent  Subjective Data: no prn tx needed      Resp Comments: (P) pt post op vent day 1, found on settings 400 14 50% +6 ORDER FOR cmv 16 450 +6 50% changed to ordered settings, Ett 7 5 23 @ teeth, moved frm   Center to left, skin and strap intact with two finger pass to back of head, pt per CRNA difficult airway and pt will remain intubated til am, no other changes made will cont to monitor, BS dim CTA sxn x 1 scant thin clear

## 2023-01-29 NOTE — RESPIRATORY THERAPY NOTE
01/29/23 0415   Respiratory Assessment   Assessment Type Assess only   General Appearance Sedated   Respiratory Pattern Normal;Assisted   Chest Assessment Chest expansion symmetrical   Bilateral Breath Sounds Clear;Diminished   Cough Weak;Productive   Suction ET Tube   Resp Comments uneventful shift no changes made will cont to monitor   O2 Device vent   Vent Information   Vent ID [id number]   Vent type Porras C3   Porras C3/G5 Vent Mode (S)CMV   $ Pulse Oximetry Spot Check Charge Completed   SpO2 97 %   (S)CMV Settings   Resp Rate (BPM) 16 BPM   VT (mL) 450 mL   FIO2 (%) (S)  40 %   PEEP (cmH2O) 6 cmH2O   I:E Ratio 1:2 8   Insp Time (%) 1 %   Flow Trigger (LPM) 5   Humidification Heater   Heater Temperature (Set) 95 °F (35 °C)   (S)CMV Actuals   Resp Rate (BPM) 16 BPM   VT (mL) 442   MV 7 1   MAP (cmH2O) 8 9 cmH2O   Peak Pressure (cmH2O) 16 cmH2O   I:E Ratio (Obs) 1:2 8   Insp Resistance 12   Heater Temperature (Obs) 95 °F (35 °C)   Static Compliance (mL/cmH20) 63 mL/cmH2O   Plateau Pressure (cm H2O) 18 cm H2O   (S)CMV Alarms   High Peak Pressure (cmH2O) 40   Low Pressure (cmH2O) 5 cm H2O   High Resp Rate (BPM) 40 BPM   Low Resp Rate (BPM) 6 BPM   High MV (L/min) 20 L/min   Low MV (L/min) 4 L/min   High VT (mL) 1000 mL   Low VT (mL) 300 mL   Apnea Time (s) 20 S   Maintenance   Alarm (pink) cable attached No   Resuscitation bag with peep valve at bedside Yes   Water bag changed No   Circuit changed No   IHI Ventilator Associated Pneumonia Bundle   Head of Bed Elevated HOB 30  (found HOB at 15)   ETT  Cuffed; Hi-Lo 7 5 mm   Placement Date/Time: 01/28/23 0824   Mask Ventilation: Ventilated by mask (1)  Preoxygenated: Yes  Technique: Video laryngoscopy  Type: Cuffed; Hi-Lo  Tube Size: 7 5 mm  Laryngoscope: Davis  Blade Size: 3  Location: Oral  Grade View: Arytenoids or po       Secured at (cm) 23   Measured from Teeth   Secured Location Left   Secured by Commercial tube wahl  (unchanged)   Site Condition Dry   Cuff Pressure (cm H2O) 24 cm H2O   HI-LO Suction  Continuous low suction   HI-LO Secretions Scant;Thin;Clear   HI-LO Intervention Patent

## 2023-01-29 NOTE — NURSING NOTE
Synchronous with vent most times  VS and sats stable  Propofol continues @40mcg  Mendez patent  R and L abdominal drains patent for serosanguineous drainage  NGT flushes easily; minimal amounts of bilious drainage  Abdominal binder intact

## 2023-01-29 NOTE — RESPIRATORY THERAPY NOTE
01/29/23 1452   Incentive Spirometry Tx   IS level of assistance Assisted by respiratory care provider   Respiratory Effort Normal   Treatment Tolerance Tolerated well   Chest Physiotherapy Tx   $ Chest Physiotherapy (CPT)  Yes   CPT Delivery Source Acapella   CPT Duration 10   CPT Chest Site Full range   Breath Sounds Pre-Treatment Bilateral Coarse   Position High Desai's   CPT Treatment Tolerance Tolerated well     Pt given acapella to assist with secretion management  Pt also placed on a midflow at this time to help with oxgenation   Will cont to monitor

## 2023-01-29 NOTE — NURSING NOTE
VS and sats stable; synchronous with vent   Grimacing after bathing and repositioning; nodded "yes" to pain 8/10; Dliaudid per PRN

## 2023-01-29 NOTE — ASSESSMENT & PLAN NOTE
· Likely 2/2 volume overload   · Continue PRN diuretics  · strict I/O w/ leon catheter  · Nephrology following  · Neuro assessment Q4  · Continue to trend labs

## 2023-01-29 NOTE — PROGRESS NOTES
[address]  Progress Note Kaylee [place] 1970, 46 y o  male MRN: [id number]  Unit/Bed#: ICU 02-01 Encounter: [id number]  Primary Care Provider: Regine De Jesus DO   Date and time admitted to hospital: 1/21/2023  8:32 PM    * Acute peritonitis [facility])  Assessment & Plan  • Likely 2/2  Liver cirrhosis  • Admitted to [facility] service 1/22 w/ complaints of abdominal pain for 3 days and urinary retention, increased abdominal distention, nausea/vomiting  •  CT ABD Pelvis:  Moderate to large intra-abdominal and pelvic ascites with diffuse enhancement and thickening of the peritoneal lining most compatible with an acute peritonitis  Clinical correlation is recommended  Peritoneal fluid sampling could be performed for further evaluation  Mildly nodularity of the liver surface contour suggestive of chronic cirrhotic changes  No free intraperitoneal air  No evidence of large or small bowel obstruction  Partially visualized bilateral pleural effusions with bibasilar atelectasis, left greater than right  • S/p Paracentesis- Cultures w/strep intermedius  • ID following   • Taken to OR 1/28 for attempted EX lap  Frozen abdomen precipitated enterotomy for small bowel repair, lysis of adhesions, and drainage of large intraabdominal abscess   • Currently intubated, but will attempt wean to extubation  • Currently sedated w/ propofol  • Continue PRN pain meds  • Continue ceftriaxone and flagyl  • Surgery following  • GI following         Cirrhosis of liver with ascites (HCC)  Assessment & Plan  · Pt presented w/ worsening abdominal distention and abd pain  · CT ABD/Pelvis: Moderate to large intra-abdominal and pelvic ascites with diffuse enhancement and thickening of the peritoneal lining most compatible with an acute peritonitis      · Pt is s/p paracentesis  · Continue diuresis PRN  · GI following     Hyponatremia  Assessment & Plan  · Likely 2/2 volume overload   · Continue PRN diuretics  · strict I/O w/ leon catheter  · Nephrology following  · Neuro assessment Q4  · Continue to trend labs       Difficult intubation  Assessment & Plan  · Patient is status post post OR4 enterotomy, he was reportedly a difficult airway  · Wean to extubation in AM     Sepsis ([address] )  Assessment & Plan  · Evident on initial admission w/ leukocytosis, tachycardia, lactic acidosis  · Received fluids and ABX  · Likely 2/2 SBP w/ paracentesis cultures:  Streptococcus intermedius   · ID following  · Continue ceftriaxone and Flagyl  · Monitor WBC and fever curve  · PT is s/p OR for enterotomy, lysis of adhesions, drainage of large abscess        Hepatitis C  Assessment & Plan  · Pt with hst of untreated hep c  · Likely led to newly diagnosed cirrhosis  · CT ABD: Mildly nodularity of the liver surface contour suggestive of chronic cirrhotic changes  · GI following  · Follow daily INR and coags   · OP follow up     ----------------------------------------------------------------------------------------  HPI/24hr events:   Underwent enterotomy and washout of loculated abdominal abscess  Difficult intubation according to CRNA  Remained intubated post-op for unclear reason       Patient appropriate for transfer out of the ICU today?: No  Disposition: Continue Critical Care   Code Status: Level 1 - Full Code  ---------------------------------------------------------------------------------------  SUBJECTIVE  Intubated/sedated    Review of Systems  Review of systems was unable to be performed secondary to intubation/sedation  ---------------------------------------------------------------------------------------  OBJECTIVE    Vitals   Vitals:    23 0300 23 0400 23 0415 23 0500   BP: 90/66 100/69  96/67   BP Location: Right arm Right arm  Right arm   Pulse: 84 87 86 86   Resp: 16 16 16 16   Temp:  (!) 96 7 °F (35 9 °C)     TempSrc:  Tympanic     SpO2: 97% 97% 97% 95%   Weight:       Height:         Temp (24hrs), Av 7 °F (35 9 °C), Min:95 7 °F (35 4 °C), Max:97 6 °F (36 4 °C)  Current: Temperature: (!) 96 7 °F (35 9 °C)          Respiratory:  SpO2: SpO2: 95 %, SpO2 Activity: SpO2 Activity: At Rest, SpO2 Device: O2 Device: Ventilator       Invasive/non-invasive ventilation settings   Respiratory    Lab Data (Last 4 hours)    None         O2/Vent Data (Last 4 hours)    None                Physical Exam  Constitutional:       General: He is not in acute distress  Appearance: He is ill-appearing  Eyes:      Pupils: Pupils are equal, round, and reactive to light  Cardiovascular:      Rate and Rhythm: Normal rate and regular rhythm  Pulmonary:      Effort: Pulmonary effort is normal    Abdominal:      Palpations: Abdomen is soft  Comments: Abdominal binder in place, 2x MAURICE drains w/ serosanguinous drainage   Musculoskeletal:         General: No swelling  Skin:     General: Skin is warm and dry     Neurological:      Comments: Intubated, sedated             Laboratory and Diagnostics:  Results from last 7 days   Lab Units 01/28/23  0402 01/27/23  0427 01/26/23  0956 01/25/23  0503 01/24/23  0455 01/23/23  0742   WBC Thousand/uL 8 76 7 80 8 66 8 90 8 56 7 67   HEMOGLOBIN g/dL 11 9* 11 4* 11 8* 11 7* 12 3 12 5   HEMATOCRIT % 34 5* 33 2* 34 1* 33 8* 36 2* 36 6   PLATELETS Thousands/uL 208 216 238 281 329 302   NEUTROS PCT % 74 73 73 73 72 74   MONOS PCT % 11 10 10 12 12 12     Results from last 7 days   Lab Units 01/28/23  2228 01/28/23  1616 01/28/23  0402 01/27/23  2035 01/27/23  1549 01/27/23  1203 01/27/23  0810 01/26/23  1649 01/26/23  0956 01/25/23  0503 01/24/23  0455 01/23/23  0742 01/22/23  1557   SODIUM mmol/L 123* 124* 119* 119* 121* 122* 123*   < > 120* 123* 122* 122* 121*   POTASSIUM mmol/L 4 8 4 6 3 8 3 0* 3 3* 3 5 3 7   < > 3 6 3 9 3 8 3 8 3 8   CHLORIDE mmol/L 93* 93* 88* 87* 88* 87* 91*   < > 88* 90* 91* 89* 91*   CO2 mmol/L 24 25 25 25 25 25 25   < > 25 23 23 26 23   ANION GAP mmol/L 6 6 6 7 8 10 7   < > 7 10 8 7 7   BUN mg/dL 14 13 10 10 10 9 9   < > 10 10 9 11 12   CREATININE mg/dL 0 48* 0 48* 0 54* 0 63 0 52* 0 51* 0 45*   < > 0 52* 0 50* 0 57* 0 59* 0 63   CALCIUM mg/dL 8 1* 7 6* 7 5* 7 2* 7 6* 7 6* 7 4*   < > 7 7* 7 5* 7 8* 7 9* 7 6*   GLUCOSE RANDOM mg/dL 146* 153* 136 189* 120 139 113   < > 143* 118 109 114 120   ALT U/L  --   --   --   --   --   --   --   --  42 54* 75* 74* 63*   AST U/L  --   --   --   --   --   --   --   --  39 47* 80* 84* 74*   ALK PHOS U/L  --   --   --   --   --   --   --   --  66 67 78 83 80   ALBUMIN g/dL  --   --   --   --   --   --   --   --  2 4* 2 4* 2 6* 2 6* 2 4*   TOTAL BILIRUBIN mg/dL  --   --   --   --   --   --   --   --  0 90 0 97 1 24* 1 54* 1 32*    < > = values in this interval not displayed  Results from last 7 days   Lab Units 01/28/23  2228 01/28/23  1616 01/28/23  0402 01/27/23  0427 01/24/23  0455 01/23/23  0742   MAGNESIUM mg/dL 2 2 1 9 1 5* 1 5* 1 8* 1 6*   PHOSPHORUS mg/dL 4 0 4 0 3 1 2 7  --   --       Results from last 7 days   Lab Units 01/28/23  1616   INR  1 18              ABG:    VBG:    Results from last 7 days   Lab Units 01/28/23  0401 01/24/23  0455 01/23/23  0742 01/22/23  0551   PROCALCITONIN ng/ml 0 30* 0 34* 0 35* 0 41*       Micro  Results from last 7 days   Lab Units 01/25/23  1201 01/23/23  1437   BLOOD CULTURE  No Growth at 72 hrs  No Growth at 72 hrs   --    GRAM STAIN RESULT   --  4+ Polys*  2+ Gram positive cocci in pairs, chains and clusters*   BODY FLUID CULTURE, STERILE   --  3+ Growth of Streptococcus intermedius*       EKG: NSR  Imaging: I have personally reviewed pertinent reports  Intake and Output  I/O       01/27 0701 01/28 0700 01/28 0701 01/29 0700    P  O  654     I V  (mL/kg) 781 3 (11 2) 3516 (50 5)    NG/GT  30    IV Piggyback 200 200    Total Intake(mL/kg) 1635 3 (23 5) 3746 (53 8)    Urine (mL/kg/hr) 4175 (2 5) 760 (0 8)    Drains  320    Blood  500    Total Output 4175 1580    Net -2539 8 +2166 Height and Weights   Height: 5' 11" (180 3 cm)  IBW (Ideal Body Weight): 75 3 kg  Body mass index is 21 4 kg/m²  Weight (last 2 days)     Date/Time Weight    01/28/23 0600 69 6 (153 44)    01/27/23 0600 73 7 (162 48)    01/27/23 0437 73 7 (162 48)            Nutrition       Diet Orders   (From admission, onward)             Start     Ordered    01/28/23 1324  Diet NPO  Diet effective midnight        References:    Nutrtion Support Algorithm Enteral vs  Parenteral   Question Answer Comment   Diet Type NPO    RD to adjust diet per protocol?  Yes        01/28/23 1323                  Active Medications  Scheduled Meds:  Current Facility-Administered Medications   Medication Dose Route Frequency Provider Last Rate   • cefTRIAXone  2,000 mg Intravenous Q24H MARQUIS [place] Stopped (01/28/23 1712)   • chlorhexidine  15 mL Mouth/Throat Q12H [address] MARQUIS [place]     • enoxaparin  40 mg Subcutaneous Q24H [address] MARQUIS [place]     • fentanyl citrate (PF)  50 mcg Intravenous Q1H PRN Mahi Bruchristie CRNP     • HYDROmorphone  0 5 mg Intravenous Q4H PRN Mahi BruGUILLAUME sorianoNP     • metroNIDAZOLE  500 mg Oral Q8H [address] MARQUIS [place]     • ondansetron  4 mg Intravenous Q6H PRN Mahi Bruchristie CRNP     • propofol  5-50 mcg/kg/min Intravenous Titrated GUILLAUME MesaNP 35 mcg/kg/min (01/29/23 0400)     Continuous Infusions:  propofol, 5-50 mcg/kg/min, Last Rate: 35 mcg/kg/min (01/29/23 0400)      PRN Meds:   fentanyl citrate (PF), 50 mcg, Q1H PRN  HYDROmorphone, 0 5 mg, Q4H PRN  ondansetron, 4 mg, Q6H PRN        Invasive Devices Review  Invasive Devices     Peripheral Intravenous Line  Duration           Peripheral IV 01/28/23 Left;Ventral (anterior) Forearm 1 day    Peripheral IV 01/28/23 Left Antecubital <1 day    Peripheral IV 01/28/23 Right Hand <1 day          Drain  Duration           Closed/Suction Drain Lateral LUQ <1 day    Closed/Suction Drain Right Abdomen Bulb <1 day NG/OG/Enteral Tube Nasogastric 18 Fr Left nare <1 day    Urethral Catheter 16 Fr  <1 day          Airway  Duration           ETT  Cuffed; Hi-Lo 7 5 mm <1 day                Rationale for remaining devices: Access, UOP for hyponatremia  ---------------------------------------------------------------------------------------  Advance Directive and Living Will:      Power of :    POLST:    ---------------------------------------------------------------------------------------  Care Time Delivered:   Upon my evaluation, this patient had a high probability of imminent or life-threatening deterioration due to mechanical ventilation, sepsis, which required my direct attention, intervention, and personal management  I have personally provided 41 minutes (0030 to 0111) of critical care time, exclusive of procedures, teaching, family meetings, and any prior time recorded by providers other than myself  Deidra Madison PA-C      Portions of the record may have been created with voice recognition software  Occasional wrong word or "sound a like" substitutions may have occurred due to the inherent limitations of voice recognition software    Read the chart carefully and recognize, using context, where substitutions have occurred

## 2023-01-30 LAB
ALBUMIN SERPL BCP-MCNC: 2.3 G/DL (ref 3.5–5)
ALP SERPL-CCNC: 59 U/L (ref 34–104)
ALT SERPL W P-5'-P-CCNC: 40 U/L (ref 7–52)
AMORPH URATE CRY URNS QL MICRO: NORMAL /HPF
ANION GAP SERPL CALCULATED.3IONS-SCNC: 4 MMOL/L (ref 4–13)
AST SERPL W P-5'-P-CCNC: 38 U/L (ref 13–39)
BACTERIA BLD CULT: NORMAL
BACTERIA BLD CULT: NORMAL
BACTERIA UR QL AUTO: NORMAL /HPF
BASOPHILS # BLD MANUAL: 0 THOUSAND/UL (ref 0–0.1)
BASOPHILS NFR MAR MANUAL: 0 % (ref 0–1)
BILIRUB SERPL-MCNC: 0.85 MG/DL (ref 0.2–1)
BILIRUB UR QL STRIP: NEGATIVE
BUN SERPL-MCNC: 18 MG/DL (ref 5–25)
CA-I BLD-SCNC: 1.08 MMOL/L (ref 1.12–1.32)
CALCIUM ALBUM COR SERPL-MCNC: 9.2 MG/DL (ref 8.3–10.1)
CALCIUM SERPL-MCNC: 7.8 MG/DL (ref 8.4–10.2)
CHLORIDE SERPL-SCNC: 91 MMOL/L (ref 96–108)
CLARITY UR: CLEAR
CO2 SERPL-SCNC: 28 MMOL/L (ref 21–32)
COLOR UR: YELLOW
CREAT SERPL-MCNC: 0.6 MG/DL (ref 0.6–1.3)
EOSINOPHIL # BLD MANUAL: 0 THOUSAND/UL (ref 0–0.4)
EOSINOPHIL NFR BLD MANUAL: 0 % (ref 0–6)
ERYTHROCYTE [DISTWIDTH] IN BLOOD BY AUTOMATED COUNT: 13.4 % (ref 11.6–15.1)
GFR SERPL CREATININE-BSD FRML MDRD: 115 ML/MIN/1.73SQ M
GLUCOSE SERPL-MCNC: 133 MG/DL (ref 65–140)
GLUCOSE UR STRIP-MCNC: NEGATIVE MG/DL
HCT VFR BLD AUTO: 35 % (ref 36.5–49.3)
HGB BLD-MCNC: 11.7 G/DL (ref 12–17)
HGB UR QL STRIP.AUTO: NEGATIVE
INR PPP: 1.17 (ref 0.84–1.19)
KETONES UR STRIP-MCNC: NEGATIVE MG/DL
LEUKOCYTE ESTERASE UR QL STRIP: NEGATIVE
LYMPHOCYTES # BLD AUTO: 17 % (ref 14–44)
LYMPHOCYTES # BLD AUTO: 3.19 THOUSAND/UL (ref 0.6–4.47)
MAGNESIUM SERPL-MCNC: 1.7 MG/DL (ref 1.9–2.7)
MCH RBC QN AUTO: 34 PG (ref 26.8–34.3)
MCHC RBC AUTO-ENTMCNC: 33.4 G/DL (ref 31.4–37.4)
MCV RBC AUTO: 102 FL (ref 82–98)
MONOCYTES # BLD AUTO: 1.32 THOUSAND/UL (ref 0–1.22)
MONOCYTES NFR BLD: 7 % (ref 4–12)
NEUTROPHILS # BLD MANUAL: 14.28 THOUSAND/UL (ref 1.85–7.62)
NEUTS BAND NFR BLD MANUAL: 4 % (ref 0–8)
NEUTS SEG NFR BLD AUTO: 72 % (ref 43–75)
NITRITE UR QL STRIP: NEGATIVE
NON-SQ EPI CELLS URNS QL MICRO: NORMAL /HPF
PH UR STRIP.AUTO: 6 [PH]
PHOSPHATE SERPL-MCNC: 3.2 MG/DL (ref 2.7–4.5)
PLATELET # BLD AUTO: 308 THOUSANDS/UL (ref 149–390)
PLATELET BLD QL SMEAR: ADEQUATE
PMV BLD AUTO: 9.5 FL (ref 8.9–12.7)
POTASSIUM SERPL-SCNC: 4.4 MMOL/L (ref 3.5–5.3)
PROCALCITONIN SERPL-MCNC: 0.23 NG/ML
PROT SERPL-MCNC: 5.9 G/DL (ref 6.4–8.4)
PROT UR STRIP-MCNC: NEGATIVE MG/DL
PROTHROMBIN TIME: 14.9 SECONDS (ref 11.6–14.5)
RBC # BLD AUTO: 3.44 MILLION/UL (ref 3.88–5.62)
RBC #/AREA URNS AUTO: NORMAL /HPF
RBC MORPH BLD: NORMAL
SODIUM SERPL-SCNC: 123 MMOL/L (ref 135–147)
SP GR UR STRIP.AUTO: 1.01
UROBILINOGEN UR QL STRIP.AUTO: 0.2 E.U./DL
WBC # BLD AUTO: 18.79 THOUSAND/UL (ref 4.31–10.16)
WBC #/AREA URNS AUTO: NORMAL /HPF

## 2023-01-30 RX ORDER — BUMETANIDE 0.25 MG/ML
2 INJECTION, SOLUTION INTRAMUSCULAR; INTRAVENOUS ONCE
Status: COMPLETED | OUTPATIENT
Start: 2023-01-30 | End: 2023-01-30

## 2023-01-30 RX ORDER — MAGNESIUM SULFATE HEPTAHYDRATE 40 MG/ML
2 INJECTION, SOLUTION INTRAVENOUS ONCE
Status: COMPLETED | OUTPATIENT
Start: 2023-01-30 | End: 2023-01-30

## 2023-01-30 RX ADMIN — METRONIDAZOLE 500 MG: 500 TABLET ORAL at 21:27

## 2023-01-30 RX ADMIN — OXYCODONE HYDROCHLORIDE 5 MG: 5 TABLET ORAL at 21:31

## 2023-01-30 RX ADMIN — HYDROMORPHONE HYDROCHLORIDE 0.5 MG: 1 INJECTION, SOLUTION INTRAMUSCULAR; INTRAVENOUS; SUBCUTANEOUS at 08:48

## 2023-01-30 RX ADMIN — HYDROMORPHONE HYDROCHLORIDE 0.5 MG: 1 INJECTION, SOLUTION INTRAMUSCULAR; INTRAVENOUS; SUBCUTANEOUS at 01:01

## 2023-01-30 RX ADMIN — OXYCODONE HYDROCHLORIDE 5 MG: 5 TABLET ORAL at 05:01

## 2023-01-30 RX ADMIN — BUMETANIDE 2 MG: 0.25 INJECTION INTRAMUSCULAR; INTRAVENOUS at 11:11

## 2023-01-30 RX ADMIN — METRONIDAZOLE 500 MG: 500 TABLET ORAL at 14:31

## 2023-01-30 RX ADMIN — MAGNESIUM SULFATE HEPTAHYDRATE 2 G: 40 INJECTION, SOLUTION INTRAVENOUS at 08:35

## 2023-01-30 RX ADMIN — CEFTRIAXONE 2000 MG: 2 INJECTION, SOLUTION INTRAVENOUS at 16:13

## 2023-01-30 RX ADMIN — CHLORHEXIDINE GLUCONATE 0.12% ORAL RINSE 15 ML: 1.2 LIQUID ORAL at 08:37

## 2023-01-30 RX ADMIN — CHLORHEXIDINE GLUCONATE 0.12% ORAL RINSE 15 ML: 1.2 LIQUID ORAL at 21:27

## 2023-01-30 RX ADMIN — METRONIDAZOLE 500 MG: 500 TABLET ORAL at 05:01

## 2023-01-30 RX ADMIN — ENOXAPARIN SODIUM 40 MG: 100 INJECTION SUBCUTANEOUS at 08:37

## 2023-01-30 NOTE — PROGRESS NOTES
Progress Note- Roc Merida 46 y o  male MRN: [id number]    Unit/Bed#: ICU 02-01 Encounter: [id number]    Assessment and Plan:    45 y/o M w/ pmhx sig for chronic HCV, former etoh use disorder, who was admitted on 01/22/23 with abdominal distention, pain, fever, found to have peritonitis with fluid studies c/w secondary peritonitis of unclear source  He underwent ex-lap on 01/28/23 with peritoneal bx, lavage, extensive lysis of adhesions and drainage of large abscess  On ceftriaxone and flagyl per ID  Drains with serosanguinous output  Acute peritonitis   Abnormal findings on contrasted CT     · Underwent diagnostic/therapeutic paracentesis on 01/23/23 with innumerable septations and loculations  Analysis (> 70K), low SAAG (<1 1), and high ascitic protein (4 4), not consistent with portal HTN secondary to liver  Concerning for secondary peritonitis with unclear source  · S/p ex-lap on 01/28/23 with extensive intra-abdominal abscess with multiple loculations, extensive rind, and friable abscess wall; extensive lysis of adhesions, peritoneal bx, and peritoneal lavage  · Follow up on results of cx taken during ex-lap  · Appreciate ID input re abx guidance, continuing ceftriaxone, flagyl  Suspected cirrhosis 2/2 to etoh and chronic HCV     · Cirrhotic liver morphology noted on CT scan  · Several serologic abnormalities including hyponatremia, hypoalbuminemia, and hypercoagulability, suggestive of synthetic dysfunction  · HCV viral load detectable, genotype 1A  · Continue to monitor daily liver/MELD labs including CMP/INR  · He will require close outpatient GI/Hepatology follow up to discuss treatment of HCV, as well as long-term management of cirrhosis to include variceal screening, hepatoma screening, etc     GI will arrange for outpatient f/u as pt resides in [facility] and we will be available during this hospitalization on an as-needed basis  ______________________________________________________________________    Subjective:     Patient is a 46 y o  male w/ pmhx sig for chronic HCV, former etoh use disorder, who was admitted on 01/22/23 with abdominal distention, pain, fever, found to have peritonitis with fluid studies c/w secondary peritonitis of unclear source  He underwent ex-lap on 01/28/23 with peritoneal bx, lavage, extensive lysis of adhesions, and drainage of large abscess  On ceftriaxone and flagyl per ID  Interval events:   No acute GI events overnight  Pt denies any significant abd pain, nausea, emesis  Tolerating PO without issue  No BM for approx 3 days  Scant amt of flatus       Medication Administration - last 24 hours from 01/29/2023 1142 to 01/30/2023 1142       Date/Time Order Dose Route Action Action by     01/29/2023 1653 EST cefTRIAXone (ROCEPHIN) IVPB (premix in dextrose) 2,000 mg 50 mL 2,000 mg Intravenous [address] [facility] [address]     01/30/2023 6112 EST enoxaparin (LOVENOX) subcutaneous injection 40 mg 40 mg Subcutaneous Given Slim Grover RN     01/30/2023 0501 EST metroNIDAZOLE (FLAGYL) tablet 500 mg 500 mg Oral Given William Mari RN     01/29/2023 2134 EST metroNIDAZOLE (FLAGYL) tablet 500 mg 500 mg Oral Given William Mari RN     01/29/2023 1311 EST metroNIDAZOLE (FLAGYL) tablet 500 mg 500 mg Oral Given [address] [place] [address]     01/30/2023 9305 EST chlorhexidine (PERIDEX) 0 12 % oral rinse 15 mL 15 mL Mouth/Throat Given Slim Grover RN     01/29/2023 2030 EST chlorhexidine (PERIDEX) 0 12 % oral rinse 15 mL 15 mL Mouth/Throat Given William Mari RN     01/29/2023 1311 EST phenol (CHLORASEPTIC) 1 4 % mucosal liquid 1 spray 1 spray Mouth/Throat Given Tess Street RN     01/30/2023 0848 EST HYDROmorphone (DILAUDID) injection 0 5 mg 0 5 mg Intravenous Given Slim Grover RN     01/30/2023 0101 EST HYDROmorphone (DILAUDID) injection 0 5 mg 0 5 mg Intravenous Given William Mari RN     01/29/2023 1443 EST HYDROmorphone (DILAUDID) injection 0 5 mg 0 5 mg Intravenous Given Brandie Hensley RN     01/30/2023 0501 EST oxyCODONE (ROXICODONE) IR tablet 5 mg 5 mg Oral Given Lee Soulier, RN     01/29/2023 2139 EST oxyCODONE (ROXICODONE) IR tablet 5 mg 5 mg Oral Given Lee Soulier, RN     01/29/2023 1435 EST oxyCODONE (ROXICODONE) immediate release tablet 10 mg 10 mg Oral Given Brandie Hensley [facility]     01/30/2023 8652 EST magnesium sulfate 2 g/50 mL IVPB (premix) 2 g 2 g Intravenous [address] Jaquelin Sky RN     01/30/2023 1111 EST bumetanide (BUMEX) injection 2 mg 2 mg Intravenous Given Jaquelin Sky RN        Review of Systems   Per HPI    Objective:     Vitals: Blood pressure 132/83, pulse 105, temperature 97 7 °F (36 5 °C), temperature source Tympanic, resp  rate 22, height 5' 11" (1 803 m), weight 69 6 kg (153 lb 7 oz), SpO2 95 %  ,Body mass index is 21 4 kg/m²  Intake/Output Summary (Last 24 hours) at 1/30/2023 1142  Last data filed at 1/30/2023 1100  Gross per 24 hour   Intake 240 ml   Output 1140 ml   Net -900 ml     Physical Exam  Vitals and nursing note reviewed  Constitutional:       Appearance: Normal appearance  Comments: Chronically ill appearing    HENT:      Head: Normocephalic and atraumatic  Eyes:      General: No scleral icterus  Conjunctiva/sclera: Conjunctivae normal    Cardiovascular:      Rate and Rhythm: Normal rate  Pulmonary:      Effort: Pulmonary effort is normal    Abdominal:      Palpations: Abdomen is soft  Tenderness: There is no abdominal tenderness  There is no guarding  Comments: Abdominal binder in place    Musculoskeletal:      Right lower leg: Edema present  Left lower leg: Edema present  Skin:     General: Skin is warm and dry  Neurological:      General: No focal deficit present  Mental Status: He is alert and oriented to person, place, and time        Comments: No asterixis    Psychiatric:         Mood and Affect: Mood normal          Behavior: Behavior normal        Invasive Devices     Peripheral Intravenous Line  Duration           Peripheral IV 01/28/23 Left;Ventral (anterior) Forearm 2 days    Peripheral IV 01/28/23 Right Hand 2 days    Peripheral IV 01/28/23 Left Antecubital 1 day          Drain  Duration           Closed/Suction Drain Lateral LUQ 2 days    Closed/Suction Drain Right Abdomen Bulb 2 days    Urethral Catheter 16 Fr  1 day              Lab Results:  No results displayed because visit has over 200 results  Imaging Studies: I have personally reviewed pertinent imaging studies  Monse Benavides PA-C    **Please note:  Dictation voice to text software may have been used in the creation of this record  Occasional wrong word or “sound alike” substitutions may have occurred due to the inherent limitations of voice recognition software  Read the chart carefully and recognize, using context, where substitutions have occurred  **

## 2023-01-30 NOTE — PLAN OF CARE
Problem: Nutrition/Hydration-ADULT  Goal: Nutrient/Hydration intake appropriate for improving, restoring or maintaining nutritional needs  Description: Monitor and assess patient's nutrition/hydration status for malnutrition  Collaborate with interdisciplinary team and initiate plan and interventions as ordered  Monitor patient's weight and dietary intake as ordered or per policy  Utilize nutrition screening tool and intervene as necessary  Determine patient's food preferences and provide high-protein, high-caloric foods as appropriate       INTERVENTIONS:  - Monitor oral intake, urinary output, labs, and treatment plans  - Assess nutrition and hydration status and recommend course of action  - Evaluate amount of meals eaten  - Assist patient with eating if necessary   - Allow adequate time for meals  - Recommend/ encourage appropriate diets, oral nutritional supplements, and vitamin/mineral supplements  - Order, calculate, and assess calorie counts as needed  - Recommend, monitor, and adjust tube feedings and TPN/PPN based on assessed needs  - Assess need for intravenous fluids  - Provide specific nutrition/hydration education as appropriate  - Include patient/family/caregiver in decisions related to nutrition  Outcome: Progressing     Problem: PAIN - ADULT  Goal: Verbalizes/displays adequate comfort level or baseline comfort level  Description: Interventions:  - Encourage patient to monitor pain and request assistance  - Assess pain using appropriate pain scale  - Administer analgesics based on type and severity of pain and evaluate response  - Implement non-pharmacological measures as appropriate and evaluate response  - Consider cultural and social influences on pain and pain management  - Notify physician/advanced practitioner if interventions unsuccessful or patient reports new pain  Outcome: Progressing     Problem: METABOLIC, FLUID AND ELECTROLYTES - ADULT  Goal: Electrolytes maintained within normal limits  Description: INTERVENTIONS:  - Monitor labs and assess patient for signs and symptoms of electrolyte imbalances  - Administer electrolyte replacement as ordered  - Monitor response to electrolyte replacements, including repeat lab results as appropriate  - Instruct patient on fluid and nutrition as appropriate  Outcome: Progressing     Problem: METABOLIC, FLUID AND ELECTROLYTES - ADULT  Goal: Fluid balance maintained  Description: INTERVENTIONS:  - Monitor labs   - Monitor I/O and WT  - Instruct patient on fluid and nutrition as appropriate  - Assess for signs & symptoms of volume excess or deficit  Outcome: Progressing     Problem: METABOLIC, FLUID AND ELECTROLYTES - ADULT  Goal: Glucose maintained within target range  Description: INTERVENTIONS:  - Monitor Blood Glucose as ordered  - Assess for signs and symptoms of hyperglycemia and hypoglycemia  - Administer ordered medications to maintain glucose within target range  - Assess nutritional intake and initiate nutrition service referral as needed  Outcome: Progressing     Problem: MOBILITY - ADULT  Goal: Maintain or return to baseline ADL function  Description: INTERVENTIONS:  -  Assess patient's ability to carry out ADLs; assess patient's baseline for ADL function and identify physical deficits which impact ability to perform ADLs (bathing, care of mouth/teeth, toileting, grooming, dressing, etc )  - Assess/evaluate cause of self-care deficits   - Assess range of motion  - Assess patient's mobility; develop plan if impaired  - Assess patient's need for assistive devices and provide as appropriate  - Encourage maximum independence but intervene and supervise when necessary  - Involve family in performance of ADLs  - Assess for home care needs following discharge   - Consider OT consult to assist with ADL evaluation and planning for discharge  - Provide patient education as appropriate  Outcome: Progressing

## 2023-01-30 NOTE — ASSESSMENT & PLAN NOTE
· Does not appear to be in acute decompensation  · GI input appreciated  · Patient had paracentesis performed on 1/23/2023  · Cytology from initial paracentesis negative for malignancy cells

## 2023-01-30 NOTE — ASSESSMENT & PLAN NOTE
· Secondary to peritonitis  · Noted by leukocytosis, tachycardia tachypnea mild lactic acidosis present on admission  · Lactic acidosis resolved with IV fluids  · Continue antibiotics with ceftriaxone/vancomycin  Ascites cx    4+ Polys Abnormal  P    2+ Gram positive cocci in pairs, chains and clusters Abnormal    · Will continue abx per ID  · Follow-up final cultures  · Blood cultures have remained negative

## 2023-01-30 NOTE — PROGRESS NOTES
Cecy [facility]  66   Progress Note Prachi Shelton 1970, 46 y o  male MRN: [id number]  Unit/Bed#: ICU 02-01 Encounter: [id number]  Primary Care Provider: Bryce Iverson DO   Date and time admitted to hospital: 1/21/2023  8:32 PM    * Acute peritonitis [facility])  Assessment & Plan  · Patient reporting abdominal pain for 3 days and urinary retention, increased abdominal distention, nausea/vomiting  · Patient was initially on Levaquin/Flagyl  · Infectious Disease was consulted and Levaquin/Flagyl were discontinued and patient was started on ceftriaxone/vancomycin  · Vancomycin has been discontinued  · Continue ceftriaxone/flagyl  · Patient had exploratory laparotomy with repair of enterotomy x2, extensive lysis of adhesions, peritoneal biopsy, peritoneal lavage on 1/28/23  · Will follow up cultures  · Continue Abx per infectious disease    Cirrhosis of liver with ascites ([facility] )  Assessment & Plan  · Does not appear to be in acute decompensation  · GI input appreciated  · Patient had paracentesis performed on 1/23/2023  · Cytology from initial paracentesis negative for malignancy cells    Sepsis ([facility] )  Assessment & Plan  · Secondary to peritonitis  · Noted by leukocytosis, tachycardia tachypnea mild lactic acidosis present on admission  · Lactic acidosis resolved with IV fluids  · Continue antibiotics with ceftriaxone/vancomycin  Ascites cx    4+ Polys Abnormal  P    2+ Gram positive cocci in pairs, chains and clusters Abnormal    · Will continue abx per ID  · Follow-up final cultures  · Blood cultures have remained negative    Hyponatremia  Assessment & Plan  · Sodium level 123 today  · Nephrology following  · Patient received Bumex 2 mg x1 on 1/27/2023  · Nephrology recommending another dose of Bumex today  · Will repeat bmp tomorrow morning    Hepatitis C  Assessment & Plan  · Patient with a history of hepatitis C untreated  · GI following  · Liver with cirrhotic appearance  · Outpatient follow-up with Gastroenterology for treatment      VTE Prophylaxis:  Enoxaparin (Lovenox)    Patient Centered Rounds: I have performed bedside rounds with nursing staff today  Discussions with Specialists or Other Care Team Provider: yes  Education and Discussions with Family / Patient: updated patient regarding plan of care, offer to call family and patient declined    Current Length of Stay: 8 day(s)    Current Patient Status: Inpatient   Certification Statement: The patient will continue to require additional inpatient hospital stay due to peritonitis    Discharge Plan: pending hospital course    Code Status: Level 1 - Full Code    Subjective:   No overnight events noted  Intermittent abd pain  No fever/chills    Objective:     Vitals:   Temp (24hrs), Av 4 °F (36 3 °C), Min:96 3 °F (35 7 °C), Max:98 °F (36 7 °C)    Temp:  [96 3 °F (35 7 °C)-98 °F (36 7 °C)] 97 7 °F (36 5 °C)  HR:  [] 105  Resp:  [20-28] 22  BP: (106-133)/(63-83) 132/83  SpO2:  [90 %-99 %] 95 %  Body mass index is 21 4 kg/m²  Input and Output Summary (last 24 hours): Intake/Output Summary (Last 24 hours) at 2023 1416  Last data filed at 2023 1315  Gross per 24 hour   Intake 462 ml   Output 1675 ml   Net -1213 ml       Physical Exam:   Physical Exam  Constitutional:       General: He is not in acute distress  HENT:      Head: Normocephalic and atraumatic  Nose: Nose normal       Mouth/Throat:      Mouth: Mucous membranes are moist    Eyes:      Extraocular Movements: Extraocular movements intact  Conjunctiva/sclera: Conjunctivae normal    Cardiovascular:      Rate and Rhythm: Normal rate and regular rhythm  Pulmonary:      Effort: Pulmonary effort is normal  No respiratory distress  Abdominal:      Palpations: Abdomen is soft  Tenderness: There is abdominal tenderness  Musculoskeletal:         General: Normal range of motion  Cervical back: Normal range of motion and neck supple        Right lower leg: Edema present  Left lower leg: Edema present  Skin:     General: Skin is warm and dry  Neurological:      General: No focal deficit present  Mental Status: He is alert  Mental status is at baseline  Cranial Nerves: No cranial nerve deficit  Psychiatric:         Mood and Affect: Mood normal          Behavior: Behavior normal          Additional Data:     Labs:    Results from last 7 days   Lab Units 01/30/23  0453 01/29/23  0457   WBC Thousand/uL 18 79* 13 28*   HEMOGLOBIN g/dL 11 7* 10 8*   HEMATOCRIT % 35 0* 32 6*   PLATELETS Thousands/uL 308 223   NEUTROS PCT %  --  74   LYMPHS PCT %  --  18   LYMPHO PCT % 17  --    MONOS PCT %  --  7   MONO PCT % 7  --    EOS PCT % 0 0     Results from last 7 days   Lab Units 01/30/23  0453   SODIUM mmol/L 123*   POTASSIUM mmol/L 4 4   CHLORIDE mmol/L 91*   CO2 mmol/L 28   BUN mg/dL 18   CREATININE mg/dL 0 60   CALCIUM mg/dL 7 8*   ALK PHOS U/L 59   ALT U/L 40   AST U/L 38     Results from last 7 days   Lab Units 01/30/23  0453   INR  1 17     Results from last 7 days   Lab Units 01/29/23  1151   POC GLUCOSE mg/dl 110           * I Have Reviewed All Lab Data Listed Above  * Additional Pertinent Lab Tests Reviewed: Lauren 66 Admission  Reviewed    Imaging:  Imaging Reports Reviewed Today Include: no new imaging    Recent Cultures (last 7 days):     Results from last 7 days   Lab Units 01/28/23  1036 01/28/23  1035 01/25/23  1201 01/23/23  1437   BLOOD CULTURE   --   --  No Growth After 4 Days  No Growth After 4 Days    --    GRAM STAIN RESULT  3+ Polys*  3+ Mononuclear Cells*  1+ Gram positive cocci in chains* 2+ Disintegrating polys*  1+ Gram positive cocci in pairs and chains*  --  4+ Polys*  2+ Gram positive cocci in pairs, chains and clusters*   BODY FLUID CULTURE, STERILE  No growth No growth  --  3+ Growth of Streptococcus intermedius*       Last 24 Hours Medication List:   Current Facility-Administered Medications Medication Dose Route Frequency Provider Last Rate   • cefTRIAXone  2,000 mg Intravenous Q24H Robin Conesus, GUILLAUMENP 2,000 mg (01/29/23 4528)   • chlorhexidine  15 mL Mouth/Throat Q12H [address] MARQUIS Whitehead     • enoxaparin  40 mg Subcutaneous Q24H [address] MARQUIS [place]     • HYDROmorphone  0 5 mg Intravenous Q3H PRN Robin ConesusMARQUIS castellanos     • metroNIDAZOLE  500 mg Oral Q8H [address] MARQUIS [place]     • ondansetron  4 mg Intravenous Q6H PRN Robin Conesus, GUILLAUMENP     • oxyCODONE  10 mg Oral Q4H PRN Robin Conesus, GUILLAUMENP     • oxyCODONE  5 mg Oral Q4H PRN Robin Conesus, GUILLAUMENP     • phenol  1 spray Mouth/Throat Q2H PRN Robin Conesus, MARQUIS          Today, Patient Was Seen By: Renna Lanes, MD    ** Please Note: Dictation voice to text software may have been used in the creation of this document   **

## 2023-01-30 NOTE — PROGRESS NOTES
Progress Note - [facility] Infectious Disease   Edy Cobb 46 y o  male MRN: [id number]  Unit/Bed#: ICU 02-01 Encounter: [id number]    IMPRESSION & RECOMMENDATIONS:   1  Sepsis, evolving from admission, evidenced by leukocytosis and tachycardia, with intermittent low-grade fevers  Likely due to peritonitis  Initial blood cultures are negative for growth  Repeat blood cultures negative >4d  WBC up-trending post op and likely reactive  Patient remains hemodynamically stable  -Continue antibiotics as below  -Follow-up repeat blood cultures   -Monitor temperature and hemodynamics  -Low threshold to re-image A/P if leukocytosis continues to uptrend    -Trend daily CBC differential and chemistry     2  Acute peritonitis  Patient presented with 1 week of worsening abdominal pain, distention, fevers and chills   CT showed moderate to large intra-abdominal and pelvic ascites with diffuse enhancement and thickening of the peritoneal lining compatible with acute peritonitis  S/p paracentesis 1/23 with loculated ascites present and fluid studies consistent with infection with extremely elevated WBC >70,000, with elevated LDH   Fluid studies not entirely consistent with portal hypertension, SAAG < 1 1 and Protein 4 4  Peritoneal fluid culture positive for strep intermedius, which has the propensity to produce abscesses  Unknown source  No evidence of GI source for a secondary peritonitis on initial CT A/P  Unlikely malignant with negative cytology  Now s/p ex lap with peritoneal bx, lavage and drainage of large abscess 1/28/23   OR cultures are pending but so far GS shows GPC in pairs and chains   -Continue IV Ceftriaxone 2g q24 and PO Flagyl 500mg q8h  -Follow up OR cultures  -Monitor drain output   -Consider amoxicillin challenge in the next 24-48hrs; would ideally d/c with Augmentin   -Anticipate 2 week course of antibiotics   -Serial abdominal exams and daily liver chemistries -Close gastroenterology/surgery follow-up    3  Cirrhosis   CT shows nodularity of the liver surface suggestive of chronic cirrhosis   History of chronic hepatitis C, former alcohol abuse and IV drug abuse   In remission for 20 years   Patient not treated for hepatitis C  HIV negative  HCV PCR quant [id number]    -Ongoing follow-up with GI for tx    4  Postoperative respiratory insufficiency  Improved and now extubated to 2-3L NC      5  Transaminitis, hyponatremia  Lauryn Self due to cirrhosis   Nephrology and gastroenterology are following   -Monitor LFTs, chemistry     6  Antibiotic Allergy   Reports history of rash with penicillin and denies anaphylaxis   At this point unlikely this is a significant allergy   Low risk for cross-reactivity with cephalosporins   He is tolerating ceftriaxone   -Monitor for adverse drug reactions   -Amoxicillin challenge as above     Antibiotics:  D7 ceftriaxone  D4 flagyl   D10 abx     I have discussed the above management plan in detail with patient  I have discussed the above management plan in detail with patient's RN, and the primary service, SLIM  Also d/w surgery AP  Subjective:  Patient has no fever, chills, sweats; reports abdominal pain and difficulty taking deep breaths  Tolerating CLD with out nausea or vomiting  Has lingering cough post intubation  Objective:  Vitals:  Temp:  [96 3 °F (35 7 °C)-98 °F (36 7 °C)] 96 3 °F (35 7 °C)  HR:  [] 109  Resp:  [12-28] 22  BP: (104-133)/(60-83) 133/83  SpO2:  [82 %-99 %] 94 %  Temp (24hrs), Av 2 °F (36 2 °C), Min:96 3 °F (35 7 °C), Max:98 °F (36 7 °C)  Current: Temperature: (!) 96 3 °F (35 7 °C)    PHYSICAL EXAM:  General Appearance:  Appearing well, nontoxic, and in no distress   HEENT: Normocephalic, without obvious abnormality, atraumatic  Conjunctiva pink and sclera anicteric  Oropharynx moist without lesions       Lungs:   Clear to auscultation bilaterally, respirations unlabored   Heart:  Tachycardia; no murmur, rub or gallop   Abdomen:   Abdominal binder in place  Abdominal tenderness around incision  MAURICE drains in place x2 draining SS fluid  Extremities: No cyanosis, clubbing; +2 bilateral edema   Musculoskeletal: Back symmetric without curvature, ROM normal     : No CVA or suprapubic tenderness  Mendez patent for doreen colored urine  Skin: No rashes or lesions  Peripheral IV intact without evidence of erythema, warmth, or exudate  LABS, IMAGING, & OTHER STUDIES:  Lab Results:  I have personally reviewed pertinent labs  Results from last 7 days   Lab Units 01/30/23  0453 01/29/23  0457 01/28/23  0402   WBC Thousand/uL 18 79* 13 28* 8 76   HEMOGLOBIN g/dL 11 7* 10 8* 11 9*   PLATELETS Thousands/uL 308 223 208     Results from last 7 days   Lab Units 01/30/23  0453 01/29/23  0457 01/28/23  2228 01/26/23  1649 01/26/23  0956 01/25/23  0503   SODIUM mmol/L 123* 125* 123*   < > 120* 123*   POTASSIUM mmol/L 4 4 4 6 4 8   < > 3 6 3 9   CHLORIDE mmol/L 91* 92* 93*   < > 88* 90*   CO2 mmol/L 28 26 24   < > 25 23   BUN mg/dL 18 15 14   < > 10 10   CREATININE mg/dL 0 60 0 50* 0 48*   < > 0 52* 0 50*   EGFR ml/min/1 73sq m 115 124 126   < > 122 124   CALCIUM mg/dL 7 8* 7 9* 8 1*   < > 7 7* 7 5*   AST U/L 38  --   --   --  39 47*   ALT U/L 40  --   --   --  42 54*   ALK PHOS U/L 59  --   --   --  66 67    < > = values in this interval not displayed  Results from last 7 days   Lab Units 01/28/23  1036 01/28/23  1035 01/25/23  1201 01/23/23  1437   BLOOD CULTURE   --   --  No Growth After 4 Days  No Growth After 4 Days    --    GRAM STAIN RESULT  3+ Polys*  3+ Mononuclear Cells*  1+ Gram positive cocci in chains* 2+ Disintegrating polys*  1+ Gram positive cocci in pairs and chains*  --  4+ Polys*  2+ Gram positive cocci in pairs, chains and clusters*   BODY FLUID CULTURE, STERILE   --   --   --  3+ Growth of Streptococcus intermedius*     Results from last 7 days   Lab Units 01/30/23  0453 01/28/23  4305 01/24/23  0455   PROCALCITONIN ng/ml 0 23 0 30* 0 34*

## 2023-01-30 NOTE — PLAN OF CARE
Problem: Nutrition/Hydration-ADULT  Goal: Nutrient/Hydration intake appropriate for improving, restoring or maintaining nutritional needs  Description: Monitor and assess patient's nutrition/hydration status for malnutrition  Collaborate with interdisciplinary team and initiate plan and interventions as ordered  Monitor patient's weight and dietary intake as ordered or per policy  Utilize nutrition screening tool and intervene as necessary  Determine patient's food preferences and provide high-protein, high-caloric foods as appropriate       INTERVENTIONS:  - Monitor oral intake, urinary output, labs, and treatment plans  - Assess nutrition and hydration status and recommend course of action  - Evaluate amount of meals eaten  - Assist patient with eating if necessary   - Allow adequate time for meals  - Recommend/ encourage appropriate diets, oral nutritional supplements, and vitamin/mineral supplements  - Order, calculate, and assess calorie counts as needed  - Recommend, monitor, and adjust tube feedings and TPN/PPN based on assessed needs  - Assess need for intravenous fluids  - Provide specific nutrition/hydration education as appropriate  - Include patient/family/caregiver in decisions related to nutrition  Outcome: Progressing     Problem: PAIN - ADULT  Goal: Verbalizes/displays adequate comfort level or baseline comfort level  Description: Interventions:  - Encourage patient to monitor pain and request assistance  - Assess pain using appropriate pain scale  - Administer analgesics based on type and severity of pain and evaluate response  - Implement non-pharmacological measures as appropriate and evaluate response  - Consider cultural and social influences on pain and pain management  - Notify physician/advanced practitioner if interventions unsuccessful or patient reports new pain  Outcome: Progressing     Problem: SAFETY ADULT  Goal: Patient will remain free of falls  Description: INTERVENTIONS:  - Educate patient/family on patient safety including physical limitations  - Instruct patient to call for assistance with activity   - Consult OT/PT to assist with strengthening/mobility   - Keep Call bell within reach  - Keep bed low and locked with side rails adjusted as appropriate  - Keep care items and personal belongings within reach  - Initiate and maintain comfort rounds  - Make Fall Risk Sign visible to staff  - Offer Toileting every 2 Hours, in advance of need  - Initiate/Maintain alarm  - Obtain necessary fall risk management equipment:   - Apply yellow socks and bracelet for high fall risk patients  - Consider moving patient to room near nurses station  Outcome: Progressing  Goal: Maintain or return to baseline ADL function  Description: INTERVENTIONS:  -  Assess patient's ability to carry out ADLs; assess patient's baseline for ADL function and identify physical deficits which impact ability to perform ADLs (bathing, care of mouth/teeth, toileting, grooming, dressing, etc )  - Assess/evaluate cause of self-care deficits   - Assess range of motion  - Assess patient's mobility; develop plan if impaired  - Assess patient's need for assistive devices and provide as appropriate  - Encourage maximum independence but intervene and supervise when necessary  - Involve family in performance of ADLs  - Assess for home care needs following discharge   - Consider OT consult to assist with ADL evaluation and planning for discharge  - Provide patient education as appropriate  Outcome: Progressing  Goal: Maintains/Returns to pre admission functional level  Description: INTERVENTIONS:  - Perform BMAT or MOVE assessment daily    - Set and communicate daily mobility goal to care team and patient/family/caregiver  - Collaborate with rehabilitation services on mobility goals if consulted  - Perform Range of Motion 3 times a day  - Reposition patient every 2 hours    - Dangle patient 3 times a day  - Stand patient 3 Times a day  - Ambulate patient 3 times a day  - Out of bed to chair 3 times a day   - Out of bed for meals 3 times a day  - Out of bed for toileting  - Record patient progress and toleration of activity level   Outcome: Progressing     Problem: DISCHARGE PLANNING  Goal: Discharge to home or other facility with appropriate resources  Description: INTERVENTIONS:  - Identify barriers to discharge w/patient and caregiver  - Arrange for needed discharge resources and transportation as appropriate  - Identify discharge learning needs (meds, wound care, etc )  - Arrange for interpretive services to assist at discharge as needed  - Refer to Case Management Department for coordinating discharge planning if the patient needs post-hospital services based on physician/advanced practitioner order or complex needs related to functional status, cognitive ability, or social support system  Outcome: Progressing     Problem: METABOLIC, FLUID AND ELECTROLYTES - ADULT  Goal: Electrolytes maintained within normal limits  Description: INTERVENTIONS:  - Monitor labs and assess patient for signs and symptoms of electrolyte imbalances  - Administer electrolyte replacement as ordered  - Monitor response to electrolyte replacements, including repeat lab results as appropriate  - Instruct patient on fluid and nutrition as appropriate  Outcome: Progressing  Goal: Fluid balance maintained  Description: INTERVENTIONS:  - Monitor labs   - Monitor I/O and WT  - Instruct patient on fluid and nutrition as appropriate  - Assess for signs & symptoms of volume excess or deficit  Outcome: Progressing  Goal: Glucose maintained within target range  Description: INTERVENTIONS:  - Monitor Blood Glucose as ordered  - Assess for signs and symptoms of hyperglycemia and hypoglycemia  - Administer ordered medications to maintain glucose within target range  - Assess nutritional intake and initiate nutrition service referral as needed  Outcome: Progressing     Problem: Prexisting or High Potential for Compromised Skin Integrity  Goal: Skin integrity is maintained or improved  Description: INTERVENTIONS:  - Identify patients at risk for skin breakdown  - Assess and monitor skin integrity  - Assess and monitor nutrition and hydration status  - Monitor labs   - Assess for incontinence   - Turn and reposition patient  - Assist with mobility/ambulation  - Relieve pressure over bony prominences  - Avoid friction and shearing  - Provide appropriate hygiene as needed including keeping skin clean and dry  - Evaluate need for skin moisturizer/barrier cream  - Collaborate with interdisciplinary team   - Patient/family teaching  - Consider wound care consult   Outcome: Progressing     Problem: MOBILITY - ADULT  Goal: Maintain or return to baseline ADL function  Description: INTERVENTIONS:  -  Assess patient's ability to carry out ADLs; assess patient's baseline for ADL function and identify physical deficits which impact ability to perform ADLs (bathing, care of mouth/teeth, toileting, grooming, dressing, etc )  - Assess/evaluate cause of self-care deficits   - Assess range of motion  - Assess patient's mobility; develop plan if impaired  - Assess patient's need for assistive devices and provide as appropriate  - Encourage maximum independence but intervene and supervise when necessary  - Involve family in performance of ADLs  - Assess for home care needs following discharge   - Consider OT consult to assist with ADL evaluation and planning for discharge  - Provide patient education as appropriate  Outcome: Progressing  Goal: Maintains/Returns to pre admission functional level  Description: INTERVENTIONS:  - Perform BMAT or MOVE assessment daily    - Set and communicate daily mobility goal to care team and patient/family/caregiver  - Collaborate with rehabilitation services on mobility goals if consulted  - Perform Range of Motion 3 times a day  - Reposition patient every 2 hours    - Dangle patient 3 times a day  - Stand patient 3 Times a day  - Ambulate patient 3 times a day  - Out of bed to chair 3 times a day   - Out of bed for meals 3 times a day  - Out of bed for toileting  - Record patient progress and toleration of activity level   Outcome: Progressing

## 2023-01-30 NOTE — ASSESSMENT & PLAN NOTE
· Sodium level 123 today  · Nephrology following  · Patient received Bumex 2 mg x1 on 1/27/2023  · Nephrology recommending another dose of Bumex today  · Will repeat bmp tomorrow morning

## 2023-01-30 NOTE — PROGRESS NOTES
Progress Note - Nephrology   Krista Gustafson 46 y o  male MRN: [id number]  Unit/Bed#: ICU 02-01 Encounter: [id number]    Patient is a 53-year old male admitted 1/22/2023 with chief complaint of decreased oral intake  Patient has history of hepatitis C diagnosed 15 years ago and has history of alcohol abuse, sober 20 years  Patient found to have newly diagnosed cirrhosis of liver with ascites  Nephrology following for hyponatremia  Assessment and Plan    1  Hyponatremia  Serum sodium up to peak 125 millimoles per liter initially improved after loop diuretic 1/27/2023  Diuretic held over the weekend in the setting of euvolemia now falling to 123 mmol/L on 1/30/2023  Patient examines with significant lower extremity edema in between SCD compressions  Will give bumetanide 2 mg IV x1  On clear liquid diet  Ideally, patient should be on a fluid restriction  Consider restarting 1 5 L fluid restriction once diet is advanced      2  Volume overload  Lower extremity edema has returned after diuretic holiday this weekend  We will give bumetanide 2 mg IV today      3  Hypomagnesemia  Magnesium decreased to 1 7 mg/dL 1/30/2023  Status post magnesium sulfate 2 g IV today  Continue to monitor and replete for magnesium goal 2 0 mg/dL and potassium 4 0 millimole per L      4  Newly diagnosed cirrhosis of liver   Suspected secondary to alcohol and chronic hepatitis C virus  Continue management directed by gastroenterology colleagues      5  Hepatitis C positive  Defer management to gastroenterology colleagues      6  Peritonitis   Status post diagnostic/therapeutic paracentesis which revealed innumerable septations and loculations 1/23/2023  Status post exploratory laparotomy converted to open repair of enterotomy x2 lysis of adhesions on 1/28/2023      Follow up reason for today's visit:     Acute peritonitis [facility])    Patient Active Problem List   Diagnosis   • Hepatitis C   • Acute peritonitis ([facility] [address] )   • Sepsis [facility])   • Cirrhosis of liver with ascites ([address] )   • Hyponatremia   • Difficult intubation         Subjective:   Denies physical complaints  Denies diarrhea/vomiting  A complete 10 point review of systems was performed and is otherwise negative  Objective:     Vitals: Blood pressure 132/83, pulse 105, temperature 97 7 °F (36 5 °C), temperature source Tympanic, resp  rate 22, height 5' 11" (1 803 m), weight 69 6 kg (153 lb 7 oz), SpO2 95 %  ,Body mass index is 21 4 kg/m²  Weight (last 2 days)     Date/Time Weight    01/30/23 0501 69 6 (153 44)    01/29/23 0600 71 5 (157 63)    01/28/23 0600 69 6 (153 44)            Intake/Output Summary (Last 24 hours) at 1/30/2023 1145  Last data filed at 1/30/2023 1100  Gross per 24 hour   Intake 240 ml   Output 1140 ml   Net -900 ml     I/O last 3 completed shifts: In: 1206 9 [P O :240; I V :806 9; NG/GT:110; IV Piggyback:50]  Out: 6188 [Urine:1220; Drains:510]    Urethral Catheter 16 Fr  (Active)   Reasons to continue Urinary Catheter  Accurate I&O assessment in critically ill patients (48 hr  max) 01/30/23 0800   Goal for Removal Remove after 48 hrs of I/O monitoring 01/30/23 0800   Site Assessment Clean;Skin intact 01/30/23 0800   Mendez Care Done 01/29/23 2100   Collection Container Standard drainage bag 01/30/23 0800   Securement Method Securing device (Describe) 01/30/23 0800   Securing Device Change Date 02/04/23 01/30/23 0800   Output (mL) 150 mL 01/30/23 1100       Physical Exam: /83 (BP Location: Right arm)   Pulse 105   Temp 97 7 °F (36 5 °C) (Tympanic)   Resp 22   Ht 5' 11" (1 803 m)   Wt 69 6 kg (153 lb 7 oz)   SpO2 95%   BMI 21 40 kg/m²     General Appearance:    No acute distress  Cooperative  Appears stated age  Chronically ill-appearing  Head:    Normocephalic  Atraumatic  Normal jaw occlusion  Eyes:    Lids, conjunctiva normal  No scleral icterus  Ears:    Normal external ears  Nose:   Nares normal  No drainage     Mouth:   Lips, tongue normal  Mucosa normal  Phonation normal    Neck:   Supple  Symmetrical    Back:     Symmetric  No CVA tenderness  Lungs:     Normal respiratory effort  Clear to auscultation bilaterally  Chest wall:    No tenderness or deformity  Heart:    Regular rate and rhythm  Normal S1 and S2  No murmur  No JVD  2+ edema  Abdomen:     Soft  Non-tender  Bowel sounds active  Genitourinary:   No Mendez catheter present  Extremities:   Extremities normal  Atraumatic  No cyanosis  Skin:   Warm and dry  No pallor, jaundice, rash, ecchymoses  Neurologic:   Alert and oriented to person, place, time  No focal deficit  Lab, Imaging and other studies: I have personally reviewed pertinent labs  CBC:   Lab Results   Component Value Date    WBC 18 79 (H) 01/30/2023    HGB 11 7 (L) 01/30/2023    HCT 35 0 (L) 01/30/2023     (H) 01/30/2023     01/30/2023    MCH 34 0 01/30/2023    MCHC 33 4 01/30/2023    RDW 13 4 01/30/2023    MPV 9 5 01/30/2023     CMP:   Lab Results   Component Value Date    K 4 4 01/30/2023    CL 91 (L) 01/30/2023    CO2 28 01/30/2023    BUN 18 01/30/2023    CREATININE 0 60 01/30/2023    CALCIUM 7 8 (L) 01/30/2023    AST 38 01/30/2023    ALT 40 01/30/2023    ALKPHOS 59 01/30/2023    EGFR 115 01/30/2023         Results from last 7 days   Lab Units 01/30/23  0453 01/29/23  0457 01/28/23  2228 01/26/23  1649 01/26/23  0956 01/25/23  0503   POTASSIUM mmol/L 4 4 4 6 4 8   < > 3 6 3 9   CHLORIDE mmol/L 91* 92* 93*   < > 88* 90*   CO2 mmol/L 28 26 24   < > 25 23   BUN mg/dL 18 15 14   < > 10 10   CREATININE mg/dL 0 60 0 50* 0 48*   < > 0 52* 0 50*   CALCIUM mg/dL 7 8* 7 9* 8 1*   < > 7 7* 7 5*   ALK PHOS U/L 59  --   --   --  66 67   ALT U/L 40  --   --   --  42 54*   AST U/L 38  --   --   --  39 47*    < > = values in this interval not displayed           Phosphorus:   Lab Results   Component Value Date    PHOS 3 2 01/30/2023     Magnesium:   Lab Results   Component Value Date    MG 1 7 (L) 01/30/2023     Urinalysis: No results found for: Eleonore Do, SPECGRAV, PHUR, LEUKOCYTESUR, NITRITE, PROTEINUA, GLUCOSEU, KETONESU, BILIRUBINUR, BLOODU  Ionized Calcium: No results found for: CAION  Coagulation:   Lab Results   Component Value Date    INR 1 17 01/30/2023     Troponin: No results found for: TROPONINI  ABG: No results found for: PHART, BLP3VFP, PO2ART, OPT8IWL, Q4IKNMKM, BEART, SOURCE  Radiology review:     IMAGING  Procedure: XR chest portable ICU    Result Date: 1/29/2023  Narrative: CHEST INDICATION:   Endotracheal tube positioning  COMPARISON:  None EXAM PERFORMED/VIEWS:  XR CHEST PORTABLE ICU FINDINGS: ETT 6 7 cm of valentin  NGT tip in gastric fundus  Upper lateral right abdomen drain versus superimposed tube Cardiomediastinal silhouette appears unremarkable  The lungs are clear  Bibasilar atelectasis  Bilateral pleural effusions  No pneumothorax  Osseous structures appear within normal limits for patient age  Impression: ETT 6 7 cm above valentin   NGT tip gastric fundus Workstation performed: [id number]       Current Facility-Administered Medications   Medication Dose Route Frequency   • cefTRIAXone (ROCEPHIN) IVPB (premix in dextrose) 2,000 mg 50 mL  2,000 mg Intravenous Q24H   • chlorhexidine (PERIDEX) 0 12 % oral rinse 15 mL  15 mL Mouth/Throat Q12H [address]   • enoxaparin (LOVENOX) subcutaneous injection 40 mg  40 mg Subcutaneous Q24H JAMSHID   • HYDROmorphone (DILAUDID) injection 0 5 mg  0 5 mg Intravenous Q3H PRN   • metroNIDAZOLE (FLAGYL) tablet 500 mg  500 mg Oral Q8H [address]   • ondansetron (ZOFRAN) injection 4 mg  4 mg Intravenous Q6H PRN   • oxyCODONE (ROXICODONE) immediate release tablet 10 mg  10 mg Oral Q4H PRN   • oxyCODONE (ROXICODONE) IR tablet 5 mg  5 mg Oral Q4H PRN   • phenol (CHLORASEPTIC) 1 4 % mucosal liquid 1 spray  1 spray Mouth/Throat Q2H PRN     Medications Discontinued During This Encounter   Medication Reason   • ibuprofen (MOTRIN) 200 mg tablet    • levofloxacin (LEVAQUIN) IVPB (premix in dextrose) 750 mg 150 mL    • metroNIDAZOLE (FLAGYL) IVPB (premix) 500 mg 100 mL    • vancomycin (VANCOCIN) 1,250 mg in sodium chloride 0 9 % 250 mL IVPB    • vancomycin (VANCOCIN) 1500 mg in sodium chloride 0 9% 250 mL IVPB    • sodium chloride (HYPERTONIC) 3 % infusion    • bumetanide (BUMEX) injection 3 mg    • potassium chloride (K-DUR,KLOR-CON) CR tablet 40 mEq    • bupivacaine-epinephrine (MARCAINE/EPINEPHRINE) 0 25 %-1:[id number] injection Patient Discharge   • acetaminophen (TYLENOL) tablet 650 mg    • oxyCODONE (ROXICODONE) IR tablet 5 mg    • oxyCODONE (ROXICODONE) immediate release tablet 10 mg    • aluminum-magnesium hydroxide-simethicone (MYLANTA) oral suspension 30 mL    • loperamide (IMODIUM) capsule 2 mg    • saliva substitute (MOUTH KOTE) mucosal solution 5 spray    • sodium chloride 0 9 % with KCl 20 mEq/L infusion (premix)    • sodium chloride 0 9 % with KCl 20 mEq/L infusion (premix)    • sodium chloride 0 9 % infusion    • propofol (DIPRIVAN) 1000 mg in 100 mL infusion (premix)    • fentanyl citrate (PF) 100 MCG/2ML 50 mcg    • HYDROmorphone (DILAUDID) injection 0 5 mg    • HYDROmorphone HCl (DILAUDID) injection 0 2 mg        Marissa Schirmer, PA-C    Portions of the record may have been created with voice recognition software  Occasional wrong word or "sound a like" substitutions may have occurred due to the inherent limitations of voice recognition software  Read the chart carefully and recognize, using context, where substitutions have occurred

## 2023-01-30 NOTE — RESPIRATORY THERAPY NOTE
01/30/23 0200   Respiratory Assessment   Assessment Type Assess only   General Appearance Sleeping   Oxygen Therapy/Pulse Ox   O2 Device Mid flow nasal cannula   O2 Therapy Oxygen humidified   Nasal Cannula O2 Flow Rate (L/min) (S)  5 L/min  (decreased from 8)   Calculated FIO2 (%) - Nasal Cannula 40   SpO2 98 %   SpO2 Activity At Rest   $ Pulse Oximetry Spot Check Charge Completed

## 2023-01-30 NOTE — ASSESSMENT & PLAN NOTE
· Patient reporting abdominal pain for 3 days and urinary retention, increased abdominal distention, nausea/vomiting  · Patient was initially on Levaquin/Flagyl  · Infectious Disease was consulted and Levaquin/Flagyl were discontinued and patient was started on ceftriaxone/vancomycin  · Vancomycin has been discontinued  · Continue ceftriaxone/flagyl  · Patient had exploratory laparotomy with repair of enterotomy x2, extensive lysis of adhesions, peritoneal biopsy, peritoneal lavage on 1/28/23  · Will follow up cultures  · Continue Abx per infectious disease

## 2023-01-30 NOTE — UTILIZATION REVIEW
Continued Stay Review    Date: 1/28-1/30/23                          Current Patient Class: inpatient  Current Level of Care: ICU    HPI:52 y o  male initially admitted on 1/22/23  Peritonitis, Sepsis      Assessment/Plan:    1/28:  OR today with general surgery for lysis and drainage of bacterial septations  ID was consulted and Levaquin/Flagyl were discontinued and patient was started on ceftriaxone/vancomycin  Vancomycin has been discontinued as per ID  Continue ceftriaxone day 5  FU on final cxs  Na worsening to 119, patient received Bumex 2 mg x1 on 1/27, BMP q4h, nephrology following, continue fluid restriction  1/28 OP Note:  Procedure(s):  LAPAROSCOPY DIAGNOSTIC CONVERTED TO OPEN  LAPAROTOMY  REPAIR ENTEROTOMY X2  EXTENSIVE LYSIS OF ADHESIONS  PERITONEAL BIOPSIES  PERITONEAL LAVAGE     Specimen(s):  ID Type Source Tests Collected by Time Destination   1 : peritoneal biopsies Tissue Peritoneum TISSUE EXAM Tabitha Phoenix, MD 1/28/2023 1015     2 : peritoneal biopsies with acid fast stain Tissue Peritoneum TISSUE EXAM, AFB CULTURE WITH STAIN Tabitha Phoenix, MD 1/28/2023 1015     3 : peritoneal fluid in Lukens trap for cytology and culture Body Fluid Peritoneal Fluid NON-GYNECOLOGIC CYTOLOGY, BODY FLUID CULTURE AND GRAM STAIN Tabitha Phoenix, MD 1/28/2023 1036     A : aerobic culture peritoneal fluid Body Fluid Peritoneal Fluid BODY FLUID CULTURE AND GRAM STAIN Tabitha Phoenix, MD 1/28/2023 1035     B : anaerobic culture peritoneal fluid Body Fluid Peritoneal Fluid ANAEROBIC CULTURE AND GRAM STAIN Tabitha Phoenix, MD 1/28/2023 1035        Estimated Blood Loss:   500 mL  Anesthesia Type:   General  Operative Findings:  Extensive intra-abdominal abscess, with multiple loculations, extensive rind, extremely friable abscess wall, no odor, or enteric contents to suggest perforated bowel as the source    Complications:   Enterotomy x 2 involving proximal small bowel, repaired in 2 layers with chromic and silk  1/29:  Patient remains intubated and this morning was weaned and extubated  Currently is on nasal cannula  He had little pain in his abdomen which has improved  Was started on trickle feeds through the NG tube and he is tolerating  Continue IV ABX, fu on cxs, hyponatremia is improving, dc IVF, trend labs, encourage inc spirometry, OOB to chair, continue prn pain control, sx and GI following      1/30:  Pt reports abdominal pain and difficulty taking deep breaths  Tolerating CLD with out nausea or vomiting  Has lingering cough post intubation  Continue IV ABX, fu on repeat blood cxs and OR cxs, monitor temp and WBC, trend daily CBC and chemistries  Monitor drain output  Close GI and sx fu  Anticipate 2 week course of ABX  Monitor LFTs  D7 ceftriaxone, D4 Flagyl, D10 ABX  ID following  POD#2 s/p diagnostic lap converted to ex lap with repair of enterotomy x2, extensive CHAY, peritoneal bx, peritoneal lavage  Clear liquid diet as nancy, continue drain care and abd binder      Vital Signs:   Date/Time Temp Pulse Resp BP MAP (mmHg) SpO2 FiO2 (%) Calculated FIO2 (%) - Nasal Cannula Nasal Cannula O2 Flow Rate (L/min) O2 Device   01/30/23 0842 -- 108 Abnormal  -- 127/77 96 96 % -- -- -- --   01/30/23 0837 97 3 °F (36 3 °C) Abnormal  106 Abnormal  -- -- -- 95 % -- 32 3 L/min Nasal cannula   01/30/23 0706 -- -- -- -- -- 94 % -- 32 3 L/min Nasal cannula   01/30/23 0500 96 3 °F (35 7 °C) Abnormal  109 Abnormal  22 133/83 102 95 % -- 32 3 L/min  Mid flow nasal cannula   01/30/23 0200 -- -- -- -- -- 98 % -- 40 5 L/min   Mid flow nasal cannula   01/29/23 2015 97 7 °F (36 5 °C) 97 23 Abnormal  110/65 82 98 % -- 52 8 L/min  Mid flow nasal cannula   01/29/23 1800 -- 110 Abnormal  28 Abnormal  112/72 87 96 % -- -- -- --   01/29/23 1743 -- -- -- -- -- 99 % -- 68 12 L/min Mid flow nasal cannula   01/29/23 1700 -- 96 22 108/63 80 98 % -- -- -- --   01/29/23 1600 -- 98 24 Abnormal  106/64 80 90 % -- 80 15 L/min Mid flow nasal cannula   01/29/23 1500 98 °F (36 7 °C) 100 27 Abnormal  116/71 89 94 % -- -- -- --   01/29/23 1452 -- 109 Abnormal  20 -- -- 92 % -- 80 15 L/min Mid flow nasal cannula   01/29/23 1300 -- 94 21 110/65 82 82 % Abnormal  -- -- -- --   01/29/23 1200 -- 93 19 105/60 77 93 % -- 44 6 L/min Nasal cannula   01/29/23 1100 97 6 °F (36 4 °C) 92 20 114/66 85 95 % -- -- -- --   01/29/23 1000 -- 95 20 113/68 86 91 % -- -- -- --   01/29/23 0900 -- 89 22 114/67 85 95 % -- -- -- --   01/29/23 0820 -- -- -- -- -- 95 % -- 36 4 L/min Nasal cannula   01/29/23 0800 96 5 °F (35 8 °C) Abnormal  96 12 104/75 85 94 % 50 -- -- --   01/29/23 0715 -- 98 18 -- -- 98 % -- -- -- --   01/29/23 0600 -- 87 16 93/66 74 95 % 40 -- -- Ventilator   01/29/23 0500 -- 86 16 96/67 78 95 % 40 -- -- Ventilator   01/29/23 0415 -- 86 16 -- -- 97 % 40 -- -- Ventilator   01/29/23 0400 96 7 °F (35 9 °C) Abnormal  87 16 100/69 80 97 % 50 -- -- Ventilator   01/29/23 0300 -- 84 16 90/66 74 97 % 50 -- -- Ventilator   01/29/23 0200 -- 85 16 86/62 Abnormal  70 97 % 50 -- -- Ventilator   01/29/23 0106 -- 101 23 Abnormal  124/77 95 97 % -- -- -- --   01/29/23 0100 -- 100 22 116/67 87 96 % 50 -- -- Ventilator   01/29/23 0015 -- 85 16 109/76 88 96 % 50 -- -- Ventilator   01/29/23 0000 96 8 °F (36 °C) Abnormal  83 16 107/77 87 96 % 50 -- -- Ventilator   01/28/23 2300 -- 81 16 99/70 80 97 % 50 -- -- Ventilator   01/28/23 2200 -- 79 16 103/67 78 97 % 50 -- -- Ventilator   01/28/23 2100 -- 80 16 90/69 76 97 % 50 -- -- Ventilator   01/28/23 2028 -- 80 17 -- -- 97 % 50 -- -- Ventilator   01/28/23 2000 95 7 °F (35 4 °C) Abnormal  80 14 98/71 80 97 % 50 -- -- Ventilator   01/28/23 1900 -- 83 14 104/75 86 97 % 50 -- -- Ventilator   01/28/23 1830 -- 83 14 110/72 87 97 % -- -- -- --   01/28/23 1800 -- 84 17 105/77 87 98 % -- -- -- --   01/28/23 1730 -- 88 14 107/75 87 98 % -- -- -- --   01/28/23 1700 -- 88 14 103/76 86 98 % -- -- -- --   01/28/23 1633 -- -- -- -- -- 98 % -- -- -- --   01/28/23 1615 -- 90 16 107/80 90 98 % -- -- -- --   01/28/23 1600 -- 91 20 112/79 90 98 % -- -- -- --   01/28/23 1545 -- 91 16 110/79 90 98 % -- -- -- --   01/28/23 1530 -- 91 20 109/79 89 98 % -- -- -- --   01/28/23 1515 -- 91 16 110/78 89 98 % -- -- -- --   01/28/23 1500 -- 92 20 109/77 89 98 % -- -- -- --   01/28/23 1445 -- 93 20 103/78 86 98 % -- -- -- --   01/28/23 1430 -- 93 16 114/75 89 98 % -- -- -- --   01/28/23 1415 -- 95 14 107/71 86 98 % -- -- -- --   01/28/23 1400 -- 97 14 107/77 88 98 % -- -- -- --   01/28/23 1345 -- 97 14 104/74 85 98 % -- -- -- --   01/28/23 1330 -- 98 14 103/73 84 97 % -- -- -- --   01/28/23 1315 -- 100 15 105/77 87 97 % -- -- -- --   01/28/23 1300 -- 101 18 115/80 91 98 % -- -- -- --   01/28/23 1245 -- 100 16 118/84 94 97 % -- -- -- --   01/28/23 1240 -- 101 16 116/83 95 97 % -- -- -- --   01/28/23 1235 -- 100 16 117/81 95 97 % -- -- -- --   01/28/23 1230 -- 104 16 128/87 103 97 % -- -- -- --   01/28/23 1222 -- -- -- -- -- 97 % -- -- -- --   01/28/23 1210 96 8 °F (36 °C) Abnormal  100 16 121/83 99 97 % -- -- -- Ventilator   01/28/23 0800 -- -- -- -- -- -- -- -- -- None (Room air)   01/28/23 0700 97 6 °F (36 4 °C) -- -- -- -- -- -- -- -- --   01/28/23 0600 -- 87 31 Abnormal  118/74 91 93 % -- -- -- None (Room air)   01/28/23 0500 -- 96 26 Abnormal  -- -- 94 % -- -- -- None (Room air)   01/28/23 0400 -- 98 37 Abnormal  -- -- 92 % -- -- -- None (Room air)   01/28/23 0330 98 7 °F (37 1 °C) 91 26 Abnormal  125/78 96 93 % -- -- -- None (Room air)   01/28/23 0300 -- 93 25 Abnormal  -- -- 92 % -- -- -- None (Room air)   01/28/23 0200 -- 97 31 Abnormal  -- -- 90 % -- -- -- None (Room air)   01/28/23 0100 -- 102 28 Abnormal  -- -- 91 % -- -- -- None (Room air)   01/28/23 0000 -- 98 21 -- -- 91 % -- -- -- None (Room air)       Pertinent Labs/Diagnostic Results:       Results from last 7 days   Lab Units 01/30/23  0453 01/29/23  0457 01/28/23  0402 01/27/23  0427 01/26/23  4039 WBC Thousand/uL 18 79* 13 28* 8 76 7 80 8 66   HEMOGLOBIN g/dL 11 7* 10 8* 11 9* 11 4* 11 8*   HEMATOCRIT % 35 0* 32 6* 34 5* 33 2* 34 1*   PLATELETS Thousands/uL 308 223 208 216 238   NEUTROS ABS Thousands/µL  --  9 92* 6 57 5 68 6 33   BANDS PCT % 4  --   --   --   --          Results from last 7 days   Lab Units 01/30/23 0453 01/29/23 0457 01/28/23 2228 01/28/23  1616 01/28/23  0402   SODIUM mmol/L 123* 125* 123* 124* 119*   POTASSIUM mmol/L 4 4 4 6 4 8 4 6 3 8   CHLORIDE mmol/L 91* 92* 93* 93* 88*   CO2 mmol/L 28 26 24 25 25   ANION GAP mmol/L 4 7 6 6 6   BUN mg/dL 18 15 14 13 10   CREATININE mg/dL 0 60 0 50* 0 48* 0 48* 0 54*   EGFR ml/min/1 73sq m 115 124 126 126 120   CALCIUM mg/dL 7 8* 7 9* 8 1* 7 6* 7 5*   CALCIUM, IONIZED mmol/L 1 08* 1 09* 1 09* 0 77*  --    MAGNESIUM mg/dL 1 7* 2 0 2 2 1 9 1 5*   PHOSPHORUS mg/dL 3 2 3 8 4 0 4 0 3 1     Results from last 7 days   Lab Units 01/30/23 0453 01/28/23 2228 01/26/23  0956 01/25/23  0503 01/24/23  0455   AST U/L 38  --  39 47* 80*   ALT U/L 40  --  42 54* 75*   ALK PHOS U/L 59  --  66 67 78   TOTAL PROTEIN g/dL 5 9*  --  6 2* 5 9* 6 0*   ALBUMIN g/dL 2 3* 2 2* 2 4* 2 4* 2 6*   TOTAL BILIRUBIN mg/dL 0 85  --  0 90 0 97 1 24*     Results from last 7 days   Lab Units 01/29/23  1151   POC GLUCOSE mg/dl 110     Results from last 7 days   Lab Units 01/30/23 0453 01/29/23 0457 01/28/23 2228 01/28/23 1616 01/28/23  0402 01/27/23  2035 01/27/23  1549 01/27/23  1203 01/27/23  0810 01/27/23  0427 01/27/23  0030 01/26/23  2020   GLUCOSE RANDOM mg/dL 133 130 146* 153* 136 189* 120 139 113 118  120 118 143*     Results from last 7 days   Lab Units 01/28/23  1238   OSMOLALITY, SERUM mmol/*     Results from last 7 days   Lab Units 01/30/23  0453 01/29/23  0457 01/28/23  1616   PROTIME seconds 14 9* 15 3* 15 0*   INR  1 17 1 21* 1 18         Results from last 7 days   Lab Units 01/30/23  0453 01/28/23  0401 01/24/23  0455   PROCALCITONIN ng/ml 0 23 0 30* 0 34* Results from last 7 days   Lab Units 01/28/23  1238   OSMOLALITY, SERUM mmol/*     Results from last 7 days   Lab Units 01/28/23  1229   SODIUM UR  8   CREATININE UR mg/dL 117 0  117 0   PROTEIN UR mg/dL 46   PROT/CREAT RATIO UR  0 39*     Results from last 7 days   Lab Units 01/28/23  1036 01/28/23  1035 01/25/23  1201 01/23/23  1437   BLOOD CULTURE   --   --  No Growth After 4 Days  No Growth After 4 Days  --    GRAM STAIN RESULT  3+ Polys*  3+ Mononuclear Cells*  1+ Gram positive cocci in chains* 2+ Disintegrating polys*  1+ Gram positive cocci in pairs and chains*  --  4+ Polys*  2+ Gram positive cocci in pairs, chains and clusters*   BODY FLUID CULTURE, STERILE   --   --   --  3+ Growth of Streptococcus intermedius*     Results from last 7 days   Lab Units 01/23/23  1437   TOTAL COUNTED  100   WBC FLUID /ul 70,410     Medications:   Scheduled Medications:  cefTRIAXone, 2,000 mg, Intravenous, Q24H  chlorhexidine, 15 mL, Mouth/Throat, Q12H JAMSHID  enoxaparin, 40 mg, Subcutaneous, Q24H JAMSHID  magnesium sulfate, 2 g, Intravenous, Once  metroNIDAZOLE, 500 mg, Oral, Q8H [address]      Continuous IV Infusions: none     PRN Meds:  HYDROmorphone, 0 5 mg, Intravenous, Q3H PRN  ondansetron, 4 mg, Intravenous, Q6H PRN  oxyCODONE, 10 mg, Oral, Q4H PRN  oxyCODONE, 5 mg, Oral, Q4H PRN  phenol, 1 spray, Mouth/Throat, Q2H PRN        Discharge Plan: d    Network Utilization Review Department  ATTENTION: Please call with any questions or concerns to [phone] and carefully listen to the prompts so that you are directed to the right person  All voicemails are confidential   Jose Armando Whitehead all requests for admission clinical reviews, approved or denied determinations and any other requests to dedicated fax number below belonging to the campus where the patient is receiving treatment   List of dedicated fax numbers for the Facilities:  FACILITY NAME NADINE [facility] DENIALS (Administrative/Medical Necessity) [phone] [address]  (Maternity/NICU/Pediatrics) [address] [place] [address] [phone]   [address] [place] [address] [facility] [address] [facility] [place] [address] [place] [phone]

## 2023-01-31 LAB
ALBUMIN SERPL BCP-MCNC: 2.4 G/DL (ref 3.5–5)
ALP SERPL-CCNC: 59 U/L (ref 34–104)
ALT SERPL W P-5'-P-CCNC: 30 U/L (ref 7–52)
ANION GAP SERPL CALCULATED.3IONS-SCNC: 6 MMOL/L (ref 4–13)
AST SERPL W P-5'-P-CCNC: 21 U/L (ref 13–39)
BILIRUB SERPL-MCNC: 0.95 MG/DL (ref 0.2–1)
BUN SERPL-MCNC: 16 MG/DL (ref 5–25)
CALCIUM ALBUM COR SERPL-MCNC: 9.2 MG/DL (ref 8.3–10.1)
CALCIUM SERPL-MCNC: 7.9 MG/DL (ref 8.4–10.2)
CHLORIDE SERPL-SCNC: 89 MMOL/L (ref 96–108)
CO2 SERPL-SCNC: 30 MMOL/L (ref 21–32)
CREAT SERPL-MCNC: 0.64 MG/DL (ref 0.6–1.3)
CREAT UR-MCNC: 71.1 MG/DL
ERYTHROCYTE [DISTWIDTH] IN BLOOD BY AUTOMATED COUNT: 13.4 % (ref 11.6–15.1)
GFR SERPL CREATININE-BSD FRML MDRD: 112 ML/MIN/1.73SQ M
GLUCOSE SERPL-MCNC: 148 MG/DL (ref 65–140)
HCT VFR BLD AUTO: 33.3 % (ref 36.5–49.3)
HGB BLD-MCNC: 11.1 G/DL (ref 12–17)
MCH RBC QN AUTO: 33.5 PG (ref 26.8–34.3)
MCHC RBC AUTO-ENTMCNC: 33.3 G/DL (ref 31.4–37.4)
MCV RBC AUTO: 101 FL (ref 82–98)
MYCOBACTERIUM SPEC CULT: NORMAL
OSMOLALITY UR: 356 MMOL/KG
PLATELET # BLD AUTO: 308 THOUSANDS/UL (ref 149–390)
PMV BLD AUTO: 9.9 FL (ref 8.9–12.7)
POTASSIUM SERPL-SCNC: 3.8 MMOL/L (ref 3.5–5.3)
PROCALCITONIN SERPL-MCNC: 0.16 NG/ML
PROT SERPL-MCNC: 6 G/DL (ref 6.4–8.4)
RBC # BLD AUTO: 3.31 MILLION/UL (ref 3.88–5.62)
RHODAMINE-AURAMINE STN SPEC: NORMAL
SODIUM 24H UR-SCNC: 10 MOL/L
SODIUM SERPL-SCNC: 125 MMOL/L (ref 135–147)
UUN 24H UR-MCNC: 609 MG/DL
WBC # BLD AUTO: 16.22 THOUSAND/UL (ref 4.31–10.16)

## 2023-01-31 RX ORDER — ALBUTEROL SULFATE 90 UG/1
2 AEROSOL, METERED RESPIRATORY (INHALATION) EVERY 4 HOURS PRN
Status: DISCONTINUED | OUTPATIENT
Start: 2023-01-31 | End: 2023-02-03 | Stop reason: HOSPADM

## 2023-01-31 RX ORDER — AMOXICILLIN 250 MG/5ML
500 POWDER, FOR SUSPENSION ORAL ONCE
Status: COMPLETED | OUTPATIENT
Start: 2023-01-31 | End: 2023-01-31

## 2023-01-31 RX ORDER — FAMOTIDINE 10 MG/ML
20 INJECTION, SOLUTION INTRAVENOUS ONCE AS NEEDED
Status: DISCONTINUED | OUTPATIENT
Start: 2023-01-31 | End: 2023-02-03 | Stop reason: HOSPADM

## 2023-01-31 RX ORDER — AMOXICILLIN 250 MG/5ML
50 POWDER, FOR SUSPENSION ORAL ONCE
Status: COMPLETED | OUTPATIENT
Start: 2023-01-31 | End: 2023-01-31

## 2023-01-31 RX ORDER — DIPHENHYDRAMINE HYDROCHLORIDE 50 MG/ML
25 INJECTION INTRAMUSCULAR; INTRAVENOUS EVERY 6 HOURS PRN
Status: DISCONTINUED | OUTPATIENT
Start: 2023-01-31 | End: 2023-02-03 | Stop reason: HOSPADM

## 2023-01-31 RX ORDER — EPINEPHRINE 1 MG/ML
0.3 INJECTION, SOLUTION, CONCENTRATE INTRAVENOUS ONCE AS NEEDED
Status: DISCONTINUED | OUTPATIENT
Start: 2023-01-31 | End: 2023-02-03 | Stop reason: HOSPADM

## 2023-01-31 RX ADMIN — METRONIDAZOLE 500 MG: 500 TABLET ORAL at 14:38

## 2023-01-31 RX ADMIN — CHLORHEXIDINE GLUCONATE 0.12% ORAL RINSE 15 ML: 1.2 LIQUID ORAL at 20:34

## 2023-01-31 RX ADMIN — ENOXAPARIN SODIUM 40 MG: 100 INJECTION SUBCUTANEOUS at 08:31

## 2023-01-31 RX ADMIN — CEFTRIAXONE 2000 MG: 2 INJECTION, SOLUTION INTRAVENOUS at 16:15

## 2023-01-31 RX ADMIN — OXYCODONE HYDROCHLORIDE 10 MG: 10 TABLET ORAL at 08:31

## 2023-01-31 RX ADMIN — OXYCODONE HYDROCHLORIDE 10 MG: 10 TABLET ORAL at 20:33

## 2023-01-31 RX ADMIN — METRONIDAZOLE 500 MG: 500 TABLET ORAL at 05:08

## 2023-01-31 RX ADMIN — AMOXICILLIN 500 MG: 250 POWDER, FOR SUSPENSION ORAL at 15:51

## 2023-01-31 RX ADMIN — AMOXICILLIN 50 MG: 250 POWDER, FOR SUSPENSION ORAL at 14:37

## 2023-01-31 RX ADMIN — HYDROMORPHONE HYDROCHLORIDE 0.5 MG: 1 INJECTION, SOLUTION INTRAMUSCULAR; INTRAVENOUS; SUBCUTANEOUS at 11:00

## 2023-01-31 RX ADMIN — CHLORHEXIDINE GLUCONATE 0.12% ORAL RINSE 15 ML: 1.2 LIQUID ORAL at 08:32

## 2023-01-31 RX ADMIN — OXYCODONE HYDROCHLORIDE 10 MG: 10 TABLET ORAL at 16:14

## 2023-01-31 RX ADMIN — METRONIDAZOLE 500 MG: 500 TABLET ORAL at 20:33

## 2023-01-31 RX ADMIN — HYDROMORPHONE HYDROCHLORIDE 0.5 MG: 1 INJECTION, SOLUTION INTRAMUSCULAR; INTRAVENOUS; SUBCUTANEOUS at 04:42

## 2023-01-31 NOTE — ASSESSMENT & PLAN NOTE
· Nephrology following  · Patient received Bumex 2 mg x1 on 1/27/2023  · Nephrology recommending another dose of Bumex on 1/30/23  · Sodium level 125 today

## 2023-01-31 NOTE — PROGRESS NOTES
[facility]  Progress Note Xochitl Cornejo 1970, 46 y o  male MRN: [id number]  Unit/Bed#: ICU 02-01 Encounter: [id number]  Primary Care Provider: Mary Pagan DO   Date and time admitted to hospital: 1/21/2023  8:32 PM    * Acute peritonitis [facility])  Assessment & Plan  · Patient reporting abdominal pain for 3 days and urinary retention, increased abdominal distention, nausea/vomiting  · Patient was initially on Levaquin/Flagyl  · Infectious Disease was consulted and Levaquin/Flagyl were discontinued and patient was started on ceftriaxone/vancomycin  · Vancomycin has been discontinued  · Continue ceftriaxone/flagyl  · Patient had exploratory laparotomy with repair of enterotomy x2, extensive lysis of adhesions, peritoneal biopsy, peritoneal lavage on 1/28/23  · Will follow up cultures  · Continue Abx per infectious disease    Cirrhosis of liver with ascites ([facility] )  Assessment & Plan  · Does not appear to be in acute decompensation  · GI input appreciated  · Patient had paracentesis performed on 1/23/2023  · Cytology from initial paracentesis negative for malignancy cells    Sepsis ([facility] )  Assessment & Plan  · Secondary to peritonitis  · Noted by leukocytosis, tachycardia tachypnea mild lactic acidosis present on admission  · Lactic acidosis resolved with IV fluids  · Continue antibiotics with ceftriaxone/vancomycin  Ascites cx    4+ Polys Abnormal  P    2+ Gram positive cocci in pairs, chains and clusters Abnormal    · Will continue abx per ID  · Follow-up final cultures  · Blood cultures have remained negative    Hyponatremia  Assessment & Plan  · Nephrology following  · Patient received Bumex 2 mg x1 on 1/27/2023  · Nephrology recommending another dose of Bumex on 1/30/23  · Sodium level 125 today    Hepatitis C  Assessment & Plan  · Patient with a history of hepatitis C untreated  · GI following  · Liver with cirrhotic appearance  · Outpatient follow-up with Gastroenterology for treatment      VTE Prophylaxis:  Enoxaparin (Lovenox)    Patient Centered Rounds: I have performed bedside rounds with nursing staff today  Discussions with Specialists or Other Care Team Provider: yes  Education and Discussions with Family / Patient: Updated patient regarding plan of care    Current Length of Stay: 9 day(s)    Current Patient Status: Inpatient   Certification Statement: The patient will continue to require additional inpatient hospital stay due to Peritonitis    Discharge Plan: Pending hospital course    Code Status: Level 1 - Full Code    Subjective:   No overnight events noted  Patient states he is feeling better today  Denies any pain complaints  Objective:     Vitals:   Temp (24hrs), Av °F (36 7 °C), Min:97 6 °F (36 4 °C), Max:98 4 °F (36 9 °C)    Temp:  [97 6 °F (36 4 °C)-98 4 °F (36 9 °C)] 98 2 °F (36 8 °C)  HR:  [103-127] 106  Resp:  [18-22] 18  BP: (115-149)/(64-89) 115/75  SpO2:  [94 %-96 %] 95 %  Body mass index is 20 75 kg/m²  Input and Output Summary (last 24 hours): Intake/Output Summary (Last 24 hours) at 2023 1101  Last data filed at 2023 0801  Gross per 24 hour   Intake 552 ml   Output 1690 ml   Net -1138 ml       Physical Exam:   Physical Exam  Constitutional:       General: He is not in acute distress  HENT:      Head: Normocephalic and atraumatic  Nose: Nose normal       Mouth/Throat:      Mouth: Mucous membranes are moist    Eyes:      Extraocular Movements: Extraocular movements intact  Conjunctiva/sclera: Conjunctivae normal    Cardiovascular:      Rate and Rhythm: Normal rate and regular rhythm  Pulmonary:      Effort: Pulmonary effort is normal  No respiratory distress  Abdominal:      Palpations: Abdomen is soft  Tenderness: There is no abdominal tenderness  Comments: Abdominal drains in place   Musculoskeletal:         General: Normal range of motion  Cervical back: Normal range of motion and neck supple  Skin:     General: Skin is warm and dry  Neurological:      General: No focal deficit present  Mental Status: He is alert  Mental status is at baseline  Cranial Nerves: No cranial nerve deficit  Psychiatric:         Mood and Affect: Mood normal          Behavior: Behavior normal          Additional Data:     Labs:    Results from last 7 days   Lab Units 01/31/23  0509 01/30/23  0453 01/29/23  0457   WBC Thousand/uL 16 22* 18 79* 13 28*   HEMOGLOBIN g/dL 11 1* 11 7* 10 8*   HEMATOCRIT % 33 3* 35 0* 32 6*   PLATELETS Thousands/uL 308 308 223   NEUTROS PCT %  --   --  74   LYMPHS PCT %  --   --  18   LYMPHO PCT %  --  17  --    MONOS PCT %  --   --  7   MONO PCT %  --  7  --    EOS PCT %  --  0 0     Results from last 7 days   Lab Units 01/31/23  0509   SODIUM mmol/L 125*   POTASSIUM mmol/L 3 8   CHLORIDE mmol/L 89*   CO2 mmol/L 30   BUN mg/dL 16   CREATININE mg/dL 0 64   CALCIUM mg/dL 7 9*   ALK PHOS U/L 59   ALT U/L 30   AST U/L 21     Results from last 7 days   Lab Units 01/30/23  0453   INR  1 17     Results from last 7 days   Lab Units 01/29/23  1151   POC GLUCOSE mg/dl 110           * I Have Reviewed All Lab Data Listed Above  * Additional Pertinent Lab Tests Reviewed: [facility] Admission  Reviewed    Imaging:  Imaging Reports Reviewed Today Include: No new imaging    Recent Cultures (last 7 days):     Results from last 7 days   Lab Units 01/28/23  1036 01/28/23  1035 01/25/23  1201   BLOOD CULTURE   --   --  No Growth After 5 Days  No Growth After 5 Days     GRAM STAIN RESULT  3+ Polys*  3+ Mononuclear Cells*  1+ Gram positive cocci in chains* 2+ Disintegrating polys*  1+ Gram positive cocci in pairs and chains*  --    BODY FLUID CULTURE, STERILE  No growth No growth  --        Last 24 Hours Medication List:   Current Facility-Administered Medications   Medication Dose Route Frequency Provider Last Rate   • cefTRIAXone  2,000 mg Intravenous Q24H MARQUIS Boudreaux 2,000 mg (01/30/23 1613)   • chlorhexidine  15 mL Mouth/Throat Q12H [address] MARQUIS Whitehead     • enoxaparin  40 mg Subcutaneous Q24H [address] MARQUIS Whitehead     • HYDROmorphone  0 5 mg Intravenous Q3H PRN Mahi Bruins, CRNP     • metroNIDAZOLE  500 mg Oral Q8H [address] MARQUIS Whitehead     • ondansetron  4 mg Intravenous Q6H PRN Mahi Bruins, CRNP     • oxyCODONE  10 mg Oral Q4H PRN Mahi Bruins, CRNP     • oxyCODONE  5 mg Oral Q4H PRN Mahi Bruins, CRNP     • phenol  1 spray Mouth/Throat Q2H PRN Mahi Bruins, CRNP          Today, Patient Was Seen By: Maribell Willis MD    ** Please Note: Dictation voice to text software may have been used in the creation of this document   **

## 2023-01-31 NOTE — PROGRESS NOTES
First dose of 50mg amoxicillin given, pt reported no symptoms  Vital signs done q15 min x4, vital signs wdl

## 2023-01-31 NOTE — PLAN OF CARE
Problem: PAIN - ADULT  Goal: Verbalizes/displays adequate comfort level or baseline comfort level  Description: Interventions:  - Encourage patient to monitor pain and request assistance  - Assess pain using appropriate pain scale  - Administer analgesics based on type and severity of pain and evaluate response  - Implement non-pharmacological measures as appropriate and evaluate response  - Consider cultural and social influences on pain and pain management  - Notify physician/advanced practitioner if interventions unsuccessful or patient reports new pain  Outcome: Progressing     Problem: Nutrition/Hydration-ADULT  Goal: Nutrient/Hydration intake appropriate for improving, restoring or maintaining nutritional needs  Description: Monitor and assess patient's nutrition/hydration status for malnutrition  Collaborate with interdisciplinary team and initiate plan and interventions as ordered  Monitor patient's weight and dietary intake as ordered or per policy  Utilize nutrition screening tool and intervene as necessary  Determine patient's food preferences and provide high-protein, high-caloric foods as appropriate       INTERVENTIONS:  - Monitor oral intake, urinary output, labs, and treatment plans  - Assess nutrition and hydration status and recommend course of action  - Evaluate amount of meals eaten  - Assist patient with eating if necessary   - Allow adequate time for meals  - Recommend/ encourage appropriate diets, oral nutritional supplements, and vitamin/mineral supplements  - Order, calculate, and assess calorie counts as needed  - Recommend, monitor, and adjust tube feedings and TPN/PPN based on assessed needs  - Assess need for intravenous fluids  - Provide specific nutrition/hydration education as appropriate  - Include patient/family/caregiver in decisions related to nutrition  Outcome: Progressing     Problem: METABOLIC, FLUID AND ELECTROLYTES - ADULT  Goal: Electrolytes maintained within normal limits  Description: INTERVENTIONS:  - Monitor labs and assess patient for signs and symptoms of electrolyte imbalances  - Administer electrolyte replacement as ordered  - Monitor response to electrolyte replacements, including repeat lab results as appropriate  - Instruct patient on fluid and nutrition as appropriate  Outcome: Progressing     Problem: METABOLIC, FLUID AND ELECTROLYTES - ADULT  Goal: Fluid balance maintained  Description: INTERVENTIONS:  - Monitor labs   - Monitor I/O and WT  - Instruct patient on fluid and nutrition as appropriate  - Assess for signs & symptoms of volume excess or deficit  Outcome: Progressing

## 2023-01-31 NOTE — DISCHARGE INSTR - OTHER ORDERS
Skin and Wound Care Plan:   1  When patient is transferred from ICU to Med Surg unit, needs order for P-500 low air loss therapy surface  2  Apply skin nourishing cream to the skin daily  3  Ehob offloading cushion to chair when OOB  4  Apply hydraguard to b/l heels  5  Elevate heels off of bed with pillows to offload  6  Turn and reposition patient Q2 hours  7  Cleanse back and buttock wounds gently with soap and water, pat dry   Place oil emulsion non-adherent dressing on wound beds, cover with small Allevyn silicone bordered dressings, malick with T, date, change every other day and PRN with dislodgement or soilage

## 2023-01-31 NOTE — WOUND OSTOMY CARE
Progress Note - Wound   Emeli Palacios 46 y o  male MRN: [id number]  Unit/Bed#: ICU 02-01 Encounter: [id number]    Assessment:   46year old male patient admitted with acute peritonitis  Wound care consulted for sacral wound  Patient history significant for hepatitis C, cirrhosis of liver with ascites and sepsis  Patient assessed in bed, he is awake, alert and oriented, is able to turn in the bed independently, he states today is the first day he has been out of bed  Ehob cushion on recliner seat  Per RN, patient has not been compliant with turning in bed and pulls out the wedges when placed for offloading  He is able to feed himself, is incontinent of urine and not of stool  Patient had a leon catheter in place was removed on 01/30/2023  Patient is on an ICU bed  Spoke with patient regarding wounds, explained he should be keeping pressure off his back and buttocks, and to follow with the wound center on discharge for back and buttock wounds  Findings  1  HA-deep tissue injury to the mid back on the bony prominence of the spine  Wound bed is mix of purple and yellow slough  Wound may evolve to a stage 3, stage 4 or unstageable wound  Periwound mix of blanchable and non-blanchable erythema  2  HA-deep tissue injury to bilateral buttocks  Serous filled blisters to the inner buttocks area of the wound beds  Non-blanchable beefy red wound bed to the right buttock with maroon and purple non-blanchable tissue and blanchable erythema to the periwound  Left inner buttock small area of deep tissue injury, serous blister within wound bed of purple non-blanchable tissue  Inner gluteal cleft light purple  Suspect wound to evolve to a stage 3, stage 4 or unstageable wound  3  Bilateral heels intact    Skin and Wound Care Plan:   1  When patient is transferred from ICU to Med Surg unit, needs order for P-500 low air loss therapy surface  2  Apply skin nourishing cream to the skin daily  3   Ehob offloading cushion to chair when OOB  4  Apply hydraguard to b/l heels  5  Elevate heels off of bed with pillows to offload  6  Turn and reposition patient Q2 hours  7  Cleanse back and buttock wounds gently with soap and water, pat dry  Place oil emulsion non-adherent dressing on wound beds, cover with small Allevyn silicone bordered dressings, malick with T, date, change every other day and PRN with dislodgement or soilage    Discussed findings with manager of unit, Hailee    Wound:  Wound 01/28/23 Abrasion(s) Back Medial;Upper (Active)   Wound Image   01/31/23 1517   Wound Description Fragile 01/31/23 1600   Pressure Injury Stage DTPI 01/31/23 1517   Aminah-wound Assessment Intact 01/31/23 1600   Wound Length (cm) 2 9 cm 01/31/23 1517   Wound Width (cm) 1 8 cm 01/31/23 1517   Wound Surface Area (cm^2) 5 22 cm^2 01/31/23 1517   Drainage Amount None 01/31/23 1600   Drainage Description Brown 01/31/23 1517   Treatments Cleansed 01/31/23 1517   Dressing Foam, Silicon (eg  Allevyn, etc) 01/31/23 1600   Dressing Changed Changed 01/31/23 1517   Patient Tolerance Tolerated well 01/31/23 1517   Dressing Status Clean;Dry; Intact 01/31/23 1600       Wound 01/28/23 Abdomen N/A (Active)   Wound Description Fragile;Pink;Dusky 01/31/23 1600   Aminah-wound Assessment Clean;Dry; Intact; [place] 01/31/23 1600   Wound Site Closure Other (Comment) 01/31/23 1600   Drainage Amount Moderate 01/31/23 1600   Drainage Description Serosanguineous 01/31/23 1600   Treatments Cleansed 01/31/23 0800   Dressing Dry dressing 01/31/23 1600   Dressing Status Clean;Dry; Intact 01/31/23 1600       Wound 01/29/23 Pressure Injury Sacrum (Active)   Wound Image   01/31/23 1518   Wound Description Fragile;Pink;Light purple; Beefy red;Bleeding 01/31/23 1600   Pressure Injury Stage DTPI 01/31/23 1600   Aminah-wound Assessment Intact 01/31/23 1600   Wound Length (cm) 4 cm 01/31/23 1518   Wound Width (cm) 7 cm 01/31/23 1518   Wound Depth (cm) 0 1 cm 01/31/23 1518   Wound Surface Area (cm^2) 28 cm^2 01/31/23 1518   Wound Volume (cm^3) 2 8 cm^3 01/31/23 1518   Calculated Wound Volume (cm^3) 2 8 cm^3 01/31/23 1518   Drainage Amount None 01/31/23 1600   Drainage Description Yellow 01/31/23 1518   Treatments Cleansed 01/31/23 1518   Dressing Foam, Silicon (eg  Allevyn, etc) 01/31/23 1600   Dressing Changed Changed 01/31/23 1518   Patient Tolerance Tolerated well 01/31/23 1518   Dressing Status Clean;Dry; Intact 01/31/23 1600     Reviewed plan of care with primary RN Montse  Recommendations written as orders  Wound care team to follow weekly while admitted  Questions or concerns [facility] Nurse    Starr Curry BSN, RN, [employer]

## 2023-01-31 NOTE — UTILIZATION REVIEW
Continued Stay Review    Date: 1/31/23                          Current Patient Class: inpatient  Current Level of Care: ICU    HPI:52 y o  male initially admitted on 1/22/23  Peritonitis, Sepsis      Assessment/Plan:  States feeling better today  Abd drains in place  Patient had exploratory laparotomy with repair of enterotomy x2, extensive lysis of adhesions, peritoneal biopsy, peritoneal lavage on 1/28/23  Continue ABXs, fu on cxs, ID following  Monitor electrolytes  GI following  Regular diet as nancy, trend WBC and fever, strict IO, pain and nausea control  Continue ICU level of care      Vital Signs:   Date/Time Temp Pulse Resp BP MAP (mmHg) SpO2 FiO2 (%) Calculated FIO2 (%) - Nasal Cannula Nasal Cannula O2 Flow Rate (L/min) O2 Device Patient Position - Orthostatic VS   01/31/23 0751 -- -- -- -- -- 95 % -- 28 2 L/min Nasal cannula --   01/31/23 0747 98 2 °F (36 8 °C) 106 Abnormal  18 115/75 87 94 % -- 28 2 L/min Nasal cannula --   01/31/23 0515 98 4 °F (36 9 °C) 103 21 117/64 83 95 % -- 32 3 L/min Nasal cannula --   01/30/23 1928 97 6 °F (36 4 °C) 111 Abnormal  19 133/87 104 95 % -- 32 3 L/min Nasal cannula Lying   01/30/23 1700 -- 115 Abnormal  -- 130/86 102 94 % -- -- -- -- --   01/30/23 1600 -- 118 Abnormal  20 130/79 99 95 % -- 32 3 L/min Nasal cannula Lying   01/30/23 1500 -- 122 Abnormal  -- 146/89 112 96 % -- -- -- -- --   01/30/23 1400 -- 127 Abnormal  -- 135/77 98 94 % -- -- -- -- --   01/30/23 1300 -- 115 Abnormal  -- 149/86 108 96 % -- -- -- -- --   01/30/23 1116 97 7 °F (36 5 °C) 105 22 132/83 102 95 % -- 32 3 L/min Nasal cannula Lying   01/30/23 0842 -- 108 Abnormal  -- 127/77 96 96 % -- -- -- -- Lying   01/30/23 0837 97 3 °F (36 3 °C) Abnormal  106 Abnormal  -- -- -- 95 % -- 32 3 L/min Nasal cannula --   01/30/23 0706 -- -- -- -- -- 94 % -- 32 3 L/min Nasal cannula --   01/30/23 0500 96 3 °F (35 7 °C) Abnormal  109 Abnormal  22 133/83 102 95 % -- 32 3 L/min  Mid flow nasal cannula --   Nasal Cannula O2 Flow Rate (L/min): decreased from 5 at 01/30/23 0500   01/30/23 0200 -- -- -- -- -- 98 % -- 40 5 L/min   Mid flow nasal cannula --   Nasal Cannula O2 Flow Rate (L/min): decreased from 8 at 01/30/23 0200   01/29/23 2015 97 7 °F (36 5 °C) 97 23 Abnormal  110/65 82 98 % -- 52 8 L/min  Mid flow nasal cannula Lying   Nasal Cannula O2 Flow Rate (L/min): decreased from 11 at 01/29/23 2015 01/29/23 1800 -- 110 Abnormal  28 Abnormal  112/72 87 96 % -- -- -- -- --   01/29/23 1743 -- -- -- -- -- 99 % -- 68 12 L/min Mid flow nasal cannula --   01/29/23 1700 -- 96 22 108/63 80 98 % -- -- -- -- --   01/29/23 1600 -- 98 24 Abnormal  106/64 80 90 % -- 80 15 L/min Mid flow nasal cannula Lying   01/29/23 1500 98 °F (36 7 °C) 100 27 Abnormal  116/71 89 94 % -- -- -- -- Lying   01/29/23 1452 -- 109 Abnormal  20 -- -- 92 % -- 80 15 L/min Mid flow nasal cannula --   01/29/23 1300 -- 94 21 110/65 82 82 % Abnormal  -- -- -- -- --   01/29/23 1200 -- 93 19 105/60 77 93 % -- 44 6 L/min Nasal cannula --   01/29/23 1100 97 6 °F (36 4 °C) 92 20 114/66 85 95 % -- -- -- -- --   01/29/23 1000 -- 95 20 113/68 86 91 % -- -- -- -- --   01/29/23 0900 -- 89 22 114/67 85 95 % -- -- -- -- --   01/29/23 0820 -- -- -- -- -- 95 % -- 36 4 L/min Nasal cannula --   01/29/23 0800 96 5 °F (35 8 °C) Abnormal  96 12 104/75 85 94 % 50 -- -- -- --   01/29/23 0715 -- 98 18 -- -- 98 % -- -- -- -- --   01/29/23 0600 -- 87 16 93/66 74 95 % 40 -- -- Ventilator Lying   01/29/23 0500 -- 86 16 96/67 78 95 % 40 -- -- Ventilator Lying   01/29/23 0415 -- 86 16 -- -- 97 % 40 -- -- Ventilator --   01/29/23 0400 96 7 °F (35 9 °C) Abnormal  87 16 100/69 80 97 % 50 -- -- Ventilator Lying   01/29/23 0300 -- 84 16 90/66 74 97 % 50 -- -- Ventilator Lying   01/29/23 0200 -- 85 16 86/62 Abnormal  70 97 % 50 -- -- Ventilator Lying   01/29/23 0106 -- 101 23 Abnormal  124/77 95 97 % -- -- -- -- --   01/29/23 0100 -- 100 22 116/67 87 96 % 50 -- -- Ventilator Lying   01/29/23 0015 -- 85 16 109/76 88 96 % 50 -- -- Ventilator Lying   01/29/23 0000 96 8 °F (36 °C) Abnormal  83 16 107/77 87 96 % 50 -- -- Ventilator Lying       Pertinent Labs/Diagnostic Results:       Results from last 7 days   Lab Units 01/31/23  0509 01/30/23  0453 01/29/23  0457 01/28/23  0402 01/27/23  0427   WBC Thousand/uL 16 22* 18 79* 13 28* 8 76 7 80   HEMOGLOBIN g/dL 11 1* 11 7* 10 8* 11 9* 11 4*   HEMATOCRIT % 33 3* 35 0* 32 6* 34 5* 33 2*   PLATELETS Thousands/uL 308 308 223 208 216   NEUTROS ABS Thousands/µL  --   --  9 92* 6 57 5 68   BANDS PCT %  --  4  --   --   --          Results from last 7 days   Lab Units 01/31/23  0509 01/30/23  0453 01/29/23  0457 01/28/23  2228 01/28/23  1616 01/28/23  0402   SODIUM mmol/L 125* 123* 125* 123* 124* 119*   POTASSIUM mmol/L 3 8 4 4 4 6 4 8 4 6 3 8   CHLORIDE mmol/L 89* 91* 92* 93* 93* 88*   CO2 mmol/L 30 28 26 24 25 25   ANION GAP mmol/L 6 4 7 6 6 6   BUN mg/dL 16 18 15 14 13 10   CREATININE mg/dL 0 64 0 60 0 50* 0 48* 0 48* 0 54*   EGFR ml/min/1 73sq m 112 115 124 126 126 120   CALCIUM mg/dL 7 9* 7 8* 7 9* 8 1* 7 6* 7 5*   CALCIUM, IONIZED mmol/L  --  1 08* 1 09* 1 09* 0 77*  --    MAGNESIUM mg/dL  --  1 7* 2 0 2 2 1 9 1 5*   PHOSPHORUS mg/dL  --  3 2 3 8 4 0 4 0 3 1     Results from last 7 days   Lab Units 01/31/23  0509 01/30/23  0453 01/28/23 2228 01/26/23  0956 01/25/23  0503   AST U/L 21 38  --  39 47*   ALT U/L 30 40  --  42 54*   ALK PHOS U/L 59 59  --  66 67   TOTAL PROTEIN g/dL 6 0* 5 9*  --  6 2* 5 9*   ALBUMIN g/dL 2 4* 2 3* 2 2* 2 4* 2 4*   TOTAL BILIRUBIN mg/dL 0 95 0 85  --  0 90 0 97     Results from last 7 days   Lab Units 01/29/23  1151   POC GLUCOSE mg/dl 110     Results from last 7 days   Lab Units 01/31/23  0509 01/30/23  0453 01/29/23  0457 01/28/23  2228 01/28/23  1616 01/28/23  0402 01/27/23  2035 01/27/23  1549 01/27/23  1203 01/27/23  0810 01/27/23  0427 01/27/23  0030   GLUCOSE RANDOM mg/dL 148* 133 130 146* 153* 136 189* 120 139 113 118  120 118     Results from last 7 days   Lab Units 01/28/23  1238   OSMOLALITY, SERUM mmol/*     Results from last 7 days   Lab Units 01/30/23  0453 01/29/23  0457 01/28/23  1616   PROTIME seconds 14 9* 15 3* 15 0*   INR  1 17 1 21* 1 18         Results from last 7 days   Lab Units 01/31/23  0509 01/30/23  0453 01/28/23  0401   PROCALCITONIN ng/ml 0 16 0 23 0 30*     Results from last 7 days   Lab Units 01/30/23  1619 01/28/23  1238   OSMOLALITY, SERUM mmol/KG  --  267*   OSMO UR mmol/  --      Results from last 7 days   Lab Units 01/30/23  1619 01/28/23  1229   CLARITY UA  Clear  --    COLOR UA  Yellow  --    SPEC GRAV UA  1 015  --    PH UA  6 0  --    GLUCOSE UA mg/dl Negative  --    KETONES UA mg/dl Negative  --    BLOOD UA  Negative  --    PROTEIN UA mg/dl Negative  --    NITRITE UA  Negative  --    BILIRUBIN UA  Negative  --    UROBILINOGEN UA E U /dl 0 2  --    LEUKOCYTES UA  Negative  --    WBC UA /hpf None Seen  --    RBC UA /hpf None Seen  --    BACTERIA UA /hpf None Seen  --    EPITHELIAL CELLS WET PREP /hpf None Seen  --    SODIUM UR  10 8   CREATININE UR mg/dL 71 1 117 0  117 0   PROTEIN UR mg/dL  --  46   PROT/CREAT RATIO UR   --  0 39*     Results from last 7 days   Lab Units 01/28/23  1036 01/28/23  1035 01/25/23  1201   BLOOD CULTURE   --   --  No Growth After 5 Days  No Growth After 5 Days     GRAM STAIN RESULT  3+ Polys*  3+ Mononuclear Cells*  1+ Gram positive cocci in chains* 2+ Disintegrating polys*  1+ Gram positive cocci in pairs and chains*  --    BODY FLUID CULTURE, STERILE  No growth No growth  --      Medications:   Scheduled Medications:  amoxicillin, 50 mg, Oral, Once   Followed by  amoxicillin, 500 mg, Oral, Once  cefTRIAXone, 2,000 mg, Intravenous, Q24H  chlorhexidine, 15 mL, Mouth/Throat, Q12H JAMSHID  enoxaparin, 40 mg, Subcutaneous, Q24H JAMSHID  metroNIDAZOLE, 500 mg, Oral, Q8H [address]      Continuous IV Infusions: none     PRN Meds:  albuterol, 2 puff, Inhalation, Q4H PRN  diphenhydrAMINE, 25 mg, Intravenous, Q6H PRN  EPINEPHrine PF, 0 3 mg, Intramuscular, Once PRN  famotidine, 20 mg, Intravenous, Once PRN  HYDROmorphone, 0 5 mg, Intravenous, Q3H PRN  ondansetron, 4 mg, Intravenous, Q6H PRN  oxyCODONE, 10 mg, Oral, Q4H PRN  oxyCODONE, 5 mg, Oral, Q4H PRN  phenol, 1 spray, Mouth/Throat, Q2H PRN        Discharge Plan: d    Network Utilization Review Department  ATTENTION: Please call with any questions or concerns to [phone] and carefully listen to the prompts so that you are directed to the right person  All voicemails are confidential   Alonzo Benson all requests for admission clinical reviews, approved or denied determinations and any other requests to dedicated fax number below belonging to the campus where the patient is receiving treatment   List of dedicated fax numbers for the Facilities:  [address] DENIALS (Administrative/Medical Necessity) [phone]   [address] (Maternity/NICU/Pediatrics) [phone]   3 Rosalina Truong [phone]   [facility] [phone]   [address] [place] [address] Alfredo Flannery 28 [phone]   1551 [facility] [place] [facility] 134 [address] [phone]

## 2023-01-31 NOTE — PROGRESS NOTES
Progress Note - [facility] Infectious Disease   Lender Rota 46 y o  male MRN: [id number]  Unit/Bed#: ICU 02-01 Encounter: [id number]      IMPRESSION & RECOMMENDATIONS:   1  Sepsis, evolving from admission, evidenced by leukocytosis and tachycardia, with intermittent low-grade fevers  Likely due to peritonitis  Initial blood cultures are negative for growth  Repeat blood cultures negative >4d  WBC up-trending post op and likely reactive  Patient remains hemodynamically stable  -Continue antibiotics as below  -Follow-up repeat blood cultures   -Monitor temperature and hemodynamics  -Low threshold to re-image A/P if leukocytosis continues to uptrend    -Trend daily CBC differential and chemistry     2  Acute peritonitis  Patient presented with 1 week of worsening abdominal pain, distention, fevers and chills   CT showed moderate to large intra-abdominal and pelvic ascites with diffuse enhancement and thickening of the peritoneal lining compatible with acute peritonitis  S/p paracentesis 1/23 with loculated ascites present and fluid studies consistent with infection with extremely elevated WBC >70,000, with elevated LDH   Fluid studies not entirely consistent with portal hypertension, SAAG < 1 1 and Protein 4 4  Peritoneal fluid culture positive for strep intermedius, which has the propensity to produce abscesses  Unknown source  No evidence of GI source for a secondary peritonitis on initial CT A/P  Unlikely malignant with negative cytology  Now s/p ex lap with peritoneal bx, lavage and drainage of large abscess 1/28/23   OR cultures are so far negative, and GS shows GPC in pairs and chains   -Continue IV Ceftriaxone 2g q24 and PO Flagyl 500mg q8h  -Continue to follow up OR cultures  -Monitor drain output   -Will attempt amoxicillin challenge today, orders placed; d/w ID clinical pharmacist and nursing   -Anticipate 2 week course of antibiotics from OR, likely Augmentin if tolerates amoxicillin, through 23  -Serial abdominal exams and daily liver chemistries   -Close gastroenterology/surgery follow-up     3  Cirrhosis   CT shows nodularity of the liver surface suggestive of chronic cirrhosis   History of chronic hepatitis C, former alcohol abuse and IV drug abuse   In remission for 20 years   Patient not treated for hepatitis C  HIV negative  HCV PCR quant [id number]    -Ongoing follow-up with GI for tx     4  Postoperative respiratory insufficiency  Improved and now extubated to 2-3L NC       5  Transaminitis, hyponatremia  Ashish Ream due to cirrhosis   Nephrology and gastroenterology are following   -Monitor LFTs, chemistry     6  Antibiotic Allergy   Reports history of rash with penicillin and denies anaphylaxis   At this point unlikely this is a significant allergy   Low risk for cross-reactivity with cephalosporins   He is tolerating ceftriaxone   -Monitor for adverse drug reactions   -Amoxicillin challenge as above     Antibiotics:  D8 ceftriaxone  D5 flagyl  D11 abx     I have discussed the above management plan in detail with patient  I have discussed the above management plan in detail with patient's RN, and the primary service, SLIM attending  Subjective:  Patient has no fever, chills, sweats; minimal abdominal pain this AM, just had breakfast and tolerated it without nausea or vomiting  Mendez removed and he is voiding  We discussed initiation of amoxicillin challenge and he is agreeable  Patient is low risk for adverse reaction and expressed understanding       Objective:  Vitals:  Temp:  [97 6 °F (36 4 °C)-98 4 °F (36 9 °C)] 98 2 °F (36 8 °C)  HR:  [103-127] 106  Resp:  [18-21] 18  BP: (115-149)/(64-89) 115/75  SpO2:  [94 %-96 %] 95 %  Temp (24hrs), Av 1 °F (36 7 °C), Min:97 6 °F (36 4 °C), Max:98 4 °F (36 9 °C)  Current: Temperature: 98 2 °F (36 8 °C)    PHYSICAL EXAM:  General Appearance:  Appearing chronically ill, nontoxic, and in no distress   HEENT: Normocephalic, without obvious abnormality, atraumatic  Conjunctiva pink and sclera anicteric  Oropharynx dry without lesions  Lungs:   Clear to auscultation bilaterally, respirations unlabored   Heart:  RRR; no murmur, rub or gallop   Abdomen:   Abdominal tenderness around incision with dressing in place c/d/i  MAURICE drains in place x2 draining SS fluid R>L  Extremities: No cyanosis, clubbing; minimal pedal edema b/l    : No CVA or suprapubic tenderness  Mendez removed  Tea colored urine noted in bedside urinal     Skin: No rashes or lesions  No draining wounds noted  Peripheral IV intact without evidence of erythema, warmth, or exudate  LABS, IMAGING, & OTHER STUDIES:  Lab Results:  I have personally reviewed pertinent labs  Results from last 7 days   Lab Units 01/31/23  0509 01/30/23  0453 01/29/23  0457   WBC Thousand/uL 16 22* 18 79* 13 28*   HEMOGLOBIN g/dL 11 1* 11 7* 10 8*   PLATELETS Thousands/uL 308 308 223     Results from last 7 days   Lab Units 01/31/23  0509 01/30/23  0453 01/29/23  0457 01/26/23  1649 01/26/23  0956   SODIUM mmol/L 125* 123* 125*   < > 120*   POTASSIUM mmol/L 3 8 4 4 4 6   < > 3 6   CHLORIDE mmol/L 89* 91* 92*   < > 88*   CO2 mmol/L 30 28 26   < > 25   BUN mg/dL 16 18 15   < > 10   CREATININE mg/dL 0 64 0 60 0 50*   < > 0 52*   EGFR ml/min/1 73sq m 112 115 124   < > 122   CALCIUM mg/dL 7 9* 7 8* 7 9*   < > 7 7*   AST U/L 21 38  --   --  39   ALT U/L 30 40  --   --  42   ALK PHOS U/L 59 59  --   --  66    < > = values in this interval not displayed  Results from last 7 days   Lab Units 01/28/23  1036 01/28/23  1035 01/25/23  1201   BLOOD CULTURE   --   --  No Growth After 5 Days  No Growth After 5 Days     GRAM STAIN RESULT  3+ Polys*  3+ Mononuclear Cells*  1+ Gram positive cocci in chains* 2+ Disintegrating polys*  1+ Gram positive cocci in pairs and chains*  --    BODY FLUID CULTURE, STERILE  No growth No growth  --      Results from last 7 days   Lab Units 01/31/23  0509 01/30/23  0458 01/28/23  0401   PROCALCITONIN ng/ml 0 16 0 23 0 30*

## 2023-01-31 NOTE — PROGRESS NOTES
Progress Note - General Surgery   Roc Merida 46 y o  male MRN: [id number]  Unit/Bed#: ICU 02-01 Encounter: [id number]    Assessment:  54-year-old M with PMH significant for hep C/alcoholic cirrhosis presenting with peritonitis of unknown source  POD#3 s/p diagnostic lap converted to ex lap with repair of enterotomy x2, extensive CHAY, peritoneal bx, peritoneal lavage  - afebrile, tachycardic 100-110s, SpO2 95% on 2L O2 via NC  - Leukocytosis downtrending today, WBC 16 (18)  - hemoglobin stable   - procal WNL  - UOP 1 7L  - Drains x2: Left sided 100cc ss, Right sided 170cc ss with clot in bulb  Electrolyte abnormalities     Plan:  - ok to cont regular diet as tolerated   - cont IV abx per ID recs - currently on rocephin / PO flagyl , will await final cx for further recs   - trend WBC, monitor vitals  - follow up final peritoneal fluid cx   - Strict I/Os  - PRN analgesia/antiemetics   - Cont drain care, apply clean dressings daily, telfa kaiser to remain in place until removed by surgical team, cont abdominal binder  - medical management per primary team    Subjective/Objective   Subjective: No acute events overnight  Patient states he is feeling much better today, "I finally am starting to feel like myself again"  Tolerating regular diet without N/V  Passing flatus, no BM  Wearing abdominal binder  Objective:   Blood pressure 115/75, pulse (!) 106, temperature 98 2 °F (36 8 °C), temperature source Oral, resp  rate 18, height 5' 11" (1 803 m), weight 67 5 kg (148 lb 13 oz), SpO2 95 %  ,Body mass index is 20 75 kg/m²        Intake/Output Summary (Last 24 hours) at 1/31/2023 0804  Last data filed at 1/31/2023 0501  Gross per 24 hour   Intake 552 ml   Output 1870 ml   Net -1318 ml       Invasive Devices     Peripheral Intravenous Line  Duration           Peripheral IV 01/28/23 Left;Ventral (anterior) Forearm 3 days    Peripheral IV 01/28/23 Left Antecubital 2 days    Peripheral IV 01/28/23 Right Hand 2 days Drain  Duration           Closed/Suction Drain Lateral LUQ 2 days    Closed/Suction Drain Right Abdomen Bulb 2 days                Physical Exam  Vitals reviewed  Constitutional:       General: He is not in acute distress  Appearance: He is not toxic-appearing  Comments: Chronically ill appearing   HENT:      Head: Normocephalic and atraumatic  Eyes:      General: No scleral icterus  Cardiovascular:      Rate and Rhythm: Regular rhythm  Tachycardia present  Pulmonary:      Effort: Pulmonary effort is normal       Breath sounds: Normal breath sounds  No wheezing  Abdominal:      General: Abdomen is flat  Bowel sounds are normal  There is no distension  Tenderness: There is abdominal tenderness (incisional)  There is no guarding  Comments: Left abdominal drain to bulb suction, ss, surrounding skin intact   Right abdominal drain to bulb suction, ss with clot in bulb, surrounding skin intact   Midline abdominal incision C/D loosely approximated with telfa kaiser in place   Musculoskeletal:      Right lower leg: No edema  Left lower leg: No edema  Skin:     General: Skin is warm and dry  Neurological:      General: No focal deficit present  Mental Status: He is alert and oriented to person, place, and time  Psychiatric:         Mood and Affect: Mood normal          Behavior: Behavior normal            Lab, Imaging and other studies:  I have personally reviewed pertinent lab results    , CBC:   Lab Results   Component Value Date    WBC 16 22 (H) 01/31/2023    HGB 11 1 (L) 01/31/2023    HCT 33 3 (L) 01/31/2023     (H) 01/31/2023     01/31/2023    MCH 33 5 01/31/2023    MCHC 33 3 01/31/2023    RDW 13 4 01/31/2023    MPV 9 9 01/31/2023   , CMP:   Lab Results   Component Value Date    SODIUM 125 (L) 01/31/2023    K 3 8 01/31/2023    CL 89 (L) 01/31/2023    CO2 30 01/31/2023    BUN 16 01/31/2023    CREATININE 0 64 01/31/2023    CALCIUM 7 9 (L) 01/31/2023    AST 21 01/31/2023    ALT 30 01/31/2023    ALKPHOS 59 01/31/2023    EGFR 112 01/31/2023     VTE Pharmacologic Prophylaxis: Enoxaparin (Lovenox)  VTE Mechanical Prophylaxis: sequential compression device      Nathalia Scott  01/31/23  **Please Note: This note may have been constructed using a voice recognition system  **

## 2023-01-31 NOTE — PROGRESS NOTES
Progress Note - Nephrology   Awais Cali 46 y o  male MRN: [id number]  Unit/Bed#: ICU 02-01 Encounter: [id number]    A/P:  1  Hyponatremia   - due to cirrhosis and hypoalbuminemia   - has significant leg edema - repeat Bumex today and follow I/O, serum sodium   - Urine osm < plasma osm due to above    2  Cirrhosis of liver with ascites   - had a paracentesis last week   - being followed by GI    3  Sepsis   - 2/2 peritonitis with strep   - receiving IV abx as per ID rec on CTX and Flagyl    4  Hepatitis C   - will need treatment [facility] GI      Follow up reason for today's visit:  hyponatremia    Acute peritonitis [facility])    Patient Active Problem List   Diagnosis   • Hepatitis C   • Acute peritonitis ([facility] )   • Sepsis ([facility] )   • Cirrhosis of liver with ascites ([facility] )   • Hyponatremia   • Difficult intubation         Subjective:   Says he feels improved  A 10 point ROS is negative  He had a good diuretic response to Bumex yesterday without difficulty    Objective:     Vitals: Blood pressure 115/75, pulse (!) 106, temperature 98 2 °F (36 8 °C), temperature source Oral, resp  rate 18, height 5' 11" (1 803 m), weight 67 5 kg (148 lb 13 oz), SpO2 95 %  ,Body mass index is 20 75 kg/m²  Weight (last 2 days)     Date/Time Weight    01/31/23 0600 67 5 (148 81)    01/30/23 0501 69 6 (153 44)    01/29/23 0600 71 5 (157 63)            Intake/Output Summary (Last 24 hours) at 1/31/2023 1245  Last data filed at 1/31/2023 1201  Gross per 24 hour   Intake 912 ml   Output 1290 ml   Net -378 ml     I/O last 3 completed shifts:   In: 12 [P O :792]  Out: 18 [Urine:2150; Drains:420]         Physical Exam: /75   Pulse (!) 106   Temp 98 2 °F (36 8 °C) (Oral)   Resp 18   Ht 5' 11" (1 803 m)   Wt 67 5 kg (148 lb 13 oz)   SpO2 95%   BMI 20 75 kg/m²     General Appearance:    Alert, cooperative, no distress, appears stated age   Head:    Normocephalic, without obvious abnormality, atraumatic   Eyes:    Conjunctiva/corneas clear Ears:    Normal external ears   Nose:   Nares normal, septum midline, mucosa normal, no drainage    or sinus tenderness   Throat:   Lips, mucosa, and tongue normal; teeth and gums normal   Neck:   Supple, symmetrical, trachea midline, no adenopathy;        thyroid:  No enlargement/tenderness/nodules; no carotid    bruit or JVD   Back:     Symmetric, no curvature, ROM normal, no CVA tenderness   Lungs:     Clear to auscultation bilaterally, respirations unlabored   Chest wall:    No tenderness or deformity   Heart:    Regular rate and rhythm, S1 and S2 normal, no murmur, rub   or gallop   Abdomen:      Distended with tube draining blood tinged fluid   Extremities:   Extremities normal, atraumatic, no cyanosis has 2+ edema   Skin:   Skin color, texture, turgor normal, no rashes or lesions   Lymph nodes:   Cervical normal   Neurologic:   CNII-XII intact            Lab, Imaging and other studies: I have personally reviewed pertinent labs  CBC:   Lab Results   Component Value Date    WBC 16 22 (H) 01/31/2023    HGB 11 1 (L) 01/31/2023    HCT 33 3 (L) 01/31/2023     (H) 01/31/2023     01/31/2023    MCH 33 5 01/31/2023    MCHC 33 3 01/31/2023    RDW 13 4 01/31/2023    MPV 9 9 01/31/2023     CMP:   Lab Results   Component Value Date    K 3 8 01/31/2023    CL 89 (L) 01/31/2023    CO2 30 01/31/2023    BUN 16 01/31/2023    CREATININE 0 64 01/31/2023    CALCIUM 7 9 (L) 01/31/2023    AST 21 01/31/2023    ALT 30 01/31/2023    ALKPHOS 59 01/31/2023    EGFR 112 01/31/2023           Results from last 7 days   Lab Units 01/31/23  0509 01/30/23  0453 01/29/23  0457 01/26/23  1649 01/26/23  0956   POTASSIUM mmol/L 3 8 4 4 4 6   < > 3 6   CHLORIDE mmol/L 89* 91* 92*   < > 88*   CO2 mmol/L 30 28 26   < > 25   BUN mg/dL 16 18 15   < > 10   CREATININE mg/dL 0 64 0 60 0 50*   < > 0 52*   CALCIUM mg/dL 7 9* 7 8* 7 9*   < > 7 7*   ALK PHOS U/L 59 59  --   --  66   ALT U/L 30 40  --   --  42   AST U/L 21 38  --   --  39    < > = values in this interval not displayed  Phosphorus: No results found for: PHOS  Magnesium: No results found for: MG  Urinalysis:   Lab Results   Component Value Date    COLORU Yellow 01/30/2023    CLARITYU Clear 01/30/2023    SPECGRAV 1 015 01/30/2023    PHUR 6 0 01/30/2023    LEUKOCYTESUR Negative 01/30/2023    NITRITE Negative 01/30/2023    GLUCOSEU Negative 01/30/2023    KETONESU Negative 01/30/2023    BILIRUBINUR Negative 01/30/2023    BLOODU Negative 01/30/2023     Ionized Calcium: No results found for: CAION  Coagulation: No results found for: PT, INR, APTT  Troponin: No results found for: TROPONINI  ABG: No results found for: PHART, PWF9BGS, PO2ART, ZQD4DMM, A9NLBQLK, BEART, SOURCE  Radiology review:     IMAGING  Procedure: XR chest portable ICU    Result Date: 1/29/2023  Narrative: CHEST INDICATION:   Endotracheal tube positioning  COMPARISON:  None EXAM PERFORMED/VIEWS:  XR CHEST PORTABLE ICU FINDINGS: ETT 6 7 cm of valentin  NGT tip in gastric fundus  Upper lateral right abdomen drain versus superimposed tube Cardiomediastinal silhouette appears unremarkable  The lungs are clear  Bibasilar atelectasis  Bilateral pleural effusions  No pneumothorax  Osseous structures appear within normal limits for patient age  Impression: ETT 6 7 cm above valentin   NGT tip gastric fundus Workstation performed: [id number]       Current Facility-Administered Medications   Medication Dose Route Frequency   • albuterol (PROVENTIL HFA,VENTOLIN HFA) inhaler 2 puff  2 puff Inhalation Q4H PRN   • amoxicillin (AMOXIL) oral suspension 50 mg  50 mg Oral Once    Followed by   • amoxicillin (AMOXIL) oral suspension 500 mg  500 mg Oral Once   • cefTRIAXone (ROCEPHIN) IVPB (premix in dextrose) 2,000 mg 50 mL  2,000 mg Intravenous Q24H   • chlorhexidine (PERIDEX) 0 12 % oral rinse 15 mL  15 mL Mouth/Throat Q12H [facility]   • diphenhydrAMINE (BENADRYL) injection 25 mg  25 mg Intravenous Q6H PRN   • enoxaparin (LOVENOX) subcutaneous injection 40 mg  40 mg Subcutaneous Q24H [facility]   • EPINEPHrine PF (ADRENALIN) 1 mg/mL injection 0 3 mg  0 3 mg Intramuscular Once PRN   • Famotidine (PF) (PEPCID) injection 20 mg  20 mg Intravenous Once PRN   • HYDROmorphone (DILAUDID) injection 0 5 mg  0 5 mg Intravenous Q3H PRN   • metroNIDAZOLE (FLAGYL) tablet 500 mg  500 mg Oral Q8H [facility]   • ondansetron (ZOFRAN) injection 4 mg  4 mg Intravenous Q6H PRN   • oxyCODONE (ROXICODONE) immediate release tablet 10 mg  10 mg Oral Q4H PRN   • oxyCODONE (ROXICODONE) IR tablet 5 mg  5 mg Oral Q4H PRN   • phenol (CHLORASEPTIC) 1 4 % mucosal liquid 1 spray  1 spray Mouth/Throat Q2H PRN     Medications Discontinued During This Encounter   Medication Reason   • ibuprofen (MOTRIN) 200 mg tablet    • levofloxacin (LEVAQUIN) IVPB (premix in dextrose) 750 mg 150 mL    • metroNIDAZOLE (FLAGYL) IVPB (premix) 500 mg 100 mL    • vancomycin (VANCOCIN) 1,250 mg in sodium chloride 0 9 % 250 mL IVPB    • vancomycin (VANCOCIN) 1500 mg in sodium chloride 0 9% 250 mL IVPB    • sodium chloride (HYPERTONIC) 3 % infusion    • bumetanide (BUMEX) injection 3 mg    • potassium chloride (K-DUR,KLOR-CON) CR tablet 40 mEq    • bupivacaine-epinephrine (MARCAINE/EPINEPHRINE) 0 25 %-1:[id number] injection Patient Discharge   • acetaminophen (TYLENOL) tablet 650 mg    • oxyCODONE (ROXICODONE) IR tablet 5 mg    • oxyCODONE (ROXICODONE) immediate release tablet 10 mg    • aluminum-magnesium hydroxide-simethicone (MYLANTA) oral suspension 30 mL    • loperamide (IMODIUM) capsule 2 mg    • saliva substitute (MOUTH KOTE) mucosal solution 5 spray    • sodium chloride 0 9 % with KCl 20 mEq/L infusion (premix)    • sodium chloride 0 9 % with KCl 20 mEq/L infusion (premix)    • sodium chloride 0 9 % infusion    • propofol (DIPRIVAN) 1000 mg in 100 mL infusion (premix)    • fentanyl citrate (PF) 100 MCG/2ML 50 mcg    • HYDROmorphone (DILAUDID) injection 0 5 mg    • HYDROmorphone HCl (DILAUDID) injection 0 2 mg Aureliano Saini MD      This progress note was produced in part using a dictation device which may document imprecise wording from author's original intent

## 2023-02-01 LAB
ALBUMIN SERPL BCP-MCNC: 2.1 G/DL (ref 3.5–5)
ALP SERPL-CCNC: 50 U/L (ref 34–104)
ALT SERPL W P-5'-P-CCNC: 20 U/L (ref 7–52)
ANION GAP SERPL CALCULATED.3IONS-SCNC: 4 MMOL/L (ref 4–13)
AST SERPL W P-5'-P-CCNC: 21 U/L (ref 13–39)
BACTERIA SPEC ANAEROBE CULT: ABNORMAL
BACTERIA SPEC ANAEROBE CULT: ABNORMAL
BASOPHILS # BLD AUTO: 0.06 THOUSANDS/ÂΜL (ref 0–0.1)
BASOPHILS NFR BLD AUTO: 1 % (ref 0–1)
BILIRUB SERPL-MCNC: 0.82 MG/DL (ref 0.2–1)
BUN SERPL-MCNC: 13 MG/DL (ref 5–25)
CALCIUM ALBUM COR SERPL-MCNC: 8.8 MG/DL (ref 8.3–10.1)
CALCIUM SERPL-MCNC: 7.3 MG/DL (ref 8.4–10.2)
CHLORIDE SERPL-SCNC: 91 MMOL/L (ref 96–108)
CO2 SERPL-SCNC: 30 MMOL/L (ref 21–32)
CREAT SERPL-MCNC: 0.47 MG/DL (ref 0.6–1.3)
EOSINOPHIL # BLD AUTO: 0.09 THOUSAND/ÂΜL (ref 0–0.61)
EOSINOPHIL NFR BLD AUTO: 1 % (ref 0–6)
ERYTHROCYTE [DISTWIDTH] IN BLOOD BY AUTOMATED COUNT: 13.3 % (ref 11.6–15.1)
GFR SERPL CREATININE-BSD FRML MDRD: 127 ML/MIN/1.73SQ M
GLUCOSE SERPL-MCNC: 151 MG/DL (ref 65–140)
HCT VFR BLD AUTO: 30.1 % (ref 36.5–49.3)
HGB BLD-MCNC: 10.3 G/DL (ref 12–17)
IMM GRANULOCYTES # BLD AUTO: 0.07 THOUSAND/UL (ref 0–0.2)
IMM GRANULOCYTES NFR BLD AUTO: 1 % (ref 0–2)
LYMPHOCYTES # BLD AUTO: 1.97 THOUSANDS/ÂΜL (ref 0.6–4.47)
LYMPHOCYTES NFR BLD AUTO: 15 % (ref 14–44)
MAGNESIUM SERPL-MCNC: 1.5 MG/DL (ref 1.9–2.7)
MCH RBC QN AUTO: 34.4 PG (ref 26.8–34.3)
MCHC RBC AUTO-ENTMCNC: 34.2 G/DL (ref 31.4–37.4)
MCV RBC AUTO: 101 FL (ref 82–98)
MONOCYTES # BLD AUTO: 0.73 THOUSAND/ÂΜL (ref 0.17–1.22)
MONOCYTES NFR BLD AUTO: 6 % (ref 4–12)
NEUTROPHILS # BLD AUTO: 10.26 THOUSANDS/ÂΜL (ref 1.85–7.62)
NEUTS SEG NFR BLD AUTO: 76 % (ref 43–75)
NRBC BLD AUTO-RTO: 0 /100 WBCS
PLATELET # BLD AUTO: 236 THOUSANDS/UL (ref 149–390)
PMV BLD AUTO: 9.4 FL (ref 8.9–12.7)
POTASSIUM SERPL-SCNC: 3.5 MMOL/L (ref 3.5–5.3)
PROT SERPL-MCNC: 5.3 G/DL (ref 6.4–8.4)
RBC # BLD AUTO: 2.99 MILLION/UL (ref 3.88–5.62)
SODIUM SERPL-SCNC: 125 MMOL/L (ref 135–147)
WBC # BLD AUTO: 13.18 THOUSAND/UL (ref 4.31–10.16)

## 2023-02-01 RX ORDER — ALBUMIN, HUMAN INJ 5% 5 %
25 SOLUTION INTRAVENOUS ONCE
Status: COMPLETED | OUTPATIENT
Start: 2023-02-01 | End: 2023-02-01

## 2023-02-01 RX ORDER — MAGNESIUM SULFATE HEPTAHYDRATE 40 MG/ML
2 INJECTION, SOLUTION INTRAVENOUS ONCE
Status: COMPLETED | OUTPATIENT
Start: 2023-02-01 | End: 2023-02-01

## 2023-02-01 RX ADMIN — CHLORHEXIDINE GLUCONATE 0.12% ORAL RINSE 15 ML: 1.2 LIQUID ORAL at 08:26

## 2023-02-01 RX ADMIN — CEFTRIAXONE 2000 MG: 2 INJECTION, SOLUTION INTRAVENOUS at 16:26

## 2023-02-01 RX ADMIN — METRONIDAZOLE 500 MG: 500 TABLET ORAL at 20:14

## 2023-02-01 RX ADMIN — OXYCODONE HYDROCHLORIDE 5 MG: 5 TABLET ORAL at 16:32

## 2023-02-01 RX ADMIN — OXYCODONE HYDROCHLORIDE 10 MG: 10 TABLET ORAL at 08:26

## 2023-02-01 RX ADMIN — ENOXAPARIN SODIUM 40 MG: 100 INJECTION SUBCUTANEOUS at 08:26

## 2023-02-01 RX ADMIN — OXYCODONE HYDROCHLORIDE 10 MG: 10 TABLET ORAL at 04:17

## 2023-02-01 RX ADMIN — ALBUMIN (HUMAN) 25 G: 2.5 SOLUTION INTRAVENOUS at 11:00

## 2023-02-01 RX ADMIN — MAGNESIUM SULFATE HEPTAHYDRATE 2 G: 40 INJECTION, SOLUTION INTRAVENOUS at 10:59

## 2023-02-01 RX ADMIN — CHLORHEXIDINE GLUCONATE 0.12% ORAL RINSE 15 ML: 1.2 LIQUID ORAL at 20:14

## 2023-02-01 RX ADMIN — METRONIDAZOLE 500 MG: 500 TABLET ORAL at 04:18

## 2023-02-01 RX ADMIN — METRONIDAZOLE 500 MG: 500 TABLET ORAL at 14:25

## 2023-02-01 RX ADMIN — OXYCODONE HYDROCHLORIDE 10 MG: 10 TABLET ORAL at 20:14

## 2023-02-01 RX ADMIN — OXYCODONE HYDROCHLORIDE 10 MG: 10 TABLET ORAL at 00:10

## 2023-02-01 NOTE — ASSESSMENT & PLAN NOTE
· Secondary to peritonitis  · Noted by leukocytosis, tachycardia tachypnea mild lactic acidosis present on admission  · Continue treatment as above

## 2023-02-01 NOTE — PROGRESS NOTES
Progress Note - General Surgery   Tomasz Ribeiro 46 y o  male MRN: [id number]  Unit/Bed#: ICU 02-01 Encounter: [id number]    Assessment:  46year-old M with PMH significant for hep C/alcoholic cirrhosis presenting with peritonitis of unknown source  POD#3 s/p diagnostic lap converted to ex lap with repair of enterotomy x2, extensive CHAY, peritoneal bx, peritoneal lavage  -AVSS on room air  - cc  -MAURICE x2 210 cc, R>L  -WBC 13 18  -Hgb 10 3  -  -CR 0 47  -Hyponatremia, 125  -Hypomagnesia, 1 5  -Tissue exam pending/cytology pending  -AFB culture negative  -Culture/Gram stain positive for strep intermedius    Plan:  Regular diet as tolerated  IV antibiotics per ID, currently on IV Rocephin, p o  Flagyl with p o  amoxicillin challenge completed 01/31  Follow-up final cultures  Serial abdominal exams  Strict I/O  Antiemetics and analgesics as needed  Continue local wound/drain care, will need to determine timing of drain removal and kaiser likely to be removed today by surgical team  Medical management per primary, patient recommendations  Nephrology following, appreciate recommendations  Wound care nurse following, appreciate recommendations  To be evaluated bedside by attending    Subjective/Objective   Subjective: Acute overnight events  Patient overall doing well this morning and notes that he is feeling back to himself  Tolerating diet without increase in abdominal pain, nausea, vomiting  Patient notes that patient's friend is bringing food from outside this afternoon which he is excited about  Passing flatus but no recent bowel movements and surgery  Objective:   Blood pressure 110/73, pulse 93, temperature (!) 97 4 °F (36 3 °C), temperature source Tympanic, resp  rate 18, height 5' 11" (1 803 m), weight 65 kg (143 lb 4 8 oz), SpO2 92 %  ,Body mass index is 19 99 kg/m²        Intake/Output Summary (Last 24 hours) at 2/1/2023 0812  Last data filed at 2/1/2023 0550  Gross per 24 hour   Intake 580 ml Output 1010 ml   Net -430 ml       Invasive Devices     Peripheral Intravenous Line  Duration           Peripheral IV 02/01/23 Left;Upper;Ventral (anterior) Arm <1 day          Drain  Duration           Closed/Suction Drain Lateral LUQ 3 days    Closed/Suction Drain Right Abdomen Bulb 3 days              Physical Exam  Vitals and nursing note reviewed  Constitutional:       General: He is not in acute distress  Appearance: Normal appearance  He is normal weight  He is not ill-appearing or toxic-appearing  HENT:      Head: Normocephalic and atraumatic  Cardiovascular:      Rate and Rhythm: Normal rate and regular rhythm  Pulses: Normal pulses  Heart sounds: Normal heart sounds  Pulmonary:      Effort: Pulmonary effort is normal       Breath sounds: Wheezing present  Abdominal:      General: Bowel sounds are normal  There is no distension  Palpations: Abdomen is soft  Tenderness: There is no abdominal tenderness  There is no guarding or rebound  Musculoskeletal:         General: Normal range of motion  Right lower leg: No edema  Left lower leg: No edema  Skin:     General: Skin is warm and dry  Coloration: Skin is pale  Neurological:      General: No focal deficit present  Mental Status: He is alert and oriented to person, place, and time  Psychiatric:         Mood and Affect: Mood normal          Behavior: Behavior normal          Thought Content: Thought content normal        Lab, Imaging and other studies:  I have personally reviewed pertinent lab results      CBC:   Lab Results   Component Value Date    WBC 13 18 (H) 02/01/2023    HGB 10 3 (L) 02/01/2023    HCT 30 1 (L) 02/01/2023     (H) 02/01/2023     02/01/2023    MCH 34 4 (H) 02/01/2023    MCHC 34 2 02/01/2023    RDW 13 3 02/01/2023    MPV 9 4 02/01/2023    NRBC 0 02/01/2023     CMP:   Lab Results   Component Value Date    SODIUM 125 (L) 02/01/2023    K 3 5 02/01/2023    CL 91 (L) 02/01/2023    CO2 30 02/01/2023    BUN 13 02/01/2023    CREATININE 0 47 (L) 02/01/2023    CALCIUM 7 3 (L) 02/01/2023    AST 21 02/01/2023    ALT 20 02/01/2023    ALKPHOS 50 02/01/2023    EGFR 127 02/01/2023     VTE Pharmacologic Prophylaxis: Heparin  VTE Mechanical Prophylaxis: sequential compression device    Mireya Cosme PA-C

## 2023-02-01 NOTE — PLAN OF CARE
Problem: OCCUPATIONAL THERAPY ADULT  Goal: Performs self-care activities at highest level of function for planned discharge setting  See evaluation for individualized goals  Description: Treatment Interventions: ADL retraining, Functional transfer training, Endurance training, Equipment evaluation/education, Compensatory technique education, Energy conservation, Activityengagement    See flowsheet documentation for full assessment, interventions and recommendations  Note: Limitation: Decreased ADL status, Decreased Safe judgement during ADL, Decreased endurance, Decreased self-care trans, Decreased high-level ADLs  Prognosis: Good  Assessment: Pt is a 46 y o  male seen for OT evaluation s/p admit to [facility] on 1/21/2023 w/ Acute peritonitis ([facility] )  Comorbidities affecting pt's functional performance at time of assessment include: Hepatitis C, Sepsis, Hyponatremia  Personal factors affecting pt at time of IE include:steps to enter environment, limited home support and difficulty performing ADLS  Prior to admission, pt was Mod I with ADLs  Upon evaluation: the following deficits impact occupational performance: decreased balance and decreased tolerance  Pt to benefit from continued skilled OT tx while in the hospital to address deficits as defined above and maximize level of functional independence w ADL's and functional mobility  Occupational Performance areas to address include: bathing/shower, toilet hygiene, dressing, functional mobility and clothing management  From OT standpoint, recommendation at time of d/c would be Out-patient OT       OT Discharge Recommendation: Home with outpatient rehabilitation     Antonio Gomez MS, OTR/L

## 2023-02-01 NOTE — PHYSICAL THERAPY NOTE
Physical Therapy Treatment Session Note    Patient's Name: Edy Cobb    Admitting Diagnosis  Acute peritonitis ([facility] ) [K65 0]  Hyponatremia [E87 1]  Abdominal pain [R10 9]  SIRS (systemic inflammatory response syndrome) ([facility] ) [R65 10]  Hepatic cirrhosis due to chronic hepatitis C infection ([facility] ) [B18 2, K74 60]  Cirrhosis of liver with ascites, unspecified hepatic cirrhosis type ([facility] ) [K74 60, R18 8]  Hepatitis C virus infection without hepatic coma, unspecified chronicity [B19 20]    Problem List  Patient Active Problem List   Diagnosis    Hepatitis C    Acute peritonitis ([facility] )    Sepsis ([facility] )    Cirrhosis of liver with ascites ([facility] )    Hyponatremia    Difficult intubation       Past Medical History  Past Medical History:   Diagnosis Date    Hard to intubate     Hepatitis C        Past Surgical History  Past Surgical History:   Procedure Laterality Date    HAND SURGERY      IR PARACENTESIS  1/23/2023    DC LAPS ABD PRTM&OMENTUM DX W/WO SPEC BR/WA SPX N/A 1/28/2023    Procedure: LAPAROSCOPY DIAGNOSTIC CONVERTED TO OPEN, LAPAROTOMY, REPAIR ENTEROTOMY X2, EXTENSIS LYSIS OF ADHESIONS, PERITONEAL BIOPSIES, PERITONEAL LAVAGE;  Surgeon: Chris English MD;  Location: [facility];  Service: General    WISDOM TOOTH EXTRACTION          02/01/23 1315   PT Last Visit   PT Visit Date 02/01/23   Note Type   Note Type Treatment   Pain Assessment   Pain Assessment Tool 0-10   Pain Score 2   Pain Location/Orientation Orientation: Mid;Location: Abdomen   Pain Onset/Description Onset: Ongoing;Frequency: Constant/Continuous; Descriptor: Aching;Descriptor: Sore   Effect of Pain on Daily Activities yes   Patient's Stated Pain Goal No pain   Hospital Pain Intervention(s) Repositioned; Ambulation/increased activity; Emotional support; Environmental changes   Multiple Pain Sites No   Restrictions/Precautions   Weight Bearing Precautions Per Order No   Other Precautions Fall Risk;Pain;Multiple lines;Telemetry  (MAURICE drains x 2)   General Chart Reviewed Yes   Additional Pertinent History Juan M Pickard requested second PT treatment session if we were available today  Response to Previous Treatment Patient with no complaints from previous session  Family/Caregiver Present No   Cognition   Overall Cognitive Status WFL   Arousal/Participation Alert; Cooperative   Attention Within functional limits   Orientation Level Oriented X4   Memory Within functional limits   Following Commands Follows all commands and directions without difficulty   Comments Juan M Pickard was agreeable to PT treatment session,very pleasant  Bed Mobility   Supine to Sit   (DNT pt  was sitting out of bed on recliner upon arrival )   Sit to Supine   (CGA)   Additional items Assist x 1;HOB elevated; Bedrails; Increased time required;Verbal cues   Additional Comments Verbal cues for bedrail utilization and proper body mechanics  Transfers   Sit to Stand 5  Supervision   Additional items Assist x 1; Armrests; Increased time required;Verbal cues   Stand to Sit 5  Supervision   Additional items Assist x 1; Armrests; Increased time required;Verbal cues   Stand pivot 5  Supervision   Additional items Assist x 1; Increased time required;Verbal cues   Additional Comments Verbal cues for safety while turning and proper AD usage  Ambulation/Elevation   Gait pattern Improper Weight shift; Antalgic; Short stride; Foward flexed   Gait Assistance 5  Supervision   Additional items Assist x 1;Verbal cues   Assistive Device Rolling walker   Distance 250 feet   Stair Management Assistance Not tested   Ambulation/Elevation Additional Comments Verbal cues for hand placement with transitional movements  Juan M Pickard stood at the window x 2 minutes with close S for some sunlight without LOB     Balance   Static Sitting Good   Dynamic Sitting Good   Static Standing Fair +   Dynamic Standing Fair +   Ambulatory Fair   Endurance Deficit   Endurance Deficit Yes   Activity Tolerance   Activity Tolerance Patient limited by fatigue Nurse Made Aware yes, Ricci RN   Exercises   Quad Sets Supine;20 reps;AROM; Bilateral   Heelslides Supine;20 reps;AROM; Bilateral   Glute Sets Supine;20 reps;AROM; Bilateral   Hip Flexion Supine;20 reps;AROM; Bilateral   Hip Abduction Supine;20 reps;AROM; Bilateral   Hip Adduction Supine;20 reps;AROM   Knee AROM Long Arc Quad Supine;10 reps;AROM; Bilateral  (straight leg raises)   Ankle Pumps Supine;20 reps;AROM; Bilateral   Assessment   Prognosis Good   Problem List Decreased strength;Decreased endurance; Impaired balance;Decreased mobility;Pain;Decreased skin integrity   Assessment Pt seen for PT treatment session this date with interventions consisting of gait training to reduce the risk of medical complications and advance toward previous level of function w/ emphasis on improving pt's ability to ambulate level surfaces x 250 feet with close S provided by therapist with RW, Therapeutic exercise to improve BLE stability and overall endurance consisting of: AROM 20 reps B LE in supine with head of bed elevated position and therapeutic activity to reduce fall risk consisting of training: supine<>sit transfers, sit<>stand transfers, static sitting tolerance at EOB for 5 minutes w/ unilateral UE support, static standing tolerance for 3 minutes w/ B UE support, vc and tactile cues for static sitting posture faciliation, vc and tactile cues for static standing posture faciliation, stand pivot transfers towards B direction and compensatory pain modulation strategies  Pt agreeable to PT treatment session upon arrival, pt found supine in bed w/ HOB elevated, A&O x 4  In comparison to previous session, pt with improvements in ambulatory distance,task tolerance to ther ex  Post session: pt returned BTB, all needs in reach and RN notified of session findings/recommendations  Continue to recommend home with outpatient rehabilitation at time of d/c in order to maximize pt's functional independence and safety w/ mobility   Pt continues to be functioning below baseline level, and remains limited 2* factors listed above and including weakness, pain, decreased endurance, gait deviations, activity intolerance  PT will continue to see pt during current hospitalization in order to address the deficits listed above and provide interventions consistent w/ POC in effort to achieve LTGs  Goals   Patient Goals to get out of here soon   LTG Expiration Date 02/11/23   Long Term Goal #1 LTGs remain appropriate   PT Treatment Day 1   Plan   Treatment/Interventions Functional transfer training;LE strengthening/ROM; Elevations; Therapeutic exercise; Endurance training;Patient/family training;Equipment eval/education; Bed mobility;Gait training;Spoke to nursing   Progress Improving as expected   PT Frequency 3-5x/wk   Recommendation   PT Discharge Recommendation Home with outpatient rehabilitation   Equipment Recommended [address] [facility] [place] Recommended Wheeled walker   Change/add to [employer]? No   Additional Comments Upon conclusion, Devon Casanova was resting in bed w/all needs within reach  Additional Comments 2      AM-PAC Basic Mobility Inpatient   Turning in Flat Bed Without Bedrails 4   Lying on Back to Sitting on Edge of Flat Bed Without Bedrails 3   Moving Bed to Chair 3   Standing Up From Chair Using Arms 3   Walk in Room 3   Climb 3-5 Stairs With Railing 3   Basic Mobility Inpatient Raw Score 19   Basic Mobility Standardized Score 42 48   Highest Level Of Mobility   JH-HLM Goal 6: Walk 10 steps or more   JH-HLM Achieved 8: Walk 250 feet ot more     Treatment Time: 2604-8675    Chase Castillo, PT

## 2023-02-01 NOTE — PROGRESS NOTES
[address]  Progress Note Salvador Adkins 1970, 46 y o  male MRN: [id number]  Unit/Bed#: ICU 02-01 Encounter: [id number]  Primary Care Provider: Best Riojas DO   Date and time admitted to hospital: 1/21/2023  8:32 PM    * Acute peritonitis [facility])  Assessment & Plan  · Patient reporting abdominal pain for 3 days and urinary retention, increased abdominal distention, nausea/vomiting  · Patient was initially on Levaquin/Flagyl  · Infectious Disease was consulted and Levaquin/Flagyl were discontinued and patient was started on ceftriaxone/vancomycin  · Vancomycin has been discontinued  · Continue ceftriaxone/flagyl  · Patient had exploratory laparotomy with repair of enterotomy x2, extensive lysis of adhesions, peritoneal biopsy, peritoneal lavage on 1/28/23  · Will follow up cultures  · Patient had amoxicillin challenge on 1/31/2023 by infectious disease  · Continue Abx per infectious disease    Cirrhosis of liver with ascites ([facility] [address] )  Assessment & Plan  · Does not appear to be in acute decompensation  · GI input appreciated  · Patient had paracentesis performed on 1/23/2023  · Cytology from initial paracentesis negative for malignancy cells    Sepsis [facility])  Assessment & Plan  · Secondary to peritonitis  · Noted by leukocytosis, tachycardia tachypnea mild lactic acidosis present on admission  · Continue treatment as above    Hyponatremia  Assessment & Plan  · Nephrology following  · Patient received Bumex 2 mg x1 on 1/27/2023  · Nephrology recommending another dose of Bumex on 1/30/23  · Sodium level 125 today    Hepatitis C  Assessment & Plan  · Patient with a history of hepatitis C untreated  · GI following  · Liver with cirrhotic appearance  · Outpatient follow-up with Gastroenterology for treatment      VTE Prophylaxis:  Enoxaparin (Lovenox)    Patient Centered Rounds: I have performed bedside rounds with nursing staff today      Discussions with Specialists or Other Care Team Provider: yes  Education and Discussions with Family / Patient: Offered to call family/friends for patient however patient states that no need he will update them himself    Current Length of Stay: 10 day(s)    Current Patient Status: Inpatient   Certification Statement: The patient will continue to require additional inpatient hospital stay due to Peritonitis    Discharge Plan: Pending hospital course    Code Status: Level 1 - Full Code    Subjective:   No overnight events noted  Patient states his abdominal pain is improving  Currently afebrile  Objective:     Vitals:   Temp (24hrs), Av 2 °F (36 8 °C), Min:97 4 °F (36 3 °C), Max:98 8 °F (37 1 °C)    Temp:  [97 4 °F (36 3 °C)-98 8 °F (37 1 °C)] 97 4 °F (36 3 °C)  HR:  [] 93  Resp:  [18-20] 18  BP: ()/(54-83) 110/73  SpO2:  [90 %-98 %] 92 %  Body mass index is 19 99 kg/m²  Input and Output Summary (last 24 hours): Intake/Output Summary (Last 24 hours) at 2023 1113  Last data filed at 2023 0550  Gross per 24 hour   Intake 580 ml   Output 1010 ml   Net -430 ml       Physical Exam:   Physical Exam  Constitutional:       General: He is not in acute distress  HENT:      Head: Normocephalic and atraumatic  Nose: Nose normal       Mouth/Throat:      Mouth: Mucous membranes are moist    Eyes:      Extraocular Movements: Extraocular movements intact  Conjunctiva/sclera: Conjunctivae normal    Cardiovascular:      Rate and Rhythm: Normal rate and regular rhythm  Pulmonary:      Effort: Pulmonary effort is normal  No respiratory distress  Abdominal:      Palpations: Abdomen is soft  Tenderness: There is no abdominal tenderness  Comments: Abdominal drains in place   Musculoskeletal:         General: Normal range of motion  Cervical back: Normal range of motion and neck supple  Skin:     General: Skin is warm and dry  Neurological:      General: No focal deficit present  Mental Status: He is alert  Mental status is at baseline  Cranial Nerves: No cranial nerve deficit  Psychiatric:         Mood and Affect: Mood normal          Behavior: Behavior normal          Additional Data:     Labs:    Results from last 7 days   Lab Units 02/01/23  0504   WBC Thousand/uL 13 18*   HEMOGLOBIN g/dL 10 3*   HEMATOCRIT % 30 1*   PLATELETS Thousands/uL 236   NEUTROS PCT % 76*   LYMPHS PCT % 15   MONOS PCT % 6   EOS PCT % 1     Results from last 7 days   Lab Units 02/01/23  0504   SODIUM mmol/L 125*   POTASSIUM mmol/L 3 5   CHLORIDE mmol/L 91*   CO2 mmol/L 30   BUN mg/dL 13   CREATININE mg/dL 0 47*   CALCIUM mg/dL 7 3*   ALK PHOS U/L 50   ALT U/L 20   AST U/L 21     Results from last 7 days   Lab Units 01/30/23  0453   INR  1 17     Results from last 7 days   Lab Units 01/29/23  1151   POC GLUCOSE mg/dl 110           * I Have Reviewed All Lab Data Listed Above  * Additional Pertinent Lab Tests Reviewed: Lauren 66 Admission  Reviewed    Imaging:  Imaging Reports Reviewed Today Include: No new imaging    Recent Cultures (last 7 days):     Results from last 7 days   Lab Units 01/28/23  1036 01/28/23  1035 01/25/23  1201   BLOOD CULTURE   --   --  No Growth After 5 Days  No Growth After 5 Days     GRAM STAIN RESULT  3+ Polys*  3+ Mononuclear Cells*  1+ Gram positive cocci in chains* 2+ Disintegrating polys*  1+ Gram positive cocci in pairs and chains*  --    BODY FLUID CULTURE, STERILE  No growth No growth  --        Last 24 Hours Medication List:   Current Facility-Administered Medications   Medication Dose Route Frequency Provider Last Rate   • albuterol  2 puff Inhalation Q4H PRN Joe Henao PA-C     • cefTRIAXone  2,000 mg Intravenous Q24H MARQUIS Rockwell Stopped (01/31/23 1901)   • chlorhexidine  15 mL Mouth/Throat Q12H [address] MARQUIS Whitehead     • diphenhydrAMINE  25 mg Intravenous Q6H PRN Joe Henao PA-C     • enoxaparin  40 mg Subcutaneous Q24H Shubham Piña MARQUIS Bloom     • EPINEPHrine PF  0 3 mg Intramuscular Once PRN Joe Henao PA-C     • famotidine  20 mg Intravenous Once PRN Joe Henao PA-C     • HYDROmorphone  0 5 mg Intravenous Q3H PRN Robin Biggs, CRNP     • magnesium sulfate  2 g Intravenous Once Micaela Rangel MD     • metroNIDAZOLE  500 mg Oral Q8H [address] MARQUIS Whitehead     • ondansetron  4 mg Intravenous Q6H PRN Robin Biggs, CRNP     • oxyCODONE  10 mg Oral Q4H PRN Robin Biggs, CRNP     • oxyCODONE  5 mg Oral Q4H PRN Robin Biggs, CRNP     • phenol  1 spray Mouth/Throat Q2H PRN Robin Biggs, CRNP          Today, Patient Was Seen By: Renna Lanes, MD    ** Please Note: Dictation voice to text software may have been used in the creation of this document   **

## 2023-02-01 NOTE — UTILIZATION REVIEW
Continued Stay Review    Date: 2/1/23                          Current Patient Class: inpatient  Current Level of Care: ICU    HPI:52 y o  male initially admitted on 1/22/23  Peritonitis, Sepsis    POD#3 s/p diagnostic lap converted to ex lap with repair of enterotomy x2, extensive CHAY, peritoneal bx, peritoneal lavage    Assessment/Plan:  Pt doing well today, no new complaints overnight  Passing flatus, no recent BM since sx  Continue regular diet as tolerated, IV antibiotics per ID, currently on IV Rocephin, p o  Flagyl with p o  amoxicillin challenge completed 01/31  FU on final cxs, continue serial abd exams, strict IO, pain and nausea control prn, continue local wound and drain care with possible drain removal and kaiser today by surgical team   Continue to monitor electrolytes and administer albumin 25 gms today  GI, Nephrology and wound care following  PT recommending home with outpt rehab      Vital Signs:   Date/Time Temp Pulse Resp BP MAP (mmHg) SpO2 Calculated FIO2 (%) - Nasal Cannula Nasal Cannula O2 Flow Rate (L/min) O2 Device Patient Position - Orthostatic VS   02/01/23 0753 97 4 °F (36 3 °C) Abnormal  93 18 110/73 88 92 % -- -- -- --   02/01/23 0600 -- 88 -- 111/75 88 96 % -- -- -- --   02/01/23 0517 -- 83 -- -- -- 91 % -- -- -- --   02/01/23 0400 97 8 °F (36 6 °C) 92 20 102/67 80 98 % -- -- -- Lying   02/01/23 0200 -- 84 -- 98/54 70 91 % -- -- -- --   02/01/23 0110 -- -- -- -- -- 92 % -- -- -- --   02/01/23 0100 -- -- -- -- -- 91 % -- -- -- --   02/01/23 0010 -- 92 -- 105/57 74 91 % -- -- -- --   02/01/23 0000 -- 86 -- 105/57 74 90 % -- -- -- --   01/31/23 2200 -- 92 -- 98/56 70 90 % -- -- -- --   01/31/23 2100 -- 92 -- 106/62 77 93 % -- -- -- --   01/31/23 2033 -- 103 -- 93/55 69 96 % -- -- -- --   01/31/23 2000 -- 100 -- 124/72 91 96 % -- -- -- --   01/31/23 1900 98 8 °F (37 1 °C) 102 20 117/71 89 96 % 28 2 L/min Nasal cannula Lying   01/31/23 1800 -- 96 -- 121/64 84 95 % -- -- -- --   01/31/23 1720 -- 105 -- 129/79 96 95 % -- -- -- --   01/31/23 1710 -- 99 -- 105/60 75 95 % -- -- -- --   01/31/23 1700 -- 106 Abnormal  -- 138/79 103 94 % -- -- -- --   01/31/23 1650 -- 97 -- 97/54 72 94 % -- -- -- --   01/31/23 1630 -- 101 -- 119/74 92 94 % -- -- -- --   01/31/23 1620 -- 107 Abnormal  -- 125/75 93 95 % -- -- -- --   01/31/23 1600 -- 104 -- 127/73 95 95 % -- -- -- --   01/31/23 1550 -- 108 Abnormal  -- 128/74 95 93 % -- -- -- --   01/31/23 1540 -- 113 Abnormal  -- 134/74 101 91 % -- -- -- --   01/31/23 1530 -- 109 Abnormal  -- 131/78 101 90 % -- -- -- --   01/31/23 1520 -- 110 Abnormal  -- 130/73 94 91 % -- -- -- --   01/31/23 1511 -- 108 Abnormal  -- 129/68 92 95 % -- -- -- --   01/31/23 1500 98 7 °F (37 1 °C) 108 Abnormal  -- 139/83 103 95 % -- -- -- --   01/31/23 1450 -- 105 -- 130/79 98 95 % -- -- -- --   01/31/23 1440 -- 104 -- 126/78 99 94 % -- -- -- --       Pertinent Labs/Diagnostic Results:       Results from last 7 days   Lab Units 02/01/23  0504 01/31/23  0509 01/30/23  0453 01/29/23  0457 01/28/23  0402   WBC Thousand/uL 13 18* 16 22* 18 79* 13 28* 8 76   HEMOGLOBIN g/dL 10 3* 11 1* 11 7* 10 8* 11 9*   HEMATOCRIT % 30 1* 33 3* 35 0* 32 6* 34 5*   PLATELETS Thousands/uL 236 308 308 223 208   NEUTROS ABS Thousands/µL 10 26*  --   --  9 92* 6 57   BANDS PCT %  --   --  4  --   --          Results from last 7 days   Lab Units 02/01/23  0504 01/31/23  0509 01/30/23  0453 01/29/23  0457 01/28/23  2228 01/28/23  1616 01/28/23  0402   SODIUM mmol/L 125* 125* 123* 125* 123* 124* 119*   POTASSIUM mmol/L 3 5 3 8 4 4 4 6 4 8 4 6 3 8   CHLORIDE mmol/L 91* 89* 91* 92* 93* 93* 88*   CO2 mmol/L 30 30 28 26 24 25 25   ANION GAP mmol/L 4 6 4 7 6 6 6   BUN mg/dL 13 16 18 15 14 13 10   CREATININE mg/dL 0 47* 0 64 0 60 0 50* 0 48* 0 48* 0 54*   EGFR ml/min/1 73sq m 127 112 115 124 126 126 120   CALCIUM mg/dL 7 3* 7 9* 7 8* 7 9* 8 1* 7 6* 7 5*   CALCIUM, IONIZED mmol/L  --   --  1 08* 1 09* 1 09* 0 77*  --    MAGNESIUM mg/dL 1 5*  --  1 7* 2 0 2 2 1 9 1 5*   PHOSPHORUS mg/dL  --   --  3 2 3 8 4 0 4 0 3 1     Results from last 7 days   Lab Units 02/01/23  0504 01/31/23  0509 01/30/23  0453 01/28/23  2228 01/26/23  0956   AST U/L 21 21 38  --  39   ALT U/L 20 30 40  --  42   ALK PHOS U/L 50 59 59  --  66   TOTAL PROTEIN g/dL 5 3* 6 0* 5 9*  --  6 2*   ALBUMIN g/dL 2 1* 2 4* 2 3* 2 2* 2 4*   TOTAL BILIRUBIN mg/dL 0 82 0 95 0 85  --  0 90     Results from last 7 days   Lab Units 01/29/23  1151   POC GLUCOSE mg/dl 110     Results from last 7 days   Lab Units 02/01/23  0504 01/31/23  0509 01/30/23  0453 01/29/23  0457 01/28/23  2228 01/28/23  1616 01/28/23  0402 01/27/23  2035 01/27/23  1549 01/27/23  1203 01/27/23  0810 01/27/23  0427   GLUCOSE RANDOM mg/dL 151* 148* 133 130 146* 153* 136 189* 120 139 113 118  120     Results from last 7 days   Lab Units 01/28/23  1238   OSMOLALITY, SERUM mmol/*     Results from last 7 days   Lab Units 01/30/23  0453 01/29/23  0457 01/28/23  1616   PROTIME seconds 14 9* 15 3* 15 0*   INR  1 17 1 21* 1 18         Results from last 7 days   Lab Units 01/31/23  0509 01/30/23  0453 01/28/23  0401   PROCALCITONIN ng/ml 0 16 0 23 0 30*     Results from last 7 days   Lab Units 01/30/23  1619 01/28/23  1238   OSMOLALITY, SERUM mmol/KG  --  267*   OSMO UR mmol/  --      Results from last 7 days   Lab Units 01/30/23  1619 01/28/23  1229   CLARITY UA  Clear  --    COLOR UA  Yellow  --    SPEC GRAV UA  1 015  --    PH UA  6 0  --    GLUCOSE UA mg/dl Negative  --    KETONES UA mg/dl Negative  --    BLOOD UA  Negative  --    PROTEIN UA mg/dl Negative  --    NITRITE UA  Negative  --    BILIRUBIN UA  Negative  --    UROBILINOGEN UA E U /dl 0 2  --    LEUKOCYTES UA  Negative  --    WBC UA /hpf None Seen  --    RBC UA /hpf None Seen  --    BACTERIA UA /hpf None Seen  --    EPITHELIAL CELLS WET PREP /hpf None Seen  --    SODIUM UR  10 8   CREATININE UR mg/dL 71 1 117 0  117 0   PROTEIN UR mg/dL  --  46 PROT/CREAT RATIO UR   --  0 39*     Results from last 7 days   Lab Units 01/28/23  1036 01/28/23  1035 01/25/23  1201   BLOOD CULTURE   --   --  No Growth After 5 Days  No Growth After 5 Days  GRAM STAIN RESULT  3+ Polys*  3+ Mononuclear Cells*  1+ Gram positive cocci in chains* 2+ Disintegrating polys*  1+ Gram positive cocci in pairs and chains*  --    BODY FLUID CULTURE, STERILE  No growth No growth  --      Medications:   Scheduled Medications: Albumin 5%, 25 g, Intravenous, Once  cefTRIAXone, 2,000 mg, Intravenous, Q24H  chlorhexidine, 15 mL, Mouth/Throat, Q12H JAMSHID  enoxaparin, 40 mg, Subcutaneous, Q24H JAMSHID  magnesium sulfate, 2 g, Intravenous, Once  metroNIDAZOLE, 500 mg, Oral, Q8H [facility]      Continuous IV Infusions: none     PRN Meds:  albuterol, 2 puff, Inhalation, Q4H PRN  diphenhydrAMINE, 25 mg, Intravenous, Q6H PRN  EPINEPHrine PF, 0 3 mg, Intramuscular, Once PRN  famotidine, 20 mg, Intravenous, Once PRN  HYDROmorphone, 0 5 mg, Intravenous, Q3H PRN  ondansetron, 4 mg, Intravenous, Q6H PRN  oxyCODONE, 10 mg, Oral, Q4H PRN  oxyCODONE, 5 mg, Oral, Q4H PRN  phenol, 1 spray, Mouth/Throat, Q2H PRN        Discharge Plan: d    Network Utilization Review Department  ATTENTION: Please call with any questions or concerns to [phone] and carefully listen to the prompts so that you are directed to the right person  All voicemails are confidential   Yokoneda Gallegos all requests for admission clinical reviews, approved or denied determinations and any other requests to dedicated fax number below belonging to the campus where the patient is receiving treatment   List of dedicated fax numbers for the Facilities:  [address] DENIALS (Administrative/Medical Necessity) [phone]   [address] [place] [address] (Maternity/NICU/Pediatrics) Petrona Cisse [address] Dionne Finnegan  [address] [place] [address] Alfredo Payne [facility] [address] [facility] [place] [address] [phone]

## 2023-02-01 NOTE — PLAN OF CARE
Problem: PHYSICAL THERAPY ADULT  Goal: Performs mobility at highest level of function for planned discharge setting  See evaluation for individualized goals  Description: Treatment/Interventions: Functional transfer training, LE strengthening/ROM, Elevations, Therapeutic exercise, Endurance training, Patient/family training, Equipment eval/education, Bed mobility, Gait training, Spoke to nursing, Spoke to case management  Equipment Recommended: Mariusz Jewell       See flowsheet documentation for full assessment, interventions and recommendations  2/1/2023 1357 by Aaron Wilkinson PT  Outcome: Progressing  Note: Prognosis: Good  Problem List: Decreased strength, Decreased endurance, Impaired balance, Decreased mobility, Pain, Decreased skin integrity  Assessment: Pt seen for PT treatment session this date with interventions consisting of gait training to reduce the risk of medical complications and advance toward previous level of function w/ emphasis on improving pt's ability to ambulate level surfaces x 250 feet with close S provided by therapist with RW, Therapeutic exercise to improve BLE stability and overall endurance consisting of: AROM 20 reps B LE in supine with head of bed elevated position and therapeutic activity to reduce fall risk consisting of training: supine<>sit transfers, sit<>stand transfers, static sitting tolerance at EOB for 5 minutes w/ unilateral UE support, static standing tolerance for 3 minutes w/ B UE support, vc and tactile cues for static sitting posture faciliation, vc and tactile cues for static standing posture faciliation, stand pivot transfers towards B direction and compensatory pain modulation strategies  Pt agreeable to PT treatment session upon arrival, pt found supine in bed w/ HOB elevated, A&O x 4  In comparison to previous session, pt with improvements in ambulatory distance,task tolerance to ther ex   Post session: pt returned BTB, all needs in reach and RN notified of session findings/recommendations  Continue to recommend home with outpatient rehabilitation at time of d/c in order to maximize pt's functional independence and safety w/ mobility  Pt continues to be functioning below baseline level, and remains limited 2* factors listed above and including weakness, pain, decreased endurance, gait deviations, activity intolerance  PT will continue to see pt during current hospitalization in order to address the deficits listed above and provide interventions consistent w/ POC in effort to achieve LTGs  PT Discharge Recommendation: Home with outpatient rehabilitation    See flowsheet documentation for full assessment

## 2023-02-01 NOTE — PLAN OF CARE
Problem: PHYSICAL THERAPY ADULT  Goal: Performs mobility at highest level of function for planned discharge setting  See evaluation for individualized goals  Description: Treatment/Interventions: Functional transfer training, LE strengthening/ROM, Elevations, Therapeutic exercise, Endurance training, Patient/family training, Equipment eval/education, Bed mobility, Gait training, Spoke to nursing, Spoke to case management  Equipment Recommended: Mikie Allen       See flowsheet documentation for full assessment, interventions and recommendations  Note: Prognosis: Good  Problem List: Decreased strength, Decreased endurance, Impaired balance, Decreased mobility, Decreased coordination, Pain  Assessment: Pt is 46 y o  male seen for PT evaluation s/p admit to [address] [place] [address] on 1/21/2023 w/ Acute peritonitis ([facility] [address] )  PT consulted to assess pt's functional mobility and d/c needs  Order placed for PT eval and tx, w/ ambulate patient order  Comorbidities affecting pt's physical performance at time of assessment include: weakness,acute peritonitis, liver cirrhosis with ascites, hepatitis C, hyponatremia, sepsis  PTA, pt was independent w/ all functional mobility w/ o AD usage  Personal factors affecting pt at time of IE include: ambulating w/ assistive device, stairs to enter home, inability to perform IADLs and inability to perform ADLs  Please find objective findings from PT assessment regarding body systems outlined above with impairments and limitations including weakness, impaired balance, decreased endurance, impaired coordination, gait deviations, pain, decreased activity tolerance, decreased functional mobility tolerance, fall risk and decreased skin integrity  From PT/mobility standpoint, recommendation at time of d/c would be home with outpatient rehabilitation pending progress in order to facilitate return to PLOF          PT Discharge Recommendation: Home with outpatient rehabilitation    See flowsheet documentation for full assessment

## 2023-02-01 NOTE — OCCUPATIONAL THERAPY NOTE
Occupational Therapy Evaluation      oRc Merida    2/1/2023    Principal Problem:    Acute peritonitis ([facility] )  Active Problems:    Hepatitis C    Sepsis ([facility] )    Cirrhosis of liver with ascites ([facility] )    Hyponatremia    Difficult intubation      Past Medical History:   Diagnosis Date    Hard to intubate     Hepatitis C        Past Surgical History:   Procedure Laterality Date    HAND SURGERY      IR PARACENTESIS  1/23/2023    AZ LAPS ABD PRTM&OMENTUM DX W/WO SPEC BR/WA SPX N/A 1/28/2023    Procedure: LAPAROSCOPY DIAGNOSTIC CONVERTED TO OPEN, LAPAROTOMY, REPAIR ENTEROTOMY X2, EXTENSIS LYSIS OF ADHESIONS, PERITONEAL BIOPSIES, PERITONEAL LAVAGE;  Surgeon: Emily Riojas MD;  Location: [facility];  Service: General    WISDOM TOOTH EXTRACTION          02/01/23 0836   OT Last Visit   OT Visit Date 02/01/23   Note Type   Note type Evaluation   Pain Assessment   Pain Assessment Tool 0-10   Pain Score 8   Pain Location/Orientation Orientation: Mid;Location: Abdomen   Pain Onset/Description Onset: Ongoing;Frequency: Intermittent; Descriptor: [place] Number; Descriptor: Sore   Effect of Pain on Daily Activities yes   Patient's Stated Pain Goal No pain   Hospital Pain Intervention(s) Medication (See MAR); Repositioned; Ambulation/increased activity; Emotional support; Environmental changes  Vanda Do RN provided pain medication )   Multiple Pain Sites No   Restrictions/Precautions   Weight Bearing Precautions Per Order No   Other Precautions Multiple lines;Telemetry; Fall Risk;Pain  (MAURICE drains x 2, surgical site)   Home Living   Type of Home Apartment   Home Layout Multi-level;Performs ADLs on one level; Able to live on main level with bedroom/bathroom  (3 levels apartment, 6 steps betweens each floor )   Bathroom Shower/Tub Tub/shower unit   Bathroom Toilet Standard   Bathroom Equipment Grab bars in shower;Built-in shower seat;Grab bars around toilet   [address]  (denies prior AD usage) Prior Function   Level of [place] Independent with ADLs; Independent with functional mobility   Lives With [address]  (has a friend on the floor below him who can help him)   IADLs Independent with driving; Independent with meal prep; Independent with medication management   Falls in the last 6 months 0  (denies)   Vocational Full time employment  (12 hr shifts, 3-4 days/week; [place] )   ADL   UB Bathing Assistance 5  Supervision/Setup   LB Bathing Assistance 5  Supervision/Setup   UB Dressing Assistance 5  Supervision/Setup   LB Dressing Assistance 5  Supervision/Setup   Bed Mobility   Supine to Sit 5  Supervision   Additional items Assist x 1;HOB elevated; Bedrails; Increased time required;Verbal cues   Additional Comments Pt remained OOB in recliner upon conclusion   Transfers   Sit to Stand 5  Supervision   Additional items Assist x 1;Bedrails; Increased time required;Verbal cues   Stand to Sit 5  Supervision   Additional items Assist x 1; Armrests; Increased time required;Verbal cues   Stand pivot 5  Supervision   Additional items Assist x 1; Increased time required;Verbal cues  (RW)   Functional Mobility   Functional Mobility 5  Supervision   Additional items Rolling walker   Balance   Static Sitting Good   Dynamic Sitting Good   Static Standing Fair +   Dynamic Standing Fair +   Ambulatory Fair   Activity Tolerance   Activity Tolerance Patient limited by fatigue;Patient limited by pain   Medical Staff Made Aware CM Tatianna   Nurse Made Aware ADAM Mason Assessment   RUE Assessment WFL   LUE Assessment   LUE Assessment WFL   Vision-Basic Assessment   Current Vision Wears glasses for distance only   Cognition   Overall Cognitive Status WFL   Arousal/Participation Alert; Cooperative   Attention Within functional limits   Orientation Level Oriented X4   Memory Within functional limits   Following Commands Follows all commands and directions without difficulty   Assessment Limitation Decreased ADL status; Decreased Safe judgement during ADL;Decreased endurance;Decreased self-care trans;Decreased high-level ADLs   Prognosis Good   Assessment Pt is a 46 y o  male seen for OT evaluation s/p admit to [facility] on 1/21/2023 w/ Acute peritonitis ([facility] [address] )  Comorbidities affecting pt's functional performance at time of assessment include: Hepatitis C, Sepsis, Hyponatremia  Personal factors affecting pt at time of IE include:steps to enter environment, limited home support and difficulty performing ADLS  Prior to admission, pt was Mod I with ADLs  Upon evaluation: the following deficits impact occupational performance: decreased balance and decreased tolerance  Pt to benefit from continued skilled OT tx while in the hospital to address deficits as defined above and maximize level of functional independence w ADL's and functional mobility  Occupational Performance areas to address include: bathing/shower, toilet hygiene, dressing, functional mobility and clothing management  From OT standpoint, recommendation at time of d/c would be Out-patient OT  Goals   Patient Goals to get better soon   Plan   Treatment Interventions ADL retraining;Functional transfer training; Endurance training;Equipment evaluation/education; Compensatory technique education; Energy conservation; Activityengagement   Goal Expiration Date 02/11/23   OT Treatment Day 0   OT Frequency 3-5x/wk   Recommendation   OT Discharge Recommendation Home with outpatient rehabilitation   Additional Comments  Pt seen as a co-eval with PT due to the patient's co-morbidities, clinically unstable presentation, and present impairments which are a regression from the patient's baseline  Additional Comments 2 The patient's raw score on the AM-PAC Daily Activity inpatient short form is 20, standardized score is 42 03, greater than 39 4  Patients at this level are likely to benefit from discharge to home   Please refer to the recommendation of the Occupational Therapist for safe discharge planning     AM-PAC Daily Activity Inpatient   Lower Body Dressing 3   Bathing 3   Toileting 3   Upper Body Dressing 3   Grooming 4   Eating 4   Daily Activity Raw Score 20   Daily Activity Standardized Score (Calc for Raw Score >=11) 42 03   AM-PAC Applied Cognition Inpatient   Following a Speech/Presentation 4   Understanding Ordinary Conversation 4   Taking Medications 4   Remembering Where Things Are Placed or Put Away 4   Remembering List of 4-5 Errands 4   Taking Care of Complicated Tasks 4   Applied Cognition Raw Score 24   Applied Cognition Standardized Score 62 21     GOALS:    Pt will achieve the following within specified time frame: STG  Pt will achieve the following goals within 5 days    *ADL transfers with (I) for inc'd independence with ADLs/purposeful tasks    *UB ADL with (I) for inc'd independence with self cares    *LB ADL with (I) using AE prn for inc'd independence with self cares    *Toileting with (I) for clothing management and hygiene for return to PLOF with personal care    *Increase static stand balance and dyn stand balance to G- for inc'd safety with standing purposeful tasks    *Increase stand tolerance x5 m for inc'd tolerance with standing purposeful tasks    *Participate in 10m UE therex to increase overall stamina/activity tolerance for purposeful tasks    *Bed mobility- (I) for inc'd independence to manage own comfort and initiate EOB & OOB purposeful tasks    Pt will achieve the following within specified time frame: LTG  Pt will achieve the following goals within 10 days    *Increase static stand balance and dyn stand balance to G for inc'd safety with standing purposeful tasks    *Increase stand tolerance x7 m for inc'd tolerance with standing purposeful tasks      Darin Cameron MS, OTR/L

## 2023-02-01 NOTE — PROGRESS NOTES
Progress Note - Nephrology   Roc Merida 46 y o  male MRN: [id number]  Unit/Bed#: ICU 02-01 Encounter: [id number]    A/P:  1  Hyponatremia -likely due to cirrhosis and hypoalbuminemia   - Had marked leg edema and was treated with 1 dose of Bumex with a good urinary response and no change in serum sodium at 125 mmol/L today   - Continue monitoring and will administer albumin 25 g today    2  Cirrhosis of the liver with ascites    - Being followed by surgery and GI     3   Sepsis   - Resolved  -  Secondary to peritonitis with strep and receiving IV antibiotics currently on ceftriaxone 2 g daily and metronidazole 500 poV every 8  He will be transition to amoxicillin oral suspension and having an penicillin allergy challenge at this time    4  Hepatitis C -   -  will need outpatient treatment      Follow up reason for today's visit: Hyponatremia    Acute peritonitis [facility])    Patient Active Problem List   Diagnosis   • Hepatitis C   • Acute peritonitis ([facility] [address] )   • Sepsis ([facility] )   • Cirrhosis of liver with ascites ([facility] )   • Hyponatremia   • Difficult intubation         Subjective:   He says he feels well  He is eating without any difficulty 10 point review of systems is unremarkable  He is urinating without difficulty    Objective:     Vitals: Blood pressure 110/73, pulse 93, temperature (!) 97 4 °F (36 3 °C), temperature source Tympanic, resp  rate 18, height 5' 11" (1 803 m), weight 65 kg (143 lb 4 8 oz), SpO2 92 %  ,Body mass index is 19 99 kg/m²  Weight (last 2 days)     Date/Time Weight    02/01/23 0550 65 (143 3)    02/01/23 0517 66 8 (147 27)    01/31/23 0600 67 5 (148 81)    01/30/23 0501 69 6 (153 44)            Intake/Output Summary (Last 24 hours) at 2/1/2023 0953  Last data filed at 2/1/2023 0550  Gross per 24 hour   Intake 580 ml   Output 1010 ml   Net -430 ml     I/O last 3 completed shifts:   In: 36 [P O :720; IV Piggyback:100]  Out: 1400 [Urine:1100; Drains:300]         Physical Exam: /73 Pulse 93   Temp (!) 97 4 °F (36 3 °C) (Tympanic)   Resp 18   Ht 5' 11" (1 803 m)   Wt 65 kg (143 lb 4 8 oz)   SpO2 92%   BMI 19 99 kg/m²     General Appearance:    Alert, frail cooperative, no distress, appears stated age   Head:    Normocephalic, without obvious abnormality, atraumatic   Eyes:    Conjunctiva/corneas clear   Ears:    Normal external ears   Nose:   Nares normal, septum midline, mucosa normal, no drainage    or sinus tenderness   Throat:   Lips, mucosa, and tongue normal; teeth and gums normal   Neck:   Supple, symmetrical, trachea midline, no adenopathy;        thyroid:  No enlargement/tenderness/nodules; no carotid    bruit or JVD   Back:     Symmetric, no curvature, ROM normal, no CVA tenderness   Lungs:     Clear to auscultation bilaterally, respirations unlabored   Chest wall:    No tenderness or deformity   Heart:    Regular rate and rhythm, S1 and S2 normal, no murmur, rub   or gallop   Abdomen:    Incision looks clean  2 drains with bloody drainage  Urine has blood-tinged   Extremities:   Extremities normal, atraumatic, no cyanosis or edema   Skin:   Skin color, texture, turgor normal, no rashes or lesions   Lymph nodes:   Cervical normal   Neurologic:   CNII-XII intact            Lab, Imaging and other studies: I have personally reviewed pertinent labs  CBC:   Lab Results   Component Value Date    WBC 13 18 (H) 02/01/2023    HGB 10 3 (L) 02/01/2023    HCT 30 1 (L) 02/01/2023     (H) 02/01/2023     02/01/2023    MCH 34 4 (H) 02/01/2023    MCHC 34 2 02/01/2023    RDW 13 3 02/01/2023    MPV 9 4 02/01/2023    NRBC 0 02/01/2023     CMP:   Lab Results   Component Value Date    K 3 5 02/01/2023    CL 91 (L) 02/01/2023    CO2 30 02/01/2023    BUN 13 02/01/2023    CREATININE 0 47 (L) 02/01/2023    CALCIUM 7 3 (L) 02/01/2023    AST 21 02/01/2023    ALT 20 02/01/2023    ALKPHOS 50 02/01/2023    EGFR 127 02/01/2023           Results from last 7 days   Lab Units 02/01/23  050 01/31/23  0509 01/30/23  0453   POTASSIUM mmol/L 3 5 3 8 4 4   CHLORIDE mmol/L 91* 89* 91*   CO2 mmol/L 30 30 28   BUN mg/dL 13 16 18   CREATININE mg/dL 0 47* 0 64 0 60   CALCIUM mg/dL 7 3* 7 9* 7 8*   ALK PHOS U/L 50 59 59   ALT U/L 20 30 40   AST U/L 21 21 38         Phosphorus: No results found for: PHOS  Magnesium:   Lab Results   Component Value Date    MG 1 5 (L) 02/01/2023     Urinalysis: No results found for: COLORU, CLARITYU, SPECGRAV, PHUR, LEUKOCYTESUR, NITRITE, PROTEINUA, GLUCOSEU, KETONESU, BILIRUBINUR, BLOODU  Ionized Calcium: No results found for: CAION  Coagulation: No results found for: PT, INR, APTT  Troponin: No results found for: TROPONINI  ABG: No results found for: PHART, PSE3DLE, PO2ART, FKU0TZU, J4CDGZXU, BEART, SOURCE  Radiology review:     IMAGING  No results found      Current Facility-Administered Medications   Medication Dose Route Frequency   • albuterol (PROVENTIL HFA,VENTOLIN HFA) inhaler 2 puff  2 puff Inhalation Q4H PRN   • cefTRIAXone (ROCEPHIN) IVPB (premix in dextrose) 2,000 mg 50 mL  2,000 mg Intravenous Q24H   • chlorhexidine (PERIDEX) 0 12 % oral rinse 15 mL  15 mL Mouth/Throat Q12H JAMSHID   • diphenhydrAMINE (BENADRYL) injection 25 mg  25 mg Intravenous Q6H PRN   • enoxaparin (LOVENOX) subcutaneous injection 40 mg  40 mg Subcutaneous Q24H JAMSHID   • EPINEPHrine PF (ADRENALIN) 1 mg/mL injection 0 3 mg  0 3 mg Intramuscular Once PRN   • Famotidine (PF) (PEPCID) injection 20 mg  20 mg Intravenous Once PRN   • HYDROmorphone (DILAUDID) injection 0 5 mg  0 5 mg Intravenous Q3H PRN   • magnesium sulfate 2 g/50 mL IVPB (premix) 2 g  2 g Intravenous Once   • metroNIDAZOLE (FLAGYL) tablet 500 mg  500 mg Oral Q8H [address]   • ondansetron (ZOFRAN) injection 4 mg  4 mg Intravenous Q6H PRN   • oxyCODONE (ROXICODONE) immediate release tablet 10 mg  10 mg Oral Q4H PRN   • oxyCODONE (ROXICODONE) IR tablet 5 mg  5 mg Oral Q4H PRN   • phenol (CHLORASEPTIC) 1 4 % mucosal liquid 1 spray  1 spray Mouth/Throat Q2H PRN     Medications Discontinued During This Encounter   Medication Reason   • ibuprofen (MOTRIN) 200 mg tablet    • levofloxacin (LEVAQUIN) IVPB (premix in dextrose) 750 mg 150 mL    • metroNIDAZOLE (FLAGYL) IVPB (premix) 500 mg 100 mL    • vancomycin (VANCOCIN) 1,250 mg in sodium chloride 0 9 % 250 mL IVPB    • vancomycin (VANCOCIN) 1500 mg in sodium chloride 0 9% 250 mL IVPB    • sodium chloride (HYPERTONIC) 3 % infusion    • bumetanide (BUMEX) injection 3 mg    • potassium chloride (K-DUR,KLOR-CON) CR tablet 40 mEq    • bupivacaine-epinephrine (MARCAINE/EPINEPHRINE) 0 25 %-1:[id number] injection Patient Discharge   • acetaminophen (TYLENOL) tablet 650 mg    • oxyCODONE (ROXICODONE) IR tablet 5 mg    • oxyCODONE (ROXICODONE) immediate release tablet 10 mg    • aluminum-magnesium hydroxide-simethicone (MYLANTA) oral suspension 30 mL    • loperamide (IMODIUM) capsule 2 mg    • saliva substitute (MOUTH KOTE) mucosal solution 5 spray    • sodium chloride 0 9 % with KCl 20 mEq/L infusion (premix)    • sodium chloride 0 9 % with KCl 20 mEq/L infusion (premix)    • sodium chloride 0 9 % infusion    • propofol (DIPRIVAN) 1000 mg in 100 mL infusion (premix)    • fentanyl citrate (PF) 100 MCG/2ML 50 mcg    • HYDROmorphone (DILAUDID) injection 0 5 mg    • HYDROmorphone HCl (DILAUDID) injection 0 2 mg        Kwesi Lozano MD      This progress note was produced in part using a dictation device which may document imprecise wording from author's original intent

## 2023-02-01 NOTE — PLAN OF CARE
Problem: PHYSICAL THERAPY ADULT  Goal: Performs mobility at highest level of function for planned discharge setting  See evaluation for individualized goals  Description: Treatment/Interventions: Functional transfer training, LE strengthening/ROM, Elevations, Therapeutic exercise, Endurance training, Patient/family training, Equipment eval/education, Bed mobility, Gait training, Spoke to nursing, Spoke to case management  Equipment Recommended: Maddison Kendall       See flowsheet documentation for full assessment, interventions and recommendations  2/1/2023 1357 by Tessy Rod PT  Outcome: Progressing  Note: Prognosis: Good  Problem List: Decreased strength, Decreased endurance, Impaired balance, Decreased mobility, Pain, Decreased skin integrity  Assessment: Pt seen for PT treatment session this date with interventions consisting of gait training to reduce the risk of medical complications and advance toward previous level of function w/ emphasis on improving pt's ability to ambulate level surfaces x 250 feet with close S provided by therapist with RW, Therapeutic exercise to improve BLE stability and overall endurance consisting of: AROM 20 reps B LE in supine with head of bed elevated position and therapeutic activity to reduce fall risk consisting of training: supine<>sit transfers, sit<>stand transfers, static sitting tolerance at EOB for 5 minutes w/ unilateral UE support, static standing tolerance for 3 minutes w/ B UE support, vc and tactile cues for static sitting posture faciliation, vc and tactile cues for static standing posture faciliation, stand pivot transfers towards B direction and compensatory pain modulation strategies  Pt agreeable to PT treatment session upon arrival, pt found supine in bed w/ HOB elevated, A&O x 4  In comparison to previous session, pt with improvements in ambulatory distance,task tolerance to ther ex   Post session: pt returned BTB, all needs in reach and RN notified of session findings/recommendations  Continue to recommend home with outpatient rehabilitation at time of d/c in order to maximize pt's functional independence and safety w/ mobility  Pt continues to be functioning below baseline level, and remains limited 2* factors listed above and including weakness, pain, decreased endurance, gait deviations, activity intolerance  PT will continue to see pt during current hospitalization in order to address the deficits listed above and provide interventions consistent w/ POC in effort to achieve LTGs  PT Discharge Recommendation: Home with outpatient rehabilitation    See flowsheet documentation for full assessment

## 2023-02-01 NOTE — ASSESSMENT & PLAN NOTE
· Patient reporting abdominal pain for 3 days and urinary retention, increased abdominal distention, nausea/vomiting  · Patient was initially on Levaquin/Flagyl  · Infectious Disease was consulted and Levaquin/Flagyl were discontinued and patient was started on ceftriaxone/vancomycin  · Vancomycin has been discontinued  · Continue ceftriaxone/flagyl  · Patient had exploratory laparotomy with repair of enterotomy x2, extensive lysis of adhesions, peritoneal biopsy, peritoneal lavage on 1/28/23  · Will follow up cultures  · Patient had amoxicillin challenge on 1/31/2023 by infectious disease  · Continue Abx per infectious disease

## 2023-02-01 NOTE — NURSING NOTE
Patient cooperative with Q 2 hr  turns throughout the night  Offloaded sacrum and back with pillows and wedges

## 2023-02-01 NOTE — PROGRESS NOTES
Progress Note - [facility] Infectious Disease   Haim Lassiter 46 y o  male MRN: [id number]  Unit/Bed#: ICU 02-01 Encounter: [id number]    IMPRESSION & RECOMMENDATIONS:   1  Sepsis, evolving from admission, evidenced by leukocytosis and tachycardia, with intermittent low-grade fevers  Likely due to peritonitis  Initial blood cultures are negative for growth  Repeat blood cultures negative >5d  WBC up-trending post op and likely reactive  Patient remains hemodynamically stable  -Continue antibiotics as below  -Follow-up repeat blood cultures   -Monitor temperature and hemodynamics  -Low threshold to re-image A/P    -Trend daily CBC differential and chemistry     2  Acute peritonitis  Patient presented with 1 week of worsening abdominal pain, distention, fevers and chills   CT showed moderate to large intra-abdominal and pelvic ascites with diffuse enhancement and thickening of the peritoneal lining compatible with acute peritonitis  S/p paracentesis 1/23 with loculated ascites present and fluid studies consistent with infection with extremely elevated WBC >70,000, with elevated LDH   Fluid studies not entirely consistent with portal hypertension, SAAG < 1 1 and Protein 4 4  Peritoneal fluid culture positive for strep intermedius, which has the propensity to produce abscesses  Unknown source  No evidence of GI source for a secondary peritonitis on initial CT A/P  Unlikely malignant with negative cytology  Now s/p ex-lap, lysis of adhesions, peritoneal biopsy 1/28/23 with extensive intra-peritoneal abscess with multiple loculations, extensive rind, and friable abscess wall encountered  OR cultures with few colonies of Strep intermedius  Consider possibility that this did start as "SBP" and given nature of S   Intermedius to develop into intra-peritoneal abscess, which may have lead to discrepancy of peritoneal fluid studies    -Continue IV Ceftriaxone 2g q24 and PO Flagyl 500mg q8h for now   -Pt tolerated amoxicillin challenge, thus will plan to switch to Augmentin when closer to d/c   -Anticipate 2 week course of abx from OR, through 23   -Monitor drain output   -Serial abdominal exams and daily liver chemistries   -Close gastroenterology/surgery follow-up     3  Cirrhosis   CT shows nodularity of the liver surface suggestive of chronic cirrhosis   History of chronic hepatitis C, former alcohol abuse and IV drug abuse   In remission for 20 years   Patient not treated for hepatitis C  HIV negative  HCV PCR quant [id number]    -Ongoing follow-up with GI for tx     4  Postoperative respiratory insufficiency  Improved and now extubated to 2-3L NC       5  Transaminitis, hyponatremia  Vivica Snow due to cirrhosis   Nephrology and gastroenterology are following   -Monitor LFTs, chemistry     6  Antibiotic Allergy   Reports history of rash with penicillin and denies anaphylaxis   At this point unlikely this is a significant allergy   Low risk for cross-reactivity with cephalosporins   He is tolerating ceftriaxone   -Monitor for adverse drug reactions   -Amoxicillin challenge as above     Antibiotics:  D9 ceftriaxone   D6 flagyl  D11 abx   POD 4    I have discussed the above management plan in detail with patient  I have discussed the above management plan in detail with patient's RN, and the primary service, SLIM  Subjective:  Patient has no fever, chills, sweats; no nausea, vomiting, diarrhea; no cough, shortness of breath; no pain  Feels improved today  Sitting up in the chair  Slept well and tolerating more PO  Drains still with high output       Objective:  Vitals:  Temp:  [97 4 °F (36 3 °C)-98 8 °F (37 1 °C)] 97 4 °F (36 3 °C)  HR:  [] 93  Resp:  [18-20] 18  BP: ()/(54-83) 110/73  SpO2:  [90 %-98 %] 92 %  Temp (24hrs), Av 2 °F (36 8 °C), Min:97 4 °F (36 3 °C), Max:98 8 °F (37 1 °C)  Current: Temperature: (!) 97 4 °F (36 3 °C)    PHYSICAL EXAM:  General Appearance:  Appearing chronically ill, nontoxic, and in no distress   HEENT: Normocephalic, without obvious abnormality, atraumatic  Conjunctiva pink and sclera anicteric  Oropharynx moist without lesions  Lungs:   Clear to auscultation bilaterally, respirations unlabored   Heart:  RRR; no murmur, rub or gallop   Abdomen:   Abdominal tenderness around incision with dressing in place c/d/i  MAURICE drains in place x2 draining SS fluid R>L  Extremities: No cyanosis, clubbing or edema   Musculoskeletal: Back symmetric without curvature, ROM normal     Skin: No rashes or lesions  No draining wounds noted  Peripheral IV intact without evidence of erythema, warmth, or exudate  LABS, IMAGING, & OTHER STUDIES:  Lab Results:  I have personally reviewed pertinent labs  Results from last 7 days   Lab Units 02/01/23  0504 01/31/23  0509 01/30/23  0453   WBC Thousand/uL 13 18* 16 22* 18 79*   HEMOGLOBIN g/dL 10 3* 11 1* 11 7*   PLATELETS Thousands/uL 236 308 308     Results from last 7 days   Lab Units 02/01/23  0504 01/31/23  0509 01/30/23  0453   SODIUM mmol/L 125* 125* 123*   POTASSIUM mmol/L 3 5 3 8 4 4   CHLORIDE mmol/L 91* 89* 91*   CO2 mmol/L 30 30 28   BUN mg/dL 13 16 18   CREATININE mg/dL 0 47* 0 64 0 60   EGFR ml/min/1 73sq m 127 112 115   CALCIUM mg/dL 7 3* 7 9* 7 8*   AST U/L 21 21 38   ALT U/L 20 30 40   ALK PHOS U/L 50 59 59     Results from last 7 days   Lab Units 01/28/23  1036 01/28/23  1035 01/25/23  1201   BLOOD CULTURE   --   --  No Growth After 5 Days  No Growth After 5 Days     GRAM STAIN RESULT  3+ Polys*  3+ Mononuclear Cells*  1+ Gram positive cocci in chains* 2+ Disintegrating polys*  1+ Gram positive cocci in pairs and chains*  --    BODY FLUID CULTURE, STERILE  No growth No growth  --      Results from last 7 days   Lab Units 01/31/23  0509 01/30/23  0453 01/28/23  0401   PROCALCITONIN ng/ml 0 16 0 23 0 30*

## 2023-02-01 NOTE — PHYSICAL THERAPY NOTE
Physical Therapy Evaluation     Patient's Name: Krista Gustafson    Admitting Diagnosis  Acute peritonitis ([facility] ) [K65 0]  Hyponatremia [E87 1]  Abdominal pain [R10 9]  SIRS (systemic inflammatory response syndrome) ([facility] ) [R65 10]  Hepatic cirrhosis due to chronic hepatitis C infection ([facility] ) [B18 2, K74 60]  Cirrhosis of liver with ascites, unspecified hepatic cirrhosis type ([facility] ) [K74 60, R18 8]  Hepatitis C virus infection without hepatic coma, unspecified chronicity [B19 20]    Problem List  Patient Active Problem List   Diagnosis    Hepatitis C    Acute peritonitis ([facility] )    Sepsis ([facility] )    Cirrhosis of liver with ascites ([facility] )    Hyponatremia    Difficult intubation       Past Medical History  Past Medical History:   Diagnosis Date    Hard to intubate     Hepatitis C        Past Surgical History  Past Surgical History:   Procedure Laterality Date    HAND SURGERY      IR PARACENTESIS  1/23/2023    KY LAPS ABD PRTM&OMENTUM DX W/WO SPEC BR/WA SPX N/A 1/28/2023    Procedure: LAPAROSCOPY DIAGNOSTIC CONVERTED TO OPEN, LAPAROTOMY, REPAIR ENTEROTOMY X2, EXTENSIS LYSIS OF ADHESIONS, PERITONEAL BIOPSIES, PERITONEAL LAVAGE;  Surgeon: Lali Palafox MD;  Location: [facility];  Service: General    WISDOM TOOTH EXTRACTION          02/01/23 0815   PT Last Visit   PT Visit Date 02/01/23   Note Type   Note type Evaluation   Pain Assessment   Pain Assessment Tool 0-10   Pain Score 8   Pain Location/Orientation Orientation: Mid;Location: Abdomen   Pain Onset/Description Onset: Ongoing;Frequency: Intermittent; Descriptor: Eze Hero; Descriptor: Sore   Effect of Pain on Daily Activities yes   Patient's Stated Pain Goal No pain   Hospital Pain Intervention(s) Medication (See MAR); Repositioned; Ambulation/increased activity; Emotional support; Environmental changes  Flako Cortez RN provided pain medication )   Multiple Pain Sites No   Restrictions/Precautions   Weight Bearing Precautions Per Order No   Other Precautions Multiple lines;Telemetry; Fall Risk;Pain  (MAURICE drains x 2, surgical site)   Home Living   Type of Home Apartment   Home Layout Multi-level;Performs ADLs on one level; Able to live on main level with bedroom/bathroom  (3 levels apartment, 6 steps betweens each floor )   Bathroom Shower/Tub Tub/shower unit   Bathroom Toilet Standard   Bathroom Equipment Grab bars in shower;Built-in shower seat;Grab bars around toilet   [address]  (denies prior AD usage)   Prior Function   Level of [place] Independent with ADLs; Independent with functional mobility   Lives With [address]  (has a friend on the floor below him who can help him)   IADLs Independent with driving; Independent with meal prep; Independent with medication management   Falls in the last 6 months 0  (denies)   Vocational Full time employment  (12 hr shifts, 4 days/week)   General   Additional Pertinent History Myrisa OT present for co-assessment due to medical complexity, required skilled interventions of 2 clinicians for care delivery  Family/Caregiver Present No   Cognition   Overall Cognitive Status WFL   Arousal/Participation Alert   Orientation Level Oriented X4   Memory Within functional limits   Following Commands Follows all commands and directions without difficulty   Comments Tiajuana Bigger was very pleasant and cooperative during assessment  RLE Assessment   RLE Assessment X  (3+/5 gross musculature)   LLE Assessment   LLE Assessment X  (3+/5 gross musculature)   Vision-Basic Assessment   Current Vision Wears glasses for distance only   Vestibular   Spontaneous Nystagmus (-) no evidence of nystagmus at rest in room light   Coordination   Movements are Fluid and Coordinated 0   Coordination and Movement Description Antalgic mobility requiring increased time     Sensation WFL   Sharp/Dull   RLE Sharp/Dull Grossly intact   LLE Sharp/Dull Grossly intact   Bed Mobility   Supine to Sit 5  Supervision Additional items Assist x 1;HOB elevated; Bedrails; Increased time required;Verbal cues   Sit to Supine   (DNT pt  was sitting out of bed on recliner upon conclusion )   Additional Comments Verbal cues for bedrail utilization and proper body mechanics  Transfers   Sit to Stand 5  Supervision   Additional items Assist x 1;Bedrails; Increased time required;Verbal cues  (RW usage)   Stand to Sit 5  Supervision   Additional items Assist x 1;Bedrails; Increased time required;Verbal cues   Stand pivot 5  Supervision  (close)   Additional items Assist x 1; Increased time required;Verbal cues   Additional Comments Verbal cues for BUE placement with transitional movements and proper AD usage  Ambulation/Elevation   Gait pattern Improper Weight shift; Antalgic; Forward Flexion; Short stride   Gait Assistance 5  Supervision  (close)   Additional items Assist x 1;Verbal cues   Assistive Device Rolling walker   Distance 225  feet   Stair Management Assistance Not tested   Ambulation/Elevation Additional Comments Verbal cues for base of support widening for stabilization  Balance   Static Sitting Good   Dynamic Sitting Good   Static Standing Fair +   Dynamic Standing Fair +   Ambulatory Fair   Endurance Deficit   Endurance Deficit Yes   Activity Tolerance   Activity Tolerance Patient limited by fatigue;Patient limited by pain   Medical Staff Made Aware yes, Tatianna CM   Nurse Made Aware yes, Ricci RN   Assessment   Prognosis Good   Problem List Decreased strength;Decreased endurance; Impaired balance;Decreased mobility; Decreased coordination;Pain   Assessment Pt is 46 y o  male seen for PT evaluation s/p admit to [facility] on 1/21/2023 w/ Acute peritonitis ([facility] )  PT consulted to assess pt's functional mobility and d/c needs  Order placed for PT eval and tx, w/ ambulate patient order   Comorbidities affecting pt's physical performance at time of assessment include: weakness,acute peritonitis, liver cirrhosis with ascites, hepatitis C, hyponatremia, sepsis  PTA, pt was independent w/ all functional mobility w/ o AD usage  Personal factors affecting pt at time of IE include: ambulating w/ assistive device, stairs to enter home, inability to perform IADLs and inability to perform ADLs  Please find objective findings from PT assessment regarding body systems outlined above with impairments and limitations including weakness, impaired balance, decreased endurance, impaired coordination, gait deviations, pain, decreased activity tolerance, decreased functional mobility tolerance, fall risk and decreased skin integrity  From PT/mobility standpoint, recommendation at time of d/c would be home with outpatient rehabilitation pending progress in order to facilitate return to PLOF  Goals   Patient Goals to get better soon   LTG Expiration Date 02/11/23   Long Term Goal #1 1 )Patient will complete bed mobility modified I  for decrease need for caregiver assistance, decrease burden of care  2 ) Patient will complete transfers modified I to decrease risk of falls, facilitate upright standing posture  3 ) BLE strength to greater than/equal to 4/5 gross musculature to increase ability to safely transfer, control descent to chair  4 ) Patient will exhibit increase dynamic standing to Good 4-6 minutes without LOB modified I to improve activity tolerance  5 ) Patient will exhibit increase dynamic ambulatory balance to Good 350-400 feet w/AD prn distant supervision to improve ability to mobilize to toilet, chair and decrease risk for additional medical complications  6 ) Patient will exhibit good self monitoring and ability to follow 2 step commands to increase complexity of tasks and resume ADL's without LOB  PT Treatment Day 0   Plan   Treatment/Interventions Functional transfer training;LE strengthening/ROM; Elevations; Therapeutic exercise; Endurance training;Patient/family training;Equipment eval/education; Bed mobility;Gait training;Spoke to nursing;Spoke to case management   PT Frequency 3-5x/wk   Recommendation   PT Discharge Recommendation Home with outpatient rehabilitation   Equipment Recommended [facility] Recommended Wheeled walker   Change/add to [place]? No   Additional Comments Upon conclusion, pt  was resting out of bed on recliner  All of Vijay's needs were within reach     AM-PAC Basic Mobility Inpatient   Turning in Flat Bed Without Bedrails 4   Lying on Back to Sitting on Edge of Flat Bed Without Bedrails 3   Moving Bed to Chair 3   Standing Up From Chair Using Arms 3   Walk in Room 3   Climb 3-5 Stairs With Railing 3   Basic Mobility Inpatient Raw Score 19   Basic Mobility Standardized Score 42 48   Highest Level Of Mobility   JH-HLM Goal 6: Walk 10 steps or more   JH-HLM Achieved 7: Walk 25 feet or more     History/Personal Factors/Comorbidities: weakness,acute peritonitis, liver cirrhosis with ascites, hepatitis C, hyponatremia, sepsis    # of body structures/limitations: muscle weakness, activity intolerance,decreased endurance, impaired balance, gait deviations,pain    Clinical presentation: unstable as seen in pain severity, progressive symptoms prior to hospitalization with sepsis diagnosis, decreased skin integrity    Initial Assessment Time: [id number]    Stacia Ramos, PT

## 2023-02-01 NOTE — PLAN OF CARE
Problem: Nutrition/Hydration-ADULT  Goal: Nutrient/Hydration intake appropriate for improving, restoring or maintaining nutritional needs  Description: Monitor and assess patient's nutrition/hydration status for malnutrition  Collaborate with interdisciplinary team and initiate plan and interventions as ordered  Monitor patient's weight and dietary intake as ordered or per policy  Utilize nutrition screening tool and intervene as necessary  Determine patient's food preferences and provide high-protein, high-caloric foods as appropriate       INTERVENTIONS:  - Monitor oral intake, urinary output, labs, and treatment plans  - Assess nutrition and hydration status and recommend course of action  - Evaluate amount of meals eaten  - Assist patient with eating if necessary   - Allow adequate time for meals  - Recommend/ encourage appropriate diets, oral nutritional supplements, and vitamin/mineral supplements  - Order, calculate, and assess calorie counts as needed  - Recommend, monitor, and adjust tube feedings and TPN/PPN based on assessed needs  - Assess need for intravenous fluids  - Provide specific nutrition/hydration education as appropriate  - Include patient/family/caregiver in decisions related to nutrition  Outcome: Progressing     Problem: PAIN - ADULT  Goal: Verbalizes/displays adequate comfort level or baseline comfort level  Description: Interventions:  - Encourage patient to monitor pain and request assistance  - Assess pain using appropriate pain scale  - Administer analgesics based on type and severity of pain and evaluate response  - Implement non-pharmacological measures as appropriate and evaluate response  - Consider cultural and social influences on pain and pain management  - Notify physician/advanced practitioner if interventions unsuccessful or patient reports new pain  Outcome: Progressing     Problem: SAFETY ADULT  Goal: Patient will remain free of falls  Description: INTERVENTIONS:  - Educate patient/family on patient safety including physical limitations  - Instruct patient to call for assistance with activity   - Consult OT/PT to assist with strengthening/mobility   - Keep Call bell within reach  - Keep bed low and locked with side rails adjusted as appropriate  - Keep care items and personal belongings within reach  - Initiate and maintain comfort rounds  - Make Fall Risk Sign visible to staff  - Offer Toileting every 2 Hours, in advance of need  - Initiate/Maintain alarm  - Obtain necessary fall risk management equipment:   - Apply yellow socks and bracelet for high fall risk patients  - Consider moving patient to room near nurses station  Outcome: Progressing  Goal: Maintain or return to baseline ADL function  Description: INTERVENTIONS:  -  Assess patient's ability to carry out ADLs; assess patient's baseline for ADL function and identify physical deficits which impact ability to perform ADLs (bathing, care of mouth/teeth, toileting, grooming, dressing, etc )  - Assess/evaluate cause of self-care deficits   - Assess range of motion  - Assess patient's mobility; develop plan if impaired  - Assess patient's need for assistive devices and provide as appropriate  - Encourage maximum independence but intervene and supervise when necessary  - Involve family in performance of ADLs  - Assess for home care needs following discharge   - Consider OT consult to assist with ADL evaluation and planning for discharge  - Provide patient education as appropriate  Outcome: Progressing  Goal: Maintains/Returns to pre admission functional level  Description: INTERVENTIONS:  - Perform BMAT or MOVE assessment daily    - Set and communicate daily mobility goal to care team and patient/family/caregiver  - Collaborate with rehabilitation services on mobility goals if consulted  - Perform Range of Motion 3 times a day  - Reposition patient every 2 hours    - Dangle patient 3 times a day  - Stand patient 3 Times a day  - Ambulate patient 3 times a day  - Out of bed to chair 3 times a day   - Out of bed for meals 3 times a day  - Out of bed for toileting  - Record patient progress and toleration of activity level   Outcome: Progressing     Problem: METABOLIC, FLUID AND ELECTROLYTES - ADULT  Goal: Electrolytes maintained within normal limits  Description: INTERVENTIONS:  - Monitor labs and assess patient for signs and symptoms of electrolyte imbalances  - Administer electrolyte replacement as ordered  - Monitor response to electrolyte replacements, including repeat lab results as appropriate  - Instruct patient on fluid and nutrition as appropriate  Outcome: Progressing  Goal: Fluid balance maintained  Description: INTERVENTIONS:  - Monitor labs   - Monitor I/O and WT  - Instruct patient on fluid and nutrition as appropriate  - Assess for signs & symptoms of volume excess or deficit  Outcome: Progressing  Goal: Glucose maintained within target range  Description: INTERVENTIONS:  - Monitor Blood Glucose as ordered  - Assess for signs and symptoms of hyperglycemia and hypoglycemia  - Administer ordered medications to maintain glucose within target range  - Assess nutritional intake and initiate nutrition service referral as needed  Outcome: Progressing     Problem: Prexisting or High Potential for Compromised Skin Integrity  Goal: Skin integrity is maintained or improved  Description: INTERVENTIONS:  - Identify patients at risk for skin breakdown  - Assess and monitor skin integrity  - Assess and monitor nutrition and hydration status  - Monitor labs   - Assess for incontinence   - Turn and reposition patient  - Assist with mobility/ambulation  - Relieve pressure over bony prominences  - Avoid friction and shearing  - Provide appropriate hygiene as needed including keeping skin clean and dry  - Evaluate need for skin moisturizer/barrier cream  - Collaborate with interdisciplinary team   - Patient/family teaching  - Consider wound care consult   Outcome: Progressing     Problem: MOBILITY - ADULT  Goal: Maintain or return to baseline ADL function  Description: INTERVENTIONS:  -  Assess patient's ability to carry out ADLs; assess patient's baseline for ADL function and identify physical deficits which impact ability to perform ADLs (bathing, care of mouth/teeth, toileting, grooming, dressing, etc )  - Assess/evaluate cause of self-care deficits   - Assess range of motion  - Assess patient's mobility; develop plan if impaired  - Assess patient's need for assistive devices and provide as appropriate  - Encourage maximum independence but intervene and supervise when necessary  - Involve family in performance of ADLs  - Assess for home care needs following discharge   - Consider OT consult to assist with ADL evaluation and planning for discharge  - Provide patient education as appropriate  Outcome: Progressing  Goal: Maintains/Returns to pre admission functional level  Description: INTERVENTIONS:  - Perform BMAT or MOVE assessment daily    - Set and communicate daily mobility goal to care team and patient/family/caregiver  - Collaborate with rehabilitation services on mobility goals if consulted  - Perform Range of Motion 3 times a day  - Reposition patient every 2 hours    - Dangle patient 3 times a day  - Stand patient 3 Times a day  - Ambulate patient 3 times a day  - Out of bed to chair 3 times a day   - Out of bed for meals 3 times a day  - Out of bed for toileting  - Record patient progress and toleration of activity level   Outcome: Progressing

## 2023-02-01 NOTE — PROGRESS NOTES
Pt received 500mg dose of augmentin at 1551  No adverse reaction noted to medication, pt remained hemodynamically stable  See flowsheets for vitals

## 2023-02-02 ENCOUNTER — APPOINTMENT (INPATIENT)
Dept: CT IMAGING | Facility: HOSPITAL | Age: 53
End: 2023-02-02

## 2023-02-02 LAB
ANION GAP SERPL CALCULATED.3IONS-SCNC: 5 MMOL/L (ref 4–13)
BUN SERPL-MCNC: 9 MG/DL (ref 5–25)
CALCIUM SERPL-MCNC: 7.1 MG/DL (ref 8.4–10.2)
CHLORIDE SERPL-SCNC: 91 MMOL/L (ref 96–108)
CO2 SERPL-SCNC: 31 MMOL/L (ref 21–32)
CREAT SERPL-MCNC: 0.49 MG/DL (ref 0.6–1.3)
ERYTHROCYTE [DISTWIDTH] IN BLOOD BY AUTOMATED COUNT: 13.6 % (ref 11.6–15.1)
GFR SERPL CREATININE-BSD FRML MDRD: 125 ML/MIN/1.73SQ M
GLUCOSE SERPL-MCNC: 135 MG/DL (ref 65–140)
HCT VFR BLD AUTO: 26 % (ref 36.5–49.3)
HGB BLD-MCNC: 8.6 G/DL (ref 12–17)
MAGNESIUM SERPL-MCNC: 1.6 MG/DL (ref 1.9–2.7)
MCH RBC QN AUTO: 34 PG (ref 26.8–34.3)
MCHC RBC AUTO-ENTMCNC: 33.1 G/DL (ref 31.4–37.4)
MCV RBC AUTO: 103 FL (ref 82–98)
OSMOLALITY UR: 432 MMOL/KG
PLATELET # BLD AUTO: 220 THOUSANDS/UL (ref 149–390)
PMV BLD AUTO: 10.1 FL (ref 8.9–12.7)
POTASSIUM SERPL-SCNC: 3.5 MMOL/L (ref 3.5–5.3)
RBC # BLD AUTO: 2.53 MILLION/UL (ref 3.88–5.62)
SODIUM SERPL-SCNC: 127 MMOL/L (ref 135–147)
WBC # BLD AUTO: 9.15 THOUSAND/UL (ref 4.31–10.16)

## 2023-02-02 RX ORDER — ALBUMIN, HUMAN INJ 5% 5 %
25 SOLUTION INTRAVENOUS ONCE
Status: COMPLETED | OUTPATIENT
Start: 2023-02-02 | End: 2023-02-02

## 2023-02-02 RX ORDER — MAGNESIUM SULFATE HEPTAHYDRATE 40 MG/ML
2 INJECTION, SOLUTION INTRAVENOUS ONCE
Status: COMPLETED | OUTPATIENT
Start: 2023-02-02 | End: 2023-02-02

## 2023-02-02 RX ORDER — BUMETANIDE 0.25 MG/ML
1 INJECTION, SOLUTION INTRAMUSCULAR; INTRAVENOUS ONCE
Status: COMPLETED | OUTPATIENT
Start: 2023-02-02 | End: 2023-02-02

## 2023-02-02 RX ADMIN — OXYCODONE HYDROCHLORIDE 10 MG: 10 TABLET ORAL at 04:55

## 2023-02-02 RX ADMIN — OXYCODONE HYDROCHLORIDE 10 MG: 10 TABLET ORAL at 00:10

## 2023-02-02 RX ADMIN — METRONIDAZOLE 500 MG: 500 TABLET ORAL at 21:19

## 2023-02-02 RX ADMIN — CEFTRIAXONE 2000 MG: 2 INJECTION, SOLUTION INTRAVENOUS at 15:29

## 2023-02-02 RX ADMIN — MAGNESIUM SULFATE HEPTAHYDRATE 2 G: 40 INJECTION, SOLUTION INTRAVENOUS at 11:05

## 2023-02-02 RX ADMIN — OXYCODONE HYDROCHLORIDE 10 MG: 10 TABLET ORAL at 21:19

## 2023-02-02 RX ADMIN — METRONIDAZOLE 500 MG: 500 TABLET ORAL at 13:40

## 2023-02-02 RX ADMIN — ALBUMIN (HUMAN) 25 G: 2.5 SOLUTION INTRAVENOUS at 11:22

## 2023-02-02 RX ADMIN — METRONIDAZOLE 500 MG: 500 TABLET ORAL at 05:48

## 2023-02-02 RX ADMIN — IOHEXOL 100 ML: 350 INJECTION, SOLUTION INTRAVENOUS at 10:56

## 2023-02-02 RX ADMIN — CHLORHEXIDINE GLUCONATE 0.12% ORAL RINSE 15 ML: 1.2 LIQUID ORAL at 21:19

## 2023-02-02 RX ADMIN — ENOXAPARIN SODIUM 40 MG: 100 INJECTION SUBCUTANEOUS at 08:46

## 2023-02-02 RX ADMIN — CHLORHEXIDINE GLUCONATE 0.12% ORAL RINSE 15 ML: 1.2 LIQUID ORAL at 08:46

## 2023-02-02 RX ADMIN — OXYCODONE HYDROCHLORIDE 10 MG: 10 TABLET ORAL at 12:44

## 2023-02-02 RX ADMIN — BUMETANIDE 1 MG: 0.25 INJECTION INTRAMUSCULAR; INTRAVENOUS at 11:06

## 2023-02-02 RX ADMIN — OXYCODONE HYDROCHLORIDE 10 MG: 10 TABLET ORAL at 17:29

## 2023-02-02 RX ADMIN — OXYCODONE HYDROCHLORIDE 10 MG: 10 TABLET ORAL at 08:46

## 2023-02-02 NOTE — PROGRESS NOTES
Progress Note - Nephrology   Jorgito Escobar 46 y o  male MRN: [id number]  Unit/Bed#: ICU 02-01 Encounter: [id number]    A/P:  1  Hyponatremia   - case discussed with Dr Aneta Wen and he is in agreement with plan    Expect that his hyponatremia will be chronic due to cirrhosis   - needs to fluid restrict at home   - he did improve to 127 mmol/liter with albumin and Bumex   - However, he is having a CT with contrast of the abdomen due to mycobacterial species and will have a quantiferon gold evaluation   - hold Bumex today will give albumin after discussion  so will repeat today and check labs in the morning    2  Cirrhosis of the liver with ascites   - being followed by GI and surgery    3  Sepsis   - 2/2 peritonitis with strep and responded to IV antibiotics --> oral abx as per ID    4  Hepatitis C   - will need outpatient treatment by GI   -       Follow up reason for today's visit: Hyponatremia    Acute peritonitis [facility])    Patient Active Problem List   Diagnosis   • Hepatitis C   • Acute peritonitis ([facility] [address] )   • Sepsis ([facility] [address] )   • Cirrhosis of liver with ascites ([facility] [address] )   • Hyponatremia   • Difficult intubation         Subjective:   He feels well  A 10 point ROS is negative    Objective:     Vitals: Blood pressure 117/70, pulse 91, temperature 97 8 °F (36 6 °C), temperature source Tympanic, resp  rate 16, height 5' 11" (1 803 m), weight 65 kg (143 lb 4 8 oz), SpO2 94 %  ,Body mass index is 19 99 kg/m²  Weight (last 2 days)     Date/Time Weight    02/01/23 0550 65 (143 3)    02/01/23 0517 66 8 (147 27)    01/31/23 0600 67 5 (148 81)            Intake/Output Summary (Last 24 hours) at 2/2/2023 1144  Last data filed at 2/2/2023 0800  Gross per 24 hour   Intake 478 ml   Output 832 ml   Net -354 ml     I/O last 3 completed shifts:   In: 700 [P O :600; IV Piggyback:100]  Out: 7035 [Urine:1175; Drains:372]         Physical Exam: /70 (BP Location: Right arm)   Pulse 91   Temp 97 8 °F (36 6 °C) (Tympanic)   Resp 16   Ht 5' 11" (1 803 m)   Wt 65 kg (143 lb 4 8 oz)   SpO2 94%   BMI 19 99 kg/m²     General Appearance:    Alert, cooperative, no distress, appears stated age   Head:    Normocephalic, without obvious abnormality, atraumatic   Eyes:    Conjunctiva/corneas clear   Ears:    Normal external ears   Nose:   Nares normal, septum midline, mucosa normal, no drainage    or sinus tenderness   Throat:   Lips, mucosa, and tongue normal; teeth and gums normal   Neck:   Supple, symmetrical, trachea midline, no adenopathy;        thyroid:  No enlargement/tenderness/nodules; no carotid    bruit or JVD   Back:     Symmetric, no curvature, ROM normal, no CVA tenderness   Lungs:     Clear to auscultation bilaterally, respirations unlabored   Chest wall:    No tenderness or deformity   Heart:    Regular rate and rhythm, S1 and S2 normal, no murmur, rub   or gallop   Abdomen:     Soft, distended with 2 drains   Extremities:   Extremities normal, atraumatic, no cyanosis or edema   Skin:   Skin color, texture, turgor normal, no rashes or lesions   Lymph nodes:   Cervical normal   Neurologic:   CNII-XII intact            Lab, Imaging and other studies: I have personally reviewed pertinent labs  CBC:   Lab Results   Component Value Date    WBC 9 15 02/02/2023    HGB 8 6 (L) 02/02/2023    HCT 26 0 (L) 02/02/2023     (H) 02/02/2023     02/02/2023    MCH 34 0 02/02/2023    MCHC 33 1 02/02/2023    RDW 13 6 02/02/2023    MPV 10 1 02/02/2023     CMP:   Lab Results   Component Value Date    K 3 5 02/02/2023    CL 91 (L) 02/02/2023    CO2 31 02/02/2023    BUN 9 02/02/2023    CREATININE 0 49 (L) 02/02/2023    CALCIUM 7 1 (L) 02/02/2023    EGFR 125 02/02/2023           Results from last 7 days   Lab Units 02/02/23  0501 02/01/23  0504 01/31/23  0509 01/30/23  0453   POTASSIUM mmol/L 3 5 3 5 3 8 4 4   CHLORIDE mmol/L 91* 91* 89* 91*   CO2 mmol/L 31 30 30 28   BUN mg/dL 9 13 16 18   CREATININE mg/dL 0 49* 0 47* 0 64 0 60   CALCIUM mg/dL 7 1* 7  3* 7 9* 7 8*   ALK PHOS U/L  --  50 59 59   ALT U/L  --  20 30 40   AST U/L  --  21 21 38         Phosphorus: No results found for: PHOS  Magnesium:   Lab Results   Component Value Date    MG 1 6 (L) 02/02/2023     Urinalysis: No results found for: Alexx Valeria, SPECGRAV, PHUR, LEUKOCYTESUR, NITRITE, PROTEINUA, GLUCOSEU, KETONESU, BILIRUBINUR, BLOODU  Ionized Calcium: No results found for: CAION  Coagulation: No results found for: PT, INR, APTT  Troponin: No results found for: TROPONINI  ABG: No results found for: PHART, QFN5CCE, PO2ART, CCP1SCK, B8TRCEOS, BEART, SOURCE  Radiology review:     IMAGING  No results found      Current Facility-Administered Medications   Medication Dose Route Frequency   • albuterol (PROVENTIL HFA,VENTOLIN HFA) inhaler 2 puff  2 puff Inhalation Q4H PRN   • cefTRIAXone (ROCEPHIN) IVPB (premix in dextrose) 2,000 mg 50 mL  2,000 mg Intravenous Q24H   • chlorhexidine (PERIDEX) 0 12 % oral rinse 15 mL  15 mL Mouth/Throat Q12H JAMSHID   • diphenhydrAMINE (BENADRYL) injection 25 mg  25 mg Intravenous Q6H PRN   • enoxaparin (LOVENOX) subcutaneous injection 40 mg  40 mg Subcutaneous Q24H JAMSHID   • EPINEPHrine PF (ADRENALIN) 1 mg/mL injection 0 3 mg  0 3 mg Intramuscular Once PRN   • Famotidine (PF) (PEPCID) injection 20 mg  20 mg Intravenous Once PRN   • HYDROmorphone (DILAUDID) injection 0 5 mg  0 5 mg Intravenous Q3H PRN   • magnesium sulfate 2 g/50 mL IVPB (premix) 2 g  2 g Intravenous Once   • metroNIDAZOLE (FLAGYL) tablet 500 mg  500 mg Oral Q8H [facility]   • ondansetron (ZOFRAN) injection 4 mg  4 mg Intravenous Q6H PRN   • oxyCODONE (ROXICODONE) immediate release tablet 10 mg  10 mg Oral Q4H PRN   • oxyCODONE (ROXICODONE) IR tablet 5 mg  5 mg Oral Q4H PRN   • phenol (CHLORASEPTIC) 1 4 % mucosal liquid 1 spray  1 spray Mouth/Throat Q2H PRN     Medications Discontinued During This Encounter   Medication Reason   • ibuprofen (MOTRIN) 200 mg tablet    • levofloxacin (LEVAQUIN) IVPB (premix in dextrose) 750 mg 150 mL    • metroNIDAZOLE (FLAGYL) IVPB (premix) 500 mg 100 mL    • vancomycin (VANCOCIN) 1,250 mg in sodium chloride 0 9 % 250 mL IVPB    • vancomycin (VANCOCIN) 1500 mg in sodium chloride 0 9% 250 mL IVPB    • sodium chloride (HYPERTONIC) 3 % infusion    • bumetanide (BUMEX) injection 3 mg    • potassium chloride (K-DUR,KLOR-CON) CR tablet 40 mEq    • bupivacaine-epinephrine (MARCAINE/EPINEPHRINE) 0 25 %-1:[id number] injection Patient Discharge   • acetaminophen (TYLENOL) tablet 650 mg    • oxyCODONE (ROXICODONE) IR tablet 5 mg    • oxyCODONE (ROXICODONE) immediate release tablet 10 mg    • aluminum-magnesium hydroxide-simethicone (MYLANTA) oral suspension 30 mL    • loperamide (IMODIUM) capsule 2 mg    • saliva substitute (MOUTH KOTE) mucosal solution 5 spray    • sodium chloride 0 9 % with KCl 20 mEq/L infusion (premix)    • sodium chloride 0 9 % with KCl 20 mEq/L infusion (premix)    • sodium chloride 0 9 % infusion    • propofol (DIPRIVAN) 1000 mg in 100 mL infusion (premix)    • fentanyl citrate (PF) 100 MCG/2ML 50 mcg    • HYDROmorphone (DILAUDID) injection 0 5 mg    • HYDROmorphone HCl (DILAUDID) injection 0 2 mg        Mary Lubin MD      This progress note was produced in part using a dictation device which may document imprecise wording from author's original intent

## 2023-02-02 NOTE — UTILIZATION REVIEW
Continued Stay Review    Date: 2/2                          Current Patient Class: INpatient   Current Level of Care: Med/surg    HPI:52 y o  male initially admitted on 1/22     Assessment/Plan: Follow cultures  S/p amox challenge on 1/31  COnitnue abx  Abdominal drains in place  Nephrology consult:  Needs to fluid restrict  HOld bumex today, give Albumin  ID consult: Rare AFB stained isolates on special staining of pathology specimen, although AFB smear on AFB culture isolated was negative  No respiratory symptoms  Check CT and TB quant  COntinue with ceftriaxone, flagyl  Gen/surg: Stable from surgical standpoint  Continue with drains, local wound care       Vital Signs:   Date/Time Temp Pulse Resp BP MAP (mmHg) SpO2 Calculated FIO2 (%) - Nasal Cannula Nasal Cannula O2 Flow Rate (L/min) O2 Device Patient Position - Orthostatic VS   02/02/23 0846 -- 91 16 117/70 88 94 % -- -- None (Room air) Sitting   02/02/23 0800 97 8 °F (36 6 °C) 94 -- 123/77 95 94 % -- -- -- --   02/02/23 0546 -- -- -- -- -- 91 % -- -- -- --   02/02/23 0500 -- -- -- -- -- 90 % -- -- -- --   02/02/23 0400 97 7 °F (36 5 °C) 80 18 100/57 72 91 % -- -- None (Room air) Lying   02/02/23 0300 -- -- -- -- -- 90 % -- -- -- --   02/02/23 0200 -- 81 -- 98/57 72 90 % -- -- -- --   02/02/23 0000 98 2 °F (36 8 °C) 87 20 106/64 77 93 % -- -- None (Room       Pertinent Labs/Diagnostic Results:       Results from last 7 days   Lab Units 02/02/23  0501 02/01/23  0504 01/31/23  0509 01/30/23  0453 01/29/23  0457 01/28/23  0402   WBC Thousand/uL 9 15 13 18* 16 22* 18 79* 13 28* 8 76   HEMOGLOBIN g/dL 8 6* 10 3* 11 1* 11 7* 10 8* 11 9*   HEMATOCRIT % 26 0* 30 1* 33 3* 35 0* 32 6* 34 5*   PLATELETS Thousands/uL 220 236 308 308 223 208   NEUTROS ABS Thousands/µL  --  10 26*  --   --  9 92* 6 57   BANDS PCT %  --   --   --  4  --   --          Results from last 7 days   Lab Units 02/02/23  0501 02/01/23  0504 01/31/23  0509 01/30/23  0453 01/29/23  0457 01/28/23  2228 01/28/23  1616 01/28/23  0402   SODIUM mmol/L 127* 125* 125* 123* 125* 123* 124* 119*   POTASSIUM mmol/L 3 5 3 5 3 8 4 4 4 6 4 8 4 6 3 8   CHLORIDE mmol/L 91* 91* 89* 91* 92* 93* 93* 88*   CO2 mmol/L 31 30 30 28 26 24 25 25   ANION GAP mmol/L 5 4 6 4 7 6 6 6   BUN mg/dL 9 13 16 18 15 14 13 10   CREATININE mg/dL 0 49* 0 47* 0 64 0 60 0 50* 0 48* 0 48* 0 54*   EGFR ml/min/1 73sq m 125 127 112 115 124 126 126 120   CALCIUM mg/dL 7 1* 7 3* 7 9* 7 8* 7 9* 8 1* 7 6* 7 5*   CALCIUM, IONIZED mmol/L  --   --   --  1 08* 1 09* 1 09* 0 77*  --    MAGNESIUM mg/dL 1 6* 1 5*  --  1 7* 2 0 2 2 1 9 1 5*   PHOSPHORUS mg/dL  --   --   --  3 2 3 8 4 0 4 0 3 1     Results from last 7 days   Lab Units 02/01/23  0504 01/31/23  0509 01/30/23  0453 01/28/23  2228   AST U/L 21 21 38  --    ALT U/L 20 30 40  --    ALK PHOS U/L 50 59 59  --    TOTAL PROTEIN g/dL 5 3* 6 0* 5 9*  --    ALBUMIN g/dL 2 1* 2 4* 2 3* 2 2*   TOTAL BILIRUBIN mg/dL 0 82 0 95 0 85  --      Results from last 7 days   Lab Units 01/29/23  1151   POC GLUCOSE mg/dl 110     Results from last 7 days   Lab Units 02/02/23  0501 02/01/23  0504 01/31/23  0509 01/30/23  0453 01/29/23  0457 01/28/23  2228 01/28/23  1616 01/28/23  0402 01/27/23  2035 01/27/23  1549 01/27/23  1203 01/27/23  0810   GLUCOSE RANDOM mg/dL 135 151* 148* 133 130 146* 153* 136 189* 120 139 113     Results from last 7 days   Lab Units 01/28/23  1238   OSMOLALITY, SERUM mmol/*         Results from last 7 days   Lab Units 01/30/23  0453 01/29/23  0457 01/28/23  1616   PROTIME seconds 14 9* 15 3* 15 0*   INR  1 17 1 21* 1 18         Results from last 7 days   Lab Units 01/31/23  0509 01/30/23  0453 01/28/23  0401   PROCALCITONIN ng/ml 0 16 0 23 0 30*       Results from last 7 days   Lab Units 01/30/23  1619 01/28/23  1238   OSMOLALITY, SERUM mmol/KG  --  267*   OSMO UR mmol/  --      Results from last 7 days   Lab Units 01/30/23  1619 01/28/23  1229   CLARITY UA  Clear --    COLOR UA  Yellow  --    SPEC GRAV UA  1 015  --    PH UA  6 0  --    GLUCOSE UA mg/dl Negative  --    KETONES UA mg/dl Negative  --    BLOOD UA  Negative  --    PROTEIN UA mg/dl Negative  --    NITRITE UA  Negative  --    BILIRUBIN UA  Negative  --    UROBILINOGEN UA E U /dl 0 2  --    LEUKOCYTES UA  Negative  --    WBC UA /hpf None Seen  --    RBC UA /hpf None Seen  --    BACTERIA UA /hpf None Seen  --    EPITHELIAL CELLS WET PREP /hpf None Seen  --    SODIUM UR  10 8   CREATININE UR mg/dL 71 1 117 0  117 0   PROTEIN UR mg/dL  --  46   PROT/CREAT RATIO UR   --  0 39*         Results from last 7 days   Lab Units 01/28/23  1036 01/28/23  1035   GRAM STAIN RESULT  3+ Polys*  3+ Mononuclear Cells*  1+ Gram positive cocci in chains* 2+ Disintegrating polys*  1+ Gram positive cocci in pairs and chains*   BODY FLUID CULTURE, STERILE  Few Colonies of Alpha Hemolytic Streptococcus NOT Enterococcus* Growth in Broth culture only Alpha Hemolytic Streptococcus*         Medications:   Scheduled Medications:  cefTRIAXone, 2,000 mg, Intravenous, Q24H  chlorhexidine, 15 mL, Mouth/Throat, Q12H JAMSHID  enoxaparin, 40 mg, Subcutaneous, Q24H JAMSHID  magnesium sulfate, 2 g, Intravenous, Once  metroNIDAZOLE, 500 mg, Oral, Q8H [facility]      Continuous IV Infusions:     PRN Meds:  albuterol, 2 puff, Inhalation, Q4H PRN  diphenhydrAMINE, 25 mg, Intravenous, Q6H PRN  EPINEPHrine PF, 0 3 mg, Intramuscular, Once PRN  famotidine, 20 mg, Intravenous, Once PRN  HYDROmorphone, 0 5 mg, Intravenous, Q3H PRN  ondansetron, 4 mg, Intravenous, Q6H PRN  oxyCODONE, 10 mg, Oral, Q4H PRN  oxyCODONE, 5 mg, Oral, Q4H PRN  phenol, 1 spray, Mouth/Throat, Q2H PRN        Discharge Plan: D    Network Utilization Review Department  ATTENTION: Please call with any questions or concerns to [phone] and carefully listen to the prompts so that you are directed to the right person   All voicemails are confidential   Elin Buerger all requests for admission clinical reviews, approved or denied determinations and any other requests to dedicated fax number below belonging to the campus where the patient is receiving treatment   List of dedicated fax numbers for the Facilities:  [address] [place] [address] DENIALS (Administrative/Medical Necessity) [phone]   [address] (Maternity/NICU/Pediatrics) [phone]   [address] [phone]   [facility] [phone]   [id number] [place] [address] 28 [phone]   1551 [facility] 134 815 [facility] [phone]

## 2023-02-02 NOTE — PROGRESS NOTES
Progress Note - General Surgery   Emeli Palacios 46 y o  male MRN: [id number]  Unit/Bed#: ICU 02-01 Encounter: [id number]    Assessment:  46year-old M with PMH significant for hep C/alcoholic cirrhosis presenting with peritonitis of unknown source  POD#4 s/p diagnostic lap converted to ex lap with repair of enterotomy x2, extensive CHAY, peritoneal bx, peritoneal lavage  -AVSS on room air  - cc  -MAURICE x2 232 cc (right 162 cc, left 70 cc)  -WBC 9 15  -Hgb 8 6  -  -CR 0 49  -Hyponatremia, 127  -Tissue exam with results of benign peritoneal tissue with acute and chronic inflammation and fibrinous ulceration, no evidence of malignancy however rare possible mycobacterium organisms identified on special stains  -AFB culture/stain with NGTD  -Culture gram stain positive for strep intermedius    Plan:  Stable from surgical standpoint for discharge  Will need visiting nurses for drain and wound care   Giller diet as tolerated  Antibiotics per ID, plan to transition to oral antibiotics upon discharge  Local wound care  Drain care  Antiemetics and analgesics as needed  Medical management per primary, patient recommendations  Nephrology following, patient recommendations  Wound care nurse following, appreciate recommendations  Evaluated at bedside with attending    Subjective/Objective   Subjective: No acute overnight events  Patient well doing well with no acute complaints  Passing flatus but denies recent bowel movement  Objective:   Blood pressure 100/57, pulse 80, temperature 97 7 °F (36 5 °C), temperature source Tympanic, resp  rate 18, height 5' 11" (1 803 m), weight 65 kg (143 lb 4 8 oz), SpO2 91 %  ,Body mass index is 19 99 kg/m²        Intake/Output Summary (Last 24 hours) at 2/2/2023 0830  Last data filed at 2/2/2023 0601  Gross per 24 hour   Intake 360 ml   Output 1007 ml   Net -647 ml       Invasive Devices     Peripheral Intravenous Line  Duration           Peripheral IV 02/01/23 Left;Upper;Ventral (anterior) Arm 1 day          Drain  Duration           Closed/Suction Drain Lateral LUQ 4 days    Closed/Suction Drain Right Abdomen Bulb 4 days              Physical Exam  Vitals and nursing note reviewed  Constitutional:       General: He is not in acute distress  Appearance: Normal appearance  He is normal weight  He is not ill-appearing or toxic-appearing  HENT:      Head: Normocephalic and atraumatic  Cardiovascular:      Rate and Rhythm: Normal rate and regular rhythm  Pulses: Normal pulses  Heart sounds: Normal heart sounds  Pulmonary:      Effort: Pulmonary effort is normal       Breath sounds: Wheezing present  Abdominal:      General: Bowel sounds are normal  There is no distension  Palpations: Abdomen is soft  Tenderness: There is abdominal tenderness (Incisional)  There is no guarding or rebound  Musculoskeletal:         General: Normal range of motion  Right lower leg: Edema present  Left lower leg: Edema present  Skin:     General: Skin is warm and dry  Neurological:      General: No focal deficit present  Mental Status: He is alert and oriented to person, place, and time  Psychiatric:         Mood and Affect: Mood normal          Behavior: Behavior normal          Thought Content: Thought content normal        Lab, Imaging and other studies:  I have personally reviewed pertinent lab results      CBC:   Lab Results   Component Value Date    WBC 9 15 02/02/2023    HGB 8 6 (L) 02/02/2023    HCT 26 0 (L) 02/02/2023     (H) 02/02/2023     02/02/2023    MCH 34 0 02/02/2023    MCHC 33 1 02/02/2023    RDW 13 6 02/02/2023    MPV 10 1 02/02/2023     CMP:   Lab Results   Component Value Date    SODIUM 127 (L) 02/02/2023    K 3 5 02/02/2023    CL 91 (L) 02/02/2023    CO2 31 02/02/2023    BUN 9 02/02/2023    CREATININE 0 49 (L) 02/02/2023    CALCIUM 7 1 (L) 02/02/2023    EGFR 125 02/02/2023     VTE Pharmacologic Prophylaxis: Enoxaparin (Lovenox)  VTE Mechanical Prophylaxis: sequential compression device    Rk Fonseca PA-C

## 2023-02-02 NOTE — PROGRESS NOTES
Progress Note - [facility] Infectious Disease   Kalyan Soliz 46 y o  male MRN: [id number]  Unit/Bed#: ICU 02-01 Encounter: [id number]      IMPRESSION & RECOMMENDATIONS:   1  Sepsis, evolving from admission, evidenced by leukocytosis and tachycardia, with intermittent low-grade fevers  Likely due to peritonitis  Blood cultures all negative  WBC normalized  Patient remains hemodynamically stable  -Continue antibiotics as below  -Monitor temperature and hemodynamics  -Low threshold to re-image A/P    -Trend daily CBC differential and chemistry     2  Acute peritonitis  Patient presented with 1 week of worsening abdominal pain, distention, fevers and chills   CT showed moderate to large intra-abdominal and pelvic ascites with diffuse enhancement and thickening of the peritoneal lining compatible with acute peritonitis  S/p paracentesis 1/23 with loculated ascites present and fluid studies consistent with infection with extremely elevated WBC >70,000, with elevated LDH   Fluid studies not entirely consistent with portal hypertension  Peritoneal fluid culture positive for strep intermedius, which has the propensity to produce abscesses  Unknown source  No evidence of GI source for a secondary peritonitis on initial CT A/P  Unlikely malignant with negative cytology  Now s/p ex-lap, lysis of adhesions, peritoneal biopsy 1/28/23 with extensive intra-peritoneal abscess with multiple loculations, extensive rind, and friable abscess wall encountered  OR cultures with few colonies of Strep intermedius  Peritoneal biopsy with acid fast stain positive for rare possible mycobacterial organisms  AFB cx of peritoneum so far negative  Consider possibility of extra-pulmonary TB or other slow-growing mycobacterial infection   Patient without risk factors for TB otherwise and CXR on admission was negative    -Continue IV Ceftriaxone 2g q24 and PO Flagyl 500mg q8h for now   -On discharge transition to PO Augmentin 875mg q12 for 14 day course from OR wash out, through 2/10/23  -Will check Quantiferon gold   -Recommend CECT chest to r/o active pulmonary TB   -Follow up AFB cx of peritoneum; ID office will follow   -Recommend outpatient ID follow up in 4 weeks; office notified   -Monitor drain output and close surgical follow up   -Serial abdominal exams and daily liver chemistries   -Close gastroenterology/surgery follow-up     3  Cirrhosis   CT shows nodularity of the liver surface suggestive of chronic cirrhosis   History of chronic hepatitis C, former alcohol abuse and IV drug abuse   In remission for 20 years  Priyanka Nicholas not treated for hepatitis C  HIV negative  HCV PCR quant [id number]    -Ongoing follow-up with GI for tx     4  Postoperative respiratory insufficiency  Improved and now extubated to 2-3L NC       5  Transaminitis, hyponatremia  Darral Gulshan due to cirrhosis   Nephrology and gastroenterology are following   -Monitor LFTs, chemistry     6  Antibiotic Allergy   Reports history of rash with penicillin and denies anaphylaxis   At this point unlikely this is a significant allergy   Low risk for cross-reactivity with cephalosporins   He is tolerating ceftriaxone  Also completed amoxicillin challenge and tolerated well    -will remove PCN allergy from med list     Antibiotics:  D10 ceftriaxone  D7 flagyl  D12 abx   POD5    I have discussed the above management plan in detail with patient  I have discussed the above management plan in detail with patient's RN, and the primary service, SLIM attending and surgery AP  Subjective:  Patient has no fever, chills, sweats; no nausea, vomiting, diarrhea; no cough, shortness of breath; no pain  No new symptoms  Continues to feel improved  Drains with persistent output       Objective:  Vitals:  Temp:  [97 7 °F (36 5 °C)-98 3 °F (36 8 °C)] 97 8 °F (36 6 °C)  HR:  [] 91  Resp:  [16-20] 16  BP: ()/(57-77) 117/70  SpO2:  [89 %-94 %] 94 %  Temp (24hrs), Av 1 °F (36 7 °C), Min:97 7 °F (36 5 °C), Max:98 3 °F (36 8 °C)  Current: Temperature: 97 8 °F (36 6 °C)    PHYSICAL EXAM:  General Appearance:  Appearing chronically ill, though nontoxic, and in no distress   HEENT: Normocephalic, without obvious abnormality, atraumatic  Conjunctiva pink and sclera anicteric  Oropharynx dry without lesions  Lungs:   Clear to auscultation bilaterally, respirations unlabored   Heart:  RRR; no murmur, rub or gallop   Abdomen:   Abdominal tenderness around incision with dressing in place c/d/i  MAURICE drains in place x2 draining fibrous SS fluid R>L       Extremities: No cyanosis, clubbing or edema   Musculoskeletal: Back symmetric without curvature, ROM normal     Skin: No rashes or lesions  No draining wounds noted  Peripheral IV intact without evidence of erythema, warmth, or exudate  LABS, IMAGING, & OTHER STUDIES:  Lab Results:  I have personally reviewed pertinent labs    Results from last 7 days   Lab Units 02/02/23  0501 02/01/23  0504 01/31/23  0509   WBC Thousand/uL 9 15 13 18* 16 22*   HEMOGLOBIN g/dL 8 6* 10 3* 11 1*   PLATELETS Thousands/uL 220 236 308     Results from last 7 days   Lab Units 02/02/23  0501 02/01/23  0504 01/31/23  0509 01/30/23  0453   SODIUM mmol/L 127* 125* 125* 123*   POTASSIUM mmol/L 3 5 3 5 3 8 4 4   CHLORIDE mmol/L 91* 91* 89* 91*   CO2 mmol/L 31 30 30 28   BUN mg/dL 9 13 16 18   CREATININE mg/dL 0 49* 0 47* 0 64 0 60   EGFR ml/min/1 73sq m 125 127 112 115   CALCIUM mg/dL 7 1* 7 3* 7 9* 7 8*   AST U/L  --  21 21 38   ALT U/L  --  20 30 40   ALK PHOS U/L  --  50 59 59     Results from last 7 days   Lab Units 01/28/23  1036 01/28/23  1035   GRAM STAIN RESULT  3+ Polys*  3+ Mononuclear Cells*  1+ Gram positive cocci in chains* 2+ Disintegrating polys*  1+ Gram positive cocci in pairs and chains*   BODY FLUID CULTURE, STERILE  Few Colonies of Alpha Hemolytic Streptococcus NOT Enterococcus* Growth in Broth culture only Alpha Hemolytic Streptococcus*     Results from last 7 days Lab Units 01/31/23  0509 01/30/23  0453 01/28/23  0401   PROCALCITONIN ng/ml 0 16 0 23 0 30*

## 2023-02-02 NOTE — CASE MANAGEMENT
Case Management Discharge Planning Note    Patient name Selene Campbell  Location ICU 02/ICU  MRN [id number]  : 1970 Date 2023       Current Admission Date: 2023  Current Admission Diagnosis:Acute peritonitis [facility])   Patient Active Problem List    Diagnosis Date Noted   • Difficult intubation 2023   • Acute peritonitis ([facility] [address] ) 2023   • Sepsis ([facility] [address] ) 2023   • Cirrhosis of liver with ascites ([facility] [address] ) 2023   • Hyponatremia 2023   • Hepatitis C 2023      LOS (days): 11  Geometric Mean LOS (GMLOS) (days): 9 60  Days to GMLOS:-2 1     OBJECTIVE:  Risk of Unplanned Readmission Score: 15 46         Current admission status: Inpatient   Preferred Pharmacy:   [address] [place] [address], [address] [place] [address]  5   Roby NUNEZ [address]  Phone: [phone] Fax: [phone]    Primary Care Provider: Lesley Rodriguez DO    Primary Insurance: Roge White  Secondary Insurance:     DISCHARGE DETAILS:       Freedom of Choice: Yes  Comments - Freedom of Choice: Pt is agreeable to a blanket referral for HHC  Requested  [facility]         Is the patient interested in [address] at discharge?: Yes  [address] Ela Brice requested[de-identified] [address] Name[de-identified] [facility] Provider[de-identified] PCP  Home Health Services Needed[de-identified] Wound/Ostomy Care, Post-Op Care and Assessment, Evaluate Functional Status and Safety  Homebound Criteria Met[de-identified] Requires the Assistance of Another Person for Safe Ambulation or to Leave the Home    DME Referral Provided  Referral made for DME?: No  Would you like to participate in our [address] service program?  : No - Declined    Treatment Team Recommendation: Home with  [facility]  Discharge Destination Plan[de-identified] Home with [place] at Discharge : Self   Pt is going home with a drain  Spoke to the pt about [address] services  Pt is in agreement with same  Made a blanket referral in [address] [place] [address]

## 2023-02-02 NOTE — PLAN OF CARE
Problem: OCCUPATIONAL THERAPY ADULT  Goal: Performs self-care activities at highest level of function for planned discharge setting  See evaluation for individualized goals  Description: Treatment Interventions: ADL retraining, Functional transfer training, Endurance training, Equipment evaluation/education, Compensatory technique education, Energy conservation, Activityengagement    See flowsheet documentation for full assessment, interventions and recommendations  Outcome: Progressing  Note: Limitation: Decreased ADL status, Decreased Safe judgement during ADL, Decreased endurance, Decreased self-care trans, Decreased high-level ADLs  Prognosis: Good  Assessment: Patient participated in Skilled OT session this date with interventions consisting of Energy Conservation techniques and therapeutic exercise to: increase functional use of BUEs, increase BUE muscle strength , and also including basic safety education re: ADLs and functional mobility with RW   Patient agreeable to OT treatment session, upon arrival patient was found seated in bed (back supported)  Patient requiring verbal cues for correct technique and frequent rest periods  Patient continues to be functioning below baseline level, occupational performance remains limited secondary to factors listed above and increased risk for falls and injury  From OT standpoint, recommendation at time of d/c would be home with outpatient rehabilitation  Patient to benefit from continued Occupational Therapy treatment while in the hospital to address deficits as defined above and maximize level of functional independence with ADLs and functional mobility       OT Discharge Recommendation: Home with outpatient rehabilitation     Erwin Jeans, COTA/ANGELINA

## 2023-02-02 NOTE — PROGRESS NOTES
[address]  Progress Note Arlo Schirmer 1970, 46 y o  male MRN: [id number]  Unit/Bed#: ICU 02-01 Encounter: [id number]  Primary Care Provider: Lesley Rodriguez DO   Date and time admitted to hospital: 1/21/2023  8:32 PM    * Acute peritonitis [facility])  Assessment & Plan  · Patient was initially on Levaquin/Flagyl  · Continue ceftriaxone/flagyl  · Patient had exploratory laparotomy with repair of enterotomy x2, extensive lysis of adhesions, peritoneal biopsy, peritoneal lavage on 1/28/23  · Patient had amoxicillin challenge on 1/31/2023 by infectious disease  · Continue Abx per infectious disease  · Peritoneal biopsies show Rare possible mycobacterial organisms identified on special stains (AFB)  · Infectious disease recommend CT chest with contrast and QuantiFERON gold test    Cirrhosis of liver with ascites ([facility] )  Assessment & Plan  · Does not appear to be in acute decompensation  · GI input appreciated  · Patient had paracentesis performed on 1/23/2023  · Cytology from initial paracentesis negative for malignancy cells    Sepsis ([facility] )  Assessment & Plan  · Secondary to peritonitis  · Noted by leukocytosis, tachycardia tachypnea mild lactic acidosis present on admission  · Continue treatment as above    Hyponatremia  Assessment & Plan  · Nephrology following  · Patient was given a dose of Bumex today  · Sodium level 127 today    Hepatitis C  Assessment & Plan  · Patient with a history of hepatitis C untreated  · GI following  · Liver with cirrhotic appearance  · Outpatient follow-up with Gastroenterology for treatment      VTE Prophylaxis:  Enoxaparin (Lovenox)    Patient Centered Rounds: I have performed bedside rounds with nursing staff today      Discussions with Specialists or Other Care Team Provider: yes  Education and Discussions with Family / Patient: Updated patient regarding plan of care    Current Length of Stay: 11 day(s)    Current Patient Status: Inpatient   Certification Statement: The patient will continue to require additional inpatient hospital stay due to Peritonitis    Discharge Plan: Pending hospital course    Code Status: Level 1 - Full Code    Subjective:   No overnight events noted  Currently afebrile  Tolerating diet    Objective:     Vitals:   Temp (24hrs), Av 1 °F (36 7 °C), Min:97 7 °F (36 5 °C), Max:98 3 °F (36 8 °C)    Temp:  [97 7 °F (36 5 °C)-98 3 °F (36 8 °C)] 97 8 °F (36 6 °C)  HR:  [] 91  Resp:  [16-20] 16  BP: ()/(57-77) 117/70  SpO2:  [89 %-94 %] 94 %  Body mass index is 19 99 kg/m²  Input and Output Summary (last 24 hours): Intake/Output Summary (Last 24 hours) at 2023 1358  Last data filed at 2023 1230  Gross per 24 hour   Intake 978 ml   Output 1632 ml   Net -654 ml       Physical Exam:   Physical Exam  Constitutional:       General: He is not in acute distress  HENT:      Head: Normocephalic and atraumatic  Nose: Nose normal       Mouth/Throat:      Mouth: Mucous membranes are moist    Eyes:      Extraocular Movements: Extraocular movements intact  Conjunctiva/sclera: Conjunctivae normal    Cardiovascular:      Rate and Rhythm: Normal rate and regular rhythm  Pulmonary:      Effort: Pulmonary effort is normal  No respiratory distress  Abdominal:      Palpations: Abdomen is soft  Tenderness: There is no abdominal tenderness  Comments: Abdominal wound with dressing in place, drains in place   Musculoskeletal:         General: Normal range of motion  Cervical back: Normal range of motion and neck supple  Comments: Generalized weakness   Skin:     General: Skin is warm and dry  Neurological:      General: No focal deficit present  Mental Status: He is alert  Mental status is at baseline  Cranial Nerves: No cranial nerve deficit     Psychiatric:         Mood and Affect: Mood normal          Behavior: Behavior normal          Additional Data:     Labs:    Results from last 7 days Lab Units 02/02/23  0501 02/01/23  0504   WBC Thousand/uL 9 15 13 18*   HEMOGLOBIN g/dL 8 6* 10 3*   HEMATOCRIT % 26 0* 30 1*   PLATELETS Thousands/uL 220 236   NEUTROS PCT %  --  76*   LYMPHS PCT %  --  15   MONOS PCT %  --  6   EOS PCT %  --  1     Results from last 7 days   Lab Units 02/02/23  0501 02/01/23  0504   SODIUM mmol/L 127* 125*   POTASSIUM mmol/L 3 5 3 5   CHLORIDE mmol/L 91* 91*   CO2 mmol/L 31 30   BUN mg/dL 9 13   CREATININE mg/dL 0 49* 0 47*   CALCIUM mg/dL 7 1* 7 3*   ALK PHOS U/L  --  50   ALT U/L  --  20   AST U/L  --  21     Results from last 7 days   Lab Units 01/30/23  0453   INR  1 17     Results from last 7 days   Lab Units 01/29/23  1151   POC GLUCOSE mg/dl 110           * I Have Reviewed All Lab Data Listed Above  * Additional Pertinent Lab Tests Reviewed:  [facility] Admission  Reviewed    Imaging:  Imaging Reports Reviewed Today Include: No new imaging    Recent Cultures (last 7 days):     Results from last 7 days   Lab Units 01/28/23  1036 01/28/23  1035   GRAM STAIN RESULT  3+ Polys*  3+ Mononuclear Cells*  1+ Gram positive cocci in chains* 2+ Disintegrating polys*  1+ Gram positive cocci in pairs and chains*   BODY FLUID CULTURE, STERILE  Few Colonies of Alpha Hemolytic Streptococcus NOT Enterococcus* Growth in Broth culture only Alpha Hemolytic Streptococcus*       Last 24 Hours Medication List:   Current Facility-Administered Medications   Medication Dose Route Frequency Provider Last Rate   • albuterol  2 puff Inhalation Q4H PRN Joe Henao PA-C     • cefTRIAXone  2,000 mg Intravenous Q24H MARQUIS Solo 2,000 mg (02/01/23 1626)   • chlorhexidine  15 mL Mouth/Throat Q12H [facility] MARQUIS [place]     • diphenhydrAMINE  25 mg Intravenous Q6H PRN Joe Henao PA-C     • enoxaparin  40 mg Subcutaneous Q24H [facility] MARQUIS [place]     • EPINEPHrine PF  0 3 mg Intramuscular Once PRN Joe Henao PA-C     • famotidine 20 mg Intravenous Once PRN Ashley Henao PA-C     • HYDROmorphone  0 5 mg Intravenous Q3H PRN [employer], CRNP     • metroNIDAZOLE  500 mg Oral Q8H [facility] MARQUIS [place]head     • ondansetron  4 mg Intravenous Q6H PRN [employer], CRNP     • oxyCODONE  10 mg Oral Q4H PRN [employer], CRNP     • oxyCODONE  5 mg Oral Q4H PRN [employer], CRNP     • phenol  1 spray Mouth/Throat Q2H PRN [employer], CRNP          Today, Patient Was Seen By: Eyad Dela Cruz MD    ** Please Note: Dictation voice to text software may have been used in the creation of this document   **

## 2023-02-02 NOTE — ASSESSMENT & PLAN NOTE
· Patient was initially on Levaquin/Flagyl  · Continue ceftriaxone/flagyl  · Patient had exploratory laparotomy with repair of enterotomy x2, extensive lysis of adhesions, peritoneal biopsy, peritoneal lavage on 1/28/23  · Patient had amoxicillin challenge on 1/31/2023 by infectious disease  · Continue Abx per infectious disease  · Peritoneal biopsies show Rare possible mycobacterial organisms identified on special stains (AFB)  · Infectious disease recommend CT chest with contrast and QuantiFERON gold test

## 2023-02-02 NOTE — OCCUPATIONAL THERAPY NOTE
02/02/23 1208   OT Last Visit   OT Visit Date 02/02/23   Note Type   Note Type Treatment  (6523-4613)   Pain Assessment   Pain Assessment Tool 0-10   Pain Score 6   Pain Location/Orientation Location: Abdomen   Restrictions/Precautions   Weight Bearing Precautions Per Order No   Other Precautions Fall Risk;Pain;Multiple lines  (MAURICE drains X2)   Lifestyle   Intrinsic Gratification hiking, yoga, and has enjoyed exercise in general   ADL   Where Assessed   (denied need for self-care at this time)   Eating Comments reports that the orange sherbet he was finishing on my approach was the thing that was most appetizing to him during this hospitalization   Therapeutic Excerise-Strength   UE Strength Yes   Right Upper Extremity- Strength   R Shoulder Flexion; Horizontal ABduction; Other (Comment)  (pro/re-traction)   R Elbow Elbow flexion;Elbow extension  (in forward and reverse;  Also: supin/pron-ation)   R Weight/Reps/Sets 1 pound weight - 20 reps shoulder flexion, 15 reps horizontal abduction, and 20 reps pro/re-traction  (and 30 reps elbows)   RUE Strength Comment *patient reports he had, at one time, been using a 20 pound weight, But now would like to purchase a 3 and 5 pound for home use   Left Upper Extremity-Strength   L Weights/Reps/Sets all exers  same as RUE above   Coordination   Gross Motor WFL   Dexterity WFL   Cognition   Overall Cognitive Status WFL   Arousal/Participation Alert; Cooperative   Attention Within functional limits   Orientation Level Oriented X4   Following Commands Follows all commands and directions without difficulty   Activity Tolerance   Activity Tolerance Patient tolerated treatment well  (with rest periods between sets)   Medical Staff Made Aware Nurse Alvares made aware of session's outcome   Assessment   Assessment Patient participated in Skilled OT session this date with interventions consisting of Energy Conservation techniques and therapeutic exercise to: increase functional use of BUEs, increase BUE muscle strength , and also including basic safety education re: ADLs and functional mobility with RW   Patient agreeable to OT treatment session, upon arrival patient was found seated in bed (back supported)  Patient requiring verbal cues for correct technique and frequent rest periods  Patient continues to be functioning below baseline level, occupational performance remains limited secondary to factors listed above and increased risk for falls and injury  From OT standpoint, recommendation at time of d/c would be home with outpatient rehabilitation  Patient to benefit from continued Occupational Therapy treatment while in the hospital to address deficits as defined above and maximize level of functional independence with ADLs and functional mobility  Plan   Treatment Interventions Patient/family training;UE strengthening/ROM; Activityengagement   Goal Expiration Date 02/11/23   OT Treatment Day 1   Recommendation   OT Discharge Recommendation Home with outpatient rehabilitation   AM-PAC Daily Activity Inpatient   Lower Body Dressing 3   Bathing 3   Toileting 3   Upper Body Dressing 3   Grooming 4   Eating 4   Daily Activity Raw Score 20   Daily Activity Standardized Score (Calc for Raw Score >=11) 42 03   AM-PAC Applied Cognition Inpatient   Following a Speech/Presentation 4   Understanding Ordinary Conversation 4   Taking Medications 4   Remembering Where Things Are Placed or Put Away 4   Remembering List of 4-5 Errands 4   Taking Care of Complicated Tasks 4   Applied Cognition Raw Score 24   Applied Cognition Standardized Score 62 21       LAURIE Valencia/ANGELINA

## 2023-02-03 ENCOUNTER — TELEPHONE (OUTPATIENT)
Dept: OTHER | Facility: HOSPITAL | Age: 53
End: 2023-02-03

## 2023-02-03 ENCOUNTER — TELEPHONE (OUTPATIENT)
Dept: NEPHROLOGY | Facility: CLINIC | Age: 53
End: 2023-02-03

## 2023-02-03 ENCOUNTER — HOME HEALTH ADMISSION (OUTPATIENT)
Dept: HOME HEALTH SERVICES | Facility: HOME HEALTHCARE | Age: 53
End: 2023-02-03

## 2023-02-03 VITALS
DIASTOLIC BLOOD PRESSURE: 71 MMHG | RESPIRATION RATE: 18 BRPM | BODY MASS INDEX: 21.36 KG/M2 | OXYGEN SATURATION: 95 % | HEIGHT: 71 IN | HEART RATE: 93 BPM | TEMPERATURE: 97.3 F | WEIGHT: 152.56 LBS | SYSTOLIC BLOOD PRESSURE: 125 MMHG

## 2023-02-03 DIAGNOSIS — E87.1 HYPONATREMIA: Primary | ICD-10-CM

## 2023-02-03 PROBLEM — A31.9 MYCOBACTERIUM, ATYPICAL: Status: ACTIVE | Noted: 2023-02-03

## 2023-02-03 LAB
ANION GAP SERPL CALCULATED.3IONS-SCNC: 6 MMOL/L (ref 4–13)
BACTERIA SPEC BFLD CULT: ABNORMAL
BACTERIA SPEC BFLD CULT: ABNORMAL
BASOPHILS # BLD AUTO: 0.04 THOUSANDS/ÂΜL (ref 0–0.1)
BASOPHILS NFR BLD AUTO: 0 % (ref 0–1)
BUN SERPL-MCNC: 4 MG/DL (ref 5–25)
CALCIUM SERPL-MCNC: 8.1 MG/DL (ref 8.4–10.2)
CHLORIDE SERPL-SCNC: 90 MMOL/L (ref 96–108)
CO2 SERPL-SCNC: 30 MMOL/L (ref 21–32)
CREAT SERPL-MCNC: 0.56 MG/DL (ref 0.6–1.3)
EOSINOPHIL # BLD AUTO: 0.1 THOUSAND/ÂΜL (ref 0–0.61)
EOSINOPHIL NFR BLD AUTO: 1 % (ref 0–6)
ERYTHROCYTE [DISTWIDTH] IN BLOOD BY AUTOMATED COUNT: 13.9 % (ref 11.6–15.1)
GAMMA INTERFERON BACKGROUND BLD IA-ACNC: 0.06 IU/ML
GFR SERPL CREATININE-BSD FRML MDRD: 118 ML/MIN/1.73SQ M
GLUCOSE SERPL-MCNC: 147 MG/DL (ref 65–140)
GRAM STN SPEC: ABNORMAL
HCT VFR BLD AUTO: 32.6 % (ref 36.5–49.3)
HGB BLD-MCNC: 11 G/DL (ref 12–17)
IMM GRANULOCYTES # BLD AUTO: 0.06 THOUSAND/UL (ref 0–0.2)
IMM GRANULOCYTES NFR BLD AUTO: 1 % (ref 0–2)
LYMPHOCYTES # BLD AUTO: 1.36 THOUSANDS/ÂΜL (ref 0.6–4.47)
LYMPHOCYTES NFR BLD AUTO: 13 % (ref 14–44)
M TB IFN-G BLD-IMP: NEGATIVE
M TB IFN-G CD4+ BCKGRND COR BLD-ACNC: 0 IU/ML
M TB IFN-G CD4+ BCKGRND COR BLD-ACNC: 0 IU/ML
MAGNESIUM SERPL-MCNC: 1.7 MG/DL (ref 1.9–2.7)
MCH RBC QN AUTO: 33.8 PG (ref 26.8–34.3)
MCHC RBC AUTO-ENTMCNC: 33.7 G/DL (ref 31.4–37.4)
MCV RBC AUTO: 100 FL (ref 82–98)
MITOGEN IGNF BCKGRD COR BLD-ACNC: 3.34 IU/ML
MONOCYTES # BLD AUTO: 0.56 THOUSAND/ÂΜL (ref 0.17–1.22)
MONOCYTES NFR BLD AUTO: 6 % (ref 4–12)
NEUTROPHILS # BLD AUTO: 8.07 THOUSANDS/ÂΜL (ref 1.85–7.62)
NEUTS SEG NFR BLD AUTO: 79 % (ref 43–75)
NRBC BLD AUTO-RTO: 0 /100 WBCS
PLATELET # BLD AUTO: 299 THOUSANDS/UL (ref 149–390)
PMV BLD AUTO: 9.3 FL (ref 8.9–12.7)
POTASSIUM SERPL-SCNC: 3.6 MMOL/L (ref 3.5–5.3)
RBC # BLD AUTO: 3.25 MILLION/UL (ref 3.88–5.62)
SODIUM SERPL-SCNC: 126 MMOL/L (ref 135–147)
WBC # BLD AUTO: 10.19 THOUSAND/UL (ref 4.31–10.16)

## 2023-02-03 RX ORDER — MAGNESIUM SULFATE HEPTAHYDRATE 40 MG/ML
2 INJECTION, SOLUTION INTRAVENOUS ONCE
Status: COMPLETED | OUTPATIENT
Start: 2023-02-03 | End: 2023-02-03

## 2023-02-03 RX ORDER — BUMETANIDE 0.25 MG/ML
1 INJECTION, SOLUTION INTRAMUSCULAR; INTRAVENOUS ONCE
Status: COMPLETED | OUTPATIENT
Start: 2023-02-03 | End: 2023-02-03

## 2023-02-03 RX ORDER — ACETAMINOPHEN 500 MG
1000 TABLET ORAL EVERY 6 HOURS PRN
Qty: 30 TABLET | Refills: 0 | Status: SHIPPED | OUTPATIENT
Start: 2023-02-03 | End: 2023-02-09 | Stop reason: SDUPTHER

## 2023-02-03 RX ORDER — AMOXICILLIN AND CLAVULANATE POTASSIUM 875; 125 MG/1; MG/1
1 TABLET, FILM COATED ORAL EVERY 12 HOURS SCHEDULED
Qty: 14 TABLET | Refills: 0 | Status: SHIPPED | OUTPATIENT
Start: 2023-02-03 | End: 2023-02-07 | Stop reason: SINTOL

## 2023-02-03 RX ORDER — POTASSIUM CHLORIDE 20 MEQ/1
40 TABLET, EXTENDED RELEASE ORAL ONCE
Status: COMPLETED | OUTPATIENT
Start: 2023-02-03 | End: 2023-02-03

## 2023-02-03 RX ORDER — POTASSIUM CHLORIDE 20MEQ/15ML
40 LIQUID (ML) ORAL ONCE
Status: DISCONTINUED | OUTPATIENT
Start: 2023-02-03 | End: 2023-02-03 | Stop reason: HOSPADM

## 2023-02-03 RX ORDER — POLYETHYLENE GLYCOL 3350 17 G/17G
17 POWDER, FOR SOLUTION ORAL DAILY PRN
Status: DISCONTINUED | OUTPATIENT
Start: 2023-02-03 | End: 2023-02-03 | Stop reason: HOSPADM

## 2023-02-03 RX ORDER — ONDANSETRON 4 MG/1
4 TABLET, ORALLY DISINTEGRATING ORAL EVERY 6 HOURS PRN
Qty: 20 TABLET | Refills: 0 | Status: SHIPPED | OUTPATIENT
Start: 2023-02-03 | End: 2023-02-09

## 2023-02-03 RX ORDER — BISACODYL 10 MG
10 SUPPOSITORY, RECTAL RECTAL DAILY
Status: DISCONTINUED | OUTPATIENT
Start: 2023-02-03 | End: 2023-02-03 | Stop reason: HOSPADM

## 2023-02-03 RX ADMIN — MAGNESIUM SULFATE HEPTAHYDRATE 2 G: 40 INJECTION, SOLUTION INTRAVENOUS at 09:05

## 2023-02-03 RX ADMIN — BISACODYL 10 MG: 10 SUPPOSITORY RECTAL at 10:06

## 2023-02-03 RX ADMIN — POTASSIUM CHLORIDE 40 MEQ: 1500 TABLET, EXTENDED RELEASE ORAL at 09:31

## 2023-02-03 RX ADMIN — METRONIDAZOLE 500 MG: 500 TABLET ORAL at 06:07

## 2023-02-03 RX ADMIN — OXYCODONE HYDROCHLORIDE 10 MG: 10 TABLET ORAL at 01:39

## 2023-02-03 RX ADMIN — CHLORHEXIDINE GLUCONATE 0.12% ORAL RINSE 15 ML: 1.2 LIQUID ORAL at 09:04

## 2023-02-03 RX ADMIN — OXYCODONE HYDROCHLORIDE 10 MG: 10 TABLET ORAL at 06:07

## 2023-02-03 RX ADMIN — OXYCODONE HYDROCHLORIDE 10 MG: 10 TABLET ORAL at 11:25

## 2023-02-03 RX ADMIN — ENOXAPARIN SODIUM 40 MG: 100 INJECTION SUBCUTANEOUS at 09:04

## 2023-02-03 RX ADMIN — BUMETANIDE 1 MG: 0.25 INJECTION INTRAMUSCULAR; INTRAVENOUS at 09:04

## 2023-02-03 NOTE — DISCHARGE INSTR - AVS FIRST PAGE
40 ounce fluid restriction per day      DRAIN CARE INSTRUCTIONS:  Please release pressure from bulb and drain fluid into measured container  Reapply pressure by squeezing bulb and capping  Keep tubing off tension  Please keep a log of amount and color of fluid to bring to follow up general surgery appointment

## 2023-02-03 NOTE — PROGRESS NOTES
Progress Note - General Surgery   Shaheed Campos 46 y o  male MRN: [id number]  Unit/Bed#: ICU 02-01 Encounter: [id number]    Assessment:  46year-old M with PMH significant for hep C/alcoholic cirrhosis presenting with peritonitis of unknown source  POD#5 s/p diagnostic lap converted to ex lap with repair of enterotomy x2, extensive CHAY, peritoneal bx, peritoneal lavage  -AVSS on room air  -UO 2 8 L  -MAURICE x2 50 cc, R>L  -WBC 10 19  -Hgb 11 0  -  -CR 0 56  -Hyponatremia,  126  -Tissue exam with results of benign peritoneal tissue with acute and chronic inflammation and fibrinous ulceration, no evidence of malignancy however rare possible mycobacterium organisms identified on special stains  -AFB culture/stain with NGTD  -Culture gram stain positive for strep intermedius    Plan:  Stable for discharge once patient has a bowel movement  Added bowel regimen with Dulcolax suppository and MiraLAX  Patient to be discharged with MAURICE x2  Will need visiting nurses for wound care/drain care  Medical recommendation per primary, appreciate recommendations  ID following, patient recommendations  Follow-up QuantiFERON gold  Evaluated at bedside with attending    Subjective/Objective   Subjective: No acute overnight events  Patient all doing well with no acute complaints  Passing flatus but has not had a bowel movement since prior to surgery  Denies nausea or vomiting  Objective:   Blood pressure 138/74, pulse 93, temperature (!) 97 3 °F (36 3 °C), temperature source Tympanic, resp  rate 18, height 5' 11" (1 803 m), weight 69 2 kg (152 lb 8 9 oz), SpO2 95 %  ,Body mass index is 21 28 kg/m²        Intake/Output Summary (Last 24 hours) at 2/3/2023 0831  Last data filed at 2/3/2023 0546  Gross per 24 hour   Intake 1290 ml   Output 2665 ml   Net -1375 ml       Invasive Devices     Peripheral Intravenous Line  Duration           Peripheral IV 02/01/23 Left;Upper;Ventral (anterior) Arm 2 days          Drain  Duration Closed/Suction Drain Lateral LUQ 5 days    Closed/Suction Drain Right Abdomen Bulb 5 days              Physical Exam  Vitals and nursing note reviewed  Constitutional:       General: He is not in acute distress  Appearance: Normal appearance  He is normal weight  He is not ill-appearing or toxic-appearing  HENT:      Head: Normocephalic and atraumatic  Cardiovascular:      Rate and Rhythm: Normal rate and regular rhythm  Pulses: Normal pulses  Heart sounds: Normal heart sounds  Pulmonary:      Effort: Pulmonary effort is normal       Breath sounds: Wheezing present  Abdominal:      General: Bowel sounds are normal  There is no distension  Palpations: Abdomen is soft  Tenderness: There is no abdominal tenderness  There is no guarding  Comments: Midline incision is clean, dry, intact with nylon sutures in place, exposed subcutaneous tissue no purulent drainage or signs of necrosis; MAURICE x2 with serosang output, erythema at the skin level of the drain site on the left    Musculoskeletal:         General: Normal range of motion  Right lower leg: No edema  Left lower leg: No edema  Skin:     General: Skin is warm and dry  Neurological:      General: No focal deficit present  Mental Status: He is alert and oriented to person, place, and time  Psychiatric:         Mood and Affect: Mood normal          Behavior: Behavior normal          Thought Content: Thought content normal        Lab, Imaging and other studies:  I have personally reviewed pertinent lab results      CBC:   Lab Results   Component Value Date    WBC 10 19 (H) 02/03/2023    HGB 11 0 (L) 02/03/2023    HCT 32 6 (L) 02/03/2023     (H) 02/03/2023     02/03/2023    MCH 33 8 02/03/2023    MCHC 33 7 02/03/2023    RDW 13 9 02/03/2023    MPV 9 3 02/03/2023    NRBC 0 02/03/2023     CMP:   Lab Results   Component Value Date    SODIUM 126 (L) 02/03/2023    K 3 6 02/03/2023    CL 90 (L) 02/03/2023 CO2 30 02/03/2023    BUN 4 (L) 02/03/2023    CREATININE 0 56 (L) 02/03/2023    CALCIUM 8 1 (L) 02/03/2023    EGFR 118 02/03/2023     VTE Pharmacologic Prophylaxis: Enoxaparin (Lovenox)  VTE Mechanical Prophylaxis: sequential compression device    Balwinder Palumbo PA-C

## 2023-02-03 NOTE — DISCHARGE SUMMARY
[address]  Discharge- Edy PopUpsters 1970, 46 y o  male MRN: [id number]  Unit/Bed#: ICU 02-01 Encounter: [id number]  Primary Care Provider: Blaze Woodward DO   Date and time admitted to hospital: 1/21/2023  8:32 PM    * Acute peritonitis [facility])  Assessment & Plan  · Patient was initially on Levaquin/Flagyl  · Continue ceftriaxone/flagyl  · Patient had exploratory laparotomy with repair of enterotomy x2, extensive lysis of adhesions, peritoneal biopsy, peritoneal lavage on 1/28/23  · Patient had amoxicillin challenge on 1/31/2023 by infectious disease  · Continue Abx per infectious disease  · Peritoneal biopsies show Rare possible mycobacterial organisms identified on special stains (AFB)  · Patient will be discharged on Augmentin per infectious disease recommendation  · We will need outpatient follow-up with surgery, GI, and infectious disease  · Patient being discharged home with home care    Cirrhosis of liver with ascites ([facility] )  Assessment & Plan  · Does not appear to be in acute decompensation  · GI input appreciated  · Patient had paracentesis performed on 1/23/2023  · Cytology from initial paracentesis negative for malignancy cells  · Follow-up with GI as outpatient    Sepsis ([facility] )  Assessment & Plan  · Secondary to peritonitis  · Noted by leukocytosis, tachycardia tachypnea mild lactic acidosis present on admission  · Sepsis parameters have resolved    Mycobacterium, atypical  Assessment & Plan  · Peritoneal biopsy taken during surgery shows Rare possible mycobacterial organisms identified on special stains (AFB)  · Infectious disease following  · Patient had CT chest which did not show any cavitary lesions  · No acute intervention needed at this time  · Patient can follow-up with infectious disease as outpatient  · QuantiFERON gold test has been ordered and obtained    Will need to follow-up with ID regarding results    Hyponatremia  Assessment & Plan  · Nephrology following  · Sodium level continues to fluctuate  · Discussed with nephrology  Recommending fluid restriction on discharge  Will need outpatient follow-up for repeat labs in 1 week  · Ambulatory referral to nephrology provided on discharge    Hepatitis C  Assessment & Plan  · Patient with a history of hepatitis C untreated  · GI following  · Liver with cirrhotic appearance  · Outpatient follow-up with Gastroenterology for treatment    Discharging Physician / Practitioner: Rex Marshall MD  PCP: Blaze Woodward DO  Admission Date:   Admission Orders (From admission, onward)     Ordered        01/22/23 0014  INPATIENT ADMISSION  Once                      Discharge Date: 02/03/23    Medical Problems     Resolved Problems  Date Reviewed: 1/28/2023   None         Consultations During Hospital Stay:  · GI, surgery, infectious disease, nephrology, critical care    Procedures Performed:   · Exploratory laparotomy    Significant Findings / Test Results:   · Peritonitis    Incidental Findings:   · None    Test Results Pending at Discharge (will require follow up): · None     Outpatient Tests Requested:  · Routine labs with PCP as outpatient    Complications:    • None    Reason for Admission: Peritonitis    Hospital Course:     Edy Cobb is a 46 y o  male patient who originally presented to the hospital on 1/21/2023 due to abdominal pain  Patient found to have peritonitis and started on IV antibiotics  IR was consulted and patient had paracentesis performed which showed infected fluid, GI consulted  Fluid not exactly consistent with SBP per GI  Patient was maintained on IV antibiotics per ID recommendations  Surgical team was also consulted  Due to continued intra-abdominal infection patient was taken to the OR by surgical team for exploratory laparotomy  Please see procedure/surgical note for full details regarding surgery  Biopsies were taken  Antibiotics were optimized per culture results  Patient had amoxicillin challenge while in the hospital per ID recommendation  Tolerated well  Patient has gradually improved and now will be discharged on Augmentin to complete course of therapy  Patient does have abdominal drains in place, per surgical team patient will be discharged with drains in place given continued output  Will need to follow-up with surgery as outpatient  Patient also has infectious disease follow-up arranged for outpatient  Regarding patient's cirrhosis  No signs of acute liver failure at this time  Patient can follow-up with GI as outpatient for cirrhosis and hepatitis C history  Patient has also had hyponatremia during admission for which nephrology was consulted  Sodium level has continued to fluctuate and has ranged for the most part between 125 and 127  Nephrology recommends fluid restriction on discharge  Will need to follow-up with nephrology as outpatient    Patient was seen by PT/OT who recommended home with home care  Case management arranged for home care on discharge  Please see above list of diagnoses and related plan for additional information  Condition at Discharge: stable     Discharge Day Visit / Exam:     Subjective: Complaints at this time  Patient states he is doing well  Tolerating diet  Ambulating well  Vitals: Blood Pressure: 125/71 (02/03/23 0904)  Pulse: 93 (02/03/23 0700)  Temperature: (!) 97 3 °F (36 3 °C) (02/03/23 0700)  Temp Source: Tympanic (02/03/23 0700)  Respirations: 18 (02/03/23 0904)  Height: 5' 11" (180 3 cm) (01/21/23 2041)  Weight - Scale: 69 2 kg (152 lb 8 9 oz) (02/03/23 0546)  SpO2: 95 % (02/03/23 0700)  Exam:   Physical Exam  Constitutional:       General: He is not in acute distress  Comments: Frail  male   HENT:      Head: Normocephalic and atraumatic  Nose: Nose normal       Mouth/Throat:      Mouth: Mucous membranes are moist    Eyes:      Extraocular Movements: Extraocular movements intact  Conjunctiva/sclera: Conjunctivae normal    Cardiovascular:      Rate and Rhythm: Normal rate and regular rhythm  Pulmonary:      Effort: Pulmonary effort is normal  No respiratory distress  Abdominal:      Palpations: Abdomen is soft  Comments: Nontender  Abdominal wound with dressing in place  Abdominal drains in place   Musculoskeletal:         General: Normal range of motion  Cervical back: Normal range of motion and neck supple  Skin:     General: Skin is warm and dry  Neurological:      General: No focal deficit present  Mental Status: He is alert  Mental status is at baseline  Cranial Nerves: No cranial nerve deficit  Psychiatric:         Mood and Affect: Mood normal          Behavior: Behavior normal          Discussion with Family: Offered to call family however patient states that he spoke with his friend Stone Morel and has updated him  Did not want me to call anybody else    Discharge instructions/Information to patient and family:   See after visit summary for information provided to patient and family  Provisions for Follow-Up Care:  See after visit summary for information related to follow-up care and any pertinent home health orders  Disposition:     Home with [facility] (Reminder: Complete face to face encounter)      Planned Readmission:   • no     Discharge Statement:  I spent 35 minutes discharging the patient  This time was spent on the day of discharge  I had direct contact with the patient on the day of discharge  Greater than 50% of the total time was spent examining patient, answering all patient questions, arranging and discussing plan of care with patient as well as directly providing post-discharge instructions  Additional time then spent on discharge activities  Discharge Medications:  See after visit summary for reconciled discharge medications provided to patient and family        ** Please Note: This note has been constructed using a voice recognition system **

## 2023-02-03 NOTE — NURSING NOTE
Pt discharged to home  All discharge instructions reviewed with patient who verbalized understanding  Pt educated on emptying MAURICE drains and recording amount and color  Pt demonstrated understanding  Was able to empty both without difficulty  Upcoming follow up appointments reviewed  All questions and concerns addressed  Pt transported off unit by wheelchair with all belongings

## 2023-02-03 NOTE — ASSESSMENT & PLAN NOTE
· Nephrology following  · Sodium level continues to fluctuate  · Discussed with nephrology  Recommending fluid restriction on discharge    Will need outpatient follow-up for repeat labs in 1 week  · Ambulatory referral to nephrology provided on discharge

## 2023-02-03 NOTE — OCCUPATIONAL THERAPY NOTE
02/03/23 1115   OT Last Visit   OT Visit Date 02/03/23   Note Type   Note Type Cancelled Session   Cancel Reasons   (patient declined, stating that he did arm exers  on his own earlier "for an hour-I'm tired"  He is anticipating discharge from hospital soon/today?  Given suggestion of acquiring a [address] reacher for home use - patient verbalized understanding of same info )       LAURIE Humphreys/ANGELINA

## 2023-02-03 NOTE — ASSESSMENT & PLAN NOTE
· Patient was initially on Levaquin/Flagyl  · Continue ceftriaxone/flagyl  · Patient had exploratory laparotomy with repair of enterotomy x2, extensive lysis of adhesions, peritoneal biopsy, peritoneal lavage on 1/28/23  · Patient had amoxicillin challenge on 1/31/2023 by infectious disease  · Continue Abx per infectious disease  · Peritoneal biopsies show Rare possible mycobacterial organisms identified on special stains (AFB)  · Patient will be discharged on Augmentin per infectious disease recommendation  · We will need outpatient follow-up with surgery, GI, and infectious disease  · Patient being discharged home with home care

## 2023-02-03 NOTE — CASE MANAGEMENT
Case Management Discharge Planning Note    Patient name Yenny Perez  Location ICU /ICU  MRN [id number]  : 1970 Date 2/3/2023       Current Admission Date: 2023  Current Admission Diagnosis:Acute peritonitis [facility])   Patient Active Problem List    Diagnosis Date Noted   • Difficult intubation 2023   • Acute peritonitis ([facility] ) 2023   • Sepsis ([facility] ) 2023   • Cirrhosis of liver with ascites ([facility] ) 2023   • Hyponatremia 2023   • Hepatitis C 2023      LOS (days): 12  Geometric Mean LOS (GMLOS) (days): 9 60  Days to GMLOS:-2 8     OBJECTIVE:  Risk of Unplanned Readmission Score: 15 19      Current admission status: Inpatient   Preferred Pharmacy:   [address] [place] [address],[address] [place] [address]  Phone: [phone] Fax: [phone]    Primary Care Provider: Kelsey Finch DO    Primary Insurance: Sha Zhang  Secondary Insurance:     DISCHARGE DETAILS:  [address] [employer] Name[de-identified] [id number] from the list   Pt chose [facility]

## 2023-02-03 NOTE — PROGRESS NOTES
Progress Note - [facility] Infectious Disease   Phuong Arroyo 46 y o  male MRN: [id number]  Unit/Bed#: ICU 02-01 Encounter: [id number]      IMPRESSION & RECOMMENDATIONS:   1  Sepsis, evolving from admission, evidenced by leukocytosis and tachycardia, with intermittent low-grade fevers  Likely due to peritonitis  Blood cultures all negative  Leukocytosis mild  Patient remains hemodynamically stable  -Continue antibiotics as below  -Monitor temperature and hemodynamics  -Trend daily CBC differential and chemistry     2  Acute peritonitis  Patient presented with 1 week of worsening abdominal pain, distention, fevers and chills   CT showed moderate to large intra-abdominal and pelvic ascites with diffuse enhancement and thickening of the peritoneal lining compatible with acute peritonitis  S/p paracentesis 1/23 with loculated ascites present and fluid studies consistent with infection with extremely elevated WBC >70,000, with elevated LDH   Fluid studies not entirely consistent with portal hypertension  Peritoneal fluid culture positive for strep intermedius, which has the propensity to produce abscesses  Unknown source  No evidence of GI source for a secondary peritonitis on initial CT A/P  Unlikely malignant with negative cytology  Now s/p ex-lap, lysis of adhesions, peritoneal biopsy 1/28/23 with extensive intra-peritoneal abscess with multiple loculations, extensive rind, and friable abscess wall encountered  OR cultures also with Strep intermedius  Peritoneal biopsy with acid fast stain positive for rare possible mycobacterial organisms  AFB cx of peritoneum so far negative  Consider possibility of extra-pulmonary TB or other slow-growing mycobacterial infection   Patient without risk factors for TB otherwise and imaging of chest negative for active TB    -Continue IV Ceftriaxone 2g q24 and PO Flagyl 500mg q8h for now   -On discharge transition to PO Augmentin 875mg q12 for 14 day course from OR washout, through 2/10/23  -Follow up Quantiferon gold   -Follow up AFB cx of peritoneum; ID office will follow   -Outpatient ID follow up arranged for 3/1/23 and on d/c paperwork   -Monitor drain output with close surgical follow up outpatient   -Serial abdominal exams and daily liver chemistries   -Close gastroenterology/surgery follow-up     3  Cirrhosis  CT shows nodularity of the liver surface suggestive of chronic cirrhosis   History of chronic hepatitis C, former alcohol abuse and IV drug abuse   In remission for 20 years   Patient not treated for hepatitis C  HIV negative  HCV PCR quant [id number]    -Ongoing follow-up with GI for tx     4  Postoperative respiratory insufficiency  Improved with extubated postoperatively  CT chest from 2/2 shows mucus and secretions in trachea and bilateral mainstem bronchi with occlusive debris and partial collapse of bilateral lower lobes, groundglass opacities in bilateral lower lobes  Patient remains on antibiotics for number  Now remains on room air      5  Transaminitis, hyponatremia  Ashish Ream due to cirrhosis   Nephrology and gastroenterology are following   -Monitor LFTs, chemistry     6  Antibiotic Allergy   Reports history of rash with penicillin and denies anaphylaxis  He is tolerating ceftriaxone  Completed amoxicillin challenge and tolerated well  -PCN allergy removed from chart     Antibiotics:  IV ceftri D11  flagyl D8  Total abx D13  POD6    We will see patient again 2/6/23 if here  Please call ID on call physician in meantime if questions  I have discussed the above management plan in detail with patient  I have discussed the above management plan in detail with patient's RN, and the primary service, SLIM attending  Subjective:  Patient has no fever, chills, sweats; no nausea, vomiting, diarrhea; no cough, shortness of breath; minimal pain  He is feeling well overall and looking forward to d/c home soon       Objective:  Vitals:  Temp:  [97 5 °F (36 4 °C)-97 8 °F (36 6 °C)] 97 6 °F (36 4 °C)  HR:  [90-95] 95  Resp:  [16-18] 18  BP: (117-125)/(67-77) 125/71  SpO2:  [94 %-97 %] 97 %  Temp (24hrs), Av 6 °F (36 4 °C), Min:97 5 °F (36 4 °C), Max:97 8 °F (36 6 °C)  Current: Temperature: 97 6 °F (36 4 °C)    PHYSICAL EXAM:  General Appearance:  Appearing chronically ill, though nontoxic, and in no distress sitting in bedside recliner    HEENT: Normocephalic, without obvious abnormality, atraumatic  Conjunctiva pink and sclera anicteric  Oropharynx moist without lesions  Lungs:   Clear to auscultation bilaterally, respirations unlabored on room air    Heart:  RRR; no murmur, rub or gallop   Abdomen:   Abdominal tenderness around incision with dressing in place c/d/i  MAURICE drains in place x2 draining fibrous SS fluid R>L        Extremities: No cyanosis, clubbing; pedal edema noted   Musculoskeletal: Back symmetric without curvature, ROM normal     Skin: No rashes or lesions  No draining wounds noted  Peripheral IV intact without evidence of erythema, warmth, or exudate  LABS, IMAGING, & OTHER STUDIES:  Lab Results:  I have personally reviewed pertinent labs    Results from last 7 days   Lab Units 23  0537 23  0501 23  0504   WBC Thousand/uL 10 19* 9 15 13 18*   HEMOGLOBIN g/dL 11 0* 8 6* 10 3*   PLATELETS Thousands/uL 299 220 236     Results from last 7 days   Lab Units 23  0537 23  0501 23  0504 23  0509 23  0453   SODIUM mmol/L 126* 127* 125* 125* 123*   POTASSIUM mmol/L 3 6 3 5 3 5 3 8 4 4   CHLORIDE mmol/L 90* 91* 91* 89* 91*   CO2 mmol/L 30 31 30 30 28   BUN mg/dL 4* 9 13 16 18   CREATININE mg/dL 0 56* 0 49* 0 47* 0 64 0 60   EGFR ml/min/1 73sq m 118 125 127 112 115   CALCIUM mg/dL 8 1* 7 1* 7 3* 7 9* 7 8*   AST U/L  --   --  21 21 38   ALT U/L  --   --  20 30 40   ALK PHOS U/L  --   --  50 59 59     Results from last 7 days   Lab Units 23  1036 23  1035   GRAM STAIN RESULT  3+ Polys*  3+ Mononuclear Cells*  1+ Gram positive cocci in chains* 2+ Disintegrating polys*  1+ Gram positive cocci in pairs and chains*   BODY FLUID CULTURE, STERILE  Few Colonies of Alpha Hemolytic Streptococcus NOT Enterococcus* Growth in Broth culture only Streptococcus intermedius*     Results from last 7 days   Lab Units 01/31/23  0509 01/30/23  0453 01/28/23  0401   PROCALCITONIN ng/ml 0 16 0 23 0 30*

## 2023-02-03 NOTE — PROGRESS NOTES
-- Patient:  -- MRN: [id number]  -- Aidin Request ID: [id number]  -- Level of care reserved: [address] [place] [address]  -- Partner Reserved: [facility], [place], [address] [phone]  -- Clinical needs requested:  -- Geography searched: [id number]  -- Start of Service:  -- Request sent: 3:55pm EST on 2/2/2023 by Suleman Escobar  -- Partner reserved: 8:47am EST on 2/3/2023 by Suleman Escobar  -- Choice list shared: 8:47am EST on 2/3/2023 by Suleman Escobar

## 2023-02-03 NOTE — ASSESSMENT & PLAN NOTE
· Does not appear to be in acute decompensation  · GI input appreciated  · Patient had paracentesis performed on 1/23/2023  · Cytology from initial paracentesis negative for malignancy cells  · Follow-up with GI as outpatient

## 2023-02-03 NOTE — UTILIZATION REVIEW
NOTIFICATION OF ADMISSION DISCHARGE   This is a Notification of Discharge from [address]  Please be advised that this patient has been discharge from our facility  Below you will find the admission and discharge date and time including the patient’s disposition  UTILIZATION REVIEW CONTACT:  Antonio Maloney  Utilization   Network Utilization Review Department  Phone: [phone] carefully listen to the prompts  All voicemails are confidential   Email: [email or web address]     ADMISSION INFORMATION  PRESENTATION DATE: 1/21/2023  8:32 PM  OBERVATION ADMISSION DATE:   INPATIENT ADMISSION DATE: 1/22/23 12:14 AM   DISCHARGE DATE: 2/3/2023  2:29 PM   DISPOSITION:Home with [address] INFORMATION:  Send all requests for admission clinical reviews, approved or denied determinations and any other requests to dedicated fax number below belonging to the campus where the patient is receiving treatment   List of dedicated fax numbers:  [address] DENIALS (Administrative/Medical Necessity) [phone]   [address] (Maternity/NICU/Pediatrics) [phone]   [facility] [phone]   [facility] [phone]   [address] [phone]   [address] [facility] [address] [place] [address] [phone]   [facility]  [phone] [address] [facility] [phone]   [address] [facility] [address] [facility] [phone]      8

## 2023-02-03 NOTE — PROGRESS NOTES
NEPHROLOGY PROGRESS NOTE   Phuong Arroyo 46 y o  male MRN: [id number]  Unit/Bed#: ICU 02-01 Encounter: [id number]    Assessment/Plan:    45 yo man with remote history alcohol/IVDA, HCV presented 1/22 with abdominal bloating, pain treated for newly dx cirrhosis with ascites, sepsis 2/2 subacute bacterial peritonitis  POD #5 dx ex lap with repair enterotomy x 2, CHAY, biopsy, lavage per g/s  Nephrology following for hyponatremia  1  Moderate asymptomatic hypervolemic hyponatremia  · Serum sodium 126 mmol/L  Evidence of peripheral edema and impaired renal handling of water secondary to cirrhosis  · Reinitiate fluid restriction 1 2 liters per day  This will need to be continued into the outpatient setting  No FR since 1/30 likely contributory to lack of improvement  · Low sodium diet to assist with edema reduction and compliance with fluid restriction (high sodium diet will increase thirst)  · redose bumex 1 mg today  · Consider outpatient loop diuretic prn or scheduled  · Outpatient follow-up with nephrology  He lives in [facility] but prefers [place] office  2  Hypokalemia  · 40 mEq potassium elixir  3  Hypomagnesemia  · 2 grams IV mag      ROS  Examined lying in bed  Complains of lower extremity edema  A complete 10 point review of systems have been performed and are otherwise negative  Historical Information   Past Medical History:   Diagnosis Date   • Hard to intubate    • Hepatitis C      Past Surgical History:   Procedure Laterality Date   • HAND SURGERY     • IR PARACENTESIS  1/23/2023   • ME LAPS ABD PRTM&OMENTUM DX W/WO SPEC BR/WA SPX N/A 1/28/2023    Procedure: LAPAROSCOPY DIAGNOSTIC CONVERTED TO OPEN, LAPAROTOMY, REPAIR ENTEROTOMY X2, EXTENSIS LYSIS OF ADHESIONS, PERITONEAL BIOPSIES, PERITONEAL LAVAGE;  Surgeon:  Cleophas Mcburney, MD;  Location: [facility];  Service: General   • WISDOM TOOTH EXTRACTION       Social History   Social History     Substance and Sexual Activity   Alcohol Use Not Currently    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     Social History     Substance and Sexual Activity   Drug Use Yes   • Types: Marijuana    Comment: medical marijuana     Social History     Tobacco Use   Smoking Status Never   Smokeless Tobacco Never       Family History:   Family History   Problem Relation Age of Onset   • Diabetes Mother    • No Known Problems Father        Medications:  Pertinent medications were reviewed  Current Facility-Administered Medications   Medication Dose Route Frequency Provider Last Rate   • albuterol  2 puff Inhalation Q4H PRN Joe Henao PA-C     • cefTRIAXone  2,000 mg Intravenous Q24H VicGUILLAUME KumarNP 2,000 mg (02/02/23 1529)   • chlorhexidine  15 mL Mouth/Throat Q12H [address] MARQUIS [place]     • diphenhydrAMINE  25 mg Intravenous Q6H PRN Joe Henao PA-C     • enoxaparin  40 mg Subcutaneous Q24H [address] MARQUIS [place]     • EPINEPHrine PF  0 3 mg Intramuscular Once PRN Joe Henao PA-C     • famotidine  20 mg Intravenous Once PRN Harish Henao PA-C     • HYDROmorphone  0 5 mg Intravenous Q3H PRN Vic Mates, GUILLAUMENP     • magnesium sulfate  2 g Intravenous Once MARQUIS [place]     • metroNIDAZOLE  500 mg Oral Q8H [address] MARQUIS [place]     • ondansetron  4 mg Intravenous Q6H PRN Vic Mates, GUILLAUMENP     • oxyCODONE  10 mg Oral Q4H PRN Vic Mates, CRNP     • oxyCODONE  5 mg Oral Q4H PRN Vic Mates, GUILLAUMENP     • phenol  1 spray Mouth/Throat Q2H PRN Vic Mates, CRNP           No Known Allergies      Vitals:   /71   Pulse 93   Temp (!) 97 3 °F (36 3 °C) (Tympanic)   Resp 18   Ht 5' 11" (1 803 m)   Wt 69 2 kg (152 lb 8 9 oz)   SpO2 95%   BMI 21 28 kg/m²   Body mass index is 21 28 kg/m²    SpO2: 95 %,   SpO2 Activity: At Rest,   O2 Device: None (Room air)      Intake/Output Summary (Last 24 hours) at 2/3/2023 0909  Last data filed at 2/3/2023 0546  Gross per 24 hour   Intake 1290 ml   Output 2665 ml   Net -1375 ml     Invasive Devices     Peripheral Intravenous Line  Duration           Peripheral IV 02/01/23 Left;Upper;Ventral (anterior) Arm 2 days          Drain  Duration           Closed/Suction Drain Lateral LUQ 5 days    Closed/Suction Drain Right Abdomen Bulb 5 days                Physical Exam  General: conscious, cooperative, in no acute distress  Eyes: conjunctivae pink, anicteric sclerae  ENT: lips and mucous membranes moist  Neck: supple, no JVD, no masses  Chest: clear breath sounds bilaterally, no crackles, ronchus or wheezings  CVS: distinct S1 & S2, normal rate, regular rhythm  Abdomen: soft, non-tender, non-distended, normoactive bowel sounds  Extremities: severe edema of both legs  Skin: no rash  Neuro: awake, alert, oriented      Diagnostic Data:  Lab: I have personally reviewed pertinent lab results  ,   CBC:  Results from last 7 days   Lab Units 02/03/23  0537   WBC Thousand/uL 10 19*   HEMOGLOBIN g/dL 11 0*   HEMATOCRIT % 32 6*   PLATELETS Thousands/uL 299      CMP:   Lab Results   Component Value Date    SODIUM 126 (L) 02/03/2023    K 3 6 02/03/2023    CL 90 (L) 02/03/2023    CO2 30 02/03/2023    BUN 4 (L) 02/03/2023    CREATININE 0 56 (L) 02/03/2023    CALCIUM 8 1 (L) 02/03/2023    EGFR 118 02/03/2023   ,   PT/INR: No results found for: PT, INR,   Magnesium: No components found for: MAG,  Phosphorous: No results found for: PHOS    Microbiology:  @LABRCNTIP,(urinecx:7)@        MARQUIS Sorenson    Portions of the record may have been created with voice recognition software  Occasional wrong word or "sound a like" substitutions may have occurred due to the inherent limitations of voice recognition software  Read the chart carefully and recognize, using context, where substitutions have occurred

## 2023-02-03 NOTE — ASSESSMENT & PLAN NOTE
· Peritoneal biopsy taken during surgery shows Rare possible mycobacterial organisms identified on special stains (AFB)  · Infectious disease following  · Patient had CT chest which did not show any cavitary lesions  · No acute intervention needed at this time  · Patient can follow-up with infectious disease as outpatient  · QuantiFERON gold test has been ordered and obtained    Will need to follow-up with ID regarding results

## 2023-02-04 ENCOUNTER — HOME CARE VISIT (OUTPATIENT)
Dept: HOME HEALTH SERVICES | Facility: HOME HEALTHCARE | Age: 53
End: 2023-02-04

## 2023-02-04 VITALS
DIASTOLIC BLOOD PRESSURE: 60 MMHG | RESPIRATION RATE: 16 BRPM | HEART RATE: 71 BPM | TEMPERATURE: 98.3 F | OXYGEN SATURATION: 98 % | SYSTOLIC BLOOD PRESSURE: 120 MMHG

## 2023-02-04 NOTE — WOUND OSTOMY CARE
Progress Note - Wound   Ameya Garryowen 46 y o  male MRN: [id number]  Unit/Bed#: ICU 02-01 Encounter: [id number]    Assessment:   Requested by RN to see patient prior to discharge today to image HA pressure injuries and measurements  Patient is ambulatory with a walker  He is alert and oriented X4  Patient states he will follow up with wound center on discharge for buttock and back wounds  Per RN, patient has been compliant with turning and repositioning and allowing offloading with green wedge while in bed  Ehob offloading cushion on recliner  Patient seen with primary RN  Findings  1  HA-Evolving deep tissue injury to the back  approx 90% purple non-blanchable tissue and 10% yellow slough to the distal edge at 5:00  Periwound red and slow to carmela  Distal spinal processes blanchable erythema  2  HA-evolving deep tissue injury to bilateral buttocks, wounds mirror each other on the inner buttocks  Sacral skin partial thickness wound bed  Right buttock epidermal tissue lifting, beefy red wound bed, non-blanchable, purple distal edge, peeling skin proximally  Left inner buttock light purple epidermal  skin, pink periwound  Suspect stage 3, stage 4 or unstageable wounds when evolve  Both wounds measured as one wound bed    Skin and Wound Care Plan:   1  Apply skin nourishing cream to the skin daily  2  Ehob offloading cushion to chair when OOB  3  Apply hydraguard to b/l heels  4  Elevate heels off of bed with pillows to offload  5  Turn and reposition patient Q2 hours  6  Cleanse back and buttock wounds gently with soap and water, pat dry   Place oil emulsion non-adherent dressing on wound beds, cover with small Allevyn silicone bordered dressings, malick with T, date, change every other day and PRN with dislodgement or soilage     Wound:  Wound 01/28/23 Abrasion(s) Back Medial;Upper (Active)   Wound Image   02/03/23 1323   Wound Description Beefy red;Fragile;Light purple 02/03/23 1323   Pressure Injury Stage DTPI 02/03/23 1323   Aminah-wound Assessment Erythema;Fragile 02/03/23 1323   Wound Length (cm) 1 9 cm 02/03/23 1323   Wound Width (cm) 1 8 cm 02/03/23 1323   Wound Surface Area (cm^2) 3 42 cm^2 02/03/23 1323   Drainage Amount Scant 02/03/23 1323   Drainage Description Jackye Leandro 02/03/23 1323   Treatments Site care 02/03/23 1323   Dressing Foam, Silicon (eg  Allevyn, etc); Vaseline gauze 02/03/23 1323   Dressing Changed Changed 02/03/23 1323   Patient Tolerance Tolerated well 02/02/23 0000   Dressing Status Clean;Dry; Intact 02/03/23 1323       Wound 01/28/23 Abdomen N/A (Active)   Wound Description RIGO 02/03/23 0934   Aminah-wound Assessment RIGO 02/03/23 0934   Wound Site Closure Other (Comment) 02/02/23 0000   Drainage Amount Moderate 02/02/23 0000   Drainage Description Serosanguineous 02/02/23 1943   Treatments Site care 02/02/23 0800   Dressing Dry dressing 02/03/23 0934   Dressing Changed Changed by provider 02/03/23 0934   Patient Tolerance Tolerated well 02/02/23 0000   Dressing Status Clean; Intact;Dry 02/03/23 0934       Wound 01/29/23 Pressure Injury Sacrum (Active)   Wound Image   02/03/23 1326   Wound Description Beefy red;Fragile;Light purple;Non-blanchable erythema 02/03/23 1326   Pressure Injury Stage DTPI 02/03/23 1326   Aminah-wound Assessment [place] 02/03/23 1326   Wound Length (cm) 4 5 cm 02/03/23 1326   Wound Width (cm) 4 cm 02/03/23 1326   Wound Depth (cm) 0 1 cm 02/03/23 1326   Wound Surface Area (cm^2) 18 cm^2 02/03/23 1326   Wound Volume (cm^3) 1 8 cm^3 02/03/23 1326   Calculated Wound Volume (cm^3) 1 8 cm^3 02/03/23 1326   Change in Wound Size % 35 71 02/03/23 1326   Drainage Amount None 02/02/23 1943   Drainage Description Yellow 02/02/23 1943   Treatments Site care 02/03/23 1326   Dressing Foam, Silicon (eg  Allevyn, etc); Vaseline gauze 02/03/23 1326   Dressing Changed Changed 02/03/23 1326   Patient Tolerance Tolerated well 02/02/23 0800   Dressing Status Clean;Dry; Intact 02/03/23 1326 Reviewed plan of care with primary RN Dia  Recommendations written as orders  Wound care team to follow weekly while admitted  Questions or concerns [id number] Nurse    Deisi January BSN, RN, [facility]

## 2023-02-06 ENCOUNTER — APPOINTMENT (OUTPATIENT)
Dept: LAB | Facility: HOSPITAL | Age: 53
End: 2023-02-06

## 2023-02-06 ENCOUNTER — HOME CARE VISIT (OUTPATIENT)
Dept: HOME HEALTH SERVICES | Facility: HOME HEALTHCARE | Age: 53
End: 2023-02-06

## 2023-02-06 ENCOUNTER — NURSE TRIAGE (OUTPATIENT)
Dept: OTHER | Facility: OTHER | Age: 53
End: 2023-02-06

## 2023-02-06 ENCOUNTER — TELEPHONE (OUTPATIENT)
Dept: OTHER | Facility: OTHER | Age: 53
End: 2023-02-06

## 2023-02-06 ENCOUNTER — TELEPHONE (OUTPATIENT)
Dept: INFECTIOUS DISEASES | Facility: CLINIC | Age: 53
End: 2023-02-06

## 2023-02-06 VITALS
SYSTOLIC BLOOD PRESSURE: 138 MMHG | HEART RATE: 94 BPM | OXYGEN SATURATION: 97 % | DIASTOLIC BLOOD PRESSURE: 86 MMHG | RESPIRATION RATE: 18 BRPM | TEMPERATURE: 98.4 F

## 2023-02-06 DIAGNOSIS — E87.1 HYPONATREMIA: ICD-10-CM

## 2023-02-06 LAB
ANION GAP SERPL CALCULATED.3IONS-SCNC: 8 MMOL/L (ref 4–13)
BUN SERPL-MCNC: 7 MG/DL (ref 5–25)
CALCIUM SERPL-MCNC: 8.4 MG/DL (ref 8.4–10.2)
CHLORIDE SERPL-SCNC: 99 MMOL/L (ref 96–108)
CO2 SERPL-SCNC: 28 MMOL/L (ref 21–32)
CREAT SERPL-MCNC: 0.71 MG/DL (ref 0.6–1.3)
GFR SERPL CREATININE-BSD FRML MDRD: 107 ML/MIN/1.73SQ M
GLUCOSE SERPL-MCNC: 145 MG/DL (ref 65–140)
MAGNESIUM SERPL-MCNC: 1.6 MG/DL (ref 1.9–2.7)
POTASSIUM SERPL-SCNC: 3.5 MMOL/L (ref 3.5–5.3)
SODIUM SERPL-SCNC: 135 MMOL/L (ref 135–147)

## 2023-02-06 NOTE — TELEPHONE ENCOUNTER
[facility] nurse thinks that patient has a rash from his Augmentin  He states the rash started on Saturday night  Care Advice given to stop antibiotic until patient is seen in AM at surgeons office         Reason for Disposition  • Taking new prescription antibiotic (Exception: finished taking new prescription antibiotic)    Protocols used: RASH - WIDESPREAD ON DRUGS-ADULT-

## 2023-02-06 NOTE — TELEPHONE ENCOUNTER
contacted pt with a reminder of his f/u in our [place] office      Pt confirms and verbalizes understanding

## 2023-02-07 ENCOUNTER — OFFICE VISIT (OUTPATIENT)
Dept: SURGERY | Facility: CLINIC | Age: 53
End: 2023-02-07

## 2023-02-07 ENCOUNTER — TELEPHONE (OUTPATIENT)
Dept: OTHER | Facility: HOSPITAL | Age: 53
End: 2023-02-07

## 2023-02-07 ENCOUNTER — HOME CARE VISIT (OUTPATIENT)
Dept: HOME HEALTH SERVICES | Facility: HOME HEALTHCARE | Age: 53
End: 2023-02-07

## 2023-02-07 VITALS
HEIGHT: 71 IN | SYSTOLIC BLOOD PRESSURE: 148 MMHG | HEART RATE: 95 BPM | TEMPERATURE: 97.6 F | OXYGEN SATURATION: 98 % | BODY MASS INDEX: 20.72 KG/M2 | DIASTOLIC BLOOD PRESSURE: 88 MMHG | RESPIRATION RATE: 20 BRPM | WEIGHT: 148 LBS

## 2023-02-07 DIAGNOSIS — K65.9 PERITONITIS (HCC): Primary | ICD-10-CM

## 2023-02-07 DIAGNOSIS — A49.1 STREPTOCOCCAL INFECTION: ICD-10-CM

## 2023-02-07 DIAGNOSIS — K65.0 ACUTE PERITONITIS (HCC): ICD-10-CM

## 2023-02-07 LAB
MYCOBACTERIUM SPEC CULT: NORMAL
RHODAMINE-AURAMINE STN SPEC: NORMAL

## 2023-02-07 RX ORDER — CEFUROXIME AXETIL 500 MG/1
500 TABLET ORAL EVERY 12 HOURS SCHEDULED
Qty: 8 TABLET | Refills: 0 | Status: SHIPPED | OUTPATIENT
Start: 2023-02-07 | End: 2023-02-11

## 2023-02-07 NOTE — PATIENT INSTRUCTIONS
Wash the wound with soap and rinse between the sutures with soap and water, and tuck the gauze dressings to the bottom of the open wounds    Please continue to keep a log of the drainage from the remaining Left drain    Keep a dressing in place on the drain site on the Right side, when the drainage dries up, leave open to air    I would suggest buying an immersion  and some [employer] Breakfast, you can supplement the instant breakfast with ice cream and raw eggs if you are adventurous     No heavy lifting or strenuous activity      Please return in one week

## 2023-02-07 NOTE — ASSESSMENT & PLAN NOTE
There is rather minimal serous output from both drains  The right upper Quadrant drain was removed, the left drain directed into the pelvis remains in place

## 2023-02-07 NOTE — TELEPHONE ENCOUNTER
Pt developed diffuse rash on augmentin  He is s/p amoxicillin challenge during hospitalization which he tolerated  We will stop augmentin and instead start ceftin 500mg BID to complete course  Rx sent to pharmacy on file

## 2023-02-07 NOTE — PROGRESS NOTES
Assessment/Plan:    No problem-specific Assessment & Plan notes found for this encounter  Diagnoses and all orders for this visit:    Acute peritonitis ([address] )  -     Ambulatory Referral to General Surgery    Other orders  -     NON FORMULARY; Inhale Medical Marijuana          Subjective:      Patient ID: Abhishek Le is a 46 y o  male  The patient presents in follow-up postop day 10 from laparotomy with drainage of abdominal abscesses and peritoneal biopsies  The close suction drains continue to put out about 20 cc of serous fluid bilaterally  The patient apparently began developing a rash with Augmentin, and is now on Ceftin per ID recommendation  He is gathering some strength, ambulating with relative ease, and states that he has decent appetite  The midline incision, which was packed open postoperatively, is granulating and healing, up albeit slowly  The right sided drain, which drains the space immediately adjacent to the liver, was removed  The left-sided drain, which terminates in the pelvis in a more dependent position, was left in place  Patient was given guidance regarding nutritional support  He does have follow-up with ID regarding the peritoneal biopsy suggesting a mycobacterial infection, the mycobacterial cultures to date have been negative  The patient will return in 1 week for a recheck  The following portions of the patient's history were reviewed and updated as appropriate: allergies, current medications, past family history, past medical history, past social history, past surgical history and problem list     Review of Systems   Constitutional: Positive for fatigue  Negative for chills and fever  Gastrointestinal: Positive for abdominal pain  Negative for nausea and vomiting  Genitourinary: Negative  Skin: Negative for color change           Objective:      /88 (BP Location: Left arm, Patient Position: Sitting, Cuff Size: Standard)   Pulse 95 Temp 97 6 °F (36 4 °C) (Temporal)   Resp 20   Ht 5' 11" (1 803 m)   Wt 67 1 kg (148 lb)   SpO2 98%   BMI 20 64 kg/m²          Physical Exam  Constitutional:       Appearance: He is ill-appearing  HENT:      Head: Normocephalic  Pulmonary:      Breath sounds: Normal breath sounds  Abdominal:          Comments: Incision healing uneventfully  Gaping subcutaneous tissue between interrupted nylon sutures  Skin:     General: Skin is warm and dry     Psychiatric:         Mood and Affect: Mood normal

## 2023-02-07 NOTE — CASE COMMUNICATION
Ship to   XX  [id number] [facility]    Wound 1   R and L buttocks   Full   xx       Wound 2  Back    Full  xx        Saline 100ml [id number]   1    Gauze 4x4 N/S 200 4x4s per unit  [id number]    1    ABD 5x9 [id number]    6    Tape  Transpore white  2in [id number]    1    Gauze oil emulsion [id number]   Curtis Moses 1

## 2023-02-07 NOTE — TELEPHONE ENCOUNTER
Contacted patient about taking the ceftin, patient has held augmentin  He does state that after he started taking the augmentin he had no taste, now that he has been holding doses he states he has a metallic taste  The rash is not itchy or painful, and he will call us in the interim with concerns  He is aware we will call him Thursday to f/u

## 2023-02-08 ENCOUNTER — HOME CARE VISIT (OUTPATIENT)
Dept: HOME HEALTH SERVICES | Facility: HOME HEALTHCARE | Age: 53
End: 2023-02-08

## 2023-02-08 VITALS
TEMPERATURE: 97.7 F | DIASTOLIC BLOOD PRESSURE: 72 MMHG | RESPIRATION RATE: 18 BRPM | HEART RATE: 101 BPM | OXYGEN SATURATION: 97 % | SYSTOLIC BLOOD PRESSURE: 140 MMHG

## 2023-02-08 NOTE — CASE COMMUNICATION
Ship to [facility]    Wound 1 mid abdomen surgical incision    All items are ordered by the each unless otherwise noted    PLEASE DO NOT ORDER BY THE BOX  Request specific quantity needed  For Private Insurances order should be for a 2 week period    Gauze 4x4 [id number] [phone]

## 2023-02-09 ENCOUNTER — OFFICE VISIT (OUTPATIENT)
Dept: NEPHROLOGY | Facility: CLINIC | Age: 53
End: 2023-02-09

## 2023-02-09 VITALS
HEIGHT: 71 IN | WEIGHT: 145 LBS | SYSTOLIC BLOOD PRESSURE: 130 MMHG | BODY MASS INDEX: 20.3 KG/M2 | HEART RATE: 72 BPM | DIASTOLIC BLOOD PRESSURE: 80 MMHG | OXYGEN SATURATION: 99 %

## 2023-02-09 DIAGNOSIS — R18.8 CIRRHOSIS OF LIVER WITH ASCITES, UNSPECIFIED HEPATIC CIRRHOSIS TYPE (HCC): ICD-10-CM

## 2023-02-09 DIAGNOSIS — K74.60 CIRRHOSIS OF LIVER WITH ASCITES, UNSPECIFIED HEPATIC CIRRHOSIS TYPE (HCC): ICD-10-CM

## 2023-02-09 DIAGNOSIS — E87.1 HYPONATREMIA: Primary | ICD-10-CM

## 2023-02-09 DIAGNOSIS — K65.0 ACUTE PERITONITIS (HCC): ICD-10-CM

## 2023-02-09 DIAGNOSIS — E83.42 HYPOMAGNESEMIA: ICD-10-CM

## 2023-02-09 RX ORDER — ACETAMINOPHEN 500 MG
1000 TABLET ORAL EVERY 6 HOURS PRN
Qty: 30 TABLET | Refills: 0 | Status: SHIPPED | OUTPATIENT
Start: 2023-02-09 | End: 2023-02-19

## 2023-02-09 RX ORDER — UREA 10 %
500 LOTION (ML) TOPICAL 2 TIMES DAILY
Qty: 60 TABLET | Refills: 3 | Status: SHIPPED | OUTPATIENT
Start: 2023-02-09

## 2023-02-09 NOTE — ASSESSMENT & PLAN NOTE
Patient asked for refill of Tylenol, which he was prescribed at discharge from hospitalization  I provided 1 refill, but I have asked that he discuss any further usage of Tylenol with his hepatologist, whom he is scheduled to see soon  He knows not to exceed the prescribed dose

## 2023-02-09 NOTE — ASSESSMENT & PLAN NOTE
Resolved  Doing well on 40 ounce fluid restriction  Please continue  Will see annually, or sooner as needed

## 2023-02-09 NOTE — PROGRESS NOTES
Assessment & Plan:    1  Hyponatremia  Assessment & Plan:  Resolved  Doing well on 40 ounce fluid restriction  Please continue  Will see annually, or sooner as needed  Orders:  -     Ambulatory referral to Nephrology    2  Hypomagnesemia  -     magnesium gluconate (MAGONATE) 500 mg tablet; Take 1 tablet (500 mg total) by mouth 2 (two) times a day    3  Cirrhosis of liver with ascites, unspecified hepatic cirrhosis type [facility])  Assessment & Plan:  Patient asked for refill of Tylenol, which he was prescribed at discharge from hospitalization  I provided 1 refill, but I have asked that he discuss any further usage of Tylenol with his hepatologist, whom he is scheduled to see soon  He knows not to exceed the prescribed dose  4  Acute peritonitis ([address] )  -     acetaminophen (TYLENOL) 500 mg tablet; Take 2 tablets (1,000 mg total) by mouth every 6 (six) hours as needed for mild pain for up to 10 days       The benefits, risks and alternatives to the treatment plan were discussed at this visit  Patient was advised of common adverse effects of any medical therapies prescribed  All questions were answered and discussed with the patient and any accompanying family members or caretakers  Subjective:      Patient ID: Selene Campbell is a 46 y o  male seen in the [place] office  HPI     Today, patient presents for follow-up of hospitalization  Patient was hospitalized at [place] [id number] from 1/22/2023 through 2/3/2023  Patient is a 53-year old male admitted 1/22/2023 with chief complaint of decreased oral intake   Patient has history of hepatitis C diagnosed 15 years ago and has history of alcohol abuse, sober 20 years  [place] [facility] found to have newly diagnosed cirrhosis of liver with ascites   Nephrology following for hyponatremia  Blood pressure is 130/80 HR 72  Denies headaches, lightheadedness, dizziness  Patient denies lower extremity swelling      Reviewed and discussed the results of labs performed 2/6/2023 which revealed serum sodium 135 mmol/L  Keeping to 40 ounces  History is obtained from patient  The following portions of the patient's history were reviewed and updated as appropriate: allergies, current medications, past family history, past medical history, past social history, past surgical history, and problem list     Review of Systems   Constitutional: Positive for fatigue  Negative for activity change, chills, diaphoresis and fever  HENT: Negative for mouth sores and trouble swallowing  Respiratory: Negative for apnea, cough, chest tightness, shortness of breath and wheezing  Cardiovascular: Negative for chest pain, palpitations and leg swelling  Gastrointestinal: Negative for abdominal distention, abdominal pain, blood in stool, constipation, diarrhea and nausea  Genitourinary: Negative for decreased urine volume, difficulty urinating, dysuria, enuresis, frequency, hematuria and urgency  Musculoskeletal: Negative for arthralgias, back pain and joint swelling  Skin: Negative for pallor, rash and wound  Neurological: Negative for dizziness, seizures, light-headedness, numbness and headaches  Hematological: Does not bruise/bleed easily  Psychiatric/Behavioral: Negative for agitation, behavioral problems and confusion  The patient is not nervous/anxious  Objective:      /80   Pulse 72   Ht 5' 11" (1 803 m)   Wt 65 8 kg (145 lb)   SpO2 99%   BMI 20 22 kg/m²          Physical Exam  Vitals and nursing note reviewed  Constitutional:       General: He is awake  He is not in acute distress  Appearance: Normal appearance  He is well-developed and underweight  He is not ill-appearing, toxic-appearing or diaphoretic  HENT:      Head: Normocephalic and atraumatic  Jaw: There is normal jaw occlusion  Nose: Nose normal       Mouth/Throat:      Mouth: Mucous membranes are moist       Pharynx: Oropharynx is clear   No oropharyngeal exudate or posterior oropharyngeal erythema  Eyes:      General: Lids are normal  Vision grossly intact  Gaze aligned appropriately  No scleral icterus  Right eye: No discharge  Left eye: No discharge  Extraocular Movements: Extraocular movements intact  Conjunctiva/sclera: Conjunctivae normal       Pupils: Pupils are equal, round, and reactive to light  Neck:      Thyroid: No thyroid mass or thyromegaly  Trachea: Trachea and phonation normal    Cardiovascular:      Rate and Rhythm: Normal rate and regular rhythm  Heart sounds: Normal heart sounds, S1 normal and S2 normal  No murmur heard  No friction rub  No gallop  Pulmonary:      Effort: Pulmonary effort is normal  No respiratory distress  Breath sounds: Normal breath sounds  No stridor  No wheezing, rhonchi or rales  Abdominal:      General: Abdomen is flat  Bowel sounds are normal  There is no distension  Palpations: Abdomen is soft  There is no mass  Tenderness: There is no abdominal tenderness  There is no guarding  Hernia: No hernia is present  Musculoskeletal:         General: Normal range of motion  Cervical back: Normal range of motion and neck supple  No rigidity or tenderness  Right lower leg: No edema  Left lower leg: No edema  Lymphadenopathy:      Cervical: No cervical adenopathy  Skin:     General: Skin is warm and dry  Coloration: Skin is not jaundiced  Findings: No bruising  Nails: There is no clubbing  Neurological:      General: No focal deficit present  Mental Status: He is alert and oriented to person, place, and time  Mental status is at baseline  Psychiatric:         Attention and Perception: Attention and perception normal          Mood and Affect: Mood and affect normal          Speech: Speech normal          Behavior: Behavior normal  Behavior is cooperative  Thought Content:  Thought content normal          Judgment: Judgment normal              Lab Results   Component Value Date    SODIUM 135 02/06/2023    K 3 5 02/06/2023    CL 99 02/06/2023    CO2 28 02/06/2023    AGAP 8 02/06/2023    BUN 7 02/06/2023    CREATININE 0 71 02/06/2023    GLUC 145 (H) 02/06/2023    CALCIUM 8 4 02/06/2023    AST 21 02/01/2023    ALT 20 02/01/2023    ALKPHOS 50 02/01/2023    TP 5 3 (L) 02/01/2023    TBILI 0 82 02/01/2023    EGFR 107 02/06/2023      Lab Results   Component Value Date    CREATININE 0 71 02/06/2023    CREATININE 0 56 (L) 02/03/2023    CREATININE 0 49 (L) 02/02/2023    CREATININE 0 47 (L) 02/01/2023    CREATININE 0 64 01/31/2023    CREATININE 0 60 01/30/2023    CREATININE 0 50 (L) 01/29/2023    CREATININE 0 48 (L) 01/28/2023    CREATININE 0 48 (L) 01/28/2023    CREATININE 0 54 (L) 01/28/2023    CREATININE 0 63 01/27/2023    CREATININE 0 52 (L) 01/27/2023    CREATININE 0 51 (L) 01/27/2023    CREATININE 0 45 (L) 01/27/2023    CREATININE 0 49 (L) 01/27/2023    CREATININE 0 51 (L) 01/27/2023      Lab Results   Component Value Date    COLORU Yellow 01/30/2023    CLARITYU Clear 01/30/2023    SPECGRAV 1 015 01/30/2023    PHUR 6 0 01/30/2023    LEUKOCYTESUR Negative 01/30/2023    NITRITE Negative 01/30/2023    PROTEIN UA Negative 01/30/2023    GLUCOSEU Negative 01/30/2023    KETONESU Negative 01/30/2023    UROBILINOGEN 0 2 01/30/2023    BILIRUBINUR Negative 01/30/2023    BLOODU Negative 01/30/2023    RBCUA None Seen 01/30/2023    WBCUA None Seen 01/30/2023    EPIS None Seen 01/30/2023    BACTERIA None Seen 01/30/2023      No results found for: LABPROT  No results found for: [facility] Results   Component Value Date    WBC 10 19 (H) 02/03/2023    HGB 11 0 (L) 02/03/2023    HCT 32 6 (L) 02/03/2023     (H) 02/03/2023     02/03/2023      Lab Results   Component Value Date    HGB 11 0 (L) 02/03/2023    HGB 8 6 (L) 02/02/2023    HGB 10 3 (L) 02/01/2023    HGB 11 1 (L) 01/31/2023    HGB 11 7 (L) 01/30/2023      Lab Results   Component Value Date    IRON 19 (L) 01/22/2023    TIBC 229 (L) 01/22/2023    FERRITIN 762 (H) 01/22/2023      No results found for: PTHCALCIUM, [id number], PHOSPHORUS   No results found for: CHOLESTEROL, HDL, LDLCALC, TRIG   No results found for: URICACID   Lab Results   Component Value Date    HGBA1C 5 4 01/22/2023      No results found for: TSHANTIBODY, U0FMSLG, FREET4   No results found for: SANDEEP, DSDNAAB, RFIGM   No results found for: PROT, UPEP, IMMUNOFIX, KAPPALAMBDA, KAPPALIGHT     Portions of the record may have been created with voice recognition software  Occasional wrong word or "sound a like" substitutions may have occurred due to the inherent limitations of voice recognition software  Read the chart carefully and recognize, using context, where substitutions have occurred  If you have any questions, please contact the dictating provider

## 2023-02-10 ENCOUNTER — HOME CARE VISIT (OUTPATIENT)
Dept: HOME HEALTH SERVICES | Facility: HOME HEALTHCARE | Age: 53
End: 2023-02-10

## 2023-02-10 VITALS
HEART RATE: 88 BPM | RESPIRATION RATE: 16 BRPM | DIASTOLIC BLOOD PRESSURE: 80 MMHG | TEMPERATURE: 98.3 F | SYSTOLIC BLOOD PRESSURE: 140 MMHG | OXYGEN SATURATION: 97 %

## 2023-02-10 NOTE — CASE COMMUNICATION
Ship to [employer]     Wound 1 mid abdomen        Wound 2 bulateral buttock and back pressure ulcers         Saline 100ml [id number]  Gauze oil emulsion [id number]

## 2023-02-13 ENCOUNTER — HOME CARE VISIT (OUTPATIENT)
Dept: HOME HEALTH SERVICES | Facility: HOME HEALTHCARE | Age: 53
End: 2023-02-13

## 2023-02-13 VITALS
HEART RATE: 100 BPM | TEMPERATURE: 98 F | RESPIRATION RATE: 20 BRPM | OXYGEN SATURATION: 98 % | SYSTOLIC BLOOD PRESSURE: 142 MMHG | DIASTOLIC BLOOD PRESSURE: 80 MMHG

## 2023-02-14 ENCOUNTER — OFFICE VISIT (OUTPATIENT)
Dept: SURGERY | Facility: CLINIC | Age: 53
End: 2023-02-14

## 2023-02-14 VITALS — TEMPERATURE: 97.2 F

## 2023-02-14 DIAGNOSIS — B37.9 CANDIDIASIS: Primary | ICD-10-CM

## 2023-02-14 RX ORDER — CLOTRIMAZOLE 1 %
CREAM (GRAM) TOPICAL 2 TIMES DAILY
Qty: 30 G | Refills: 0 | Status: SHIPPED | OUTPATIENT
Start: 2023-02-14

## 2023-02-14 NOTE — PROGRESS NOTES
Assessment/Plan:    Acute peritonitis ([address] )  The second drain in the left upper quadrant directed into the pelvis is putting out only 10 cc over 24-hour period  The output is straw-colored, and the drain was removed  Diagnoses and all orders for this visit:    Candidiasis  -     clotrimazole (LOTRIMIN) 1 % cream; Apply topically 2 (two) times a day  -     white petrolatum ointment; Apply topically as needed for dry skin          Subjective:      Patient ID: Juan F Monterroso is a 46 y o  male  The patient is a 51-year-old white male who is 2-1/2 weeks out from exploratory laparotomy with drainage of intra-abdominal abscess  The patient has underlying hepatitis C cirrhosis and ascites that apparently became infected with atypical Mycobacterium  Final cultures are pending  The patient had 2 drains postoperatively, the first was removed 1 week prior, the second is putting out just 10 cc over 24 hours  ,  And was removed in the office today  The patient reports that he has a good appetite, but has early satiety  He also finds it difficult to tolerate dairy, and thus has not been using milkshakes for dietary supplements  Reports no fever or chills, and has normal bowel function  On exam his midline incision, which abdomen packed open with Betadine impregnated kaiser, is gaping, there is some granulation tissue, however there also appears to be some ascitic fluid leaking through the incision  The patient has not yet contacted infectious disease for follow-up, and was urged to do that today  He was also given a prescription for topical clotrimazole as he is developing candidiasis at the edges of the incision  Restrictions on heavy lifting and strenuous activity remain in place  He is being seen by visiting nurses for wound care, and he will follow-up in 1 week        The following portions of the patient's history were reviewed and updated as appropriate: allergies, current medications, past family history, past medical history, past social history, past surgical history and problem list     Review of Systems   Constitutional: Positive for fatigue  Negative for chills and fever  Respiratory: Negative for shortness of breath  Gastrointestinal: Negative for constipation, nausea and vomiting  Genitourinary: Negative for difficulty urinating  Skin: Positive for wound  Neurological: Positive for weakness  Psychiatric/Behavioral: The patient is not nervous/anxious  Objective:      Temp (!) 97 2 °F (36 2 °C) (Temporal)          Physical Exam  Constitutional:       Appearance: He is ill-appearing  Cardiovascular:      Rate and Rhythm: Normal rate  Heart sounds: Normal heart sounds  Pulmonary:      Effort: Pulmonary effort is normal       Breath sounds: Normal breath sounds  Abdominal:          Comments: Open gaping wound with straw-colored drainage, and surrounding candidiasis  Neurological:      Mental Status: He is alert

## 2023-02-14 NOTE — PATIENT INSTRUCTIONS
Call and schedule a follow up with Infectious Disease    Please change your dressing twice a day, use Lotramin (clotrimazole) sparingly to the wound margin after washing the wound with soap and water, then a thin film of Vaseline  Continue to open up the gauze dressing and direct it into the wound, cover with dry gauze and an ABD pad  You will also need to keep a dressing on the drain site  I would also like you to keep an abdominal binder in place, to give you some support when you move around and cough  You can find an abdominal binder, Medipore tape (4 inch roll) and wound supplies on [email or web address]    Please eat as much protein as you can handle, tuna fish, peanut butter, as well as nutritional supplements with vitamins      Please call if you are having fever, chills, increased drainage from the wound, or any other problems

## 2023-02-15 ENCOUNTER — HOME CARE VISIT (OUTPATIENT)
Dept: HOME HEALTH SERVICES | Facility: HOME HEALTHCARE | Age: 53
End: 2023-02-15

## 2023-02-15 VITALS
DIASTOLIC BLOOD PRESSURE: 80 MMHG | OXYGEN SATURATION: 97 % | TEMPERATURE: 98 F | HEART RATE: 74 BPM | SYSTOLIC BLOOD PRESSURE: 120 MMHG | RESPIRATION RATE: 18 BRPM

## 2023-02-15 NOTE — ASSESSMENT & PLAN NOTE
The second drain in the left upper quadrant directed into the pelvis is putting out only 10 cc over 24-hour period  The output is straw-colored, and the drain was removed

## 2023-02-16 ENCOUNTER — OFFICE VISIT (OUTPATIENT)
Dept: WOUND CARE | Facility: CLINIC | Age: 53
End: 2023-02-16

## 2023-02-16 VITALS
HEART RATE: 97 BPM | BODY MASS INDEX: 20.3 KG/M2 | WEIGHT: 145 LBS | RESPIRATION RATE: 20 BRPM | SYSTOLIC BLOOD PRESSURE: 127 MMHG | TEMPERATURE: 97.6 F | HEIGHT: 71 IN | DIASTOLIC BLOOD PRESSURE: 75 MMHG

## 2023-02-16 DIAGNOSIS — L89.123 PRESSURE INJURY OF LEFT UPPER BACK, STAGE 3 (HCC): ICD-10-CM

## 2023-02-16 DIAGNOSIS — L89.313 PRESSURE INJURY OF RIGHT BUTTOCK, STAGE 3 (HCC): Primary | ICD-10-CM

## 2023-02-16 DIAGNOSIS — L89.322 PRESSURE INJURY OF LEFT BUTTOCK, STAGE 2 (HCC): ICD-10-CM

## 2023-02-16 PROBLEM — L89.102 PRESSURE INJURY OF UPPER BACK, STAGE 2 (HCC): Status: ACTIVE | Noted: 2023-02-16

## 2023-02-16 PROBLEM — L89.103 PRESSURE INJURY OF UPPER BACK, STAGE 3 (HCC): Status: ACTIVE | Noted: 2023-02-16

## 2023-02-16 RX ORDER — LIDOCAINE 40 MG/G
CREAM TOPICAL ONCE
Status: COMPLETED | OUTPATIENT
Start: 2023-02-16 | End: 2023-02-16

## 2023-02-16 RX ADMIN — LIDOCAINE: 40 CREAM TOPICAL at 14:19

## 2023-02-16 NOTE — PROGRESS NOTES
Patient ID: Tatiana Bunch is a 46 y o  male Date of Birth 1970     Chief Complaint   Patient presents with   • New Patient Visit     Right buttock wound started 2 weeks ago after being hospitalized, changing dressing 2 times per day  Applying bordered foam        Allergies  Penicillin v and Amoxicillin    Assessment / Plan:    Pressure injury of right buttock, stage 3 (HCC)          Present on discharge from ICU, Stage III pressure injury, Sharply debrided, silver nitrate for hemostasis  Pressure injury of left buttock, stage 2 ([address] )      Not Through the dermis, Debrided to bleeding skin level  Pressure injury of upper back, stage 3 (HCC)          Subcutaneous tissue barely exposed, debrided to bleeding tissue  Diagnoses and all orders for this visit:    Pressure injury of right buttock, stage 3 (HCC)  -     lidocaine (LMX) 4 % cream  -     Wound cleansing and dressings; Future    Other orders  -     Debridement  -     Debridement  -     Debridement              Debridement   Wound 01/28/23 Pressure Injury Back Upper    Universal Protocol:  Consent given by: patient  Time out: Immediately prior to procedure a "time out" was called to verify the correct patient, procedure, equipment, support staff and site/side marked as required    Patient understanding: patient states understanding of the procedure being performed  Patient identity confirmed: verbally with patient      Performed by: physician  Debridement type: surgical  Level of debridement: subcutaneous tissue  Pain control: lidocaine 1%  Pre-debridement measurements  Length (cm): 1 5  Width (cm): 1 4  Depth (cm): 0 1  Surface Area (cm^2): 2 1  Volume (cm^3): 0 21    Post-debridement measurements  Length (cm): 1 5  Width (cm): 1 4  Depth (cm): 0 4  Percent debrided: 100%  Surface Area (cm^2): 2 1  Area debrided (cm^2): 2 1  Volume (cm^3): 0 84  Tissue and other material debrided: subcutaneous tissue  Devitalized tissue debrided: necrotic debris, slough and eschar  Instrument(s) utilized: blade  Bleeding: small  Hemostasis obtained with: silver nitrate  Pain during procedure: anesthetized  Pain scale: anesthetized  Response to treatment: procedure was tolerated well    Debridement   Wound 01/29/23 Pressure Injury Buttocks Left    Universal Protocol:  Consent: Written consent obtained  Consent given by: patient  Time out: Immediately prior to procedure a "time out" was called to verify the correct patient, procedure, equipment, support staff and site/side marked as required  Patient understanding: patient states understanding of the procedure being performed  Patient identity confirmed: verbally with patient      Performed by: physician  Debridement type: selective  Pain control: lidocaine 1%  Pre-debridement measurements  Length (cm): 1 5  Width (cm): 1 2  Depth (cm): 0 1  Surface Area (cm^2): 1 8  Volume (cm^3): 0 18    Post-debridement measurements  Length (cm): 1 5  Width (cm): 1 2  Depth (cm): 0 3  Percent debrided: 75%  Surface Area (cm^2): 1 8  Area debrided (cm^2): 1 35  Volume (cm^3): 0 54  Devitalized tissue debrided: eschar  Instrument(s) utilized: blade  Bleeding: small  Hemostasis obtained with: pressure  Pain during procedure: anesthetized  Pain scale: anesthetized  Response to treatment: procedure was tolerated well    Debridement   Wound 02/03/23 Pressure Injury Buttocks Right    Universal Protocol:  Consent: Written consent obtained  Consent given by: patient  Time out: Immediately prior to procedure a "time out" was called to verify the correct patient, procedure, equipment, support staff and site/side marked as required    Patient understanding: patient states understanding of the procedure being performed  Patient identity confirmed: verbally with patient      Performed by: physician  Debridement type: surgical  Level of debridement: subcutaneous tissue  Pain control: lidocaine 1%  Pre-debridement measurements  Length (cm): 3  Width (cm): 3  Depth (cm): 0 1  Surface Area (cm^2): 9  Volume (cm^3): 0 9    Post-debridement measurements  Length (cm): 3  Width (cm): 3  Depth (cm): 1  Percent debrided: 100%  Surface Area (cm^2): 9  Area debrided (cm^2): 9  Volume (cm^3): 9  Tissue and other material debrided: subcutaneous tissue  Devitalized tissue debrided: eschar  Instrument(s) utilized: blade  Bleeding: medium  Hemostasis obtained with: silver nitrate  Pain during procedure: anesthetized   Pain scale: anesthetized     Response to treatment: procedure was tolerated well             Wound 01/28/23 Pressure Injury Back Upper (Active)   Wound Image Images linked 02/16/23 1407   Wound Description Eschar;Yellow 02/16/23 1328   Pressure Injury Stage 3 02/16/23 1328   Aminah-wound Assessment Dry 02/16/23 1328   Wound Length (cm) 1 5 cm 02/16/23 1328   Wound Width (cm) 1 4 cm 02/16/23 1328   Wound Depth (cm) 0 1 cm 02/16/23 1328   Wound Surface Area (cm^2) 2 1 cm^2 02/16/23 1328   Wound Volume (cm^3) 0 21 cm^3 02/16/23 1328   Calculated Wound Volume (cm^3) 0 21 cm^3 02/16/23 1328   Drainage Amount None 02/16/23 1328   Non-staged Wound Description Full thickness 02/16/23 1328   Treatments Cleansed 02/16/23 1328   Patient Tolerance Tolerated well 02/16/23 1328       Wound 01/29/23 Pressure Injury Buttocks Left (Active)   Wound Image Images linked 02/16/23 1320   Wound Description Yellow;Pink;Slough 02/16/23 1330   Pressure Injury Stage 2 02/16/23 1330   Aminah-wound Assessment Dry;Pink 02/16/23 1330   Wound Length (cm) 1 5 cm 02/16/23 1330   Wound Width (cm) 1 2 cm 02/16/23 1330   Wound Depth (cm) 0 1 cm 02/16/23 1330   Wound Surface Area (cm^2) 1 8 cm^2 02/16/23 1330   Wound Volume (cm^3) 0 18 cm^3 02/16/23 1330   Calculated Wound Volume (cm^3) 0 18 cm^3 02/16/23 1330   Change in Wound Size % 93 57 02/16/23 1330   Drainage Amount Small 02/16/23 1330   Drainage Description Serosanguineous 02/16/23 1330   Non-staged Wound Description Full thickness 02/16/23 1330   Treatments Cleansed 02/16/23 1330   Patient Tolerance Tolerated well 02/16/23 1330       Wound 02/03/23 Pressure Injury Buttocks Right (Active)   Wound Image Images linked 02/16/23 1400   Wound Description Yellow;Slough;Pink;Eschar 02/16/23 1331   Pressure Injury Stage 3 02/16/23 1331   Aminah-wound Assessment Dry;Pink 02/16/23 1331   Wound Length (cm) 3 cm 02/16/23 1331   Wound Width (cm) 3 cm 02/16/23 1331   Wound Depth (cm) 0 1 cm 02/16/23 1331   Wound Surface Area (cm^2) 9 cm^2 02/16/23 1331   Wound Volume (cm^3) 0 9 cm^3 02/16/23 1331   Calculated Wound Volume (cm^3) 0 9 cm^3 02/16/23 1331   Change in Wound Size % -109 3 02/16/23 1331   Drainage Amount Small 02/16/23 1331   Drainage Description Serosanguineous 02/16/23 1331   Non-staged Wound Description Full thickness 02/16/23 1331   Treatments Cleansed 02/16/23 1331   Patient Tolerance Tolerated well 02/16/23 1331       Wound 02/16/23 Pressure Injury Sacrum (Active)   Wound Image Images linked 02/16/23 1321   Wound Description Yellow 02/16/23 1333   Pressure Injury Stage 1 02/16/23 1333   Aminah-wound Assessment Dry;Pink 02/16/23 1333   Wound Length (cm) 0 5 cm 02/16/23 1333   Wound Width (cm) 0 2 cm 02/16/23 1333   Wound Depth (cm) 0 1 cm 02/16/23 1333   Wound Surface Area (cm^2) 0 1 cm^2 02/16/23 1333   Wound Volume (cm^3) 0 01 cm^3 02/16/23 1333   Calculated Wound Volume (cm^3) 0 01 cm^3 02/16/23 1333   Drainage Amount Small 02/16/23 1333   Drainage Description Serosanguineous 02/16/23 1333   Non-staged Wound Description Full thickness 02/16/23 1333   Treatments Cleansed 02/16/23 1333   Patient Tolerance Tolerated well 02/16/23 1333       Subjective: Milka Blight The patient is a 55-year-old white male who recently underwent exploratory laparotomy for intra-abdominal abscess, believed to be from infected ascites, with underlying history of hepatitis C  Cultures for atypical Mycobacterium are still pending, however      The patient has a granulating midline wound which is not being specifically dressed by the wound center, as well as pressure ulcers of the buttocks and upper back which were treated today  The following portions of the patient's history were reviewed and updated as appropriate: He  has a past medical history of Hard to intubate and Hepatitis C  He   Patient Active Problem List    Diagnosis Date Noted   • Pressure injury of right buttock, stage 3 ([facility] [address] ) 02/16/2023   • Pressure injury of left buttock, stage 2 ([facility] ) 02/16/2023   • Pressure injury of upper back, stage 3 ([facility] [address] ) 02/16/2023   • Hypomagnesemia 02/09/2023   • Mycobacterium, 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   • Difficult intubation 01/28/2023   • Acute peritonitis ([facility] ) 01/22/2023   • Sepsis ([facility] ) 01/22/2023   • Cirrhosis of liver with ascites ([facility] ) 01/22/2023   • Hyponatremia 01/22/2023   • Hepatitis C 01/21/2023     He  has a past surgical history that includes Hand surgery; [place] tooth extraction; IR paracentesis (1/23/2023); and pr laps abd prtm&omentum dx w/wo spec br/wa spx (N/A, 1/28/2023)  His family history includes Diabetes in his mother; No Known Problems in his father  He  reports that he has been smoking  He has never used smokeless tobacco  He reports that he does not currently use alcohol  He reports current drug use  Drug: Marijuana  Current Outpatient Medications   Medication Sig Dispense Refill   • acetaminophen (TYLENOL) 500 mg tablet Take 2 tablets (1,000 mg total) by mouth every 6 (six) hours as needed for mild pain for up to 10 days 30 tablet 0   • clotrimazole (LOTRIMIN) 1 % cream Apply topically 2 (two) times a day 30 g 0   • magnesium gluconate (MAGONATE) 500 mg tablet Take 1 tablet (500 mg total) by mouth 2 (two) times a day 60 tablet 3   • white petrolatum ointment Apply topically as needed for dry skin 30 g 0     No current facility-administered medications for this visit       Current Outpatient Medications on File Prior to Visit   Medication Sig   • acetaminophen (TYLENOL) 500 mg tablet Take 2 tablets (1,000 mg total) by mouth every 6 (six) hours as needed for mild pain for up to 10 days   • clotrimazole (LOTRIMIN) 1 % cream Apply topically 2 (two) times a day   • magnesium gluconate (MAGONATE) 500 mg tablet Take 1 tablet (500 mg total) by mouth 2 (two) times a day   • white petrolatum ointment Apply topically as needed for dry skin     No current facility-administered medications on file prior to visit  He is allergic to penicillin v and amoxicillin       Review of Systems   Constitutional: Negative for chills and fever  Gastrointestinal: Negative for nausea and vomiting  Genitourinary: Negative  Skin: Positive for wound  Neurological: Negative            Objective:       Wound 01/28/23 Pressure Injury Back Upper (Active)   Wound Image Images linked 02/16/23 1407   Wound Description Eschar;Yellow 02/16/23 1328   Pressure Injury Stage 3 02/16/23 1328   Aminah-wound Assessment Dry 02/16/23 1328   Wound Length (cm) 1 5 cm 02/16/23 1328   Wound Width (cm) 1 4 cm 02/16/23 1328   Wound Depth (cm) 0 1 cm 02/16/23 1328   Wound Surface Area (cm^2) 2 1 cm^2 02/16/23 1328   Wound Volume (cm^3) 0 21 cm^3 02/16/23 1328   Calculated Wound Volume (cm^3) 0 21 cm^3 02/16/23 1328   Drainage Amount None 02/16/23 1328   Non-staged Wound Description Full thickness 02/16/23 1328   Treatments Cleansed 02/16/23 1328   Patient Tolerance Tolerated well 02/16/23 1328       Wound 01/29/23 Pressure Injury Buttocks Left (Active)   Wound Image Images linked 02/16/23 1320   Wound Description Yellow;Pink;Slough 02/16/23 1330   Pressure Injury Stage 2 02/16/23 1330   Aminah-wound Assessment Dry;Pink 02/16/23 1330   Wound Length (cm) 1 5 cm 02/16/23 1330   Wound Width (cm) 1 2 cm 02/16/23 1330   Wound Depth (cm) 0 1 cm 02/16/23 1330   Wound Surface Area (cm^2) 1 8 cm^2 02/16/23 1330   Wound Volume (cm^3) 0 18 cm^3 02/16/23 1330   Calculated Wound Volume (cm^3) 0 18 cm^3 02/16/23 1330 Change in Wound Size % 93 57 02/16/23 1330   Drainage Amount Small 02/16/23 1330   Drainage Description Serosanguineous 02/16/23 1330   Non-staged Wound Description Full thickness 02/16/23 1330   Treatments Cleansed 02/16/23 1330   Patient Tolerance Tolerated well 02/16/23 1330       Wound 02/03/23 Pressure Injury Buttocks Right (Active)   Wound Image Images linked 02/16/23 1400   Wound Description Yellow;Slough;Pink;Eschar 02/16/23 1331   Pressure Injury Stage 3 02/16/23 1331   Aminah-wound Assessment Dry;Pink 02/16/23 1331   Wound Length (cm) 3 cm 02/16/23 1331   Wound Width (cm) 3 cm 02/16/23 1331   Wound Depth (cm) 0 1 cm 02/16/23 1331   Wound Surface Area (cm^2) 9 cm^2 02/16/23 1331   Wound Volume (cm^3) 0 9 cm^3 02/16/23 1331   Calculated Wound Volume (cm^3) 0 9 cm^3 02/16/23 1331   Change in Wound Size % -109 3 02/16/23 1331   Drainage Amount Small 02/16/23 1331   Drainage Description Serosanguineous 02/16/23 1331   Non-staged Wound Description Full thickness 02/16/23 1331   Treatments Cleansed 02/16/23 1331   Patient Tolerance Tolerated well 02/16/23 1331       Wound 02/16/23 Pressure Injury Sacrum (Active)   Wound Image Images linked 02/16/23 1321   Wound Description Yellow 02/16/23 1333   Pressure Injury Stage 1 02/16/23 1333   Aminah-wound Assessment Dry;Pink 02/16/23 1333   Wound Length (cm) 0 5 cm 02/16/23 1333   Wound Width (cm) 0 2 cm 02/16/23 1333   Wound Depth (cm) 0 1 cm 02/16/23 1333   Wound Surface Area (cm^2) 0 1 cm^2 02/16/23 1333   Wound Volume (cm^3) 0 01 cm^3 02/16/23 1333   Calculated Wound Volume (cm^3) 0 01 cm^3 02/16/23 1333   Drainage Amount Small 02/16/23 1333   Drainage Description Serosanguineous 02/16/23 1333   Non-staged Wound Description Full thickness 02/16/23 1333   Treatments Cleansed 02/16/23 1333   Patient Tolerance Tolerated well 02/16/23 1333       /75   Pulse 97   Temp 97 6 °F (36 4 °C)   Resp 20   Ht 5' 11" (1 803 m)   Wt 65 8 kg (145 lb)   BMI 20 22 kg/m² Physical Exam  Constitutional:       Appearance: He is ill-appearing  Cardiovascular:      Rate and Rhythm: Normal rate  Pulmonary:      Effort: Pulmonary effort is normal    Abdominal:          Comments: Granulating midline wound   Skin:     General: Skin is warm and dry  Neurological:      Mental Status: He is alert  Psychiatric:         Mood and Affect: Mood normal          Behavior: Behavior normal            Wound Instructions:  Orders Placed This Encounter   Procedures   • Wound cleansing and dressings     Wash your hands with soap and water  Remove old dressing, discard into plastic bag and place in trash  Cleanse the wound with unscented soap and water prior to applying a clean dressing  Do not use tissue or cotton balls  Do not scrub the wound  Pat dry using gauze  Shower yes , change dressing after shower  Keep dressing dry     Right / Left Buttock wounds and Sacrum wound  Apply skin prep to skin surrounding wound  Apply normal saline moistened gauze to the buttocks and sacral wounds  Cover with ABD pad  Secure with Medfix tape  This was done today  Change dressing 2 times per day and as needed for excessive drainage, leakage or displacement  Mid Back wound   Apply skin prep to skin surrounding wound  Apply normal saline moistened gauze to the mid back wound  Secure with allevyn bordered foam  This was done today  Change dressing 2 times per day and as needed for excessive drainage, leakage or displacement  Off-loading Instructions:  Please try to obtain and use a mattress topper or an egg crate to use on your mattress to help relieve pressure while in bed  Keep weight and pressure off wound at all times  and Turn every 2 hours  Avoid position directing pressure to Wound site  Abdominal dressing changed by Dr Momo Ward       Follow up in one week     Standing Status:   Future     Standing Expiration Date:   2/16/2024   • Debridement     This order was created via procedure documentation   • Debridement     This order was created via procedure documentation   • Debridement     This order was created via procedure documentation

## 2023-02-16 NOTE — PATIENT INSTRUCTIONS
Orders Placed This Encounter   Procedures    Wound cleansing and dressings     Wash your hands with soap and water  Remove old dressing, discard into plastic bag and place in trash  Cleanse the wound with unscented soap and water prior to applying a clean dressing  Do not use tissue or cotton balls  Do not scrub the wound  Pat dry using gauze  Shower yes , change dressing after shower  Keep dressing dry     Right / Left Buttock wounds and Sacrum wound  Apply skin prep to skin surrounding wound  Apply normal saline moistened gauze to the buttocks and sacral wounds  Cover with ABD pad  Secure with Medfix tape  This was done today  Change dressing 2 times per day and as needed for excessive drainage, leakage or displacement  Mid Back wound   Apply skin prep to skin surrounding wound  Apply normal saline moistened gauze to the mid back wound  Secure with allevyn bordered foam  This was done today  Change dressing 2 times per day and as needed for excessive drainage, leakage or displacement  Off-loading Instructions:  Please try to obtain and use a mattress topper or an egg crate to use on your mattress to help relieve pressure while in bed  Keep weight and pressure off wound at all times  and Turn every 2 hours  Avoid position directing pressure to Wound site  Abdominal dressing changed by Dr Melvin Gallegos       Follow up in one week     Standing Status:   Future     Standing Expiration Date:   2/16/2024

## 2023-02-16 NOTE — ASSESSMENT & PLAN NOTE
Present on discharge from ICU, Stage III pressure injury, Sharply debrided, silver nitrate for hemostasis

## 2023-02-17 ENCOUNTER — HOME CARE VISIT (OUTPATIENT)
Dept: HOME HEALTH SERVICES | Facility: HOME HEALTHCARE | Age: 53
End: 2023-02-17

## 2023-02-18 VITALS
HEART RATE: 71 BPM | TEMPERATURE: 96.8 F | SYSTOLIC BLOOD PRESSURE: 128 MMHG | RESPIRATION RATE: 18 BRPM | OXYGEN SATURATION: 97 % | DIASTOLIC BLOOD PRESSURE: 72 MMHG

## 2023-02-19 NOTE — CASE COMMUNICATION
Ship to  Pt  Home     Dry Dressings   Gauze 4x4 N/S 200 4x4s per unit  [id number]    2  ABD 5x9 [id number]    14    Tape  Tape  Mefix 2inch [id number]    2

## 2023-02-20 ENCOUNTER — HOME CARE VISIT (OUTPATIENT)
Dept: HOME HEALTH SERVICES | Facility: HOME HEALTHCARE | Age: 53
End: 2023-02-20

## 2023-02-21 ENCOUNTER — OFFICE VISIT (OUTPATIENT)
Dept: SURGERY | Facility: CLINIC | Age: 53
End: 2023-02-21

## 2023-02-21 VITALS — TEMPERATURE: 98 F

## 2023-02-21 DIAGNOSIS — K65.0 ACUTE PERITONITIS (HCC): Primary | ICD-10-CM

## 2023-02-21 LAB
MYCOBACTERIUM SPEC CULT: NORMAL
RHODAMINE-AURAMINE STN SPEC: NORMAL

## 2023-02-21 NOTE — PROGRESS NOTES
Assessment/Plan:    Acute peritonitis ([address] )      Appearance of the wound 3-1/2 weeks out from laparotomy  Lower portion appears healed, the upper portion slowly granulating in  Unfortunately there is some ascitic fluid draining from the right sided drain site  Diagnoses and all orders for this visit:    Acute peritonitis ([address] )          Subjective:      Patient ID: Ben Luis is a 46 y o  male  The patient is a 51-year-old white male, with newly diagnosed ascites, 3-1/2 weeks out from laparotomy for drainage of intra-abdominal abscess  Cultures for atypical mycobacteria remain pending  The patient is currently off antibiotics, eating well, claims to have normal bowel habits and regaining some of the strength and endurance that he lost during his illness  He is being treated in the wound center for pressure sores that occurred during his illness, and returns today for care for his midline abdominal wound  The top half of the wound remains open with a granulating base, the bottom half of the wound appears to be superficially healed  The patient is also developed serous drainage from the previous right upper quadrant drain site, although this is minimal   The left upper quadrant drain site appears to be healing uneventfully  The following portions of the patient's history were reviewed and updated as appropriate: allergies, current medications, past family history, past medical history, past social history, past surgical history and problem list     Review of Systems   Constitutional: Negative for chills and fever  Gastrointestinal: Negative for nausea and vomiting  Skin: Positive for wound  Objective:      Temp 98 °F (36 7 °C) (Temporal)          Physical Exam  Constitutional:       General: He is not in acute distress  Appearance: He is ill-appearing  He is not toxic-appearing     Abdominal:

## 2023-02-21 NOTE — PATIENT INSTRUCTIONS
Continue to shower, wash the wound with soap and water, apply Vaseline to the wound edge, then in the upper portion of the wound tuck 4 x 4 gauze into the open areas of the wound and cover with gauze and tape  For the lower portion, apply Vaseline or similar emollient to the scar, and If no drainage, leave open to air  Return in 2 weeks, sooner for any difficulty

## 2023-02-21 NOTE — ASSESSMENT & PLAN NOTE
Appearance of the wound 3-1/2 weeks out from laparotomy  Lower portion appears healed, the upper portion slowly granulating in  Unfortunately there is some ascitic fluid draining from the right sided drain site

## 2023-02-22 ENCOUNTER — HOME CARE VISIT (OUTPATIENT)
Dept: HOME HEALTH SERVICES | Facility: HOME HEALTHCARE | Age: 53
End: 2023-02-22

## 2023-02-22 ENCOUNTER — TELEPHONE (OUTPATIENT)
Dept: SURGERY | Facility: CLINIC | Age: 53
End: 2023-02-22

## 2023-02-23 ENCOUNTER — OFFICE VISIT (OUTPATIENT)
Dept: WOUND CARE | Facility: CLINIC | Age: 53
End: 2023-02-23

## 2023-02-23 VITALS
DIASTOLIC BLOOD PRESSURE: 68 MMHG | HEART RATE: 92 BPM | OXYGEN SATURATION: 98 % | TEMPERATURE: 98.3 F | SYSTOLIC BLOOD PRESSURE: 128 MMHG | RESPIRATION RATE: 16 BRPM

## 2023-02-23 VITALS
DIASTOLIC BLOOD PRESSURE: 89 MMHG | HEART RATE: 98 BPM | RESPIRATION RATE: 20 BRPM | TEMPERATURE: 98.6 F | SYSTOLIC BLOOD PRESSURE: 144 MMHG

## 2023-02-23 DIAGNOSIS — L89.322 PRESSURE INJURY OF LEFT BUTTOCK, STAGE 2 (HCC): ICD-10-CM

## 2023-02-23 DIAGNOSIS — L89.313 PRESSURE INJURY OF RIGHT BUTTOCK, STAGE 3 (HCC): Primary | ICD-10-CM

## 2023-02-23 DIAGNOSIS — L89.123 PRESSURE INJURY OF LEFT UPPER BACK, STAGE 3 (HCC): ICD-10-CM

## 2023-02-23 NOTE — CASE COMMUNICATION
Ship to [facility]    Wound 1 pressure ulcers upper back  Wound 2 pressure ulcers buttock      Adapt skin barrier no sting wipes 50 per box  [id number]  ABD 5x9 [id number]

## 2023-02-23 NOTE — PATIENT INSTRUCTIONS
Orders Placed This Encounter   Procedures    Wound cleansing and dressings     Wash your hands with soap and water  Remove old dressing, discard into plastic bag and place in trash  Cleanse the wound with unscented soap and water prior to applying a clean dressing  Do not use tissue or cotton balls  Do not scrub the wound  Pat dry using gauze  Shower yes , change dressing after shower  Keep dressing dry     Right / Left Buttock wounds and mid back wound  Apply skin prep to skin surrounding wound   Apply Dermagran to all wound bed  Cover with Bordered Foam    This was done today     Change dressing every other day and as needed for excessive drainage, leakage or displacement  Off-loading Instructions:   Use a mattress topper on your mattress to help relieve pressure while in bed  Keep weight and pressure off wound at all times  and Turn every 2 hours  Avoid position directing pressure to Wound site  Abdominal dressing changed by Dr Goyo Santana       Follow up in one week     Standing Status:   Future     Standing Expiration Date:   2/23/2024

## 2023-02-23 NOTE — PROGRESS NOTES
Patient ID: Laurita Gill is a 46 y o  male Date of Birth 1970     Chief Complaint   Patient presents with   • Follow Up Wound Care Visit     Right and left buttocks, sacrum, mid upper back wounds          Allergies  Penicillin v and Amoxicillin    Assessment / Plan:    Pressure injury of left buttock, stage 2 (HCC)      Healing, transition to dermagran    Pressure injury of right buttock, stage 3 (HCC)      Healing, transition to dermagran gauze    Pressure injury of upper back, stage 3 (HCC)      Wound desiccated  Transition to Dermagran Gauze       Diagnoses and all orders for this visit:    Pressure injury of right buttock, stage 3 (HCC)  -     Wound cleansing and dressings; Future    Pressure injury of left buttock, stage 2 (HCC)  -     Wound cleansing and dressings; Future    Pressure injury of left upper back, stage 3 (HCC)  -     Wound cleansing and dressings;  Future              Procedures       Wound 01/28/23 Pressure Injury Back Upper (Active)   Wound Image Images linked 02/23/23 1307   Wound Description Yellow;Pink 02/23/23 1318   Aminah-wound Assessment Dry 02/23/23 1318   Wound Length (cm) 1 5 cm 02/23/23 1318   Wound Width (cm) 1 3 cm 02/23/23 1318   Wound Depth (cm) 0 1 cm 02/23/23 1318   Wound Surface Area (cm^2) 1 95 cm^2 02/23/23 1318   Wound Volume (cm^3) 0 195 cm^3 02/23/23 1318   Calculated Wound Volume (cm^3) 0 2 cm^3 02/23/23 1318   Drainage Amount None 02/23/23 1318   Non-staged Wound Description Full thickness 02/23/23 1318   Treatments Cleansed 02/23/23 1318   Patient Tolerance Tolerated well 02/23/23 1318       Wound 01/29/23 Pressure Injury Buttocks Left (Active)   Wound Image Images linked 02/23/23 1312   Wound Description Pink;Yellow 02/23/23 1312   Pressure Injury Stage 2 02/23/23 1312   Wound Length (cm) 1 cm 02/23/23 1312   Wound Width (cm) 0 5 cm 02/23/23 1312   Wound Depth (cm) 0 1 cm 02/23/23 1312   Wound Surface Area (cm^2) 0 5 cm^2 02/23/23 1312   Wound Volume (cm^3) 0 05 cm^3 02/23/23 1312   Calculated Wound Volume (cm^3) 0 05 cm^3 02/23/23 1312   Change in Wound Size % 98 21 02/23/23 1312   Drainage Amount Small 02/23/23 1312   Drainage Description Serosanguineous 02/23/23 1312   Non-staged Wound Description Full thickness 02/23/23 1312   Treatments Cleansed 02/23/23 1312   Patient Tolerance Tolerated well 02/23/23 1312       Wound 02/03/23 Pressure Injury Buttocks Right (Active)   Wound Image Images linked 02/23/23 1312   Wound Description Yellow;Slough;Pink;Granulation tissue 02/23/23 1320   Aminah-wound Assessment Dry;Pink;Scar Tissue 02/23/23 1320   Wound Length (cm) 3 4 cm 02/23/23 1320   Wound Width (cm) 2 5 cm 02/23/23 1320   Wound Depth (cm) 1 cm 02/23/23 1320   Wound Surface Area (cm^2) 8 5 cm^2 02/23/23 1320   Wound Volume (cm^3) 8 5 cm^3 02/23/23 1320   Calculated Wound Volume (cm^3) 8 5 cm^3 02/23/23 1320   Change in Wound Size % -1876 74 02/23/23 1320   Drainage Amount Moderate 02/23/23 1320   Drainage Description Serosanguineous; Serous 02/23/23 1320   Non-staged Wound Description Full thickness 02/23/23 1320   Treatments Cleansed 02/23/23 1320   Patient Tolerance Tolerated well 02/23/23 1320       Wound 02/16/23 Pressure Injury Sacrum (Active)   Wound Image Images linked 02/23/23 1311   Wound Description Epithelialization 02/23/23 1320   Aminah-wound Assessment Dry;Pink 02/23/23 1320   Wound Length (cm) 0 cm 02/23/23 1320   Wound Width (cm) 0 cm 02/23/23 1320   Wound Depth (cm) 0 cm 02/23/23 1320   Wound Surface Area (cm^2) 0 cm^2 02/23/23 1320   Wound Volume (cm^3) 0 cm^3 02/23/23 1320   Calculated Wound Volume (cm^3) 0 cm^3 02/23/23 1320   Change in Wound Size % 100 02/23/23 1320   Drainage Amount None 02/23/23 1320   Treatments Cleansed 02/23/23 1320   Patient Tolerance Tolerated well 02/23/23 1320       Subjective: Karena Joaquin     The patient is a 45-year-old white male who is approximately 4 weeks out from exploratory laparotomy, preceded by several weeks of chronic illness, and treatment in the intensive care unit  This is resulted in an open granulating abdominal wound, and pressure ulcers of his buttocks, healed pressure ulcer of the sacrum, and a small pressure ulcer overlying his upper back  The pressure ulcers appear to be clean and granulating, unfortunately the wound of his upper back is somewhat desiccated, but certainly not infected  We will transition away from moist saline gauze to Dermagran gauze with Allevyn foam silicone bordered dressings to be changed every other day  He will follow-up in the wound center in 1 week, and in the office in 2 weeks for his abdominal wound  The following portions of the patient's history were reviewed and updated as appropriate: allergies, current medications, past family history, past medical history, past social history, past surgical history, and problem list     Review of Systems   Constitutional: Negative for chills and fever  Skin: Positive for wound           Objective:       Wound 01/28/23 Pressure Injury Back Upper (Active)   Wound Image Images linked 02/23/23 1307   Wound Description Yellow;Pink 02/23/23 1318   Aminah-wound Assessment Dry 02/23/23 1318   Wound Length (cm) 1 5 cm 02/23/23 1318   Wound Width (cm) 1 3 cm 02/23/23 1318   Wound Depth (cm) 0 1 cm 02/23/23 1318   Wound Surface Area (cm^2) 1 95 cm^2 02/23/23 1318   Wound Volume (cm^3) 0 195 cm^3 02/23/23 1318   Calculated Wound Volume (cm^3) 0 2 cm^3 02/23/23 1318   Drainage Amount None 02/23/23 1318   Non-staged Wound Description Full thickness 02/23/23 1318   Treatments Cleansed 02/23/23 1318   Patient Tolerance Tolerated well 02/23/23 1318       Wound 01/29/23 Pressure Injury Buttocks Left (Active)   Wound Image Images linked 02/23/23 1312   Wound Description Pink;Yellow 02/23/23 1312   Pressure Injury Stage 2 02/23/23 1312   Wound Length (cm) 1 cm 02/23/23 1312   Wound Width (cm) 0 5 cm 02/23/23 1312   Wound Depth (cm) 0 1 cm 02/23/23 1312   Wound Surface Area (cm^2) 0 5 cm^2 02/23/23 1312   Wound Volume (cm^3) 0 05 cm^3 02/23/23 1312   Calculated Wound Volume (cm^3) 0 05 cm^3 02/23/23 1312   Change in Wound Size % 98 21 02/23/23 1312   Drainage Amount Small 02/23/23 1312   Drainage Description Serosanguineous 02/23/23 1312   Non-staged Wound Description Full thickness 02/23/23 1312   Treatments Cleansed 02/23/23 1312   Patient Tolerance Tolerated well 02/23/23 1312       Wound 02/03/23 Pressure Injury Buttocks Right (Active)   Wound Image Images linked 02/23/23 1312   Wound Description Yellow;Slough;Pink;Granulation tissue 02/23/23 1320   Aminah-wound Assessment Dry;Pink;Scar Tissue 02/23/23 1320   Wound Length (cm) 3 4 cm 02/23/23 1320   Wound Width (cm) 2 5 cm 02/23/23 1320   Wound Depth (cm) 1 cm 02/23/23 1320   Wound Surface Area (cm^2) 8 5 cm^2 02/23/23 1320   Wound Volume (cm^3) 8 5 cm^3 02/23/23 1320   Calculated Wound Volume (cm^3) 8 5 cm^3 02/23/23 1320   Change in Wound Size % -1876 74 02/23/23 1320   Drainage Amount Moderate 02/23/23 1320   Drainage Description Serosanguineous; Serous 02/23/23 1320   Non-staged Wound Description Full thickness 02/23/23 1320   Treatments Cleansed 02/23/23 1320   Patient Tolerance Tolerated well 02/23/23 1320       Wound 02/16/23 Pressure Injury Sacrum (Active)   Wound Image Images linked 02/23/23 1311   Wound Description Epithelialization 02/23/23 1320   Aminah-wound Assessment Dry;Pink 02/23/23 1320   Wound Length (cm) 0 cm 02/23/23 1320   Wound Width (cm) 0 cm 02/23/23 1320   Wound Depth (cm) 0 cm 02/23/23 1320   Wound Surface Area (cm^2) 0 cm^2 02/23/23 1320   Wound Volume (cm^3) 0 cm^3 02/23/23 1320   Calculated Wound Volume (cm^3) 0 cm^3 02/23/23 1320   Change in Wound Size % 100 02/23/23 1320   Drainage Amount None 02/23/23 1320   Treatments Cleansed 02/23/23 1320   Patient Tolerance Tolerated well 02/23/23 1320       /89   Pulse 98   Temp 98 6 °F (37 °C)   Resp 20     Physical Exam  Abdominal: Skin:     General: Skin is warm and dry  Wound Instructions:  Orders Placed This Encounter   Procedures   • Wound cleansing and dressings     Wash your hands with soap and water  Maria Antonia Pineda old dressing, discard into plastic bag and place in trash   Cleanse the wound with unscented soap and water prior to applying a clean dressing  Do not use tissue or cotton balls  Do not scrub the wound  Pat dry using gauze  Shower yes , change dressing after shower  Keep dressing dry     Right / Left Buttock wounds and mid back wound  Apply skin prep to skin surrounding wound   Apply Dermagran to all wound bed   Cover with Bordered Foam    This was done today     Change dressing every other day and as needed for excessive drainage, leakage or displacement  Off-loading Instructions:   Use a mattress topper on your mattress to help relieve pressure while in bed  Keep weight and pressure off wound at all times  and Turn every 2 hours  Avoid position directing pressure to Wound site  Abdominal dressing changed by Dr Calles        Follow up in one week     Standing Status:   Future     Standing Expiration Date:   2/23/2024

## 2023-02-23 NOTE — CASE COMMUNICATION
Ship to    Home   Insurance [employer]    Wound 1 pressure ulcer upper back        Wound 2 abdomen          Gauze 4x4 ST [id number]  Hydrocellular Foam Adh, 4x4 [id number]

## 2023-02-24 ENCOUNTER — HOME CARE VISIT (OUTPATIENT)
Dept: HOME HEALTH SERVICES | Facility: HOME HEALTHCARE | Age: 53
End: 2023-02-24

## 2023-02-24 VITALS
SYSTOLIC BLOOD PRESSURE: 130 MMHG | TEMPERATURE: 98 F | HEART RATE: 98 BPM | OXYGEN SATURATION: 95 % | DIASTOLIC BLOOD PRESSURE: 78 MMHG | RESPIRATION RATE: 18 BRPM

## 2023-02-24 NOTE — PATIENT INSTRUCTIONS
Continue routine follow up with surgery team   Follow up with ID 2-3 weeks after repeat imaging   Check CT A/P with contrast

## 2023-02-24 NOTE — PROGRESS NOTES
Outpatient Progress Note - [facility] Infectious Disease   Roc Merida 46 y o  male MRN: [id number]  Encounter: [id number]    Assessment/Plan:  1  Streptococcus intermedius peritonitis with intraperitoneal abscess  Admitted to [facility] 1/21-2/3 with sepsis due to acute peritonitis  CT A/P showed moderate to large intra-abdominal and pelvic ascites with diffuse enhancement and thickening of the peritoneal lining compatible with acute peritonitis  S/p paracentesis 1/23 with loculated ascites present and fluid studies consistent with infection with extremely elevated WBC >70K  Peritoneal fluid culture positive for strep intermedius  Clinical picture not consistent with SBP  There was no evidence of GI source for secondary peritonitis and cytology was negative for malignancy  S/p ex-lap, lysis of adhesions, peritoneal biopsy 1/28/23 with extensive intra-peritoneal abscess with multiple loculations, extensive rind, and friable abscess wall encountered  OR cultures also with Strep intermedius  He completed 14 day course of antibiotics on 2/10  He continues to follow with general surgery and midline abdominal incision is granulating and healing slowly  Drains have been removed  He has improved abdominal pain and appetite is good  -patient has completed antibiotic course   -continue to follow up AFB culture as noted below   -recommend repeat CT A/P now   -if patient still has ascites, would recommend peritoneal bx via laparoscopy with surgery; if no ascites seen then ongoing concern for active peritonitis (due to slow growing organism) remains low   -return to ID clinic in 2-3 weeks after repeat imaging   -continue to follow up with outpatient surgery as scheduled     2  Possible mycobacterial infection  Histology from peritoneal biopsy shows acid fast stain positive for "rare possible mycobacterial organisms " AFB cx of peritoneum so far negative at 4 weeks   Consider possibility of extra-pulmonary TB or other slow-growing mycobacterial infection  Quantiferon gold negative and he has no respiratory symptoms  Patient without risk factors for TB otherwise and imaging of chest negative for active TB  Cytology from 1/28 is still pending    -I called micro and requested we continue to hold AFB culture for additional 4 weeks    -continue to follow up AFB culture   -follow up cytology from 1/28/23   -plan for repeat imaging as outlined above     3  Pressure ulcerations of buttocks, sacrum and upper back  Developed during prolonged hospitalization  He is following with wound care/surgery weekly  Wounds do not appear infected and are healing well    -continue local wound care    4  Cirrhosis  CT during recent admission showed nodularity of the liver surface suggestive of chronic cirrhosis  History of chronic hepatitis C, former etoh abuse, former IVDA  Now in remissions >20 years  Patient has not been treated for HCV and HCV PCR quant [id number]  HIV negative  -recommend f/u with outpatient GI for treatment of HCV     5  Drug rash  Prior hx of PCN allergy, s/p successful amoxicillin challenge during hospitalization  Subsequently developed drug rash on Augmentin  Tolerated Ceftin    -allergy list reviewed     Above assessment and plan discussed in detail with patient during examination  Antibiotics:  None     Subjective:  Patient presents to outpatient ID office for follow up after recent acute peritonitis and possible MAC infection  Pt admitted 1/21-2/3 with 1 week of worsening abdominal pain, distention, fevers and chills   CT A/P showed moderate to large intra-abdominal and pelvic ascites with diffuse enhancement and thickening of the peritoneal lining compatible with acute peritonitis  S/p paracentesis 1/23 with loculated ascites present and fluid studies consistent with infection with extremely elevated WBC >70,000, with elevated LDH  Fluid studies not entirely consistent with portal hypertension   Peritoneal fluid culture positive for strep intermedius  Clinical picture not consistent with SBP  No evidence of GI source for a secondary peritonitis on initial CT A/P or during OR  Unlikely malignant with negative cytology  S/p ex-lap, lysis of adhesions, peritoneal biopsy 1/28/23 with extensive intra-peritoneal abscess with multiple loculations, extensive rind, and friable abscess wall encountered  OR cultures also positive for Strep intermedius  Interestingly, AFB cx of peritoneum negative  Though peritoneal biopsy showed growth of rare possible mycobacterial organisms  Patient has no respiratory sx  Quantiferon gold negative  Chest imaging negative for active TB or cavitary lesions  [place]  He has not traveled overseas  He has no exposure to anyone in halfway or chronic care facilities  No prior PNA  No known TB exposure  He is doing well  He has no fevers or chills  Minimal abdominal discomfort but continues to feel bloated after eating  No tenderness on exam today  Abdominal wound is slowly improving but not actively draining  States he did have some residual serous drainage after drains were removed but this resolved  He has good appetite and is eating a lot of protein  States he lost about 30lbs since getting hospitalized  He is doing dressing changes on buttock wounds  Denies sacral pain  Objective:  Vitals:   Vitals:    03/01/23 1239   BP: 130/78   Pulse: 87   Temp: 97 7 °F (36 5 °C)   SpO2: 99%     Physical Exam:     General Appearance:  Alert, interactive, nontoxic, no acute distress  Chronically ill appearing  HEENT: Oropharynx moist without lesions  Lungs:   Clear to auscultation bilaterally; no wheezes, rhonchi or rales; respirations unlabored   Heart:  RRR; no murmur   Abdomen:   Mildly distended, though soft and nontender  positive bowel sounds  No palpable organomegaly  Abdominal midline incision with dressing c/d/i     Extremities: No clubbing, cyanosis or edema Skin: No new rashes or lesions  IV site nontender  Buttock wounds not undressed and clinical media reviewed  Labs, Imaging, & Other studies:   All pertinent labs and imaging studies were personally reviewed by me from 2/3, 2/6  Recent office visit with surgery/wound care reviewed

## 2023-02-27 ENCOUNTER — HOME CARE VISIT (OUTPATIENT)
Dept: HOME HEALTH SERVICES | Facility: HOME HEALTHCARE | Age: 53
End: 2023-02-27

## 2023-02-27 VITALS
OXYGEN SATURATION: 97 % | HEART RATE: 68 BPM | DIASTOLIC BLOOD PRESSURE: 84 MMHG | RESPIRATION RATE: 16 BRPM | SYSTOLIC BLOOD PRESSURE: 124 MMHG | TEMPERATURE: 97.7 F

## 2023-02-28 LAB
MYCOBACTERIUM SPEC CULT: NORMAL
RHODAMINE-AURAMINE STN SPEC: NORMAL

## 2023-02-28 NOTE — CASE COMMUNICATION
Ship to    Home   Insurance [employer]   Wound 1 pressure ulcer upper back   Wound 2 abdomen     ABD 5x9 [id number]

## 2023-03-01 ENCOUNTER — HOME CARE VISIT (OUTPATIENT)
Dept: HOME HEALTH SERVICES | Facility: HOME HEALTHCARE | Age: 53
End: 2023-03-01

## 2023-03-01 ENCOUNTER — OFFICE VISIT (OUTPATIENT)
Age: 53
End: 2023-03-01

## 2023-03-01 VITALS
DIASTOLIC BLOOD PRESSURE: 78 MMHG | TEMPERATURE: 97.7 F | OXYGEN SATURATION: 99 % | SYSTOLIC BLOOD PRESSURE: 130 MMHG | HEIGHT: 71 IN | WEIGHT: 149 LBS | BODY MASS INDEX: 20.86 KG/M2 | HEART RATE: 87 BPM

## 2023-03-01 VITALS
RESPIRATION RATE: 18 BRPM | HEART RATE: 78 BPM | SYSTOLIC BLOOD PRESSURE: 122 MMHG | TEMPERATURE: 97.2 F | OXYGEN SATURATION: 98 % | DIASTOLIC BLOOD PRESSURE: 72 MMHG

## 2023-03-01 DIAGNOSIS — L89.322 PRESSURE INJURY OF LEFT BUTTOCK, STAGE 2 (HCC): ICD-10-CM

## 2023-03-01 DIAGNOSIS — L27.0 DRUG RASH: ICD-10-CM

## 2023-03-01 DIAGNOSIS — L89.123 PRESSURE INJURY OF LEFT UPPER BACK, STAGE 3 (HCC): ICD-10-CM

## 2023-03-01 DIAGNOSIS — L89.313 PRESSURE INJURY OF RIGHT BUTTOCK, STAGE 3 (HCC): ICD-10-CM

## 2023-03-01 DIAGNOSIS — K65.0 ACUTE PERITONITIS (HCC): Primary | ICD-10-CM

## 2023-03-01 DIAGNOSIS — R18.8 CIRRHOSIS OF LIVER WITH ASCITES, UNSPECIFIED HEPATIC CIRRHOSIS TYPE (HCC): ICD-10-CM

## 2023-03-01 DIAGNOSIS — K74.60 CIRRHOSIS OF LIVER WITH ASCITES, UNSPECIFIED HEPATIC CIRRHOSIS TYPE (HCC): ICD-10-CM

## 2023-03-01 DIAGNOSIS — A31.9 MYCOBACTERIUM, ATYPICAL: ICD-10-CM

## 2023-03-02 ENCOUNTER — OFFICE VISIT (OUTPATIENT)
Dept: WOUND CARE | Facility: CLINIC | Age: 53
End: 2023-03-02

## 2023-03-02 ENCOUNTER — TELEPHONE (OUTPATIENT)
Dept: SURGERY | Facility: CLINIC | Age: 53
End: 2023-03-02

## 2023-03-02 VITALS
RESPIRATION RATE: 20 BRPM | TEMPERATURE: 98.1 F | SYSTOLIC BLOOD PRESSURE: 142 MMHG | HEART RATE: 83 BPM | DIASTOLIC BLOOD PRESSURE: 86 MMHG

## 2023-03-02 DIAGNOSIS — L89.322 PRESSURE INJURY OF LEFT BUTTOCK, STAGE 2 (HCC): ICD-10-CM

## 2023-03-02 DIAGNOSIS — L89.123 PRESSURE INJURY OF LEFT UPPER BACK, STAGE 3 (HCC): Primary | ICD-10-CM

## 2023-03-02 DIAGNOSIS — L89.313 PRESSURE INJURY OF RIGHT BUTTOCK, STAGE 3 (HCC): ICD-10-CM

## 2023-03-02 RX ORDER — LIDOCAINE 40 MG/G
CREAM TOPICAL ONCE
Status: COMPLETED | OUTPATIENT
Start: 2023-03-02 | End: 2023-03-02

## 2023-03-02 RX ADMIN — LIDOCAINE: 40 CREAM TOPICAL at 13:46

## 2023-03-02 NOTE — PATIENT INSTRUCTIONS
Orders Placed This Encounter   Procedures    Wound cleansing and dressings     Wash your hands with soap and water  Remove old dressing, discard into plastic bag and place in trash  Cleanse the wound with unscented soap and water prior to applying a clean dressing  Do not use tissue or cotton balls  Do not scrub the wound  Pat dry using gauze  Shower yes , change dressing after shower  Keep dressing dry      Right / Left Buttock wounds and mid back wound   Apply skin prep to skin surrounding wound   Apply Dermagran to all wound beds  Cover with Bordered Foam    This was done today  Change dressing every other day and as needed for excessive drainage, leakage or displacement  Off-loading Instructions:   Use a mattress topper on your mattress to help relieve pressure while in bed  Keep weight and pressure off wound at all times and turn every 2 hours  Avoid positions directing pressure to wound sites  Abdominal dressing changed by Dr Ally Lindsey       Continue with [facility] 3 times per week     Follow up in one week     Standing Status:   Future     Standing Expiration Date:   3/2/2024

## 2023-03-02 NOTE — TELEPHONE ENCOUNTER
Patient's FMLA form was faxed to [facility] at [phone] and a copy was mailed to the patient

## 2023-03-02 NOTE — LETTER
[facility] WOUND CARE [address] [address]  Phone#  [phone]  Fax#  [phone]    Patient:  Kalyan Soliz  YOB: 1970  Phone:  [phone]  Date of Visit:  3/2/2023    Orders Placed This Encounter   Procedures   • Wound cleansing and dressings     Wash your hands with soap and water  Darby Favors old dressing, discard into plastic bag and place in trash   Cleanse the wound with unscented soap and water prior to applying a clean dressing  Do not use tissue or cotton balls  Do not scrub the wound  Pat dry using gauze  Shower yes , change dressing after shower  Keep dressing dry      Right / Left Buttock wounds and mid back wound   Apply skin prep to skin surrounding wound   Apply Dermagran to all wound beds   Cover with Bordered Foam    This was done today  Change dressing every other day and as needed for excessive drainage, leakage or displacement  Off-loading Instructions:   Use a mattress topper on your mattress to help relieve pressure while in bed  Keep weight and pressure off wound at all times and turn every 2 hours  Avoid positions directing pressure to wound sites  Abdominal dressing changed by Dr Thu Gordillo       Continue with [facility] 3 times per week     Follow up in one week     Standing Status:   Future     Standing Expiration Date:   3/2/2024         Electronically signed by Rose Baker MD

## 2023-03-02 NOTE — PROGRESS NOTES
Patient ID: Rex Doherty is a 46 y o  male Date of Birth 1970     Chief Complaint   Patient presents with   • Follow Up Wound Care Visit     Right and left buttock and mid back          Allergies  Penicillin v and Amoxicillin    Assessment / Plan:    Pressure injury of right buttock, stage 3 (HCC)      Healing, continue dermagran, foam dressing    Pressure injury of left buttock, stage 2 ([address] )      Pinpoint residual wound    Pressure injury of upper back, stage 3 ([address] )      Wound with debris requiring selective debridement with curette       Diagnoses and all orders for this visit:    Pressure injury of left upper back, stage 3 (HCC)  -     Wound cleansing and dressings; Future  -     lidocaine (LMX) 4 % cream    Pressure injury of right buttock, stage 3 (HCC)  -     Wound cleansing and dressings; Future  -     lidocaine (LMX) 4 % cream    Pressure injury of left buttock, stage 2 (HCC)  -     Wound cleansing and dressings; Future  -     lidocaine (LMX) 4 % cream    Other orders  -     Debridement  -     Debridement              Debridement   Wound 01/28/23 Pressure Injury Back Upper    Universal Protocol:  Consent: Written consent obtained  Consent given by: patient  Time out: Immediately prior to procedure a "time out" was called to verify the correct patient, procedure, equipment, support staff and site/side marked as required    Patient understanding: patient states understanding of the procedure being performed  Patient identity confirmed: verbally with patient      Performed by: physician  Debridement type: selective  Pain control: lidocaine 4%  Pre-debridement measurements  Length (cm): 1 8  Width (cm): 1 4  Depth (cm): 0 3  Surface Area (cm^2): 2 52  Volume (cm^3): 0 76    Post-debridement measurements  Length (cm): 1 8  Width (cm): 1 4  Depth (cm): 0 4  Percent debrided: 75%  Surface Area (cm^2): 2 52  Area debrided (cm^2): 1 89  Volume (cm^3): 1 01  Devitalized tissue debrided: exudate, fibrin and necrotic debris  Instrument(s) utilized: curette  Bleeding: small  Hemostasis obtained with: pressure  Procedural pain (0-10): 1  Post-procedural pain: 1   Response to treatment: procedure was tolerated well    Debridement   Wound 02/03/23 Pressure Injury Buttocks Right    Universal Protocol:  Consent: Written consent obtained  Consent given by: patient  Time out: Immediately prior to procedure a "time out" was called to verify the correct patient, procedure, equipment, support staff and site/side marked as required    Patient understanding: patient states understanding of the procedure being performed  Patient identity confirmed: verbally with patient      Performed by: physician  Debridement type: selective  Pain control: lidocaine 4%  Pre-debridement measurements  Length (cm): 2 8  Width (cm): 2 2  Depth (cm): 0 2  Surface Area (cm^2): 6 16  Volume (cm^3): 1 23    Post-debridement measurements  Length (cm): 2 8  Width (cm): 2 2  Depth (cm): 0 25  Percent debrided: 10%  Surface Area (cm^2): 6 16  Area debrided (cm^2): 0 62  Volume (cm^3): 1 54  Devitalized tissue debrided: exudate and fibrin  Instrument(s) utilized: curette  Bleeding: none  Hemostasis obtained with: not applicable  Procedural pain (0-10): 1  Post-procedural pain: 1   Response to treatment: procedure was tolerated well             Wound 01/28/23 Pressure Injury Back Upper (Active)   Wound Image Images linked 03/02/23 1319   Wound Description Yellow;Pink;Slough 03/02/23 1319   Aminah-wound Assessment Dry;Scar Tissue 03/02/23 1319   Wound Length (cm) 1 8 cm 03/02/23 1319   Wound Width (cm) 1 4 cm 03/02/23 1319   Wound Depth (cm) 0 3 cm 03/02/23 1319   Wound Surface Area (cm^2) 2 52 cm^2 03/02/23 1319   Wound Volume (cm^3) 0 756 cm^3 03/02/23 1319   Calculated Wound Volume (cm^3) 0 76 cm^3 03/02/23 1319   Drainage Amount Small 03/02/23 1319   Drainage Description Serous 03/02/23 1319   Non-staged Wound Description Full thickness 03/02/23 1319 Treatments Cleansed 03/02/23 1319   Patient Tolerance Tolerated well 03/02/23 1319       Wound 01/29/23 Pressure Injury Buttocks Left (Active)   Wound Image Images linked 03/02/23 1324   Wound Description Pink 03/02/23 1325   Aminah-wound Assessment Dry;Pink;Scar Tissue 03/02/23 1325   Wound Length (cm) 0 1 cm 03/02/23 1325   Wound Width (cm) 0 1 cm 03/02/23 1325   Wound Depth (cm) 0 1 cm 03/02/23 1325   Wound Surface Area (cm^2) 0 01 cm^2 03/02/23 1325   Wound Volume (cm^3) 0 001 cm^3 03/02/23 1325   Calculated Wound Volume (cm^3) 0 cm^3 03/02/23 1325   Change in Wound Size % 100 03/02/23 1325   Drainage Amount Small 03/02/23 1325   Drainage Description Serous 03/02/23 1325   Non-staged Wound Description Full thickness 03/02/23 1325   Treatments Cleansed 03/02/23 1325   Patient Tolerance Tolerated well 03/02/23 1325       Wound 02/03/23 Pressure Injury Buttocks Right (Active)   Wound Image Images linked 03/02/23 1324   Wound Description Granulation tissue;Pink;Beefy red 03/02/23 1325   Aminah-wound Assessment Pink;Scar Tissue;Dry 03/02/23 1325   Wound Length (cm) 2 8 cm 03/02/23 1325   Wound Width (cm) 2 2 cm 03/02/23 1325   Wound Depth (cm) 0 2 cm 03/02/23 1325   Wound Surface Area (cm^2) 6 16 cm^2 03/02/23 1325   Wound Volume (cm^3) 1 232 cm^3 03/02/23 1325   Calculated Wound Volume (cm^3) 1 23 cm^3 03/02/23 1325   Change in Wound Size % -186 05 03/02/23 1325   Drainage Amount Small 03/02/23 1325   Drainage Description Serosanguineous 03/02/23 1325   Non-staged Wound Description Full thickness 03/02/23 1325   Treatments Cleansed 03/02/23 1325   Patient Tolerance Tolerated well 03/02/23 1325       Subjective: Alexa Lopez The patient presents in follow-up regarding pressure wounds of his back and buttocks, and an open healing abdominal wound, 2 days shy of 5 weeks out from laparotomy for intra-abdominal abscess  All wounds are healing  The patient reports that his appetite is good and that he is gaining weight    He has seen infectious disease who plans to repeat a CT scan to check for recurrent abdominal infection  The following portions of the patient's history were reviewed and updated as appropriate: allergies, current medications, past family history, past medical history, past social history, past surgical history, and problem list     Review of Systems   Constitutional: Negative for chills and fever  Gastrointestinal: Negative for abdominal distention, nausea and vomiting  Skin: Positive for wound           Objective:       Wound 01/28/23 Pressure Injury Back Upper (Active)   Wound Image Images linked 03/02/23 1319   Wound Description Yellow;Pink;Slough 03/02/23 1319   Aminah-wound Assessment Dry;Scar Tissue 03/02/23 1319   Wound Length (cm) 1 8 cm 03/02/23 1319   Wound Width (cm) 1 4 cm 03/02/23 1319   Wound Depth (cm) 0 3 cm 03/02/23 1319   Wound Surface Area (cm^2) 2 52 cm^2 03/02/23 1319   Wound Volume (cm^3) 0 756 cm^3 03/02/23 1319   Calculated Wound Volume (cm^3) 0 76 cm^3 03/02/23 1319   Drainage Amount Small 03/02/23 1319   Drainage Description Serous 03/02/23 1319   Non-staged Wound Description Full thickness 03/02/23 1319   Treatments Cleansed 03/02/23 1319   Patient Tolerance Tolerated well 03/02/23 1319       Wound 01/29/23 Pressure Injury Buttocks Left (Active)   Wound Image Images linked 03/02/23 1324   Wound Description Pink 03/02/23 1325   Aminah-wound Assessment Dry;Pink;Scar Tissue 03/02/23 1325   Wound Length (cm) 0 1 cm 03/02/23 1325   Wound Width (cm) 0 1 cm 03/02/23 1325   Wound Depth (cm) 0 1 cm 03/02/23 1325   Wound Surface Area (cm^2) 0 01 cm^2 03/02/23 1325   Wound Volume (cm^3) 0 001 cm^3 03/02/23 1325   Calculated Wound Volume (cm^3) 0 cm^3 03/02/23 1325   Change in Wound Size % 100 03/02/23 1325   Drainage Amount Small 03/02/23 1325   Drainage Description Serous 03/02/23 1325   Non-staged Wound Description Full thickness 03/02/23 1325   Treatments Cleansed 03/02/23 1325   Patient Tolerance Tolerated well 03/02/23 1325       Wound 02/03/23 Pressure Injury Buttocks Right (Active)   Wound Image Images linked 03/02/23 1324   Wound Description Granulation tissue;Pink;Beefy red 03/02/23 1325   Aminah-wound Assessment Pink;Scar Tissue;Dry 03/02/23 1325   Wound Length (cm) 2 8 cm 03/02/23 1325   Wound Width (cm) 2 2 cm 03/02/23 1325   Wound Depth (cm) 0 2 cm 03/02/23 1325   Wound Surface Area (cm^2) 6 16 cm^2 03/02/23 1325   Wound Volume (cm^3) 1 232 cm^3 03/02/23 1325   Calculated Wound Volume (cm^3) 1 23 cm^3 03/02/23 1325   Change in Wound Size % -186 05 03/02/23 1325   Drainage Amount Small 03/02/23 1325   Drainage Description Serosanguineous 03/02/23 1325   Non-staged Wound Description Full thickness 03/02/23 1325   Treatments Cleansed 03/02/23 1325   Patient Tolerance Tolerated well 03/02/23 1325       /86   Pulse 83   Temp 98 1 °F (36 7 °C)   Resp 20     Physical Exam  Constitutional:       Comments: Frail white male in no acute distress  Eyes:      General: No scleral icterus  Abdominal:      General: Abdomen is flat  Palpations: Abdomen is soft  Skin:                 Wound Instructions:  Orders Placed This Encounter   Procedures   • Wound cleansing and dressings     Wash your hands with soap and water  Pilar Smith old dressing, discard into plastic bag and place in trash   Cleanse the wound with unscented soap and water prior to applying a clean dressing  Do not use tissue or cotton balls  Do not scrub the wound  Pat dry using gauze  Shower yes , change dressing after shower  Keep dressing dry      Right / Left Buttock wounds and mid back wound   Apply skin prep to skin surrounding wound   Apply Dermagran to all wound beds   Cover with Bordered Foam    This was done today  Change dressing every other day and as needed for excessive drainage, leakage or displacement         Off-loading Instructions:   Use a mattress topper on your mattress to help relieve pressure while in bed  Keep weight and pressure off wound at all times and turn every 2 hours  Avoid positions directing pressure to wound sites  Abdominal dressing changed by Dr Douglas Baxter       Continue with [facility] 3 times per week     Follow up in one week     Standing Status:   Future     Standing Expiration Date:   3/2/2024   • Debridement     This order was created via procedure documentation   • Debridement     This order was created via procedure documentation Show Applicator Variable?: Yes Post-Care Instructions: I reviewed with the patient in detail post-care instructions. Patient is to wear sunprotection, and avoid picking at any of the treated lesions. Pt may apply Vaseline to crusted or scabbing areas. Number Of Freeze-Thaw Cycles: 1 freeze-thaw cycle Consent: The patient's consent was obtained including but not limited to risks of crusting, scabbing, blistering, scarring, darker or lighter pigmentary change, recurrence, incomplete removal and infection. Duration Of Freeze Thaw-Cycle (Seconds): 0 Render Note In Bullet Format When Appropriate: No Detail Level: Detailed

## 2023-03-03 ENCOUNTER — HOME CARE VISIT (OUTPATIENT)
Dept: HOME HEALTH SERVICES | Facility: HOME HEALTHCARE | Age: 53
End: 2023-03-03

## 2023-03-03 VITALS
OXYGEN SATURATION: 98 % | SYSTOLIC BLOOD PRESSURE: 122 MMHG | HEART RATE: 74 BPM | RESPIRATION RATE: 20 BRPM | DIASTOLIC BLOOD PRESSURE: 80 MMHG | TEMPERATURE: 98.2 F

## 2023-03-06 ENCOUNTER — HOME CARE VISIT (OUTPATIENT)
Dept: HOME HEALTH SERVICES | Facility: HOME HEALTHCARE | Age: 53
End: 2023-03-06

## 2023-03-07 ENCOUNTER — OFFICE VISIT (OUTPATIENT)
Dept: SURGERY | Facility: CLINIC | Age: 53
End: 2023-03-07

## 2023-03-07 VITALS
TEMPERATURE: 97.8 F | RESPIRATION RATE: 16 BRPM | HEART RATE: 82 BPM | OXYGEN SATURATION: 96 % | SYSTOLIC BLOOD PRESSURE: 130 MMHG | DIASTOLIC BLOOD PRESSURE: 80 MMHG

## 2023-03-07 VITALS
BODY MASS INDEX: 20.72 KG/M2 | OXYGEN SATURATION: 98 % | RESPIRATION RATE: 20 BRPM | SYSTOLIC BLOOD PRESSURE: 130 MMHG | TEMPERATURE: 97.2 F | HEART RATE: 85 BPM | HEIGHT: 71 IN | DIASTOLIC BLOOD PRESSURE: 82 MMHG | WEIGHT: 148 LBS

## 2023-03-07 DIAGNOSIS — K65.0 ACUTE PERITONITIS (HCC): Primary | ICD-10-CM

## 2023-03-07 LAB
MYCOBACTERIUM SPEC CULT: NORMAL
RHODAMINE-AURAMINE STN SPEC: NORMAL

## 2023-03-07 RX ORDER — ACETAMINOPHEN 500 MG
500 TABLET ORAL EVERY 6 HOURS PRN
COMMUNITY

## 2023-03-07 NOTE — ASSESSMENT & PLAN NOTE
Is 5 weeks out from laparotomy with drainage of intra-abdominal abscess  Midline wound continues to granulate

## 2023-03-07 NOTE — PROGRESS NOTES
Assessment/Plan:    Acute peritonitis (HCC)      Is 5 weeks out from laparotomy with drainage of intra-abdominal abscess  Midline wound continues to granulate  Diagnoses and all orders for this visit:    Acute peritonitis ([address] )    Other orders  -     acetaminophen (TYLENOL) 500 mg tablet; Take 500 mg by mouth every 6 (six) hours as needed for mild pain          Subjective:      Patient ID: Migdalia Koenig is a 46 y o  male  The patient follows up 5 weeks from laparotomy with drainage of intra-abdominal abscess  He is midline wound continues to granulate closed, and his appetite continues to improve as his wound heals  The following portions of the patient's history were reviewed and updated as appropriate: allergies, current medications, past family history, past medical history, past social history, past surgical history and problem list     Review of Systems   Constitutional: Negative for chills and fever  Gastrointestinal: Negative for constipation, nausea and vomiting  Skin: Positive for wound           Objective:      /82 (BP Location: Left arm, Patient Position: Sitting, Cuff Size: Standard)   Pulse 85   Temp (!) 97 2 °F (36 2 °C) (Temporal)   Resp 20   Ht 5' 11" (1 803 m)   Wt 67 1 kg (148 lb)   SpO2 98%   BMI 20 64 kg/m²          Physical Exam  Abdominal:          Comments: Granulating midline wound

## 2023-03-07 NOTE — CASE COMMUNICATION
Ship to Pt  Home  Insurance [employer]     Wound 1 upper back        Wound 2 bilateral buttock      All items are ordered by the each unless otherwise noted    PLEASE DO NOT ORDER BY THE BOX  Request specific quantity needed  For Private Insurances order should be for a 2 week period    Gauze 4x4 ST [id number]  Dermagran [id number]  Hydrocellular Foam Adh, 4x4 [id number]  pt using 2 per day

## 2023-03-07 NOTE — PATIENT INSTRUCTIONS
Continue to wash the wound with soap and water, place vaseline at the wound edge, and open up a gauze dressing and place into the wound  Leave the waterproof dressing on the Rt upper quadrant wound in place till Friday, you can shower with it in place  When you remove it on Friday, wash the wound with soap and water then keep if covered if there is any drainage  Follow up 3/20 in [facility] pass office    Call if you are having any problems

## 2023-03-08 ENCOUNTER — HOME CARE VISIT (OUTPATIENT)
Dept: HOME HEALTH SERVICES | Facility: HOME HEALTHCARE | Age: 53
End: 2023-03-08

## 2023-03-08 VITALS
TEMPERATURE: 98.1 F | HEART RATE: 74 BPM | DIASTOLIC BLOOD PRESSURE: 66 MMHG | RESPIRATION RATE: 18 BRPM | OXYGEN SATURATION: 96 % | SYSTOLIC BLOOD PRESSURE: 110 MMHG

## 2023-03-09 ENCOUNTER — OFFICE VISIT (OUTPATIENT)
Dept: WOUND CARE | Facility: CLINIC | Age: 53
End: 2023-03-09

## 2023-03-09 VITALS
SYSTOLIC BLOOD PRESSURE: 144 MMHG | HEART RATE: 77 BPM | DIASTOLIC BLOOD PRESSURE: 87 MMHG | RESPIRATION RATE: 20 BRPM | TEMPERATURE: 97.2 F

## 2023-03-09 DIAGNOSIS — L89.123 PRESSURE INJURY OF LEFT UPPER BACK, STAGE 3 (HCC): ICD-10-CM

## 2023-03-09 DIAGNOSIS — L92.9 HYPERGRANULATION: ICD-10-CM

## 2023-03-09 DIAGNOSIS — L89.313 PRESSURE INJURY OF RIGHT BUTTOCK, STAGE 3 (HCC): Primary | ICD-10-CM

## 2023-03-09 DIAGNOSIS — L89.322 PRESSURE INJURY OF LEFT BUTTOCK, STAGE 2 (HCC): ICD-10-CM

## 2023-03-09 RX ORDER — LIDOCAINE 40 MG/G
CREAM TOPICAL ONCE
Status: COMPLETED | OUTPATIENT
Start: 2023-03-09 | End: 2023-03-09

## 2023-03-09 RX ORDER — COLLAGENASE SANTYL 250 [ARB'U]/G
OINTMENT TOPICAL DAILY
Qty: 30 G | Refills: 0 | Status: SHIPPED | OUTPATIENT
Start: 2023-03-09 | End: 2023-04-08

## 2023-03-09 RX ADMIN — LIDOCAINE 1 APPLICATION.: 40 CREAM TOPICAL at 13:15

## 2023-03-09 NOTE — PATIENT INSTRUCTIONS
Orders Placed This Encounter   Procedures    Wound cleansing and dressings     Wash your hands with soap and water  Remove old dressing, discard into plastic bag and place in trash  Cleanse the wound with unscented soap and water prior to applying a clean dressing  Do not use tissue or cotton balls  Do not scrub the wound  Pat dry using gauze  Shower yes , change dressing after shower  Keep dressing dry       Left buttock wound is heeled today     Right Buttock wound and mid back wound   Apply skin prep to skin surrounding wound   Apply Dermagran to all wound beds  Cover with Bordered Foam    This was done today  Change dressing every other day and as needed for excessive drainage, leakage or displacement  Silver nitrate to Right Buttock wound for hypergranulation, you may notice increased drainage for this area and drainage may be brownish grey, this would be normal     We will order a medication called Santyl for you from a special pharmacy, they will call you to let you know the pricing, they will mail it to your home  When you receive it start using it on the mid back wound  Apply to mid back wound with a thea thickness and cover with bordered foam  Change dressing Daily and as needed for excessive drainage, leakage or displacement  Off-loading Instructions:   Use a mattress topper on your mattress to help relieve pressure while in bed  Keep weight and pressure off wound at all times and turn every 2 hours  Avoid positions directing pressure to wound sites  Abdominal dressing changed by Dr Maggie Mirza       Continue with [facility] 3 times per week     Follow up in one week     Standing Status:   Future     Standing Expiration Date:   3/9/2024

## 2023-03-09 NOTE — PROGRESS NOTES
Chemical Caut Of A Wound     Date/Time 3/9/2023 2:10 PM     Performed by  Ag Myles PA-C     Authorized by Ag Myles PA-C       Associated wounds:   Wound 02/03/23 Pressure Injury Buttocks Right   Universal Protocol   Consent: Verbal consent obtained  Consent given by: patient  Time out: Immediately prior to procedure a "time out" was called to verify the correct patient, procedure, equipment, support staff and site/side marked as required  Patient understanding: patient states understanding of the procedure being performed  Patient identity confirmed: verbally with patient        Local anesthesia used: yes     Anesthesia   Local anesthesia used: yes  Local Anesthetic: topical anesthetic     Sedation   Patient sedated: no        Specimen: no    Culture: no   Procedure Details   Procedure Notes: One silver nitrate stick used to treat hypergranulation tissue present  Neutralized with saline

## 2023-03-09 NOTE — PROGRESS NOTES
Patient ID: Rafael Born is a 46 y o  male Date of Birth 1970       Chief Complaint   Patient presents with   • Follow Up Wound Care Visit     Upper back wound, and right and left buttock wounds          Allergies:  Penicillin v and Amoxicillin    Diagnosis:   Diagnosis ICD-10-CM Associated Orders   1  Pressure injury of right buttock, stage 3 ([facility])  L89 313 Wound cleansing and dressings     lidocaine (LMX) 4 % cream     Chemical Caut Of A Wound      2  Pressure injury of left upper back, stage 3 ([facility])  L89 123 Wound cleansing and dressings     lidocaine (LMX) 4 % cream     collagenase (Santyl) ointment     Debridement      3  Pressure injury of left buttock, stage 2 ([address] )  L89 322       4  Hypergranulation  L92 9 Chemical Caut Of A Wound           Assessment  & Plan:    • F/u pressure injury of L buttock  Is now healed  No drainage from site or signs of acute infection  o No dressing required as wound is now fully healed  • F/u pressure injury of R buttock  Is measuring smaller in size  There is new edge epithelization  Wound bed with hypergranulation tissue  No signs of acute infection  Drainage is small  o Chemical cauterization of hypergranulation tissue performed  o Continue with use of Dermagran  • F/u pressure injury of upper back  Measuring slightly smaller in size  The wound bed with adherent slough  No signs of acute infection present    o Selective debridement, as documented  o Santly ordered to be placed on wound bed daily  Use Dermagran in interim or if unable to obtain Santyl  •  Continue with use of egg crate mattress for offloading  Continue with consistent shifting of weight and ambulation throughout the day to relieve areas of pressure  • Obtain 3-4 servings of protein daily for wound healing  • F/u in one week with Dr Michael Roach  Instructed to call if any questions or concerns arise in meantime            Subjective:   03/09/23: Pt presents for continued care of pressure injury of bilateral buttocks and upper back  Midline abdominal wound being addressed by Dr Robert Rodriguez in outpatient office  Has been using Dermagran to the pressure injuries and is offloading with egg crate mattress and by sleeping more on his side rather than his back  Is obtaining protein in diet  The following portions of the patient's history were reviewed and updated as appropriate:   Patient Active Problem List   Diagnosis   • Hepatitis C   • Acute peritonitis ([facility] )   • Sepsis ([facility] [address] )   • Cirrhosis of liver with ascites ([facility] [address] )   • Hyponatremia   • Difficult intubation   • Mycobacterium, atypical   • Hypomagnesemia   • Pressure injury of right buttock, stage 3 ([facility])   • Pressure injury of left buttock, stage 2 ([facility])   • Pressure injury of upper back, stage 3 ([address] )     Past Medical History:   Diagnosis Date   • Hard to intubate    • Hepatitis C      Past Surgical History:   Procedure Laterality Date   • HAND SURGERY     • IR PARACENTESIS  1/23/2023   • SC LAPS ABD PRTM&OMENTUM DX W/WO SPEC BR/WA SPX N/A 1/28/2023    Procedure: LAPAROSCOPY DIAGNOSTIC CONVERTED TO OPEN, LAPAROTOMY, REPAIR ENTEROTOMY X2, EXTENSIS LYSIS OF ADHESIONS, PERITONEAL BIOPSIES, PERITONEAL LAVAGE;  Surgeon:  Roberto Esquivel MD;  Location: [facility];  Service: General   • WISDOM TOOTH EXTRACTION       Family History   Problem Relation Age of Onset   • Diabetes Mother    • No Known Problems Father      Social History     Socioeconomic History   • Marital status: Single     Spouse name: None   • Number of children: None   • Years of education: None   • Highest education level: None   Occupational History   • None   Tobacco Use   • Smoking status: Every Day   • Smokeless tobacco: Never   • Tobacco comments:     Vape daily and medical marijuana every night    Vaping Use   • Vaping Use: Every day   • Substances: Nicotine   Substance and Sexual Activity   • Alcohol use: Not Currently     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   • Drug use: Yes     Types: Marijuana     Comment: medical marijuana   • Sexual activity: Not Currently     Partners: Female   Other Topics Concern   • None   Social History Narrative   • None     Social Determinants of Health     Financial Resource Strain: Not on file   Food Insecurity: No Food Insecurity   • Worried About Running Out of Food in the Last Year: Never true   • Ran Out of Food in the Last Year: Never true   Transportation Needs: No Transportation Needs   • Lack of Transportation (Medical): No   • Lack of Transportation (Non-Medical): No   Physical Activity: Not on file   Stress: Not on file   Social Connections: Not on file   Intimate Partner Violence: Not on file   Housing Stability: Low Risk    • Unable to Pay for Housing in the Last Year: No   • Number of Places Lived in the Last Year: 1   • Unstable Housing in the Last Year: No       Current Outpatient Medications:   •  collagenase (Santyl) ointment, Apply topically daily To wound of upper back nickel thick , Disp: 30 g, Rfl: 0  •  acetaminophen (TYLENOL) 500 mg tablet, Take 500 mg by mouth every 6 (six) hours as needed for mild pain, Disp: , Rfl:   •  clotrimazole (LOTRIMIN) 1 % cream, Apply topically 2 (two) times a day, Disp: 30 g, Rfl: 0  •  magnesium gluconate (MAGONATE) 500 mg tablet, Take 1 tablet (500 mg total) by mouth 2 (two) times a day (Patient not taking: Reported on 3/7/2023), Disp: 60 tablet, Rfl: 3  •  white petrolatum ointment, Apply topically as needed for dry skin (Patient not taking: Reported on 3/7/2023), Disp: 30 g, Rfl: 0    Current Facility-Administered Medications:   •  lidocaine (LMX) 4 % cream, , Topical, Once, Claudia Gamble MD    Review of Systems   Skin: Positive for wound (buttock, abdomen, back)  Psychiatric/Behavioral: Negative for agitation and dysphoric mood  Objective:  /87   Pulse 77   Temp (!) 97 2 °F (36 2 °C)   Resp 20   Pain Score:   3     Physical Exam  Vitals reviewed     Constitutional:       General: He is not in acute distress  Appearance: He is normal weight  Pulmonary:      Effort: Pulmonary effort is normal    Skin:     Findings: Wound present  Comments: L buttock pressure injury is now healed without drainage or signs of acute infection  R buttock pressure injury is measuring smaller in size  There is hypergranulation tissue present within wound bed without signs of acute infection  Minimal drainage  Upper back pressure injury measuring slightly smaller in size with adherent slough present within the wound bed  Minimal drainage  No signs of acute infection present  Neurological:      Mental Status: He is alert     Psychiatric:         Mood and Affect: Mood normal            Wound 01/28/23 Pressure Injury Back Upper (Active)   Wound Image       03/09/23 1408   Wound Description Yellow;Pink;Slough 03/09/23 1312   Pressure Injury Stage 3 02/16/23 1328   Aminah-wound Assessment Dry;Scar Tissue 03/09/23 1312   Wound Length (cm) 1 5 cm 03/09/23 1312   Wound Width (cm) 1 2 cm 03/09/23 1312   Wound Depth (cm) 0 3 cm 03/09/23 1312   Wound Surface Area (cm^2) 1 8 cm^2 03/09/23 1312   Wound Volume (cm^3) 0 54 cm^3 03/09/23 1312   Calculated Wound Volume (cm^3) 0 54 cm^3 03/09/23 1312   Drainage Amount Small 03/09/23 1312   Drainage Description Yellow 03/09/23 1312   Non-staged Wound Description Full thickness 03/09/23 1312   Treatments Cleansed 03/09/23 1312   Patient Tolerance Tolerated well 03/09/23 1312       Wound 01/28/23 Surgical Abdomen Mid (Active)       Wound 01/29/23 Pressure Injury Buttocks Left (Active)   Wound Image   03/09/23 1317   Wound Description Epithelialization 03/09/23 1318   Pressure Injury Stage 2 02/23/23 1312   Aminah-wound Assessment Dry;Scar Tissue 03/09/23 1318   Wound Length (cm) 0 cm 03/09/23 1318   Wound Width (cm) 0 cm 03/09/23 1318   Wound Depth (cm) 0 cm 03/09/23 1318   Wound Surface Area (cm^2) 0 cm^2 03/09/23 1318   Wound Volume (cm^3) 0 cm^3 03/09/23 1318 Calculated Wound Volume (cm^3) 0 cm^3 03/09/23 1318   Change in Wound Size % 100 03/09/23 1318   Drainage Amount None 03/09/23 1318   Drainage Description Serous 03/02/23 1325   Non-staged Wound Description Full thickness 03/02/23 1325   Treatments Cleansed 03/02/23 1325   Patient Tolerance Tolerated well 03/09/23 1318       Wound 02/03/23 Pressure Injury Buttocks Right (Active)   Wound Image   03/09/23 1409   Wound Description Granulation tissue;Pink;Hypergranulation;Epithelialization;Yellow 03/09/23 1316   Pressure Injury Stage 3 02/16/23 1331   Aminah-wound Assessment Pink;Scar Tissue;Dry 03/09/23 1316   Wound Length (cm) 1 7 cm 03/09/23 1316   Wound Width (cm) 1 2 cm 03/09/23 1316   Wound Depth (cm) 0 1 cm 03/09/23 1316   Wound Surface Area (cm^2) 2 04 cm^2 03/09/23 1316   Wound Volume (cm^3) 0 204 cm^3 03/09/23 1316   Calculated Wound Volume (cm^3) 0 2 cm^3 03/09/23 1316   Change in Wound Size % 53 49 03/09/23 1316   Drainage Amount Small 03/09/23 1316   Drainage Description Bloody 03/09/23 1316   Non-staged Wound Description Full thickness 03/09/23 1316   Treatments Cleansed 03/09/23 1316   Patient Tolerance Tolerated well 03/09/23 1316               Debridement   Wound 01/28/23 Pressure Injury Back Upper    Universal Protocol:  Consent: Verbal consent obtained  Consent given by: patient  Time out: Immediately prior to procedure a "time out" was called to verify the correct patient, procedure, equipment, support staff and site/side marked as required    Patient understanding: patient states understanding of the procedure being performed  Patient identity confirmed: verbally with patient      Performed by: PA  Debridement type: surgical  Level of debridement: subcutaneous tissue  Pain control: lidocaine 4%  Post-debridement measurements  Length (cm): 1 5  Width (cm): 1 2  Depth (cm): 0 3  Percent debrided: 100%  Surface Area (cm^2): 1 8  Area debrided (cm^2): 1 8  Volume (cm^3): 0 54  Devitalized tissue debrided: slough  Instrument(s) utilized: curette  Bleeding: small  Hemostasis obtained with: pressure  Procedural pain (0-10): 0  Post-procedural pain: 0   Response to treatment: procedure was tolerated well  Debridement Comments: Slough very adherent  Wound Instructions:  Orders Placed This Encounter   Procedures   • Wound cleansing and dressings     Wash your hands with soap and water  Gaile Beck old dressing, discard into plastic bag and place in trash   Cleanse the wound with unscented soap and water prior to applying a clean dressing  Do not use tissue or cotton balls  Do not scrub the wound  Pat dry using gauze  Shower yes , change dressing after shower  Keep dressing dry       Left buttock wound is heeled today     Right Buttock wound and mid back wound   Apply skin prep to skin surrounding wound   Apply Dermagran to all wound beds   Cover with Bordered Foam    This was done today  Change dressing every other day and as needed for excessive drainage, leakage or displacement  Silver nitrate to Right Buttock wound for hypergranulation, you may notice increased drainage for this area and drainage may be brownish grey, this would be normal     We will order a medication called Santyl for you from a special pharmacy, they will call you to let you know the pricing, they will mail it to your home  When you receive it start using it on the mid back wound  Apply to mid back wound with a thea thickness and cover with bordered foam  Change dressing Daily and as needed for excessive drainage, leakage or displacement  Off-loading Instructions:   Use a mattress topper on your mattress to help relieve pressure while in bed  Keep weight and pressure off wound at all times and turn every 2 hours  Avoid positions directing pressure to wound sites  Abdominal dressing changed by Dr Menendez Credit       Continue with [facility] 3 times per week     Follow up in one week     Standing Status: Future     Standing Expiration Date:   3/9/2024   • Debridement     This order was created via procedure documentation   • Chemical Caut Of A Wound     This order was created via procedure documentation       Ashley Contreras PA-C

## 2023-03-10 ENCOUNTER — HOME CARE VISIT (OUTPATIENT)
Dept: HOME HEALTH SERVICES | Facility: HOME HEALTHCARE | Age: 53
End: 2023-03-10

## 2023-03-10 VITALS
TEMPERATURE: 99.3 F | DIASTOLIC BLOOD PRESSURE: 74 MMHG | RESPIRATION RATE: 20 BRPM | SYSTOLIC BLOOD PRESSURE: 122 MMHG | OXYGEN SATURATION: 97 % | HEART RATE: 74 BPM

## 2023-03-13 ENCOUNTER — HOME CARE VISIT (OUTPATIENT)
Dept: HOME HEALTH SERVICES | Facility: HOME HEALTHCARE | Age: 53
End: 2023-03-13

## 2023-03-14 LAB
MYCOBACTERIUM SPEC CULT: NORMAL
RHODAMINE-AURAMINE STN SPEC: NORMAL

## 2023-03-15 ENCOUNTER — HOSPITAL ENCOUNTER (OUTPATIENT)
Dept: CT IMAGING | Facility: HOSPITAL | Age: 53
Discharge: HOME | End: 2023-03-15

## 2023-03-15 ENCOUNTER — HOME CARE VISIT (OUTPATIENT)
Dept: HOME HEALTH SERVICES | Facility: HOME HEALTHCARE | Age: 53
End: 2023-03-15

## 2023-03-15 VITALS
RESPIRATION RATE: 18 BRPM | HEART RATE: 75 BPM | DIASTOLIC BLOOD PRESSURE: 78 MMHG | TEMPERATURE: 97.8 F | SYSTOLIC BLOOD PRESSURE: 128 MMHG | OXYGEN SATURATION: 98 %

## 2023-03-15 DIAGNOSIS — K65.0 ACUTE PERITONITIS (HCC): ICD-10-CM

## 2023-03-15 RX ADMIN — IOHEXOL 100 ML: 350 INJECTION, SOLUTION INTRAVENOUS at 12:59

## 2023-03-15 NOTE — CASE COMMUNICATION
HEATHER, pt discharged form [facility] services as of today  Pt concerned about his insurance not authroizing more visits  SN offered to decrease to weekly visits but pt would prefer to be discharged and he will follow up with the wound center

## 2023-03-16 ENCOUNTER — OFFICE VISIT (OUTPATIENT)
Dept: WOUND CARE | Facility: CLINIC | Age: 53
End: 2023-03-16

## 2023-03-16 VITALS
HEART RATE: 75 BPM | DIASTOLIC BLOOD PRESSURE: 89 MMHG | SYSTOLIC BLOOD PRESSURE: 131 MMHG | TEMPERATURE: 97.3 F | RESPIRATION RATE: 16 BRPM

## 2023-03-16 DIAGNOSIS — L89.123 PRESSURE INJURY OF LEFT UPPER BACK, STAGE 3 (HCC): ICD-10-CM

## 2023-03-16 DIAGNOSIS — L89.313 PRESSURE INJURY OF RIGHT BUTTOCK, STAGE 3 (HCC): Primary | ICD-10-CM

## 2023-03-16 DIAGNOSIS — L92.9 HYPERGRANULATION: ICD-10-CM

## 2023-03-16 RX ORDER — LIDOCAINE 40 MG/G
CREAM TOPICAL ONCE
Status: COMPLETED | OUTPATIENT
Start: 2023-03-16 | End: 2023-03-16

## 2023-03-16 RX ADMIN — LIDOCAINE 1 APPLICATION.: 40 CREAM TOPICAL at 13:37

## 2023-03-16 NOTE — PROGRESS NOTES
Patient ID: Chelsea Simon is a 46 y o  male Date of Birth 1970       Chief Complaint   Patient presents with   • Follow Up Wound Care Visit     Arrived with Back and right buttock wounds covered with dressing  Allergies:  Penicillin v and Amoxicillin    Diagnosis:   Diagnosis ICD-10-CM Associated Orders   1  Pressure injury of right buttock, stage 3 ([facility])  L89 313 lidocaine (LMX) 4 % cream     Wound cleansing and dressings     Chemical Caut Of A Wound      2  Pressure injury of left upper back, stage 3 ([facility])  L89 123 lidocaine (LMX) 4 % cream     Wound cleansing and dressings     Debridement      3  Hypergranulation  L92 9 Chemical Caut Of A Wound           Assessment  & Plan:    • F/u pressure injury of R buttock  Is measuring smaller in size  Wound bed with hypergranulation tissue  Minimal drainage  No signs of acute infection present  o Chemical cauterization of hypergranulation tissue performed  o Continue with use of Dermagran    o Offload area from pressure as much as possible    o Obtain protein in diet  • F/u pressure injury of upper back  Wound is measuring smaller in size  There continues to be adherent slough in wound bed  No malodor  Minimal drainage  o Surgical debridement, as below  o Continue with use of Dermagran  • D/c use of new tape as this may be source of allergic reaction and macular rash  If rash persists or is accompanied by intense pruritis, fevers, chills, malaise present to urgent care or discuss with PCP for further evaluation  • F/u in one week  Instructed to call if any questions or concerns arise in meantime  Subjective:   03/09/23: Pt presents for continued care of pressure injury of bilateral buttocks and upper back  Midline abdominal wound being addressed by Dr Mick Che in outpatient office  Has been using Dermagran to the pressure injuries and is offloading with egg crate mattress and by sleeping more on his side rather than his back   Is obtaining protein in diet  03/16/23: Pt presents for f/u pressure injury of bilateral buttocks and upper back  Midline abdominal wound is being followed by Dr Heather Tsang in outpatient office with next appointment scheduled for 03/20/23  Pt has continued with use of Dermagran to pressure injuries- decided not to obtain Santyl given difficulty with daily application in difficult area to reach  He did start using a new kind of tape to secure abdominal wound dressing and following this developed a rash on his abdomen and back  Abdomen with itching sensation but not on his back  Also notes using a communal washing machine  Has not started any new medications  Feels well overall without fevers, chills, malaise  The following portions of the patient's history were reviewed and updated as appropriate:   Patient Active Problem List   Diagnosis   • Hepatitis C   • Acute peritonitis ([address] )   • Sepsis ([address] )   • Cirrhosis of liver with ascites ([address] )   • Hyponatremia   • Difficult intubation   • Mycobacterium, atypical   • Hypomagnesemia   • Pressure injury of right buttock, stage 3 ([facility])   • Pressure injury of left buttock, stage 2 ([facility])   • Pressure injury of upper back, stage 3 ([address] )     Past Medical History:   Diagnosis Date   • Hard to intubate    • Hepatitis C      Past Surgical History:   Procedure Laterality Date   • HAND SURGERY     • IR PARACENTESIS  1/23/2023   • TX LAPS ABD PRTM&OMENTUM DX W/WO SPEC BR/WA SPX N/A 1/28/2023    Procedure: LAPAROSCOPY DIAGNOSTIC CONVERTED TO OPEN, LAPAROTOMY, REPAIR ENTEROTOMY X2, EXTENSIS LYSIS OF ADHESIONS, PERITONEAL BIOPSIES, PERITONEAL LAVAGE;  Surgeon:  Sheryle Settler, MD;  Location: [facility];  Service: General   • WISDOM TOOTH EXTRACTION       Family History   Problem Relation Age of Onset   • Diabetes Mother    • No Known Problems Father      Social History     Socioeconomic History   • Marital status: Single     Spouse name: Not on file   • Number of children: Not on file   • Years of education: Not on file   • Highest education level: Not on file   Occupational History   • Not on file   Tobacco Use   • Smoking status: Every Day   • Smokeless tobacco: Never   • Tobacco comments:     Vape daily and medical marijuana every night    Vaping Use   • Vaping Use: Every day   • Substances: Nicotine   Substance and Sexual Activity   • Alcohol use: Not Currently     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   • Drug use: Yes     Types: Marijuana     Comment: medical marijuana   • Sexual activity: Not Currently     Partners: Female   Other Topics Concern   • Not on file   Social History Narrative   • Not on file     Social Determinants of Health     Financial Resource Strain: Not on file   Food Insecurity: No Food Insecurity   • Worried About Running Out of Food in the Last Year: Never true   • Ran Out of Food in the Last Year: Never true   Transportation Needs: No Transportation Needs   • Lack of Transportation (Medical): No   • Lack of Transportation (Non-Medical):  No   Physical Activity: Not on file   Stress: Not on file   Social Connections: Not on file   Intimate Partner Violence: Not on file   Housing Stability: Low Risk    • Unable to Pay for Housing in the Last Year: No   • Number of Places Lived in the Last Year: 1   • Unstable Housing in the Last Year: No       Current Outpatient Medications:   •  acetaminophen (TYLENOL) 500 mg tablet, Take 500 mg by mouth every 6 (six) hours as needed for mild pain, Disp: , Rfl:   •  clotrimazole (LOTRIMIN) 1 % cream, Apply topically 2 (two) times a day, Disp: 30 g, Rfl: 0  •  collagenase (Santyl) ointment, Apply topically daily To wound of upper back nickel thick , Disp: 30 g, Rfl: 0  •  magnesium gluconate (MAGONATE) 500 mg tablet, Take 1 tablet (500 mg total) by mouth 2 (two) times a day (Patient not taking: Reported on 3/7/2023), Disp: 60 tablet, Rfl: 3  •  white petrolatum ointment, Apply topically as needed for dry skin (Patient not taking: Reported on 3/7/2023), Disp: 30 g, Rfl: 0  No current facility-administered medications for this visit  Review of Systems   Constitutional: Negative for chills and fever  Respiratory: Negative for shortness of breath  Cardiovascular: Negative for chest pain  Gastrointestinal: Negative for nausea and vomiting  Skin: Positive for rash (new-macular) and wound (upper back, buttock, abdomen)  Psychiatric/Behavioral: Negative for agitation  Objective:  /89   Pulse 75   Temp (!) 97 3 °F (36 3 °C)   Resp 16   Pain Score: 0-No pain     Physical Exam  Vitals reviewed  Constitutional:       General: He is not in acute distress  Appearance: He is normal weight  Cardiovascular:      Rate and Rhythm: Normal rate  Pulmonary:      Effort: Pulmonary effort is normal  No respiratory distress  Skin:     General: Skin is warm  Findings: Rash (macular surrounding abdominal wound of abdomen  Extending to back and buttocks) and wound present  Rash is macular  Comments: L buttock pressure injury remains healed  R buttock pressure injury is measuring smaller in size  There is hypergranulation tissue present within wound bed without signs of acute infection  Minimal drainage  Upper back pressure injury measuring smaller in size  Adherent slough present within the wound bed  Minimal drainage  No signs of acute infection present  Macular rash of abdomen and back  Neurological:      Mental Status: He is alert     Psychiatric:         Mood and Affect: Mood normal            Wound 01/28/23 Pressure Injury Back Upper (Active)   Wound Image       03/16/23 1351   Wound Description Yellow;Pink;Slough 03/16/23 1331   Pressure Injury Stage 3 02/16/23 1328   Aminah-wound Assessment Scar Tissue;Pink 03/16/23 1331   Wound Length (cm) 1 cm 03/16/23 1331   Wound Width (cm) 0 5 cm 03/16/23 1331   Wound Depth (cm) 0 2 cm 03/16/23 1331   Wound Surface Area (cm^2) 0 5 cm^2 03/16/23 1331   Wound Volume (cm^3) 0 1 cm^3 03/16/23 1331   Calculated Wound Volume (cm^3) 0 1 cm^3 03/16/23 1331   Drainage Amount Small 03/16/23 1331   Drainage Description Yellow 03/16/23 1331   Non-staged Wound Description Full thickness 03/16/23 1331   Treatments Cleansed 03/09/23 1312   Patient Tolerance Tolerated well 03/09/23 1312   Dressing Status Intact 03/16/23 1331       Wound 01/28/23 Surgical Abdomen Mid (Active)       Wound 02/03/23 Pressure Injury Buttocks Right (Active)   Wound Image   03/16/23 1330   Wound Description Granulation tissue; Hypergranulation 03/16/23 1334   Pressure Injury Stage 3 02/16/23 1331   Aminah-wound Assessment Scar Tissue;[place] 03/16/23 1334   Wound Length (cm) 1 cm 03/16/23 1334   Wound Width (cm) 0 7 cm 03/16/23 1334   Wound Depth (cm) 0 1 cm 03/16/23 1334   Wound Surface Area (cm^2) 0 7 cm^2 03/16/23 1334   Wound Volume (cm^3) 0 07 cm^3 03/16/23 1334   Calculated Wound Volume (cm^3) 0 07 cm^3 03/16/23 1334   Change in Wound Size % 83 72 03/16/23 1334   Drainage Amount Small 03/16/23 1334   Drainage Description Yellow 03/16/23 1334   Non-staged Wound Description Full thickness 03/16/23 1334   Treatments Cleansed 03/09/23 1316   Patient Tolerance Tolerated well 03/09/23 1316   Dressing Status Intact 03/16/23 1334             Debridement   Wound 01/28/23 Pressure Injury Back Upper    Universal Protocol:  Consent: Verbal consent obtained  Consent given by: patient  Time out: Immediately prior to procedure a "time out" was called to verify the correct patient, procedure, equipment, support staff and site/side marked as required    Patient understanding: patient states understanding of the procedure being performed  Patient identity confirmed: verbally with patient      Performed by: PA  Debridement type: surgical  Level of debridement: subcutaneous tissue  Pain control: lidocaine 4%  Post-debridement measurements  Length (cm): 1  Width (cm): 0 5  Depth (cm): 0 3  Percent debrided: 100%  Surface Area (cm^2): 0 5  Area debrided (cm^2): 0 5  Volume (cm^3): 0 15  Tissue and other material debrided: dermis and subcutaneous tissue  Devitalized tissue debrided: slough  Instrument(s) utilized: curette  Bleeding: small  Hemostasis obtained with: pressure  Procedural pain (0-10): 0  Post-procedural pain: 0   Response to treatment: procedure was tolerated well                         Wound Instructions:  Orders Placed This Encounter   Procedures   • Wound cleansing and dressings     Wash your hands with soap and water   Cleanse the wound with unscented soap and water prior to applying a clean dressing        Shower yes , change dressing after shower  Keep dressing dry          Right Buttock wound and mid back wound   Apply skin prep to skin surrounding wound   Apply Dermagran to all wound beds   Cover with Bordered Foam    Change dressing every other day and as needed for excessive drainage, leakage or displacement       Silver nitrate to Right Buttock wound for hypergranulation, you may notice increased drainage for this area and drainage may be brownish grey, this would be normal                 Off-loading Instructions:   Use a mattress topper on your mattress to help relieve pressure while in bed  Keep weight and pressure off wound at all times and turn every 2 hours     Avoid positions directing pressure to wound sites                    Standing Status:   Future     Standing Expiration Date:   3/16/2024   • Debridement     This order was created via procedure documentation   • Chemical Caut Of A Wound     This order was created via procedure documentation       Walker Box PA-C

## 2023-03-16 NOTE — PATIENT INSTRUCTIONS
Orders Placed This Encounter   Procedures    Wound cleansing and dressings     Wash your hands with soap and water  Cleanse the wound with unscented soap and water prior to applying a clean dressing  Shower yes , change dressing after shower  Keep dressing dry          Right Buttock wound and mid back wound   Apply skin prep to skin surrounding wound   Apply Dermagran to all wound beds  Cover with Bordered Foam    Change dressing every other day and as needed for excessive drainage, leakage or displacement  Silver nitrate to Right Buttock wound for hypergranulation, you may notice increased drainage for this area and drainage may be brownish grey, this would be normal                 Off-loading Instructions:   Use a mattress topper on your mattress to help relieve pressure while in bed  Keep weight and pressure off wound at all times and turn every 2 hours  Avoid positions directing pressure to wound sites                     Standing Status:   Future     Standing Expiration Date:   3/16/2024

## 2023-03-16 NOTE — PROGRESS NOTES
Chemical Caut Of A Wound     Date/Time 3/16/2023 1:50 PM     Performed by  Linda Strickland PA-C     Authorized by Linda Strickland PA-C       Associated wounds:   Wound 02/03/23 Pressure Injury Buttocks Right   Universal Protocol   Consent given by: patient  Time out: Immediately prior to procedure a "time out" was called to verify the correct patient, procedure, equipment, support staff and site/side marked as required  Patient understanding: patient states understanding of the procedure being performed  Patient identity confirmed: verbally with patient        Local anesthesia used: yes     Anesthesia   Local anesthesia used: yes  Local Anesthetic: topical anesthetic     Sedation   Patient sedated: no        Specimen: no    Culture: no   Procedure Details   Procedure Notes: One silver nitrate stick used to treat hypergranulation tissue present  Neutralized with saline     Patient tolerance: patient tolerated the procedure well with no immediate complications

## 2023-03-17 ENCOUNTER — TELEPHONE (OUTPATIENT)
Dept: INFECTIOUS DISEASES | Facility: CLINIC | Age: 53
End: 2023-03-17

## 2023-03-17 NOTE — TELEPHONE ENCOUNTER
Immediate findings notification on patient's CT scan  Patient has f/u next week with [facility]

## 2023-03-17 NOTE — TELEPHONE ENCOUNTER
Called the reading room and spoke to DEVIKA today  Informed [facility] patient had a CT done on 3/15 and patient has an upcoming appointment if this could be read

## 2023-03-20 ENCOUNTER — OFFICE VISIT (OUTPATIENT)
Dept: SURGERY | Facility: CLINIC | Age: 53
End: 2023-03-20

## 2023-03-20 VITALS — TEMPERATURE: 97.2 F

## 2023-03-20 DIAGNOSIS — K65.0 ACUTE PERITONITIS (HCC): Primary | ICD-10-CM

## 2023-03-20 NOTE — PROGRESS NOTES
Assessment/Plan:    Acute peritonitis ([address] )      Small residual wound  Appears to be gaining back weight, and have more stamina  There are no diagnoses linked to this encounter  Subjective:      Patient ID: Heidi Bartholomew is a 46 y o  male  The patient is 7 weeks postop from laparotomy with drainage of intra-abdominal abscesses  Patient had a CT scan last week that does not appear to show any residual intra-abdominal infection  The patient's midline abdominal wound continues to granulate in, and his pressure ulcers of his sacrum and upper back are also healing  SilvaSorb gel and an occlusive dressing was applied to the small residual areas, which will be checked again in 3 days in the wound center  He will follow-up in 2 weeks for wound check in the office  The following portions of the patient's history were reviewed and updated as appropriate: allergies, current medications, past family history, past medical history, past social history, past surgical history and problem list     Review of Systems   Constitutional: Negative for chills and fever  Gastrointestinal: Negative for abdominal distention, nausea and vomiting  Skin: Positive for wound           Objective:      Temp (!) 97 2 °F (36 2 °C) (Temporal)          Physical Exam  Abdominal:

## 2023-03-20 NOTE — PATIENT INSTRUCTIONS
May shower, change the dressing on the buttocks every day  The upper back and the abdominal wounds are dressed with a waterproof dressing, you can leave them on till you are seen in the [address] [facility],[address] on Thursday  Call if you are having any problems, tentatively plan to return in 2 weeks for a follow up visit

## 2023-03-21 LAB
MYCOBACTERIUM SPEC CULT: NORMAL
RHODAMINE-AURAMINE STN SPEC: NORMAL

## 2023-03-22 ENCOUNTER — OFFICE VISIT (OUTPATIENT)
Age: 53
End: 2023-03-22

## 2023-03-22 VITALS
DIASTOLIC BLOOD PRESSURE: 64 MMHG | HEART RATE: 94 BPM | OXYGEN SATURATION: 98 % | WEIGHT: 152.8 LBS | BODY MASS INDEX: 21.31 KG/M2 | TEMPERATURE: 97 F | SYSTOLIC BLOOD PRESSURE: 120 MMHG

## 2023-03-22 DIAGNOSIS — R18.8 CIRRHOSIS OF LIVER WITH ASCITES, UNSPECIFIED HEPATIC CIRRHOSIS TYPE (HCC): ICD-10-CM

## 2023-03-22 DIAGNOSIS — K65.0 ACUTE PERITONITIS (HCC): Primary | ICD-10-CM

## 2023-03-22 DIAGNOSIS — K74.60 CIRRHOSIS OF LIVER WITH ASCITES, UNSPECIFIED HEPATIC CIRRHOSIS TYPE (HCC): ICD-10-CM

## 2023-03-22 NOTE — PROGRESS NOTES
Outpatient Progress Note - [facility] Infectious Disease   Khushi Harry 46 y o  male MRN: [id number]  Encounter: [id number]      Assessment/Plan:  1  Streptococcus intermedius peritonitis with intraperitoneal abscess  Admitted to [facility] 1/21-2/3 with sepsis due to acute peritonitis  CT A/P showed moderate to large intra-abdominal and pelvic ascites with diffuse enhancement and thickening of the peritoneal lining compatible with acute peritonitis  S/p paracentesis 1/23 with loculated ascites present and fluid studies consistent with infection with extremely elevated WBC >70K  Peritoneal fluid culture positive for strep intermedius  Clinical picture not consistent with SBP  There was no evidence of GI source for secondary peritonitis and cytology was negative for malignancy  S/p ex-lap, lysis of adhesions, peritoneal biopsy 1/28/23 with extensive intra-peritoneal abscess with multiple loculations, extensive rind, and friable abscess wall encountered  OR cultures also with Strep intermedius  He completed 14 day course of antibiotics on 2/10  He continues to follow with general surgery and midline abdominal incision is improving  Repeat CT shows near resolution of abdominalpelvic ascites  His abdomen remains soft and non-tender  Overall, he has significantly improved    -patient has completed antibiotic course   -continue to follow up AFB culture as noted below   -no formal ID follow up required, can call PRN   -continue to follow up with outpatient surgery as scheduled   -recommendation for outpatient colonoscopy discussed with pt      2  Possible mycobacterial infection  Histology from peritoneal biopsy shows acid fast stain positive for "rare possible mycobacterial organisms " AFB cx of peritoneum so far negative at 4 weeks  Consider possibility of extra-pulmonary TB or other slow-growing mycobacterial infection  Quantiferon gold negative and he has no respiratory symptoms   Patient without risk factors for TB otherwise and imaging of chest negative for active TB  Cytology from 1/28 is still pending    -micro will continue to hold AFB culture for total 8 weeks   -continue to follow up AFB culture   -if pt were to develop recurrent ascites would recommend peritoneal bx and sending for AFB cx at that time; d/w pt      3  Pressure ulcerations of buttocks, sacrum and upper back  Developed during prolonged hospitalization  He is following with wound care/surgery weekly  Wounds do not appear infected and are healing well    -continue local wound care     4  Cirrhosis  CT during recent admission showed nodularity of the liver surface suggestive of chronic cirrhosis  History of chronic hepatitis C, former etoh abuse, former IVDA  Now in remissions >20 years  Patient has not been treated for HCV and HCV PCR quant [id number]  HIV negative  -recommend f/u with outpatient GI for treatment of HCV     5  Drug rash  Prior hx of PCN allergy, s/p successful amoxicillin challenge during hospitalization  Subsequently developed drug rash on Augmentin  Tolerated Ceftin  Above assessment and plan discussed in detail with patient during examination  Antibiotics:  None     Subjective:  Patient presents to outpatient ID office for follow up for recent strep intermedius peritonitis  He was last see by ID on 3/1  ABG culture held for additional 4 weeks  So far at 6 weeks this is negative  He underwent repeat CT A/P which shows near resolution of his ascites  His abdomen remains soft and non-tender with incision healing well  He follows surgery as well as outpatient wound care for management of his DTI  These are improving  He is having no abdominal pain  No fevers or chills  His fatigue is improving  He has improved appetite  He reports some diarrhea intermittently which he has had even before his hospitalization       Objective:    Vitals:   Vitals:    03/22/23 1243   BP: 120/64   Pulse: 94   Temp: (!) 97 °F (36 1 °C)   SpO2: 98% Physical Exam:     General Appearance:  Alert, interactive, nontoxic, no acute distress  HEENT: Oropharynx moist without lesions  Lungs:   Clear to auscultation bilaterally; no wheezes, rhonchi or rales; respirations unlabored   Heart:  RRR; no murmur   Abdomen:   Soft, non-tender, non-distended, positive bowel sounds  No palpable organomegaly  Midline incision dressing intact, healing well  Extremities: No clubbing, cyanosis or edema   Skin: No new rashes or lesions  No draining wounds noted  Labs, Imaging, & Other studies:   All pertinent labs and imaging studies were personally reviewed by me including CT abdomen pelvis from 3/15       The following portions of the patient's history were reviewed and updated as appropriate: allergies, current medications, past family history, past medical history, past social history, past surgical history and problem list

## 2023-03-23 ENCOUNTER — OFFICE VISIT (OUTPATIENT)
Dept: WOUND CARE | Facility: CLINIC | Age: 53
End: 2023-03-23

## 2023-03-23 VITALS
TEMPERATURE: 98.7 F | RESPIRATION RATE: 18 BRPM | HEART RATE: 91 BPM | DIASTOLIC BLOOD PRESSURE: 80 MMHG | SYSTOLIC BLOOD PRESSURE: 120 MMHG

## 2023-03-23 DIAGNOSIS — L89.313 PRESSURE INJURY OF RIGHT BUTTOCK, STAGE 3 (HCC): Primary | ICD-10-CM

## 2023-03-23 DIAGNOSIS — L89.123 PRESSURE INJURY OF LEFT UPPER BACK, STAGE 3 (HCC): ICD-10-CM

## 2023-03-23 PROBLEM — L89.322 PRESSURE INJURY OF LEFT BUTTOCK, STAGE 2 (HCC): Status: RESOLVED | Noted: 2023-02-16 | Resolved: 2023-03-23

## 2023-03-23 RX ORDER — LIDOCAINE 40 MG/G
CREAM TOPICAL ONCE
Status: COMPLETED | OUTPATIENT
Start: 2023-03-23 | End: 2023-03-23

## 2023-03-23 RX ADMIN — LIDOCAINE 1 APPLICATION.: 40 CREAM TOPICAL at 13:19

## 2023-03-23 NOTE — PATIENT INSTRUCTIONS
Orders Placed This Encounter   Procedures    Wound cleansing and dressings     Wash your hands with soap and water  Cleanse the wound with unscented soap and water prior to applying a clean dressing  Shower yes , change dressing after shower  Keep dressing dry        Mid back wound:  Apply skin prep to skin surrounding wound   Apply Dermagran to all wound beds  Cover with Bordered Foam    Change dressing every other day and as needed for excessive drainage, leakage or displacement  Right Buttock wound is healed today       Off-loading Instructions:   Use a mattress topper on your mattress to help relieve pressure while in bed  Keep weight and pressure off wound at all times and turn every 2 hours  Avoid positions directing pressure to wound sites           Standing Status:   Future     Standing Expiration Date:   3/23/2024

## 2023-03-23 NOTE — PROGRESS NOTES
Patient ID: Katrin Roman is a 46 y o  male Date of Birth 1970     Chief Complaint   Patient presents with   • Follow Up Wound Care Visit     Wounds to the right buttock and upper back  Allergies  Penicillin v and Amoxicillin    Assessment / Plan:    Pressure injury of upper back, stage 3 (HCC)  Minute residual wound  Selective debridement    Continue with dermagran gauze  Diagnoses and all orders for this visit:    Pressure injury of right buttock, stage 3 (HCC)  -     lidocaine (LMX) 4 % cream  -     Wound cleansing and dressings; Future    Pressure injury of left upper back, stage 3 (HCC)  -     lidocaine (LMX) 4 % cream  -     Wound cleansing and dressings; Future    Other orders  -     Debridement              Debridement   Wound 01/28/23 Pressure Injury Back Upper    Universal Protocol:  Consent: Written consent obtained  Consent given by: patient  Time out: Immediately prior to procedure a "time out" was called to verify the correct patient, procedure, equipment, support staff and site/side marked as required    Patient understanding: patient states understanding of the procedure being performed  Patient identity confirmed: verbally with patient      Performed by: physician  Debridement type: selective  Pain control: lidocaine 4%  Pre-debridement measurements  Length (cm): 0 7  Width (cm): 0 6  Depth (cm): 0 1  Surface Area (cm^2): 0 42  Volume (cm^3): 0 04    Post-debridement measurements  Length (cm): 0 7  Width (cm): 0 6  Depth (cm): 0 2  Percent debrided: 50%  Surface Area (cm^2): 0 42  Area debrided (cm^2): 0 21  Volume (cm^3): 0 08  Devitalized tissue debrided: exudate and fibrin  Instrument(s) utilized: curette  Bleeding: small  Hemostasis obtained with: pressure  Procedural pain (0-10): 2  Post-procedural pain: 1   Response to treatment: procedure was tolerated well             Wound 01/28/23 Pressure Injury Back Upper (Active)   Wound Image Images linked 03/23/23 1310 Wound Description Yellow;Pink;Slough 03/23/23 1317   Aminah-wound Assessment Scar Tissue;Pink 03/23/23 1317   Wound Length (cm) 0 7 cm 03/23/23 1317   Wound Width (cm) 0 6 cm 03/23/23 1317   Wound Depth (cm) 0 1 cm 03/23/23 1317   Wound Surface Area (cm^2) 0 42 cm^2 03/23/23 1317   Wound Volume (cm^3) 0 042 cm^3 03/23/23 1317   Calculated Wound Volume (cm^3) 0 04 cm^3 03/23/23 1317   Drainage Amount Scant 03/23/23 1317   Drainage Description Serous; Yellow 03/23/23 1317   Non-staged Wound Description Full thickness 03/23/23 1317   Dressing Status Intact 03/23/23 1317       Wound 02/03/23 Pressure Injury Buttocks Right (Active)   Wound Image Images linked 03/23/23 1313   Wound Description Eschar;Dry 03/23/23 1317   Aminah-wound Assessment Pink;Scar Tissue 03/23/23 1317   Wound Length (cm) 0 5 cm 03/23/23 1317   Wound Width (cm) 0 3 cm 03/23/23 1317   Wound Depth (cm) 0 cm 03/23/23 1317   Wound Surface Area (cm^2) 0 15 cm^2 03/23/23 1317   Wound Volume (cm^3) 0 cm^3 03/23/23 1317   Calculated Wound Volume (cm^3) 0 cm^3 03/23/23 1317   Change in Wound Size % 100 03/23/23 1317   Drainage Amount None 03/23/23 1317   Non-staged Wound Description Not applicable 69/13/83 2230   Dressing Status Intact 03/23/23 1317       Subjective: [place] Proberta The patient is a 80-year-old white male who is 7 weeks out from exploratory laparotomy and drainage of intra-abdominal abscess  His midline anterior abdominal wound continues to granulate in  The pressure ulcer of his buttock has gone on to heal, there is a small residual pressure wound of his upper back with fibrinous material which is debrided today  Patient will continue to use Dermagran gauze for the residual pressure wound, and just dry gauze dressing for his granulating midline wound  He will follow-up in 2 weeks        The following portions of the patient's history were reviewed and updated as appropriate: allergies, current medications, past family history, past medical history, past social history, past surgical history, and problem list     Review of Systems   Constitutional: Negative for chills and fever  Respiratory: Negative  Cardiovascular: Negative  Gastrointestinal: Negative  Skin: Positive for wound  Objective:       Wound 01/28/23 Pressure Injury Back Upper (Active)   Wound Image Images linked 03/23/23 1310   Wound Description Yellow;Pink;Slough 03/23/23 1317   Aminah-wound Assessment Scar Tissue;Pink 03/23/23 1317   Wound Length (cm) 0 7 cm 03/23/23 1317   Wound Width (cm) 0 6 cm 03/23/23 1317   Wound Depth (cm) 0 1 cm 03/23/23 1317   Wound Surface Area (cm^2) 0 42 cm^2 03/23/23 1317   Wound Volume (cm^3) 0 042 cm^3 03/23/23 1317   Calculated Wound Volume (cm^3) 0 04 cm^3 03/23/23 1317   Drainage Amount Scant 03/23/23 1317   Drainage Description Serous; Yellow 03/23/23 1317   Non-staged Wound Description Full thickness 03/23/23 1317   Dressing Status Intact 03/23/23 1317       Wound 02/03/23 Pressure Injury Buttocks Right (Active)   Wound Image Images linked 03/23/23 1313   Wound Description Eschar;Dry 03/23/23 1317   Aminah-wound Assessment Pink;Scar Tissue 03/23/23 1317   Wound Length (cm) 0 5 cm 03/23/23 1317   Wound Width (cm) 0 3 cm 03/23/23 1317   Wound Depth (cm) 0 cm 03/23/23 1317   Wound Surface Area (cm^2) 0 15 cm^2 03/23/23 1317   Wound Volume (cm^3) 0 cm^3 03/23/23 1317   Calculated Wound Volume (cm^3) 0 cm^3 03/23/23 1317   Change in Wound Size % 100 03/23/23 1317   Drainage Amount None 03/23/23 1317   Non-staged Wound Description Not applicable 79/58/45 6559   Dressing Status Intact 03/23/23 1317       /80   Pulse 91   Temp 98 7 °F (37 1 °C)   Resp 18     Physical Exam  Constitutional:       Appearance: He is ill-appearing  Cardiovascular:      Rate and Rhythm: Normal rate  Pulmonary:      Effort: Pulmonary effort is normal    Abdominal:      Palpations: Abdomen is soft     Skin:                 Wound Instructions:  Orders Placed This Encounter   Procedures   • Wound cleansing and dressings     Wash your hands with soap and water   Cleanse the wound with unscented soap and water prior to applying a clean dressing        Shower yes , change dressing after shower  Keep dressing dry        Mid back wound:  Apply skin prep to skin surrounding wound   Apply Dermagran to all wound beds   Cover with Bordered Foam    Change dressing every other day and as needed for excessive drainage, leakage or displacement  Right Buttock wound is healed today       Off-loading Instructions:   Use a mattress topper on your mattress to help relieve pressure while in bed  Keep weight and pressure off wound at all times and turn every 2 hours     Avoid positions directing pressure to wound sites          Standing Status:   Future     Standing Expiration Date:   3/23/2024   • Debridement     This order was created via procedure documentation

## 2023-03-27 PROBLEM — A41.9 SEPSIS (HCC): Status: RESOLVED | Noted: 2023-01-22 | Resolved: 2023-03-27

## 2023-03-28 LAB — RHODAMINE-AURAMINE STN SPEC: NORMAL

## 2023-04-03 ENCOUNTER — OFFICE VISIT (OUTPATIENT)
Dept: SURGERY | Facility: CLINIC | Age: 53
End: 2023-04-03

## 2023-04-03 VITALS — TEMPERATURE: 97.7 F

## 2023-04-03 DIAGNOSIS — K65.0 ACUTE PERITONITIS (HCC): Primary | ICD-10-CM

## 2023-04-03 NOTE — PATIENT INSTRUCTIONS
Your abdominal wound is healing uneventfully  Please use adaptic and dry gauze on the remaining wound  You can wash it with soap and water, apply Cocoa butter or Vaseline as an emollient, then cover with Adaptic and dry gauze  No heavy lifting or strenuous activity  Return in 2 weeks for a final wound check

## 2023-04-04 NOTE — ASSESSMENT & PLAN NOTE
The midline wound continues to granulate and epithelialize  He will change from dry gauze dressing to adaptic dressings  And continue to wash the wound with soap and water, and change the dressing daily  Restrictions on strenuous activity remain in place  He will follow up in 2 weeks

## 2023-04-06 ENCOUNTER — OFFICE VISIT (OUTPATIENT)
Dept: WOUND CARE | Facility: CLINIC | Age: 53
End: 2023-04-06

## 2023-04-06 VITALS
SYSTOLIC BLOOD PRESSURE: 119 MMHG | TEMPERATURE: 98.6 F | RESPIRATION RATE: 18 BRPM | HEART RATE: 99 BPM | DIASTOLIC BLOOD PRESSURE: 77 MMHG

## 2023-04-06 DIAGNOSIS — L89.123 PRESSURE INJURY OF LEFT UPPER BACK, STAGE 3 (HCC): Primary | ICD-10-CM

## 2023-04-06 RX ORDER — LIDOCAINE 40 MG/G
CREAM TOPICAL ONCE
Status: COMPLETED | OUTPATIENT
Start: 2023-04-06 | End: 2023-04-06

## 2023-04-06 RX ADMIN — LIDOCAINE: 40 CREAM TOPICAL at 14:04

## 2023-04-06 NOTE — PATIENT INSTRUCTIONS
Orders Placed This Encounter   Procedures    Wound cleansing and dressings     Mid back wound is healed today       Shower yes      Mid back wound is healed today    Apply cocoa butter or moisturizer daily    Off-loading Instructions:   Use a mattress topper on your mattress to help relieve pressure while in bed  Avoid positions directing pressure to wound sites       Follow up in Dr Blanca García office as scheduled         Standing Status:   Future     Standing Expiration Date:   4/6/2024

## 2023-04-06 NOTE — PROGRESS NOTES
Patient ID: Wilfrido Perez is a 46 y o  male Date of Birth 1970     Chief Complaint   Patient presents with   • Follow Up Wound Care Visit     Mid upper back wound       Allergies  Penicillin v and Amoxicillin    Assessment / Plan:    Pressure injury of upper back, stage 3 (HCC)  Resolved, with adherent keratotic debris, to use emollient and protect from re-injury  Diagnoses and all orders for this visit:    Pressure injury of left upper back, stage 3 (HCC)  -     lidocaine (LMX) 4 % cream  -     Wound cleansing and dressings; Future              Procedures       Wound 01/28/23 Pressure Injury Back Upper (Active)   Wound Image Images linked 04/06/23 1330   Wound Description Yellow; Other (Comment) (yellow scab) 04/06/23 1333   Aminah-wound Assessment Scar Tissue;[place] 04/06/23 1333   Wound Length (cm) 0 5 cm 04/06/23 1333   Wound Width (cm) 0 2 cm 04/06/23 1333   Wound Depth (cm) 0 cm 04/06/23 1333   Wound Surface Area (cm^2) 0 1 cm^2 04/06/23 1333   Wound Volume (cm^3) 0 cm^3 04/06/23 1333   Calculated Wound Volume (cm^3) 0 cm^3 04/06/23 1333   Drainage Amount None 04/06/23 1333   Non-staged Wound Description Full thickness 04/06/23 1333   Treatments Cleansed 04/06/23 1333   Patient Tolerance Tolerated well 04/06/23 1333   Dressing Status Intact 04/06/23 1333       Subjective: [place] Sedrick The patient is 9-1/2 weeks out from exploratory laparotomy and drainage of intra-abdominal abscess  The patient's sacral decubitus ulcer is healed, and the pressure injury of his upper back has also gone on to heal   There is some keratotic debris which was not disturbed  The patient was instructed to use an emollient on the scar  The patient continues to follow-up with general surgery regarding his healing midline abdominal incision  Small partial-thickness defect of his upper abdominal cicatrix remains, but the wound for all intents and purposes has healed       Patient plans to return to full-time work in 25 days, will follow-up with general surgery regarding his abdominal wound in 11 days  He is discharged from the wound management center  The following portions of the patient's history were reviewed and updated as appropriate: allergies, current medications, past family history, past medical history, past social history, past surgical history, and problem list     Review of Systems   Constitutional: Positive for fatigue  Negative for chills and fever  Respiratory: Negative for cough  Cardiovascular: Negative for chest pain  Gastrointestinal: Negative  Skin: Positive for wound  Objective:       Wound 01/28/23 Pressure Injury Back Upper (Active)   Wound Image Images linked 04/06/23 1330   Wound Description Yellow; Other (Comment) (yellow scab) 04/06/23 1333   Aminah-wound Assessment Scar Tissue;[place] 04/06/23 1333   Wound Length (cm) 0 5 cm 04/06/23 1333   Wound Width (cm) 0 2 cm 04/06/23 1333   Wound Depth (cm) 0 cm 04/06/23 1333   Wound Surface Area (cm^2) 0 1 cm^2 04/06/23 1333   Wound Volume (cm^3) 0 cm^3 04/06/23 1333   Calculated Wound Volume (cm^3) 0 cm^3 04/06/23 1333   Drainage Amount None 04/06/23 1333   Non-staged Wound Description Full thickness 04/06/23 1333   Treatments Cleansed 04/06/23 1333   Patient Tolerance Tolerated well 04/06/23 1333   Dressing Status Intact 04/06/23 1333       /77   Pulse 99   Temp 98 6 °F (37 °C)   Resp 18     Physical Exam  Constitutional:       Appearance: Normal appearance  Skin:     General: Skin is warm and dry  Wound Instructions:  Orders Placed This Encounter   Procedures   • Wound cleansing and dressings     Mid back wound is healed today       Shower yes      Mid back wound is healed today    Apply cocoa butter or moisturizer daily    Off-loading Instructions:   Use a mattress topper on your mattress to help relieve pressure while in bed  Avoid positions directing pressure to wound sites       Follow up in Dr David Carter office as scheduled         Standing Status:   Future     Standing Expiration Date:   4/6/2024

## 2023-04-19 ENCOUNTER — TELEPHONE (OUTPATIENT)
Dept: GASTROENTEROLOGY | Facility: CLINIC | Age: 53
End: 2023-04-19

## 2023-04-19 PROBLEM — K21.9 GERD (GASTROESOPHAGEAL REFLUX DISEASE): Status: ACTIVE | Noted: 2023-04-19

## 2023-04-19 PROBLEM — Z12.11 COLON CANCER SCREENING: Status: ACTIVE | Noted: 2023-04-19

## 2023-04-19 NOTE — TELEPHONE ENCOUNTER
Pt to call and schedule     Colonoscopy and EGD  -Separate days  -Any [facility] [place]    Hepatology Referral  -Dr Edwar Campos or Dr Hany Marcano at [facility]    Ultrasound Elastography

## 2023-04-20 ENCOUNTER — LAB (OUTPATIENT)
Dept: LAB | Facility: HOSPITAL | Age: 53
End: 2023-04-20

## 2023-04-20 DIAGNOSIS — K74.60 CIRRHOSIS OF LIVER WITH ASCITES, UNSPECIFIED HEPATIC CIRRHOSIS TYPE (HCC): ICD-10-CM

## 2023-04-20 DIAGNOSIS — R18.8 CIRRHOSIS OF LIVER WITH ASCITES, UNSPECIFIED HEPATIC CIRRHOSIS TYPE (HCC): ICD-10-CM

## 2023-04-20 LAB
AFP-TM SERPL-MCNC: 5.7 NG/ML (ref 0.5–8)
ALBUMIN SERPL BCP-MCNC: 4.6 G/DL (ref 3.5–5)
ALP SERPL-CCNC: 63 U/L (ref 34–104)
ALT SERPL W P-5'-P-CCNC: 33 U/L (ref 7–52)
ANION GAP SERPL CALCULATED.3IONS-SCNC: 11 MMOL/L (ref 4–13)
AST SERPL W P-5'-P-CCNC: 27 U/L (ref 13–39)
BILIRUB SERPL-MCNC: 0.79 MG/DL (ref 0.2–1)
BUN SERPL-MCNC: 12 MG/DL (ref 5–25)
CALCIUM SERPL-MCNC: 9.8 MG/DL (ref 8.4–10.2)
CHLORIDE SERPL-SCNC: 99 MMOL/L (ref 96–108)
CO2 SERPL-SCNC: 25 MMOL/L (ref 21–32)
CREAT SERPL-MCNC: 0.82 MG/DL (ref 0.6–1.3)
ERYTHROCYTE [DISTWIDTH] IN BLOOD BY AUTOMATED COUNT: 12.4 % (ref 11.6–15.1)
FERRITIN SERPL-MCNC: 62 NG/ML (ref 8–388)
GFR SERPL CREATININE-BSD FRML MDRD: 101 ML/MIN/1.73SQ M
GLUCOSE P FAST SERPL-MCNC: 85 MG/DL (ref 65–99)
HCT VFR BLD AUTO: 45.7 % (ref 36.5–49.3)
HGB BLD-MCNC: 15 G/DL (ref 12–17)
INR PPP: 1.1 (ref 0.84–1.19)
IRON SATN MFR SERPL: 25 % (ref 20–50)
IRON SERPL-MCNC: 129 UG/DL (ref 65–175)
MCH RBC QN AUTO: 34.6 PG (ref 26.8–34.3)
MCHC RBC AUTO-ENTMCNC: 32.8 G/DL (ref 31.4–37.4)
MCV RBC AUTO: 105 FL (ref 82–98)
PLATELET # BLD AUTO: 174 THOUSANDS/UL (ref 149–390)
PMV BLD AUTO: 11.7 FL (ref 8.9–12.7)
POTASSIUM SERPL-SCNC: 3.8 MMOL/L (ref 3.5–5.3)
PROT SERPL-MCNC: 8 G/DL (ref 6.4–8.4)
PROTHROMBIN TIME: 14.2 SECONDS (ref 11.6–14.5)
RBC # BLD AUTO: 4.34 MILLION/UL (ref 3.88–5.62)
SODIUM SERPL-SCNC: 135 MMOL/L (ref 135–147)
TIBC SERPL-MCNC: 525 UG/DL (ref 250–450)
WBC # BLD AUTO: 5.22 THOUSAND/UL (ref 4.31–10.16)

## 2023-04-21 LAB
ANA SER QL IA: NEGATIVE
HAV AB SER QL IA: REACTIVE

## 2023-04-21 NOTE — RESULT ENCOUNTER NOTE
Please inform patient that his CBC is much improved  His hemoglobin is now normal at 15 2 months ago was 11  However his MCV, which measures the size of the red blood cells is elevated  This can be elevated with alcohol use, vitamin B12 deficiency in the right of other disorders  Can also related to some medications of which she is not taking  I realize he does not drink alcohol, thus I would recommend following up with his primary care provider or hematology for further evaluation of the elevated MCV  I will add on vitamin B12 and folate, please see if the lab can add this on, I will place the order  Alpha-fetoprotein tumor marker is normal   Ferritin is now down to normal suggesting the high ferritin while he was hospitalized was related to his acute inflammatory process  Chemistry panel was normal   INR is normal   Couple of the blood tests are still pending    Thank you

## 2023-04-22 NOTE — RESULT ENCOUNTER NOTE
Please inform patient that he is immune to hepatitis A  SANDEEP is negative  HFE gene is pending    Thank you

## 2023-04-24 NOTE — TELEPHONE ENCOUNTER
Left voicemail for pt to call back and get scheduled  Please schedule everything below to transfer pt to me at [phone]

## 2023-05-01 NOTE — TELEPHONE ENCOUNTER
Left voicemail for pt to call back and schedule  Lorrie Haynes,  Can you please call the patient and have her send a transmission from her Linq to see what her heart rate/rhythm were doing last night. She has appt with Dr. Dominic Ochoa on 5/4.      Thank you

## 2023-05-04 LAB — HFE GENE MUT ANL BLD/T: NORMAL

## 2023-05-04 NOTE — RESULT ENCOUNTER NOTE
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

## 2023-05-12 DIAGNOSIS — K21.9 GASTROESOPHAGEAL REFLUX DISEASE, UNSPECIFIED WHETHER ESOPHAGITIS PRESENT: ICD-10-CM

## 2023-05-12 RX ORDER — FAMOTIDINE 20 MG/1
TABLET, FILM COATED ORAL
Qty: 180 TABLET | Refills: 2 | Status: SHIPPED | OUTPATIENT
Start: 2023-05-12

## 2023-06-18 PROBLEM — Z12.11 COLON CANCER SCREENING: Status: RESOLVED | Noted: 2023-04-19 | Resolved: 2023-06-18

## 2023-10-30 NOTE — PROGRESS NOTES
Assessment/Plan:   Robina Bolton is a 46 y.o.male who is here for   Chief Complaint   Patient presents with    Hernia     Ventral hernia. Patient had abdominal surgery in 2023. Plan:   Patient does have incisional ventral hernia on examination with mild discomfort and no constipation. Patient does have hepatomegaly and chronic hepatitis c untreated. He never followed up with hepatologist referred by GI due to insurance so also has not completed his US elastography. He needs updated labs to check his liver function. We need updated CT abdomen and pelvis prior to his surgery so this can wait until we see him in 6 months after he has had a colonoscopy with GI, hepatology consult, and treatment of hepatitis c. Patient also vapes and we did discuss necessity of quitting for long term success of hernia repair. Post Op Pain Management:       Preoperative Clearance: medical       Imagin23  1. Overall significantly improved findings in the peritoneum with near-complete resolution of previous abdominopelvic ascites and no new/worsening findings of peritonitis. There is trace residual upper abdominal ascites as well as a discrete   thick-walled collection in the left upper quadrant measuring up to 2.5 cm.  2.  Mild wall thickening of the descending colon in the rectum which could be reactive to the peritoneal process or represents a nonspecific proctocolitis. 3.  Resolved pleural effusions. In preparation for this visit all relevant and known prior office notes, prior consultations, emergency room visits, blood work results, and imaging studies were personally reviewed. A total of  30 minutes was spent reviewing all of this information,caring for this patient, providing differential diagnosis, instructions for management, counseling and coordination of care. This also includes planning surgical intervention where indicated.     Patient understands hernia occurrence or re-occurrence risk is higher in a diabetic, tobacco user, with elevated BMIs.   _____________________________________________      HPI:  Anjelica Haynes is a 46 y.o.male who was referred for evaluation of Hernia (Ventral hernia. Patient had abdominal surgery in Jan 2023. )  . Currently complaining of  initial Incisional Hernia worse with bending, lifting, standing, no nausea and no vomiting, regular bowel movement. Duration of pain or symptoms:  Intermittent and no pain     Olivia Lopez is a 24-year-old gentleman who was hospitalized January 21 through February 3 after he presented with abdominal distention pain and fever. He was noted to have a nodular liver on CAT scan with a normal size spleen interestingly. He was also noted to have ascites that appeared to be loculated. Paracentesis revealed suspicion for secondary peritonitis. He did eventually undergo laparoscopy converted to open laparotomy. He did have peritoneal biopsies performed. A source for his secondary peritonitis was not identified at time of laparotomy. Peritoneal biopsies revealed rare AFB. However follow-up testing was negative for AFB. He was followed closely by infectious disease. He did have a negative quant interferon gold so was not felt that this was related to tuberculosis. He did have a low SAAG and a high protein in the fluid. To GI it was a relatively confusing picture as they do not typically see ascites treated surgically however the operative report states that he had loculated fluid collections and abscesses in the abdomen. Fortunately he is made a very nice recovery. Whether this fluid in the abdomen was related to his liver or not is not entirely clear. Seems like his ascites has essentially resolved without diuretics. I suspect he does have cirrhosis especially given chronic hepatitis C in addition to history of alcohol use/abuse and nodular liver noted on CAT scan.     GI recommendations: "However to try to confirm this further I would recommend checking an ultrasound elastography. Also given the unusual presentation I think he would benefit from meeting one of our hepatologist.  I will put in a consultation for hepatology."    MELD 3.0: 9 at 4/20/2023  1:37 PM  MELD-Na: 7 at 4/20/2023  1:37 PM  Calculated from:  Serum Creatinine: 0.82 mg/dL (Using min of 1 mg/dL) at 4/20/2023  1:37 PM  Serum Sodium: 135 mmol/L at 4/20/2023  1:37 PM  Total Bilirubin: 0.79 mg/dL (Using min of 1 mg/dL) at 4/20/2023  1:37 PM  Serum Albumin: 4.6 g/dL (Using max of 3.5 g/dL) at 4/20/2023  1:37 PM  INR(ratio): 1.10 at 4/20/2023  1:37 PM  Age at listing (hypothetical): 46 years  Sex: Male at 4/20/2023  1:37 PM      Was supposed to have EGD and colonoscopy with GI but never called back to schedule. US elastography ordered but never completed. Prior abdominal surgery:   Yes    BMI: Body mass index is 22.59 kg/m². Tobacco use:    Tobacco Use: Medium Risk (11/6/2023)    Patient History     Smoking Tobacco Use: Former     Smokeless Tobacco Use: Never     Passive Exposure: Not on file        Lab Results   Component Value Date    WBC 5.22 04/20/2023    HGB 15.0 04/20/2023    HCT 45.7 04/20/2023     (H) 04/20/2023     04/20/2023     Lab Results   Component Value Date    K 3.8 04/20/2023    CL 99 04/20/2023    CO2 25 04/20/2023    BUN 12 04/20/2023    CREATININE 0.82 04/20/2023    GLUF 85 04/20/2023    CALCIUM 9.8 04/20/2023    CORRECTEDCA 8.8 02/01/2023    AST 27 04/20/2023    ALT 33 04/20/2023    ALKPHOS 63 04/20/2023    EGFR 101 04/20/2023     Lab Results   Component Value Date    INR 1.10 04/20/2023    INR 1.17 01/30/2023    INR 1.21 (H) 01/29/2023    PROTIME 14.2 04/20/2023    PROTIME 14.9 (H) 01/30/2023    PROTIME 15.3 (H) 01/29/2023       Smoking Status: Current Smoker    ROS:  General ROS: negative  negative for - chills, fatigue, fever or night sweats, weight loss  Respiratory ROS: no cough, shortness of breath, or wheezing  Cardiovascular ROS: no chest pain or dyspnea on exertion  Genito-Urinary ROS: no dysuria, trouble voiding, or hematuria  Musculoskeletal ROS: negative for - gait disturbance, joint pain or muscle pain  Neurological ROS: no TIA or stroke symptoms  Abdominal ROS: see HPI  GI ROS: see HPI  Skin ROS: no new rashes or lesions   Lymphatic ROS: no new adenopathy noted by pt. GYN ROS: see HPI, no new GYN history or bleeding noted  Psy ROS: no new mental or behavioral disturbances       Patient Active Problem List   Diagnosis    Hepatitis C    Acute peritonitis ([address])    Cirrhosis of liver with ascites     Hyponatremia    Difficult intubation    Mycobacterium, atypical    Hypomagnesemia    Pressure injury of upper back, stage 3 (HCC)    GERD (gastroesophageal reflux disease)         Allergies: Penicillin v and Amoxicillin    Meds:  Current Outpatient Medications:     acetaminophen (TYLENOL) 500 mg tablet, Take 500 mg by mouth every 6 (six) hours as needed for mild pain, Disp: , Rfl:     clotrimazole (LOTRIMIN) 1 % cream, Apply topically 2 (two) times a day, Disp: 30 g, Rfl: 0    famotidine (PEPCID) 20 mg tablet, TAKE 1 TABLET BY MOUTH TWICE A DAY, Disp: 180 tablet, Rfl: 2    sildenafil (VIAGRA) 25 MG tablet, Take 1-4 tablets by mouth 20 minutes prior to sexual activity. AVOID NITRATES AND ALPHA BLOCKERS. Do not exceed 4 tablets in 24 hours. , Disp: , Rfl:     collagenase (Santyl) ointment, Apply topically daily To wound of upper back nickel thick.  (Patient not taking: Reported on 4/3/2023), Disp: 30 g, Rfl: 0    magnesium gluconate (MAGONATE) 500 mg tablet, Take 1 tablet (500 mg total) by mouth 2 (two) times a day (Patient not taking: Reported on 3/7/2023), Disp: 60 tablet, Rfl: 3    white petrolatum ointment, Apply topically as needed for dry skin (Patient not taking: Reported on 3/7/2023), Disp: 30 g, Rfl: 0    PMH:  Past Medical History:   Diagnosis Date    Hard to intubate     Hepatitis C        PSH:  Past Surgical History:   Procedure Laterality Date    HAND SURGERY      IR PARACENTESIS  1/23/2023    TX LAPS ABD PRTM&OMENTUM DX W/WO SPEC BR/WA SPX N/A 1/28/2023    Procedure: LAPAROSCOPY DIAGNOSTIC CONVERTED TO OPEN, LAPAROTOMY, REPAIR ENTEROTOMY X2, EXTENSIS LYSIS OF ADHESIONS, PERITONEAL BIOPSIES, PERITONEAL LAVAGE;  Surgeon: Crystal Paulino MD;  Location: [facility];  Service: General    WISDOM TOOTH EXTRACTION         Family History   Problem Relation Age of Onset    Diabetes Mother     No Known Problems Father         reports that he has quit smoking. His smoking use included cigarettes. He has never used smokeless tobacco. He reports that he does not currently use alcohol. He reports current drug use. Drug: Marijuana. Vitals:    11/06/23 0851   BP: 146/92   Pulse: 68   Temp: 97.7 °F (36.5 °C)       PHYSICAL EXAM  General Appearance:    Alert, cooperative, no distress,    Head:    Normocephalic without obvious abnormality   Eyes:    PERRL, conjunctiva/corneas clear,      Neck:   Supple, no adenopathy, no JVD   Back:     Symmetric, no spinal or CVA tenderness   Lungs:     Clear to auscultation bilaterally, no wheezing or rhonchi   Heart:    Regular rate and rhythm, S1 and S2 normal, no murmur   Abdomen:      liver enlarged  - edge below right costal margin: 3 cm liver edge palpable 3 cm below costal margin. incisional hernia  The hernia is, easily reducible, there is diastasis recti but the scar is very thin. Extremities:   Extremities normal. No clubbing, cyanosis or edema   Psych:   Normal Affect, AOx3. Neurologic:  Skin:   CNII-XII intact. Strength symmetric, speech intact    Warm, dry, intact, no visible rashes or lesions                   Informed consent for procedure was personally discussed, reviewed, and signed by Dr. Anita Cerrato. Discussion by Dr. Anita Cerrato was carried out regarding risks, benefits, and alternatives with the patient.  Risks include but are not limited to:  bleeding, infection, and delayed wound healing or an open wound, pulmonary embolus, leaks from bowel or bile ducts or other viscus, transfusions, death. Discussed in further detail the more common complications and their rates of occurrence.  was used if necessary. Patient expressed understanding of the issues discussed and wished/consented to proceed. All questions were answered by Dr. Suzie Orosco. Discussion performed between patient and the provider signing below.      Signature:   Amanda Keith    Date: 11/6/2023 Time: 9:07 AM

## 2023-11-06 ENCOUNTER — OFFICE VISIT (OUTPATIENT)
Dept: SURGERY | Facility: CLINIC | Age: 53
End: 2023-11-06
Payer: COMMERCIAL

## 2023-11-06 VITALS
BODY MASS INDEX: 22.68 KG/M2 | WEIGHT: 162 LBS | DIASTOLIC BLOOD PRESSURE: 92 MMHG | SYSTOLIC BLOOD PRESSURE: 146 MMHG | HEIGHT: 71 IN | TEMPERATURE: 97.7 F | HEART RATE: 68 BPM

## 2023-11-06 DIAGNOSIS — Z87.19 HISTORY OF PERITONITIS: ICD-10-CM

## 2023-11-06 DIAGNOSIS — R16.0 HEPATOMEGALY: ICD-10-CM

## 2023-11-06 DIAGNOSIS — K43.2 INCISIONAL HERNIA, WITHOUT OBSTRUCTION OR GANGRENE: Primary | ICD-10-CM

## 2023-11-06 DIAGNOSIS — B18.2 CHRONIC HEPATITIS C WITHOUT HEPATIC COMA (HCC): ICD-10-CM

## 2023-11-06 PROCEDURE — 99204 OFFICE O/P NEW MOD 45 MIN: CPT | Performed by: PHYSICIAN ASSISTANT

## 2023-11-06 RX ORDER — SILDENAFIL 25 MG/1
TABLET, FILM COATED ORAL
COMMUNITY
Start: 2023-08-09

## 2023-11-08 ENCOUNTER — APPOINTMENT (OUTPATIENT)
Dept: LAB | Facility: CLINIC | Age: 53
End: 2023-11-08
Payer: COMMERCIAL

## 2023-11-08 DIAGNOSIS — Z87.19 HISTORY OF PERITONITIS: ICD-10-CM

## 2023-11-08 DIAGNOSIS — B18.2 CHRONIC HEPATITIS C WITHOUT HEPATIC COMA (HCC): ICD-10-CM

## 2023-11-08 LAB
ALBUMIN SERPL BCP-MCNC: 4.4 G/DL (ref 3.5–5)
ALP SERPL-CCNC: 53 U/L (ref 34–104)
ALT SERPL W P-5'-P-CCNC: 69 U/L (ref 7–52)
ANION GAP SERPL CALCULATED.3IONS-SCNC: 8 MMOL/L
AST SERPL W P-5'-P-CCNC: 49 U/L (ref 13–39)
BASOPHILS # BLD AUTO: 0.04 THOUSANDS/ÂΜL (ref 0–0.1)
BASOPHILS NFR BLD AUTO: 1 % (ref 0–1)
BILIRUB SERPL-MCNC: 0.88 MG/DL (ref 0.2–1)
BUN SERPL-MCNC: 11 MG/DL (ref 5–25)
CALCIUM SERPL-MCNC: 9.4 MG/DL (ref 8.4–10.2)
CHLORIDE SERPL-SCNC: 102 MMOL/L (ref 96–108)
CO2 SERPL-SCNC: 30 MMOL/L (ref 21–32)
CREAT SERPL-MCNC: 0.83 MG/DL (ref 0.6–1.3)
EOSINOPHIL # BLD AUTO: 0.23 THOUSAND/ÂΜL (ref 0–0.61)
EOSINOPHIL NFR BLD AUTO: 5 % (ref 0–6)
ERYTHROCYTE [DISTWIDTH] IN BLOOD BY AUTOMATED COUNT: 12.4 % (ref 11.6–15.1)
GFR SERPL CREATININE-BSD FRML MDRD: 101 ML/MIN/1.73SQ M
GLUCOSE P FAST SERPL-MCNC: 111 MG/DL (ref 65–99)
HCT VFR BLD AUTO: 43.3 % (ref 36.5–49.3)
HGB BLD-MCNC: 14.7 G/DL (ref 12–17)
IMM GRANULOCYTES # BLD AUTO: 0.01 THOUSAND/UL (ref 0–0.2)
IMM GRANULOCYTES NFR BLD AUTO: 0 % (ref 0–2)
INR PPP: 1.05 (ref 0.84–1.19)
LYMPHOCYTES # BLD AUTO: 2.33 THOUSANDS/ÂΜL (ref 0.6–4.47)
LYMPHOCYTES NFR BLD AUTO: 47 % (ref 14–44)
MCH RBC QN AUTO: 35.6 PG (ref 26.8–34.3)
MCHC RBC AUTO-ENTMCNC: 33.9 G/DL (ref 31.4–37.4)
MCV RBC AUTO: 105 FL (ref 82–98)
MONOCYTES # BLD AUTO: 0.44 THOUSAND/ÂΜL (ref 0.17–1.22)
MONOCYTES NFR BLD AUTO: 9 % (ref 4–12)
NEUTROPHILS # BLD AUTO: 1.9 THOUSANDS/ÂΜL (ref 1.85–7.62)
NEUTS SEG NFR BLD AUTO: 38 % (ref 43–75)
NRBC BLD AUTO-RTO: 0 /100 WBCS
PLATELET # BLD AUTO: 162 THOUSANDS/UL (ref 149–390)
PMV BLD AUTO: 12.2 FL (ref 8.9–12.7)
POTASSIUM SERPL-SCNC: 3.9 MMOL/L (ref 3.5–5.3)
PROT SERPL-MCNC: 7.7 G/DL (ref 6.4–8.4)
PROTHROMBIN TIME: 13.6 SECONDS (ref 11.6–14.5)
RBC # BLD AUTO: 4.13 MILLION/UL (ref 3.88–5.62)
SODIUM SERPL-SCNC: 140 MMOL/L (ref 135–147)
WBC # BLD AUTO: 4.95 THOUSAND/UL (ref 4.31–10.16)

## 2023-11-08 PROCEDURE — 36415 COLL VENOUS BLD VENIPUNCTURE: CPT

## 2023-11-08 PROCEDURE — 87522 HEPATITIS C REVRS TRNSCRPJ: CPT

## 2023-11-08 PROCEDURE — 80053 COMPREHEN METABOLIC PANEL: CPT

## 2023-11-08 PROCEDURE — 85610 PROTHROMBIN TIME: CPT

## 2023-11-08 PROCEDURE — 87521 HEPATITIS C PROBE&RVRS TRNSC: CPT

## 2023-11-08 PROCEDURE — 85025 COMPLETE CBC W/AUTO DIFF WBC: CPT

## 2023-11-10 ENCOUNTER — HOSPITAL ENCOUNTER (OUTPATIENT)
Dept: CT IMAGING | Facility: HOSPITAL | Age: 53
End: 2023-11-10
Payer: COMMERCIAL

## 2023-11-10 DIAGNOSIS — Z87.19 HISTORY OF PERITONITIS: ICD-10-CM

## 2023-11-10 DIAGNOSIS — K43.2 INCISIONAL HERNIA, WITHOUT OBSTRUCTION OR GANGRENE: ICD-10-CM

## 2023-11-10 LAB
HCV RNA SERPL NAA+PROBE-ACNC: ABNORMAL IU/ML
HCV RNA SERPL NAA+PROBE-ACNC: DETECTED K[IU]/ML

## 2023-11-10 PROCEDURE — G1004 CDSM NDSC: HCPCS

## 2023-11-10 PROCEDURE — 74178 CT ABD&PLV WO CNTR FLWD CNTR: CPT

## 2023-11-10 RX ADMIN — IOHEXOL 100 ML: 350 INJECTION, SOLUTION INTRAVENOUS at 08:09

## 2023-11-28 ENCOUNTER — HOSPITAL ENCOUNTER (OUTPATIENT)
Dept: ULTRASOUND IMAGING | Facility: HOSPITAL | Age: 53
Discharge: HOME | End: 2023-11-28
Attending: INTERNAL MEDICINE
Payer: COMMERCIAL

## 2023-11-28 DIAGNOSIS — K74.60 CIRRHOSIS OF LIVER WITH ASCITES, UNSPECIFIED HEPATIC CIRRHOSIS TYPE: ICD-10-CM

## 2023-11-28 DIAGNOSIS — R18.8 CIRRHOSIS OF LIVER WITH ASCITES, UNSPECIFIED HEPATIC CIRRHOSIS TYPE: ICD-10-CM

## 2023-11-28 PROCEDURE — 76981 USE PARENCHYMA: CPT

## 2023-12-04 NOTE — RESULT ENCOUNTER NOTE
Please inform patient that the ultrasound elastography did reveal a moderate amount of scarring (fibrosis) in the liver but did not confirm cirrhosis of the liver as suggested by the CAT scan performed in March. The CAT scan performed on 11/10/2023 reports a normal-appearing liver and spleen. I will send a message to the radiologist that read that to have him take another look at the liver. I did recommend hepatology consultation back when I saw him back in April, please see that he has a hepatology consultation in order as he does need to pursue hepatitis C treatment in addition he had a very unusual presentation/hospitalization back in March.   Thank you

## 2023-12-20 ENCOUNTER — ANESTHESIA (OUTPATIENT)
Dept: GASTROENTEROLOGY | Facility: HOSPITAL | Age: 53
End: 2023-12-20

## 2023-12-20 ENCOUNTER — ANESTHESIA EVENT (OUTPATIENT)
Dept: GASTROENTEROLOGY | Facility: HOSPITAL | Age: 53
End: 2023-12-20

## 2023-12-20 ENCOUNTER — HOSPITAL ENCOUNTER (OUTPATIENT)
Dept: GASTROENTEROLOGY | Facility: HOSPITAL | Age: 53
Setting detail: OUTPATIENT SURGERY
Discharge: HOME | End: 2023-12-20
Attending: INTERNAL MEDICINE | Admitting: INTERNAL MEDICINE
Payer: COMMERCIAL

## 2023-12-20 VITALS
DIASTOLIC BLOOD PRESSURE: 70 MMHG | TEMPERATURE: 98.5 F | WEIGHT: 160 LBS | OXYGEN SATURATION: 97 % | BODY MASS INDEX: 22.4 KG/M2 | SYSTOLIC BLOOD PRESSURE: 122 MMHG | HEART RATE: 51 BPM | HEIGHT: 71 IN | RESPIRATION RATE: 18 BRPM

## 2023-12-20 DIAGNOSIS — K74.60 CIRRHOSIS OF LIVER WITH ASCITES, UNSPECIFIED HEPATIC CIRRHOSIS TYPE: ICD-10-CM

## 2023-12-20 DIAGNOSIS — Z12.11 COLON CANCER SCREENING: ICD-10-CM

## 2023-12-20 DIAGNOSIS — R18.8 CIRRHOSIS OF LIVER WITH ASCITES, UNSPECIFIED HEPATIC CIRRHOSIS TYPE: ICD-10-CM

## 2023-12-20 DIAGNOSIS — K21.9 GASTROESOPHAGEAL REFLUX DISEASE, UNSPECIFIED WHETHER ESOPHAGITIS PRESENT: ICD-10-CM

## 2023-12-20 PROCEDURE — 88313 SPECIAL STAINS GROUP 2: CPT | Performed by: PATHOLOGY

## 2023-12-20 PROCEDURE — 88305 TISSUE EXAM BY PATHOLOGIST: CPT | Performed by: PATHOLOGY

## 2023-12-20 PROCEDURE — 45385 COLONOSCOPY W/LESION REMOVAL: CPT | Performed by: INTERNAL MEDICINE

## 2023-12-20 PROCEDURE — 43239 EGD BIOPSY SINGLE/MULTIPLE: CPT | Performed by: INTERNAL MEDICINE

## 2023-12-20 RX ORDER — SODIUM CHLORIDE, SODIUM LACTATE, POTASSIUM CHLORIDE, CALCIUM CHLORIDE 600; 310; 30; 20 MG/100ML; MG/100ML; MG/100ML; MG/100ML
100 INJECTION, SOLUTION INTRAVENOUS CONTINUOUS
Status: DISCONTINUED | OUTPATIENT
Start: 2023-12-20 | End: 2023-12-24 | Stop reason: HOSPADM

## 2023-12-20 RX ORDER — LIDOCAINE HYDROCHLORIDE 20 MG/ML
INJECTION, SOLUTION EPIDURAL; INFILTRATION; INTRACAUDAL; PERINEURAL AS NEEDED
Status: DISCONTINUED | OUTPATIENT
Start: 2023-12-20 | End: 2023-12-20

## 2023-12-20 RX ORDER — FENTANYL CITRATE 50 UG/ML
INJECTION, SOLUTION INTRAMUSCULAR; INTRAVENOUS AS NEEDED
Status: DISCONTINUED | OUTPATIENT
Start: 2023-12-20 | End: 2023-12-20

## 2023-12-20 RX ORDER — PHENYLEPHRINE HCL IN 0.9% NACL 1 MG/10 ML
SYRINGE (ML) INTRAVENOUS AS NEEDED
Status: DISCONTINUED | OUTPATIENT
Start: 2023-12-20 | End: 2023-12-20

## 2023-12-20 RX ORDER — PROPOFOL 10 MG/ML
INJECTION, EMULSION INTRAVENOUS AS NEEDED
Status: DISCONTINUED | OUTPATIENT
Start: 2023-12-20 | End: 2023-12-20

## 2023-12-20 RX ADMIN — PROPOFOL 100 MG: 10 INJECTION, EMULSION INTRAVENOUS at 12:22

## 2023-12-20 RX ADMIN — PROPOFOL 50 MG: 10 INJECTION, EMULSION INTRAVENOUS at 12:56

## 2023-12-20 RX ADMIN — PROPOFOL 40 MG: 10 INJECTION, EMULSION INTRAVENOUS at 12:23

## 2023-12-20 RX ADMIN — PROPOFOL 10 MG: 10 INJECTION, EMULSION INTRAVENOUS at 12:24

## 2023-12-20 RX ADMIN — Medication 100 MCG: at 12:47

## 2023-12-20 RX ADMIN — SODIUM CHLORIDE, SODIUM LACTATE, POTASSIUM CHLORIDE, AND CALCIUM CHLORIDE 100 ML/HR: .6; .31; .03; .02 INJECTION, SOLUTION INTRAVENOUS at 11:20

## 2023-12-20 RX ADMIN — PROPOFOL 50 MG: 10 INJECTION, EMULSION INTRAVENOUS at 13:05

## 2023-12-20 RX ADMIN — PROPOFOL 50 MG: 10 INJECTION, EMULSION INTRAVENOUS at 12:25

## 2023-12-20 RX ADMIN — PROPOFOL 50 MG: 10 INJECTION, EMULSION INTRAVENOUS at 12:41

## 2023-12-20 RX ADMIN — TOPICAL ANESTHETIC 1 SPRAY: 200 SPRAY DENTAL; PERIODONTAL at 12:21

## 2023-12-20 RX ADMIN — Medication 100 MCG: at 13:07

## 2023-12-20 RX ADMIN — PROPOFOL 50 MG: 10 INJECTION, EMULSION INTRAVENOUS at 12:27

## 2023-12-20 RX ADMIN — LIDOCAINE HYDROCHLORIDE 100 MG: 20 INJECTION, SOLUTION EPIDURAL; INFILTRATION; INTRACAUDAL; PERINEURAL at 12:22

## 2023-12-20 RX ADMIN — PROPOFOL 50 MG: 10 INJECTION, EMULSION INTRAVENOUS at 12:45

## 2023-12-20 RX ADMIN — Medication 100 MCG: at 12:35

## 2023-12-20 RX ADMIN — FENTANYL CITRATE 50 MCG: 50 INJECTION, SOLUTION INTRAMUSCULAR; INTRAVENOUS at 12:09

## 2023-12-20 RX ADMIN — PROPOFOL 50 MG: 10 INJECTION, EMULSION INTRAVENOUS at 12:33

## 2023-12-20 NOTE — ANESTHESIA PROCEDURE NOTES
Anesthesia Notable Event  No anethesia notable event occurred.    Date/Time: 12/20/2023 1:09 PM    Patient location during procedure: OR procedure room  Performed by: Amy Toussaint CRNA  Authorized by: Spike Zhu DO

## 2023-12-20 NOTE — ANESTHESIA POSTPROCEDURE EVALUATION
Post-Op Assessment Note    CV Status:  Stable    Pain management: satisfactory to patient       Mental Status:  Awake, sleepy and arousable   Hydration Status:  Euvolemic   PONV Controlled:  Controlled   Airway Patency:  Patent     Post Op Vitals Reviewed: Yes      Staff: CRNA, Anesthesiologist               /70 (12/20/23 1311) 122/70   Temp      Pulse (!) 51 (12/20/23 1311)   Resp 18 (12/20/23 1311)    SpO2 97 % (12/20/23 1311)

## 2023-12-20 NOTE — H&P
History and Physical - [facility]  Vijay Barrera 52 y.o. male MRN: [id number]                HPI: Vijay Barrera is a 52 y.o. year old male who presents for EGD and colonoscopy.  He is having a colonoscopy for colorectal cancer screening.  He has not had a prior colonoscopy.  Please refer to my office note dated 4/19/2023 for details of his medical history.  He was hospitalized January 21 through February 3 with a severe illness abdominal distention pain and fever and required laparotomy and an evacuation of loculated ascites.  Again please refer to office note dated April 19, 2023 for details of that visit.    His reflux is improved with the use of Pepcid.  He has not yet had his hepatitis C treated but is planning on doing this after the new year.    He has noticed that he used to have 2 BMs a day and lately has been having 1 bowel a day.  This been no rectal bleeding or melena.  Does report occasional loose stool.    Imaging in the past did reveal nodular liver.  More recently elastography revealed F2 fibrosis.  He did see general surgery for a large ventral hernia.    Recent MCV was high at 105.  INR was 1.05  He is immune to hepatitis A and hepatitis B.  He does vape tobacco.  He does not drink alcohol and quit drinking many years ago.      REVIEW OF SYSTEMS: Per the HPI, and otherwise unremarkable.    Historical Information   Past Medical History:   Diagnosis Date    Hard to intubate     Hepatitis C      Past Surgical History:   Procedure Laterality Date    HAND SURGERY      IR PARACENTESIS  1/23/2023    VT LAPS ABD PRTM&OMENTUM DX W/WO SPEC BR/WA SPX N/A 1/28/2023    Procedure: LAPAROSCOPY DIAGNOSTIC CONVERTED TO OPEN, LAPAROTOMY, REPAIR ENTEROTOMY X2, EXTENSIS LYSIS OF ADHESIONS, PERITONEAL BIOPSIES, PERITONEAL LAVAGE;  Surgeon: Lamine Elliott MD;  Location: [facility];  Service: General    WISDOM TOOTH EXTRACTION       Social History   Social History     Substance and Sexual  "Activity   Alcohol Use Not Currently    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     Social History     Substance and Sexual Activity   Drug Use Yes    Frequency: 7.0 times per week    Types: Marijuana    Comment: medical marijuana     Social History     Tobacco Use   Smoking Status Former    Types: Cigarettes   Smokeless Tobacco Never   Tobacco Comments    Vape daily and medical marijuana every night      Family History   Problem Relation Age of Onset    Diabetes Mother     No Known Problems Father        Meds/Allergies       Current Outpatient Medications:     acetaminophen (TYLENOL) 500 mg tablet    famotidine (PEPCID) 20 mg tablet    sildenafil (VIAGRA) 25 MG tablet    clotrimazole (LOTRIMIN) 1 % cream    collagenase (Santyl) ointment    magnesium gluconate (MAGONATE) 500 mg tablet    white petrolatum ointment    Current Facility-Administered Medications:     lactated ringers infusion, 100 mL/hr, Intravenous, Continuous, Continue from Pre-op at 12/20/23 1206    Facility-Administered Medications Ordered in Other Encounters:     fentanyl citrate (PF) 100 MCG/2ML, , Intravenous, PRN, 50 mcg at 12/20/23 1209    Allergies   Allergen Reactions    Penicillin V Rash    Amoxicillin Rash     dysgeusia       Objective     /85   Pulse 78   Temp 98.5 °F (36.9 °C) (Temporal)   Resp 18   Ht 5' 11\" (1.803 m)   Wt 72.6 kg (160 lb)   SpO2 97%   BMI 22.32 kg/m²       PHYSICAL EXAM    Gen: NAD  Head: NCAT  CV: RRR  CHEST: Clear  ABD: soft, NT/ND.  Large ventral hernia on examination.  EXT: no edema      ASSESSMENT/PLAN:  This is a 52 y.o. year old male here for EGD and colonoscopy, and he is stable and optimized for his procedure.        "

## 2023-12-20 NOTE — ANESTHESIA PREPROCEDURE EVALUATION
Procedure:  COLONOSCOPY  EGD    Relevant Problems   ANESTHESIA   (+) Difficult intubation      GI/HEPATIC   (+) Cirrhosis of liver with ascites    (+) GERD (gastroesophageal reflux disease)   (+) Hepatitis C             Anesthesia Plan  ASA Score- 2     Anesthesia Type- IV sedation with anesthesia with ASA Monitors.         Additional Monitors:     Airway Plan:            Plan Factors-    Chart reviewed.                      Induction- intravenous.    Postoperative Plan-     Informed Consent- Anesthetic plan and risks discussed with patient.  I personally reviewed this patient with the CRNA. Discussed and agreed on the Anesthesia Plan with the CRNA..

## 2023-12-27 PROCEDURE — 88313 SPECIAL STAINS GROUP 2: CPT | Performed by: PATHOLOGY

## 2023-12-27 PROCEDURE — 88305 TISSUE EXAM BY PATHOLOGIST: CPT | Performed by: PATHOLOGY

## 2023-12-28 NOTE — RESULT ENCOUNTER NOTE
Please inform patient that biopsies from the stomach were benign and negative for bacteria called H. pylori.  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  I would recommend repeating endoscopy in 2 years, please recall for repeat EGD in 2 years.  Colon polyps were benign precancerous polyps.  Not all polyps were removed given extremely poor preparation.  Recommend repeating colonoscopy in about 3 months with a 2-day preparation.  Keep appointment with hepatology.  Also please arrange office follow-up with me for about 4 months to 5 months.  Thank you

## 2024-01-30 ENCOUNTER — OFFICE VISIT (OUTPATIENT)
Dept: GASTROENTEROLOGY | Facility: CLINIC | Age: 54
End: 2024-01-30
Payer: COMMERCIAL

## 2024-01-30 VITALS
BODY MASS INDEX: 21.92 KG/M2 | HEIGHT: 71 IN | DIASTOLIC BLOOD PRESSURE: 78 MMHG | TEMPERATURE: 97.8 F | OXYGEN SATURATION: 96 % | SYSTOLIC BLOOD PRESSURE: 150 MMHG | RESPIRATION RATE: 16 BRPM | WEIGHT: 156.6 LBS | HEART RATE: 82 BPM

## 2024-01-30 DIAGNOSIS — B18.2 CHRONIC HEPATITIS C WITHOUT HEPATIC COMA (HCC): ICD-10-CM

## 2024-01-30 DIAGNOSIS — Z87.19 HISTORY OF PERITONITIS: ICD-10-CM

## 2024-01-30 DIAGNOSIS — Z86.010 PERSONAL HISTORY OF COLONIC POLYPS: Primary | ICD-10-CM

## 2024-01-30 PROCEDURE — 99214 OFFICE O/P EST MOD 30 MIN: CPT | Performed by: FAMILY MEDICINE

## 2024-01-30 NOTE — PROGRESS NOTES
[facility] - Outpatient Consultation  Vjiay Barrera 53 y.o. male MRN: [id number]  Encounter: [id number]          ASSESSMENT AND PLAN:      1. Chronic hepatitis C without hepatic coma (HCC)  2. History of peritonitis  Patient with a treatment naïve chronic HCV infection, presumably contracted via IV drug use, who presents today to discuss treatment.  His most recent viral load was positive in 11/2023 and prior testing for HIV and HBV were negative. He also had a very unusual hospitalization in January 2023 (as detailed below) for secondary peritonitis of unclear etiology where he was found to have hypoalbuminemia, and elevated INR, mild transaminase elevations and a mildly nodular liver on imaging c/f cirrhosis.    He has since completed a US elastography for further evaluation of cirrhosis notable for F2 fibrosis. He also underwent an EGD without evidence of portal hypertension, his serologies show preserved platelet count and he has had more recent cross-sectional imaging without evidence of cirrhotic morphology or portal hypertension arguing against cirrhosis. At this time I would consider him not to be cirrhotic and will proceed with hepatitis C treatment.    Discussed the basic pathophysiology of HCV and potential to progress to cirrhosis over time if left untreated. He is interested and agreeable to being evaluated for treatment at this time. Ordered serologies as part of a pre-treatment evaluation to characterize patient's HCV, r/o coinfection with HIV and/or HBV and assess patient's immunity status to hep A and B. Patient gave verbal consent for HIV testing.    Discussed multiple treatment options (Mavyret, Eplcusa, Harvoni, Vosevi etc.), as well as, their varying lengths of treatment and side-effect profiles. He is aware of the importance of medication compliance and need for serologies both during and after treatment including an HCV viral load 12 weeks after  completing treatment to ensure SVR. Will also plan for repeat US elastography approximately 6-12 months after SVR to evaluate for improved hepatic fibrosis following the treatment of his hepatitis C. Counseled regarding measures to prevent the spread of HCV.     - Ambulatory Referral to Hepatology  - Ambulatory Referral to Hepatology  - CBC and differential; Future  - Comprehensive metabolic panel; Future  - Protime-INR; Future  - Chronic Hepatitis Panel; Future  - Hepatitis A antibody, total; Future  - Hepatitis B surface antibody; Future  - Hepatitis C RNA, quantitative, PCR; Future  - Hepatitis C genotype; Future  - HIV 1/2 AG/AB w Reflex [facility] for 2 yr old and above; Future    3. Personal history of colonic polyps  Patient is overdue for CRC screening. Colonoscopy (12/2023) was with an inadequate prep and notable for multiple colonic polyps. Recommended repeat x 3 months. Patient has been ordered for repeat colonoscopy today requiring a 2-day bowel prep. Discussed procedure and associated risks.    - Colonoscopy; Future  - polyethylene glycol (GOLYTELY) 4000 mL solution; Take 4,000 mL by mouth once for 1 dose  Dispense: 4000 mL; Refill: 0    Follow-up in 3 months.   ______________________________________________________________________    HPI: Patient is a 53 y.o. male with PMH significant for GERD and a history of acute secondary peritonitis who presents today for a consultation regarding hepatitis C.    Patient first came to the care of [facility] gastroenterology during hospitalization in January 2023 for which GI was consulted regarding possible cirrhosis and chronic HCV. He was noted to have hypoalbuminemia, and elevated INR, mild transaminase elevations and a mildly nodular liver on imaging. He was also noted to have new ascites in addition to subjective fevers, leukocytosis and imaging showing peritoneal enhancement concerning for peritonitis. He was noted to have a loculated fluid collection and  diagnostic paracentesis was not consistent with SBP but rather more concerning for secondary peritonitis. He underwent an exploratory laparotomy, repair of enterotomy, extensive lysis of adhesions, peritoneal biopsy and peritoneal lavage (1/28/2023). Peritoneal biopsies were unremarkable and etiology remains unclear to this day.    He later established care with Dr. Cuenca and was ordered for a US elastography showing F2 fibrosis. He also had a subsequent CT scan and November 2023 showing a normal-appearing liver and spleen. Serologies are without evidence of thrombocytopenia.    In regards to his HCV infection, patient was first made aware of his diagnosis and his 20s after trying to donate plasma. Denies prior treatment for hepatitis C or having had a prior liver biopsy. He does report a history of IV drug use with cocaine. Admits to sharing needles while actively using. 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 He also has 1 tattoo which was done professionally and denies prior blood transfusions.    Denies family history of liver disease or liver related cancers. Denies a known infection with HBV or HIV.      REVIEW OF SYSTEMS:    CONSTITUTIONAL: Denies any fever, chills, rigors, and weight loss.  HEENT: No earache or tinnitus. Denies hearing loss or visual disturbances.  CARDIOVASCULAR: No chest pain or palpitations.   RESPIRATORY: Denies any cough, hemoptysis, shortness of breath or dyspnea on exertion.  GASTROINTESTINAL: As noted in the History of Present Illness.   GENITOURINARY: No problems with urination. Denies any hematuria or dysuria.  NEUROLOGIC: No dizziness or vertigo, denies headaches.   MUSCULOSKELETAL: Denies any muscle or joint pain.   SKIN: Denies skin rashes or itching.   ENDOCRINE: Denies excessive thirst. Denies intolerance to heat or cold.  PSYCHOSOCIAL: Denies depression or anxiety. Denies any recent memory loss.       Historical Information   Past Medical History:   Diagnosis Date   • Hard  "to intubate    • Hepatitis C      Past Surgical History:   Procedure Laterality Date   • HAND SURGERY     • IR PARACENTESIS  1/23/2023   • MS LAPS ABD PRTM&OMENTUM DX W/WO SPEC BR/WA SPX N/A 1/28/2023    Procedure: LAPAROSCOPY DIAGNOSTIC CONVERTED TO OPEN, LAPAROTOMY, REPAIR ENTEROTOMY X2, EXTENSIS LYSIS OF ADHESIONS, PERITONEAL BIOPSIES, PERITONEAL LAVAGE;  Surgeon: Lamine Elliott MD;  Location: [facility];  Service: General   • WISDOM TOOTH EXTRACTION       Social History   Social History     Substance and Sexual Activity   Alcohol Use Not Currently    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     Social History     Substance and Sexual Activity   Drug Use Yes   • Frequency: 7.0 times per week   • Types: Marijuana    Comment: medical marijuana     Social History     Tobacco Use   Smoking Status Every Day   • Types: Cigarettes   Smokeless Tobacco Never   Tobacco Comments    Vape daily and medical marijuana every night      Family History   Problem Relation Age of Onset   • Diabetes Mother    • No Known Problems Father        Meds/Allergies       Current Outpatient Medications:   •  acetaminophen (TYLENOL) 500 mg tablet  •  clotrimazole (LOTRIMIN) 1 % cream  •  famotidine (PEPCID) 20 mg tablet  •  polyethylene glycol (GOLYTELY) 4000 mL solution  •  sildenafil (VIAGRA) 25 MG tablet  •  collagenase (Santyl) ointment  •  magnesium gluconate (MAGONATE) 500 mg tablet  •  white petrolatum ointment    Allergies   Allergen Reactions   • Penicillin V Rash   • Amoxicillin Rash     dysgeusia           Objective     Blood pressure 150/78, pulse 82, temperature 97.8 °F (36.6 °C), temperature source Temporal, resp. rate 16, height 5' 11\" (1.803 m), weight 71 kg (156 lb 9.6 oz), SpO2 96%. Body mass index is 21.84 kg/m².        PHYSICAL EXAM:      General Appearance:   Alert, cooperative, no distress   HEENT:   Normocephalic, atraumatic, anicteric.     Neck:  Supple, symmetrical, trachea midline   Lungs:   Clear to auscultation bilaterally; no " rales, rhonchi or wheezing; respirations unlabored    Heart::   Regular rate and rhythm; no murmur, rub, or gallop.   Abdomen:   Soft, non-tender, non-distended; normal bowel sounds; no masses, no organomegaly    Genitalia:   Deferred    Rectal:   Deferred    Extremities:  No cyanosis, clubbing or edema    Pulses:  2+ and symmetric    Skin:  No jaundice, rashes, or lesions    Lymph nodes:  No palpable cervical lymphadenopathy        Lab Results:   No visits with results within 1 Day(s) from this visit.   Latest known visit with results is:   Hospital Outpatient Visit on 12/20/2023   Component Date Value   • Case Report 12/20/2023                      Value:Surgical Pathology Report                         Case: [id number]                                   Authorizing Provider:  Yonas Cuenca DO  Collected:           12/20/2023 1227              Ordering Location:     [facility] Received:            12/20/2023 1438                                     Endoscopy                                                                    Pathologist:           Parth Billings MD                                                           Specimens:   A) - Stomach, ANTRAL ERYTHEMA R/O H PYLORI, COLD BX                                                 B) - Esophagus, IRREGULAR Z LINE @ 40 R/O BARRETTS, COLD BX                                         C) - Large Intestine, Transverse Colon, MID TRANSVERSE COLON POLYPS X2, COLD SNARE                  D) - Large Intestine, Cecum, CECAL POLYP, COLD SNARE                                                E) - Large Intestine, Right/Ascending Colon, PROXIMAL ASCENDING COLON POLYP, COLD                                             SNARE                                                                                     • Final Diagnosis 12/20/2023                      Value:This result contains rich text formatting which cannot be displayed here.   • Note 12/20/2023                       Value:This result contains rich text formatting which cannot be displayed here.   • Additional Information 12/20/2023                      Value:This result contains rich text formatting which cannot be displayed here.   • Synoptic Checklist 12/20/2023                      Value:                            COLON/RECTUM POLYP FORM - GI - C, D, E                                                                                     :    Adenoma(s)     • Gross Description 12/20/2023                      Value:This result contains rich text formatting which cannot be displayed here.   • Clinical Information 12/20/2023                      Value:FINDINGS:  · The duodenal bulb and 2nd part of the duodenum appeared normal.  · Mild, patchy erythematous mucosa in the antrum; performed 3 cold forceps biopsies. The gastric body appeared normal.  Retroflexion revealed a normal cardia and fundus.  · Irregular Z-line 40 cm from the incisors; performed cold forceps biopsy. There is a single 1 cm tongue of salmon-colored mucosa from 40 to 41 cm.  This was biopsied to rule out short segment Guerrero's esophagus.  The remainder the esophagus appeared normal.  No evidence of esophageal varices.  Inlet patch noted proximal esophagus.    FINDINGS:  · Photodocumentation of the cecum.  · Two sessile polyps measuring from 8 mm up to 9 mm in the mid transverse colon; performed cold snare with complete en bloc removal and retrieved specimen  · One 4 mm polyp in the cecum; performed cold snare with complete en bloc removal and retrieved specimen. Polyp located in region of appendiceal orifice and was completely removed.  · 12 mm sessile polyp in the                           proximal ascending colon; performed cold snare with complete piecemeal removal and retrieved specimen. Is a 12 mm sessile polyp on the proximal side of the proximal side of the ileocecal valve.  This was removed in piecemeal technique.  · There is a large  amount of debris scattered throughout the entire colon worse in the left colon.  Final [place] bowel prep score was 2.  · Two sessile polyps measuring from 6 mm up to 9 mm in the distal ascending colon. Polyps were not removed due to extremely poor preparation in this region of the colon.  These polyps are amendable to endoscopic resection.  · Retroflexion in rectum was normal           Radiology Results:   No results found.    Shanda Evans PA-C     **Please note: Dictation voice to text software may have been used in the creation of this record. Occasional wrong word or “sound alike” substitutions may have occurred due to the inherent limitations of voice recognition software. Read the chart carefully and recognize, using context, where substitutions have occurred.**

## 2024-01-31 ENCOUNTER — APPOINTMENT (OUTPATIENT)
Dept: LAB | Facility: CLINIC | Age: 54
End: 2024-01-31
Payer: COMMERCIAL

## 2024-01-31 DIAGNOSIS — B18.2 CHRONIC HEPATITIS C WITHOUT HEPATIC COMA (HCC): ICD-10-CM

## 2024-01-31 DIAGNOSIS — B18.2 CHRONIC HEPATITIS C WITHOUT HEPATIC COMA (HCC): Primary | ICD-10-CM

## 2024-01-31 LAB
ALBUMIN SERPL BCP-MCNC: 5 G/DL (ref 3.5–5)
ALP SERPL-CCNC: 59 U/L (ref 34–104)
ALT SERPL W P-5'-P-CCNC: 57 U/L (ref 7–52)
ANION GAP SERPL CALCULATED.3IONS-SCNC: 13 MMOL/L
AST SERPL W P-5'-P-CCNC: 46 U/L (ref 13–39)
BASOPHILS # BLD AUTO: 0.02 THOUSANDS/ÂΜL (ref 0–0.1)
BASOPHILS NFR BLD AUTO: 0 % (ref 0–1)
BILIRUB SERPL-MCNC: 1.16 MG/DL (ref 0.2–1)
BUN SERPL-MCNC: 10 MG/DL (ref 5–25)
CALCIUM SERPL-MCNC: 10.2 MG/DL (ref 8.4–10.2)
CHLORIDE SERPL-SCNC: 96 MMOL/L (ref 96–108)
CO2 SERPL-SCNC: 27 MMOL/L (ref 21–32)
CREAT SERPL-MCNC: 0.86 MG/DL (ref 0.6–1.3)
EOSINOPHIL # BLD AUTO: 0.27 THOUSAND/ÂΜL (ref 0–0.61)
EOSINOPHIL NFR BLD AUTO: 6 % (ref 0–6)
ERYTHROCYTE [DISTWIDTH] IN BLOOD BY AUTOMATED COUNT: 12.1 % (ref 11.6–15.1)
GFR SERPL CREATININE-BSD FRML MDRD: 99 ML/MIN/1.73SQ M
GLUCOSE P FAST SERPL-MCNC: 129 MG/DL (ref 65–99)
HAV AB SER QL IA: REACTIVE
HBV CORE AB SER QL: ABNORMAL
HBV CORE IGM SER QL: ABNORMAL
HBV SURFACE AB SER-ACNC: 176 MIU/ML
HBV SURFACE AG SER QL: ABNORMAL
HCT VFR BLD AUTO: 46.1 % (ref 36.5–49.3)
HCV AB SER QL: REACTIVE
HGB BLD-MCNC: 15.5 G/DL (ref 12–17)
HIV 1+2 AB+HIV1 P24 AG SERPL QL IA: NORMAL
HIV 2 AB SERPL QL IA: NORMAL
HIV1 AB SERPL QL IA: NORMAL
HIV1 P24 AG SERPL QL IA: NORMAL
IMM GRANULOCYTES # BLD AUTO: 0.02 THOUSAND/UL (ref 0–0.2)
IMM GRANULOCYTES NFR BLD AUTO: 0 % (ref 0–2)
INR PPP: 1.05 (ref 0.84–1.19)
LYMPHOCYTES # BLD AUTO: 1.72 THOUSANDS/ÂΜL (ref 0.6–4.47)
LYMPHOCYTES NFR BLD AUTO: 38 % (ref 14–44)
MCH RBC QN AUTO: 34.4 PG (ref 26.8–34.3)
MCHC RBC AUTO-ENTMCNC: 33.6 G/DL (ref 31.4–37.4)
MCV RBC AUTO: 102 FL (ref 82–98)
MONOCYTES # BLD AUTO: 0.27 THOUSAND/ÂΜL (ref 0.17–1.22)
MONOCYTES NFR BLD AUTO: 6 % (ref 4–12)
NEUTROPHILS # BLD AUTO: 2.2 THOUSANDS/ÂΜL (ref 1.85–7.62)
NEUTS SEG NFR BLD AUTO: 50 % (ref 43–75)
NRBC BLD AUTO-RTO: 0 /100 WBCS
PLATELET # BLD AUTO: 160 THOUSANDS/UL (ref 149–390)
PMV BLD AUTO: 12.2 FL (ref 8.9–12.7)
POTASSIUM SERPL-SCNC: 4 MMOL/L (ref 3.5–5.3)
PROT SERPL-MCNC: 9 G/DL (ref 6.4–8.4)
PROTHROMBIN TIME: 13.6 SECONDS (ref 11.6–14.5)
RBC # BLD AUTO: 4.5 MILLION/UL (ref 3.88–5.62)
SODIUM SERPL-SCNC: 136 MMOL/L (ref 135–147)
WBC # BLD AUTO: 4.5 THOUSAND/UL (ref 4.31–10.16)

## 2024-01-31 PROCEDURE — 87902 NFCT AGT GNTYP ALYS HEP C: CPT

## 2024-01-31 PROCEDURE — 85610 PROTHROMBIN TIME: CPT

## 2024-01-31 PROCEDURE — 36415 COLL VENOUS BLD VENIPUNCTURE: CPT

## 2024-01-31 PROCEDURE — 87340 HEPATITIS B SURFACE AG IA: CPT

## 2024-01-31 PROCEDURE — 86705 HEP B CORE ANTIBODY IGM: CPT

## 2024-01-31 PROCEDURE — 80053 COMPREHEN METABOLIC PANEL: CPT

## 2024-01-31 PROCEDURE — 86803 HEPATITIS C AB TEST: CPT

## 2024-01-31 PROCEDURE — 86708 HEPATITIS A ANTIBODY: CPT

## 2024-01-31 PROCEDURE — 85025 COMPLETE CBC W/AUTO DIFF WBC: CPT

## 2024-01-31 PROCEDURE — 86704 HEP B CORE ANTIBODY TOTAL: CPT

## 2024-01-31 PROCEDURE — 87522 HEPATITIS C REVRS TRNSCRPJ: CPT

## 2024-01-31 PROCEDURE — 87389 HIV-1 AG W/HIV-1&-2 AB AG IA: CPT

## 2024-01-31 PROCEDURE — 86706 HEP B SURFACE ANTIBODY: CPT

## 2024-01-31 NOTE — TELEPHONE ENCOUNTER
----- Message from Shanda Evans PA-C sent at 1/31/2024  8:19 AM EST -----  Please initiate treatment for hepatitis C pending patient's lab results. Thank you!

## 2024-02-01 RX ORDER — VELPATASVIR AND SOFOSBUVIR 100; 400 MG/1; MG/1
1 TABLET, FILM COATED ORAL DAILY
Qty: 28 TABLET | Refills: 2 | Status: SHIPPED | OUTPATIENT
Start: 2024-02-01 | End: 2024-02-08 | Stop reason: SDUPTHER

## 2024-02-01 NOTE — TELEPHONE ENCOUNTER
Script entered for preferred formulary of Epclusa Brand x 12 wks     Treatment naive  Viral Load 5880  Genotype 1a  Elastography F2    HBV surface Ab 176 (IMMUNE)

## 2024-02-02 LAB
HCV RNA SERPL NAA+PROBE-ACNC: NORMAL IU/ML
HCV RNA SERPL NAA+PROBE-LOG IU: 6.25 LOG10 IU/ML
TEST INFORMATION: NORMAL

## 2024-02-03 LAB
HCV GENTYP SERPL NAA+PROBE: NORMAL
HCV PLEASE NOTE: NORMAL

## 2024-02-08 RX ORDER — VELPATASVIR AND SOFOSBUVIR 100; 400 MG/1; MG/1
1 TABLET, FILM COATED ORAL DAILY
Qty: 28 TABLET | Refills: 2 | Status: SHIPPED | OUTPATIENT
Start: 2024-02-08 | End: 2024-05-02

## 2024-02-08 NOTE — TELEPHONE ENCOUNTER
all patient info was sent to [facility].   approved   CaseId: [id number]; Status: Approved; Review Type: Prior Auth; Coverage Start Date: 01/06/2024; Coverage End Date: 04/29/2024;  Authorization Expiration Date: 4/28/2024       63-year-old female returns for 10 year interval screening colonoscopy.  Last study me was normal 10 years ago.  No major interval illnesses than diabetes and hypertension medication for same.  Patient does not have family history colon cancer or polyps the places her at risk.

## 2024-02-09 NOTE — TELEPHONE ENCOUNTER
Spoke with [facility] pharmacy [facility] who confirmed receipt of script on 2/8/24, then was transferred to ANTONIO for status update who advised there is a delay in shipping medication to pt due to refills not being covered, I confirmed with Antonio that [facility] is responsible for contacting pt to enroll in copay assist and shipping medication to pt, confirmed pt's correct phone number (they had office number listed as pt's phone number).      LVM for pt to contact [facility] at [phone] to enroll in copay assist and arrange delivery of epclusa medication.

## 2024-02-22 ENCOUNTER — TELEPHONE (OUTPATIENT)
Dept: GASTROENTEROLOGY | Facility: CLINIC | Age: 54
End: 2024-02-22

## 2024-02-22 NOTE — TELEPHONE ENCOUNTER
----- Message from Jelly Pyle LPN sent at 2/21/2024  3:41 PM EST -----  He is due for colonoscopy anytime after 3/20/24 with 2 day prep.

## 2024-03-13 ENCOUNTER — APPOINTMENT (OUTPATIENT)
Dept: LAB | Facility: CLINIC | Age: 54
End: 2024-03-13
Payer: COMMERCIAL

## 2024-03-13 DIAGNOSIS — B18.2 CHRONIC HEPATITIS C WITHOUT HEPATIC COMA (HCC): ICD-10-CM

## 2024-03-14 ENCOUNTER — APPOINTMENT (OUTPATIENT)
Dept: LAB | Facility: CLINIC | Age: 54
End: 2024-03-14
Payer: COMMERCIAL

## 2024-03-14 DIAGNOSIS — B18.2 CHRONIC HEPATITIS C WITHOUT HEPATIC COMA (HCC): ICD-10-CM

## 2024-03-14 LAB
ALBUMIN SERPL BCP-MCNC: 4.5 G/DL (ref 3.5–5)
ALP SERPL-CCNC: 46 U/L (ref 34–104)
ALT SERPL W P-5'-P-CCNC: 14 U/L (ref 7–52)
ANION GAP SERPL CALCULATED.3IONS-SCNC: 9 MMOL/L (ref 4–13)
AST SERPL W P-5'-P-CCNC: 15 U/L (ref 13–39)
BASOPHILS # BLD AUTO: 0.02 THOUSANDS/ÂΜL (ref 0–0.1)
BASOPHILS NFR BLD AUTO: 0 % (ref 0–1)
BILIRUB SERPL-MCNC: 0.55 MG/DL (ref 0.2–1)
BUN SERPL-MCNC: 12 MG/DL (ref 5–25)
CALCIUM SERPL-MCNC: 9.7 MG/DL (ref 8.4–10.2)
CHLORIDE SERPL-SCNC: 100 MMOL/L (ref 96–108)
CO2 SERPL-SCNC: 30 MMOL/L (ref 21–32)
CREAT SERPL-MCNC: 0.9 MG/DL (ref 0.6–1.3)
EOSINOPHIL # BLD AUTO: 0.44 THOUSAND/ÂΜL (ref 0–0.61)
EOSINOPHIL NFR BLD AUTO: 9 % (ref 0–6)
ERYTHROCYTE [DISTWIDTH] IN BLOOD BY AUTOMATED COUNT: 12.5 % (ref 11.6–15.1)
GFR SERPL CREATININE-BSD FRML MDRD: 97 ML/MIN/1.73SQ M
GLUCOSE SERPL-MCNC: 106 MG/DL (ref 65–140)
HCT VFR BLD AUTO: 45.1 % (ref 36.5–49.3)
HGB BLD-MCNC: 15 G/DL (ref 12–17)
IMM GRANULOCYTES # BLD AUTO: 0 THOUSAND/UL (ref 0–0.2)
IMM GRANULOCYTES NFR BLD AUTO: 0 % (ref 0–2)
LYMPHOCYTES # BLD AUTO: 2.06 THOUSANDS/ÂΜL (ref 0.6–4.47)
LYMPHOCYTES NFR BLD AUTO: 45 % (ref 14–44)
MCH RBC QN AUTO: 34.2 PG (ref 26.8–34.3)
MCHC RBC AUTO-ENTMCNC: 33.3 G/DL (ref 31.4–37.4)
MCV RBC AUTO: 103 FL (ref 82–98)
MONOCYTES # BLD AUTO: 0.34 THOUSAND/ÂΜL (ref 0.17–1.22)
MONOCYTES NFR BLD AUTO: 7 % (ref 4–12)
NEUTROPHILS # BLD AUTO: 1.81 THOUSANDS/ÂΜL (ref 1.85–7.62)
NEUTS SEG NFR BLD AUTO: 39 % (ref 43–75)
NRBC BLD AUTO-RTO: 0 /100 WBCS
PLATELET # BLD AUTO: 166 THOUSANDS/UL (ref 149–390)
PMV BLD AUTO: 12.2 FL (ref 8.9–12.7)
POTASSIUM SERPL-SCNC: 4.3 MMOL/L (ref 3.5–5.3)
PROT SERPL-MCNC: 7.7 G/DL (ref 6.4–8.4)
RBC # BLD AUTO: 4.39 MILLION/UL (ref 3.88–5.62)
SODIUM SERPL-SCNC: 139 MMOL/L (ref 135–147)
WBC # BLD AUTO: 4.67 THOUSAND/UL (ref 4.31–10.16)

## 2024-03-14 PROCEDURE — 80053 COMPREHEN METABOLIC PANEL: CPT

## 2024-03-14 PROCEDURE — 36415 COLL VENOUS BLD VENIPUNCTURE: CPT

## 2024-03-14 PROCEDURE — 85025 COMPLETE CBC W/AUTO DIFF WBC: CPT

## 2024-03-14 PROCEDURE — 87522 HEPATITIS C REVRS TRNSCRPJ: CPT

## 2024-03-14 PROCEDURE — 87521 HEPATITIS C PROBE&RVRS TRNSC: CPT

## 2024-03-15 ENCOUNTER — PATIENT MESSAGE (OUTPATIENT)
Dept: GASTROENTEROLOGY | Facility: CLINIC | Age: 54
End: 2024-03-15

## 2024-03-15 DIAGNOSIS — B18.2 CHRONIC HEPATITIS C WITHOUT HEPATIC COMA (HCC): Primary | ICD-10-CM

## 2024-03-15 LAB — HCV RNA SERPL NAA+PROBE-ACNC: ABNORMAL K[IU]/ML

## 2024-05-01 ENCOUNTER — OFFICE VISIT (OUTPATIENT)
Dept: GASTROENTEROLOGY | Facility: CLINIC | Age: 54
End: 2024-05-01
Payer: COMMERCIAL

## 2024-05-01 VITALS
OXYGEN SATURATION: 98 % | RESPIRATION RATE: 18 BRPM | BODY MASS INDEX: 22.68 KG/M2 | DIASTOLIC BLOOD PRESSURE: 80 MMHG | WEIGHT: 162 LBS | HEIGHT: 71 IN | HEART RATE: 83 BPM | SYSTOLIC BLOOD PRESSURE: 124 MMHG | TEMPERATURE: 97.5 F

## 2024-05-01 DIAGNOSIS — K74.00 LIVER FIBROSIS: ICD-10-CM

## 2024-05-01 DIAGNOSIS — K43.9 VENTRAL HERNIA WITHOUT OBSTRUCTION OR GANGRENE: ICD-10-CM

## 2024-05-01 DIAGNOSIS — K22.70 BARRETT'S ESOPHAGUS WITHOUT DYSPLASIA: ICD-10-CM

## 2024-05-01 DIAGNOSIS — B18.2 CHRONIC HEPATITIS C WITHOUT HEPATIC COMA (HCC): Primary | ICD-10-CM

## 2024-05-01 DIAGNOSIS — Z86.010 PERSONAL HISTORY OF COLONIC POLYPS: ICD-10-CM

## 2024-05-01 DIAGNOSIS — K21.00 GASTROESOPHAGEAL REFLUX DISEASE WITH ESOPHAGITIS WITHOUT HEMORRHAGE: ICD-10-CM

## 2024-05-01 DIAGNOSIS — Z87.19 HISTORY OF PERITONITIS: ICD-10-CM

## 2024-05-01 PROCEDURE — 99214 OFFICE O/P EST MOD 30 MIN: CPT | Performed by: PHYSICIAN ASSISTANT

## 2024-05-01 RX ORDER — OMEPRAZOLE 20 MG/1
20 CAPSULE, DELAYED RELEASE ORAL DAILY
Qty: 30 CAPSULE | Refills: 11 | Status: SHIPPED | OUTPATIENT
Start: 2024-05-01

## 2024-05-01 NOTE — PATIENT INSTRUCTIONS
Once you are finished with the Epclusa, we can then start you back on something like omeprazole to help protect your esophagus given you are found to have Guerrero's esophagus.  You will need another upper endoscopy in 12/2025 per Dr. Cuenca's recommendation.  You can try a fiber powder like Benefiber or Metamucil to help add some bulk and form to your stool.  We do recognize that you need another colonoscopy, though we will see what the surgeon says during your appointment next week regarding the timing of your colonoscopy.  If he/says she feels it is okay to proceed with the colonoscopy now, please let me know so we can get this scheduled.  We will have you follow-up with Shanda in a couple of months.

## 2024-05-01 NOTE — PROGRESS NOTES
[facility] - Outpatient Follow-up Note  Vijay Barrera 53 y.o. male MRN: [id number]  Encounter: [id number]    ASSESSMENT AND PLAN:      1. Chronic hepatitis C without hepatic coma (HCC)  2. History of peritonitis  3. Liver fibrosis    Pt with treatment naive chronic HCV infection.   Completing treatment with 12 weeks Epclusa.   Pt has been compliant with medication.   There was some initiation concern for cirrhosis of liver, during hospitalization in 01/2023 with findings of hypoalbuminemia, elevated INR, mild transaminitis, and mildly nodular liver on imaging. However, he had elastography completed in follow up which demonstrated F2 fibrosis, EGD without evidence of portal HTN, and subsequent serologies showed preserved synthetic function, and more recent 3D imaging which did not demonstrate nodular liver or any sequelae of portal HTN, all arguing against cirrhosis.     He is to continue to follow with Hepatology, will have HCV viral load 12 weeks after completing treatment to ensure SVR.   Plan is for repeat US elastography 6-12 months after SVR to evaluate for improved hepatic fibrosis following completion of treatment.     4. Gastroesophageal reflux disease with esophagitis without hemorrhage  5. Guerrero's esophagus without dysplasia    EGD in 12/2023 with irregular z-line and single tongue of salmon-colored mucosa, bx indicating intestinal metaplasia.   Reviewed dx of Guerrero's esophagus today.   He has been on H2RA while on treatment with Epclusa, however at completion of treatment we will plan to start PPI given dx of Guerrero's esophagus.     Moreover, endoscopist recommended repeat EGD in 2 years for surveillance purposes, which would take him to 12/2025.   Continue with diet and lifestyle modifications for GERD.     - omeprazole (PriLOSEC) 20 mg delayed release capsule; Take 1 capsule (20 mg total) by mouth daily  Dispense: 30 capsule; Refill: 11    6. Personal history of  colonic polyps  7. Ventral hernia     Pt is overdue for repeat colonoscopy given hx of colon polyps and inadequate preparation on previous colonoscopy in 12/2023.   Pt shares today that he would like to be evaluated by general surgery first for his large ventral hernia (which contains large and small intestine).   We did review that presence of hernia containing bowel can complicate colonoscopy procedure and we will await input from General Surgery regarding repeat colonoscopy.   When pt is planning for repeat colonoscopy, he should complete 2 day Golytely bowel prep.  He can trial a fiber supplement now to help add some bulk and from to stool.      Pt will plan to follow up with Hepatology in approx 3 months. He should contact clinic should any new issues arise.   ______________________________________________________________________    SUBJECTIVE: Patient is a 53 y.o. male who presents today for follow-up regarding abdominal pain. Pmhx sig for hepatitis C infection, personal history of colon polyps, GERD, hyponatremia.    Patient was last evaluated in clinic in 01/2024.  He had a treatment naïve chronic HCV infection, likely contracted via IVDU, and he was started on Epclusa.      He was known to GI given his prolonged hospitalization in 01/2023 at which time GI was consulted for possible diagnosis of cirrhosis.  He had hypoalbuminemia, elevated INR, and a mild transaminitis with nodular liver on imaging.  He had no ascites in addition to fever, leukocytosis, and imaging showed peritoneal enhancement concerning for peritonitis.  He was noted to have a loculated fluid collection in his abdomen and diagnostic para was not consistent with SBP but more concerning for secondary peritonitis.  He ultimately underwent an ex lap, repair of enterotomy, extensive lysis of adhesions, peritoneal biopsy and peritoneal lavage.  There was initially some recovery of AFB, though further cultures were negative.    Post  "hospitalization, he did have additional liver investigation with an elastography showing F2 fibrosis.  CT from 11/2023 demonstrated a normal-appearing liver and spleen.    05/01/24:     Pt shares that he is feeling well overall. On Epclusa, he did have joint pain and some paranoia. Has one week of medication left.     Pt denies any significant heartburn, indigestion, nausea, emesis, dysphagia or odynphagia.     Pertaining to bowels, notes somewhat sporadic stool output alternating between formed and loose. No BRBPR or melena. No nocturnal BM. No abd pain or rectal pain related to defecation.     Pt denies any yellowing of eyes/skin, no easy bruises, no pruritus, no abd swelling/LE edema.     11/2023: HCV positive, 519K  11/2023: CT A/P: Upper abdominal ventral hernia containing unobstructed loop of small and large bowel. Estimated to measure 8.4 cm in the ML dimension and 17.7 cm in the CC dimension. Unchanged pericholecystic fluid. No calcified gallstones.  11/2023: US elastography: F2, S0  11/2024: Hep A total reactive, Hep B surface abs 176, HCV RNA [id number], HCV genotype 1a;  03/2024: BUN 12, creatinine 0.9, AST 15, ALT 14, ALP 46, albumin 4.5, T. bili 0.55, Hb 15.0, , platelets 166, HCV detected < 15 IU/mL    Endoscopic history:   EGD: 12/2023: Mild erythematous mucosa in the antrum; Irregular Z-line with a single tongue of salmon-colored mucosa 40 to 41 cm; inlet patch; no sign of esophageal varices   A. Stomach, \"Stomach antral erythema,\" Biopsy: Antral mucosa with mild chronic inactive gastritis; Negative for intestinal metaplasia; Negative for curvilinear Helicobacter microorganisms on H&E  B. Gastroesophageal junction, \"Esophagus irregular Z-line at 40 cm biopsy,\" Biopsy: Fragments of gastroesophageal junction mucosa with moderate chronic inflammation; Positive for intestinal metaplasia, supported by Alcian Blue/PAS histochemistry; Squamous mucosa with reactive changes, including acanthosis and basal " cell hyperplasia  Colon: 12/2023: Normal cecum; Sub-optimal preparation limiting the examination to a significant degree.  [place] bowel prep score was 2; 2 sessile polyps measuring 8 to 9 mm mid transverse colon; 4 mm sessile polyp ileocecal valve; 12 mm polyp proximal ascending colon distal side of ileocecal valve status post piecemeal polypectomy; 2 sessile polyps 6 mm to 9 mm distal ascending colon not removed due to suboptimal preparation. 12 mm polyp in the proximal ascending colon  C. Colon, “Mid transverse colon polyps x 2,” Biopsy: Fragments of tubular adenoma; negative for high grade dysplasia or carcinoma  D. Colon, “Cecal polyp,” Biopsy: Benign polypoid fragment of colonic mucosa with prominent lymphoid aggregate; Negative for dysplasia or carcinoma  E. Colon, “Proximal ascending colon polyp,” Biopsy: Tubular adenoma; Negative for high grade dysplasia or carcinoma    Review of Systems   Constitutional:  Negative for appetite change, chills, fever and unexpected weight change.   HENT:  Negative for trouble swallowing.    Gastrointestinal:  Positive for abdominal pain. Negative for blood in stool, constipation, nausea and vomiting.   Endocrine: Negative.    Neurological:  Negative for seizures and syncope.   Otherwise Per HPI    Historical Information   Past Medical History:   Diagnosis Date    Hard to intubate     Hepatitis C      Past Surgical History:   Procedure Laterality Date    HAND SURGERY      IR PARACENTESIS  1/23/2023    ME LAPS ABD PRTM&OMENTUM DX W/WO SPEC BR/WA SPX N/A 1/28/2023    Procedure: LAPAROSCOPY DIAGNOSTIC CONVERTED TO OPEN, LAPAROTOMY, REPAIR ENTEROTOMY X2, EXTENSIS LYSIS OF ADHESIONS, PERITONEAL BIOPSIES, PERITONEAL LAVAGE;  Surgeon: Lamine Elliott MD;  Location: [facility];  Service: General    WISDOM TOOTH EXTRACTION       Social History   Social History     Substance and Sexual Activity   Alcohol Use Not Currently    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     Social History     Substance  and Sexual Activity   Drug Use Yes    Frequency: 7.0 times per week    Types: Marijuana    Comment: medical marijuana     Social History     Tobacco Use   Smoking Status Every Day    Types: Cigarettes   Smokeless Tobacco Never   Tobacco Comments    Vape daily and medical marijuana every night      Family History   Problem Relation Age of Onset    Diabetes Mother     No Known Problems Father        Meds/Allergies       Current Outpatient Medications:     acetaminophen (TYLENOL) 500 mg tablet    clotrimazole (LOTRIMIN) 1 % cream    collagenase (Santyl) ointment    Epclusa 400-100 MG tablet    famotidine (PEPCID) 20 mg tablet    magnesium gluconate (MAGONATE) 500 mg tablet    polyethylene glycol (GOLYTELY) 4000 mL solution    sildenafil (VIAGRA) 25 MG tablet    white petrolatum ointment    Allergies   Allergen Reactions    Penicillin V Rash    Amoxicillin Rash     dysgeusia     Objective     There were no vitals taken for this visit. There is no height or weight on file to calculate BMI.    Physical Exam  Vitals and nursing note reviewed.   Constitutional:       General: He is not in acute distress.     Appearance: He is well-developed.   HENT:      Head: Normocephalic and atraumatic.   Eyes:      General: No scleral icterus.     Conjunctiva/sclera: Conjunctivae normal.   Cardiovascular:      Rate and Rhythm: Normal rate.   Pulmonary:      Effort: Pulmonary effort is normal. No respiratory distress.   Abdominal:      General: Bowel sounds are normal. There is no distension.      Palpations: Abdomen is soft.      Tenderness: There is no abdominal tenderness. There is no guarding or rebound.      Hernia: A hernia is present.   Skin:     General: Skin is warm and dry.      Coloration: Skin is not jaundiced.   Neurological:      General: No focal deficit present.      Mental Status: He is alert and oriented to person, place, and time.   Psychiatric:         Mood and Affect: Mood normal.         Behavior: Behavior normal.        Lab Results:   No visits with results within 1 Day(s) from this visit.   Latest known visit with results is:   Appointment on 03/13/2024   Component Date Value    Sodium 03/14/2024 139     Potassium 03/14/2024 4.3     Chloride 03/14/2024 100     CO2 03/14/2024 30     ANION GAP 03/14/2024 9     BUN 03/14/2024 12     Creatinine 03/14/2024 0.90     Glucose 03/14/2024 106     Calcium 03/14/2024 9.7     AST 03/14/2024 15     ALT 03/14/2024 14     Alkaline Phosphatase 03/14/2024 46     Total Protein 03/14/2024 7.7     Albumin 03/14/2024 4.5     Total Bilirubin 03/14/2024 0.55     eGFR 03/14/2024 97     WBC 03/14/2024 4.67     RBC 03/14/2024 4.39     Hemoglobin 03/14/2024 15.0     Hematocrit 03/14/2024 45.1     MCV 03/14/2024 103 (H)     MCH 03/14/2024 34.2     MCHC 03/14/2024 33.3     RDW 03/14/2024 12.5     MPV 03/14/2024 12.2     Platelets 03/14/2024 166     nRBC 03/14/2024 0     Segmented % 03/14/2024 39 (L)     Immature Grans % 03/14/2024 0     Lymphocytes % 03/14/2024 45 (H)     Monocytes % 03/14/2024 7     Eosinophils Relative 03/14/2024 9 (H)     Basophils Relative 03/14/2024 0     Absolute Neutrophils 03/14/2024 1.81 (L)     Absolute Immature Grans 03/14/2024 0.00     Absolute Lymphocytes 03/14/2024 2.06     Absolute Monocytes 03/14/2024 0.34     Eosinophils Absolute 03/14/2024 0.44     Basophils Absolute 03/14/2024 0.02     HCV TARGET 03/14/2024 Detected <15 IU/mL (A)      Radiology Results:   No results found.    Janet Ramirez PA-C    **Please note:  Dictation voice to text software may have been used in the creation of this record.  Occasional wrong word or “sound alike” substitutions may have occurred due to the inherent limitations of voice recognition software.  Read the chart carefully and recognize, using context, where substitutions have occurred.**

## 2024-05-02 NOTE — PROGRESS NOTES
Assessment/Plan:   Vijay Barrera is a 53 y.o.male who is here for   Chief Complaint   Patient presents with    Follow-up     Patient is here today for a 6 month follow up on his incisional hernia.            Plan:   Patient does have incisional ventral hernia on examination with mild discomfort and no constipation. Patient does have hepatomegaly and chronic hepatitis c untreated. He will finish is Hep c treatment tomorrow and will get viral load in 3 months. We can plan surgery for September to ensure viral load is gone and ok to move forward with surgical repair of ventral hernia robotically with mesh. Patient also vapes and we did discuss necessity of quitting for long term success of hernia repair.     HCV infection:  He is to continue to follow with Hepatology, will have HCV viral load 12 weeks after completing treatment to ensure SVR.   Plan is for repeat US elastography 6-12 months after SVR to evaluate for improved hepatic fibrosis following completion of treatment.        Post Op Pain Management:       Preoperative Clearance: medical       Imagin23  1.  Overall significantly improved findings in the peritoneum with near-complete resolution of previous abdominopelvic ascites and no new/worsening findings of peritonitis. There is trace residual upper abdominal ascites as well as a discrete   thick-walled collection in the left upper quadrant measuring up to 2.5 cm.  2.  Mild wall thickening of the descending colon in the rectum which could be reactive to the peritoneal process or represents a nonspecific proctocolitis.  3.  Resolved pleural effusions.    In preparation for this visit all relevant and known prior office notes, prior consultations, emergency room visits, blood work results, and imaging studies were personally reviewed.  A total of  30 minutes was spent reviewing all of this information,caring for this patient, providing differential diagnosis, instructions for management, counseling and  coordination of care.  This also includes planning surgical intervention where indicated.    Patient understands hernia occurrence or re-occurrence risk is higher in a diabetic, tobacco user, with elevated BMIs.   _____________________________________________      HPI:  Vijay Barrera is a 53 y.o.male who was referred for evaluation of Follow-up (Patient is here today for a 6 month follow up on his incisional hernia. )  .    Currently complaining of  initial Incisional Hernia worse with bending, lifting, standing, no nausea and no vomiting, regular bowel movement. Duration of pain or symptoms:  Intermittent and no pain     Vijay is a 52-year-old gentleman who was hospitalized January 21 through February 3 after he presented with abdominal distention pain and fever.  He was noted to have a nodular liver on CAT scan with a normal size spleen interestingly.  He was also noted to have ascites that appeared to be loculated.  Paracentesis revealed suspicion for secondary peritonitis.  He did eventually undergo laparoscopy converted to open laparotomy.  He did have peritoneal biopsies performed.  A source for his secondary peritonitis was not identified at time of laparotomy.  Peritoneal biopsies revealed rare AFB.  However follow-up testing was negative for AFB. He was followed closely by infectious disease.  He did have a negative quant interferon gold so was not felt that this was related to tuberculosis.  He did have a low SAAG and a high protein in the fluid.To GI it was a relatively confusing picture as they do not typically see ascites treated surgically however the operative report states that he had loculated fluid collections and abscesses in the abdomen.  Fortunately he is made a very nice recovery.  Whether this fluid in the abdomen was related to his liver or not is not entirely clear.  Seems like his ascites has essentially resolved without diuretics.  I suspect he does have cirrhosis especially given  "chronic hepatitis C in addition to history of alcohol use/abuse and nodular liver noted on CAT scan.    5/6/24: Patient states he has minimal discomfort with hernia and no constipation.     GI recommendations: \"However to try to confirm this further I would recommend checking an ultrasound elastography.  Also given the unusual presentation I think he would benefit from meeting one of our hepatologist.  I will put in a consultation for hepatology.\"    US elastography: \"ultrasound elastography did reveal a moderate amount of scarring (fibrosis) in the liver but did not confirm cirrhosis of the liver as suggested by the CAT scan performed in March.\"    MELD 3.0: 8 at 1/31/2024  8:50 AM  MELD-Na: 8 at 1/31/2024  8:50 AM  Calculated from:  Serum Creatinine: 0.86 mg/dL (Using min of 1 mg/dL) at 1/31/2024  8:50 AM  Serum Sodium: 136 mmol/L at 1/31/2024  8:50 AM  Total Bilirubin: 1.16 mg/dL at 1/31/2024  8:50 AM  Serum Albumin: 5.0 g/dL (Using max of 3.5 g/dL) at 1/31/2024  8:50 AM  INR(ratio): 1.05 at 1/31/2024  8:50 AM  Age at listing (hypothetical): 53 years  Sex: Male at 1/31/2024  8:50 AM    CT 11/2023:  ABDOMINAL WALL/INGUINAL REGIONS: Upper abdominal ventral hernia containing unobstructed loops of small and large bowel. Hernia measures 8.4 cm in the ML dimension and estimated to extend from the level of the umbilicus measuring 17.7 cm in the CC   dimension.    Colonoscopy and EGD 12/2023:  Final Diagnosis   A. Stomach, \"Stomach antral erythema,\" Biopsy:  - Antral mucosa with mild chronic inactive gastritis  - Negative for intestinal metaplasia  - Negative for curvilinear Helicobacter microorganisms on H&E     B. Gastroesophageal junction, \"Esophagus irregular Z-line at 40 cm biopsy,\" Biopsy:  - Fragments of gastroesophageal junction mucosa with moderate chronic inflammation  - Positive for intestinal metaplasia, supported by Alcian Blue/PAS histochemistry (see note)  - Squamous mucosa with reactive changes, including " acanthosis and basal cell hyperplasia     C. Colon, “Mid transverse colon polyps x 2,” Biopsy:  - Fragments of tubular adenoma  - Negative for high grade dysplasia or carcinoma     D. Colon, “Cecal polyp,” Biopsy:  - Benign polypoid fragment of colonic mucosa with prominent lymphoid aggregate  - Negative for dysplasia or carcinoma     E. Colon, “Proximal ascending colon polyp,” Biopsy:  - Tubular adenoma  - Negative for high grade dysplasia or carcinoma   RECOMMENDATION:    Repeat colonoscopy in 3 months     Inadequate bowel preparation         You will need a 2-day preparation for your next colonoscopy.    Prior abdominal surgery:   Yes    BMI: Body mass index is 22.73 kg/m².     Tobacco use:   Tobacco Use: High Risk (5/6/2024)    Patient History     Smoking Tobacco Use: Every Day     Smokeless Tobacco Use: Current     Passive Exposure: Never        Lab Results   Component Value Date    WBC 4.67 03/14/2024    HGB 15.0 03/14/2024    HCT 45.1 03/14/2024     (H) 03/14/2024     03/14/2024     Lab Results   Component Value Date    K 4.3 03/14/2024     03/14/2024    CO2 30 03/14/2024    BUN 12 03/14/2024    CREATININE 0.90 03/14/2024    GLUF 129 (H) 01/31/2024    CALCIUM 9.7 03/14/2024    CORRECTEDCA 8.8 02/01/2023    AST 15 03/14/2024    ALT 14 03/14/2024    ALKPHOS 46 03/14/2024    EGFR 97 03/14/2024     Lab Results   Component Value Date    INR 1.05 01/31/2024    INR 1.05 11/08/2023    INR 1.10 04/20/2023    PROTIME 13.6 01/31/2024    PROTIME 13.6 11/08/2023    PROTIME 14.2 04/20/2023       Smoking Status: Current Smoker    ROS:  General ROS: negative  negative for - chills, fatigue, fever or night sweats, weight loss  Respiratory ROS: no cough, shortness of breath, or wheezing  Cardiovascular ROS: no chest pain or dyspnea on exertion  Genito-Urinary ROS: no dysuria, trouble voiding, or hematuria  Musculoskeletal ROS: negative for - gait disturbance, joint pain or muscle pain  Neurological ROS: no  TIA or stroke symptoms  Abdominal ROS: see HPI  GI ROS: see HPI  Skin ROS: no new rashes or lesions   Lymphatic ROS: no new adenopathy noted by pt.   GYN ROS: see HPI, no new GYN history or bleeding noted  Psy ROS: no new mental or behavioral disturbances       Patient Active Problem List   Diagnosis    Hepatitis C    Acute peritonitis (HCC)    Cirrhosis of liver with ascites  (HCC)    Hyponatremia    Difficult intubation    Mycobacterium, atypical    Hypomagnesemia    Pressure injury of upper back, stage 3 (HCC)    GERD (gastroesophageal reflux disease)         Allergies: Penicillin v and Amoxicillin    Meds:  Current Outpatient Medications:     acetaminophen (TYLENOL) 500 mg tablet, Take 500 mg by mouth every 6 (six) hours as needed for mild pain, Disp: , Rfl:     Epclusa 400-100 MG tablet, Take 1 tablet by mouth daily for 12 weeks, Disp: 28 tablet, Rfl: 2    famotidine (PEPCID) 20 mg tablet, TAKE 1 TABLET BY MOUTH TWICE A DAY, Disp: 180 tablet, Rfl: 2    sildenafil (VIAGRA) 25 MG tablet, Take 1-4 tablets by mouth 20 minutes prior to sexual activity. AVOID NITRATES AND ALPHA BLOCKERS. Do not exceed 4 tablets in 24 hours., Disp: , Rfl:     omeprazole (PriLOSEC) 20 mg delayed release capsule, Take 1 capsule (20 mg total) by mouth daily (Patient not taking: Reported on 5/6/2024), Disp: 30 capsule, Rfl: 11    PMH:  Past Medical History:   Diagnosis Date    Hard to intubate     Hepatitis C        PSH:  Past Surgical History:   Procedure Laterality Date    HAND SURGERY      IR PARACENTESIS  1/23/2023    ME LAPS ABD PRTM&OMENTUM DX W/WO SPEC BR/WA SPX N/A 1/28/2023    Procedure: LAPAROSCOPY DIAGNOSTIC CONVERTED TO OPEN, LAPAROTOMY, REPAIR ENTEROTOMY X2, EXTENSIS LYSIS OF ADHESIONS, PERITONEAL BIOPSIES, PERITONEAL LAVAGE;  Surgeon: Lamine Elliott MD;  Location: [facility];  Service: General    WISDOM TOOTH EXTRACTION         Family History   Problem Relation Age of Onset    Diabetes Mother     No Known Problems  Father         reports that he has been smoking cigarettes. He has a 8.8 pack-year smoking history. He has never been exposed to tobacco smoke. He uses smokeless tobacco. He reports that he does not currently use alcohol. He reports current drug use. Frequency: 7.00 times per week. Drug: Marijuana.    Vitals:    05/06/24 0757   BP: 152/80   Pulse: 83   Resp: 16   Temp: 97.9 °F (36.6 °C)   SpO2: 96%         PHYSICAL EXAM  General Appearance:    Alert, cooperative, no distress,    Head:    Normocephalic without obvious abnormality   Eyes:    PERRL, conjunctiva/corneas clear,      Neck:   Supple, no adenopathy, no JVD   Back:     Symmetric, no spinal or CVA tenderness   Lungs:     Clear to auscultation bilaterally, no wheezing or rhonchi   Heart:    Regular rate and rhythm, S1 and S2 normal, no murmur   Abdomen:      liver enlarged  - edge below right costal margin: 3 cm liver edge palpable 3 cm below costal margin.    incisional hernia  The hernia is, easily reducible, there is diastasis recti but the scar is very thin.   Extremities:   Extremities normal. No clubbing, cyanosis or edema   Psych:   Normal Affect, AOx3.    Neurologic:  Skin:   CNII-XII intact. Strength symmetric, speech intact    Warm, dry, intact, no visible rashes or lesions                   Informed consent for procedure was personally discussed, reviewed, and signed by Dr. Mckinney. Discussion by Dr. Mckinney was carried out regarding risks, benefits, and alternatives with the patient. Risks include but are not limited to:  bleeding, infection, and delayed wound healing or an open wound, pulmonary embolus, leaks from bowel or bile ducts or other viscus, transfusions, death.  Discussed in further detail the more common complications and their rates of occurrence.   was used if necessary.  Patient expressed understanding of the issues discussed and wished/consented to proceed.  All questions were answered by Dr. Mckinney.    Discussion  performed between patient and the provider signing below.     Signature:   Hoa Reynolds PA-C    Date: 5/6/2024 Time: 8:18 AM

## 2024-05-06 ENCOUNTER — OFFICE VISIT (OUTPATIENT)
Dept: SURGERY | Facility: CLINIC | Age: 54
End: 2024-05-06
Payer: COMMERCIAL

## 2024-05-06 VITALS
OXYGEN SATURATION: 96 % | WEIGHT: 163 LBS | HEIGHT: 71 IN | DIASTOLIC BLOOD PRESSURE: 80 MMHG | TEMPERATURE: 97.9 F | HEART RATE: 83 BPM | BODY MASS INDEX: 22.82 KG/M2 | RESPIRATION RATE: 16 BRPM | SYSTOLIC BLOOD PRESSURE: 152 MMHG

## 2024-05-06 DIAGNOSIS — B18.2 CHRONIC HEPATITIS C WITHOUT HEPATIC COMA (HCC): ICD-10-CM

## 2024-05-06 DIAGNOSIS — K43.9 VENTRAL HERNIA WITHOUT OBSTRUCTION OR GANGRENE: Primary | ICD-10-CM

## 2024-05-06 PROBLEM — R18.8 CIRRHOSIS OF LIVER WITH ASCITES  (HCC): Status: RESOLVED | Noted: 2023-01-22 | Resolved: 2024-05-06

## 2024-05-06 PROBLEM — K74.60 CIRRHOSIS OF LIVER WITH ASCITES  (HCC): Status: RESOLVED | Noted: 2023-01-22 | Resolved: 2024-05-06

## 2024-05-06 PROCEDURE — 99213 OFFICE O/P EST LOW 20 MIN: CPT | Performed by: PHYSICIAN ASSISTANT

## 2024-05-22 ENCOUNTER — TELEPHONE (OUTPATIENT)
Age: 54
End: 2024-05-22

## 2024-05-22 NOTE — TELEPHONE ENCOUNTER
Detailed message left for patient he will need to set up a H&P appointment prior to his surgery date of 09/11/2024 this can be scheduled with either  or [facility] as well as a follow up appointment 2 weeks following his surgery.

## 2024-05-22 NOTE — TELEPHONE ENCOUNTER
Patient called back regarding reschedule date for surgery with Dr Mckinney. He is okay with the date offered of 9/11/24. If anything else is needed he can be reached at [phone]

## 2024-06-05 DIAGNOSIS — K22.70 BARRETT'S ESOPHAGUS WITHOUT DYSPLASIA: ICD-10-CM

## 2024-06-05 RX ORDER — OMEPRAZOLE 20 MG/1
20 CAPSULE, DELAYED RELEASE ORAL DAILY
Qty: 90 CAPSULE | Refills: 1 | Status: SHIPPED | OUTPATIENT
Start: 2024-06-05

## 2024-07-26 ENCOUNTER — TELEPHONE (OUTPATIENT)
Age: 54
End: 2024-07-26

## 2024-07-26 NOTE — TELEPHONE ENCOUNTER
Patient called in stating he was told he needs to see his pcp prior to surgery. He called his pcp and they are scheduling out 6 weeks and asked what we recommend. I transferred him to the pcp pod so he can get scheduled with a [facility] primary care, he mentioned he wanted to switch anyhow.

## 2024-08-05 ENCOUNTER — APPOINTMENT (OUTPATIENT)
Dept: LAB | Facility: CLINIC | Age: 54
End: 2024-08-05
Payer: COMMERCIAL

## 2024-08-05 DIAGNOSIS — B18.2 CHRONIC HEPATITIS C WITHOUT HEPATIC COMA (HCC): ICD-10-CM

## 2024-08-05 PROCEDURE — 36415 COLL VENOUS BLD VENIPUNCTURE: CPT

## 2024-08-05 PROCEDURE — 87522 HEPATITIS C REVRS TRNSCRPJ: CPT

## 2024-08-08 LAB
HCV RNA SERPL NAA+PROBE-ACNC: NORMAL IU/ML
TEST INFORMATION: NORMAL

## 2024-08-14 ENCOUNTER — OFFICE VISIT (OUTPATIENT)
Dept: FAMILY MEDICINE CLINIC | Facility: CLINIC | Age: 54
End: 2024-08-14
Payer: COMMERCIAL

## 2024-08-14 VITALS
SYSTOLIC BLOOD PRESSURE: 156 MMHG | BODY MASS INDEX: 22.26 KG/M2 | HEART RATE: 80 BPM | DIASTOLIC BLOOD PRESSURE: 92 MMHG | OXYGEN SATURATION: 98 % | RESPIRATION RATE: 16 BRPM | TEMPERATURE: 97.2 F | WEIGHT: 159 LBS | HEIGHT: 71 IN

## 2024-08-14 DIAGNOSIS — B18.2 CHRONIC HEPATITIS C WITHOUT HEPATIC COMA (HCC): ICD-10-CM

## 2024-08-14 DIAGNOSIS — Z01.818 PRE-OP EXAMINATION: Primary | ICD-10-CM

## 2024-08-14 DIAGNOSIS — Z12.5 PROSTATE CANCER SCREENING: ICD-10-CM

## 2024-08-14 DIAGNOSIS — K21.9 GASTROESOPHAGEAL REFLUX DISEASE, UNSPECIFIED WHETHER ESOPHAGITIS PRESENT: ICD-10-CM

## 2024-08-14 DIAGNOSIS — Z00.00 HEALTHCARE MAINTENANCE: ICD-10-CM

## 2024-08-14 DIAGNOSIS — R03.0 ELEVATED BLOOD PRESSURE READING IN OFFICE WITHOUT DIAGNOSIS OF HYPERTENSION: ICD-10-CM

## 2024-08-14 DIAGNOSIS — K43.9 VENTRAL HERNIA WITHOUT OBSTRUCTION OR GANGRENE: ICD-10-CM

## 2024-08-14 PROCEDURE — 93000 ELECTROCARDIOGRAM COMPLETE: CPT | Performed by: NURSE PRACTITIONER

## 2024-08-14 PROCEDURE — 99396 PREV VISIT EST AGE 40-64: CPT | Performed by: NURSE PRACTITIONER

## 2024-08-14 PROCEDURE — 99204 OFFICE O/P NEW MOD 45 MIN: CPT | Performed by: NURSE PRACTITIONER

## 2024-08-14 NOTE — ASSESSMENT & PLAN NOTE
- Advised to monitor blood pressure at home and record readings. Bring to next visit.   - Recommend follow up in 1 month.

## 2024-08-14 NOTE — ASSESSMENT & PLAN NOTE
- Reviewed immunizations and screenings.   - Discussed regular dental and vision exams.   - Colonoscopy UTD.  - Routine labs ordered.

## 2024-08-14 NOTE — ASSESSMENT & PLAN NOTE
- Stable with use of Pepcid. Continue same.   - Avoid triggering food and beverage.  - Continue follow up with GI.

## 2024-08-14 NOTE — PROGRESS NOTES
Ambulatory Visit  Name: Vijay Barrera      : 1970      MRN: [id number]  Encounter Provider: MARQUIS Walker  Encounter Date: 2024   Encounter department: [facility]    Assessment & Plan   1. Pre-op examination  Assessment & Plan:  - Patient is an acceptable risk for the proposed procedure pending pre-op labs.   Orders:  -     POCT ECG  2. Ventral hernia without obstruction or gangrene  Assessment & Plan:  - Patient scheduled for hernia repair on .   3. Gastroesophageal reflux disease, unspecified whether esophagitis present  Assessment & Plan:  - Stable with use of Pepcid. Continue same.   - Avoid triggering food and beverage.  - Continue follow up with GI.   4. Chronic hepatitis C without hepatic coma (HCC)  Assessment & Plan:  - Treated with Epclusa.  - Continue close follow up with GI.   5. Elevated blood pressure reading in office without diagnosis of hypertension  Assessment & Plan:  - Advised to monitor blood pressure at home and record readings. Bring to next visit.   - Recommend follow up in 1 month.   6. Healthcare maintenance  Assessment & Plan:  - Reviewed immunizations and screenings.   - Discussed regular dental and vision exams.   - Colonoscopy UTD.  - Routine labs ordered.   Orders:  -     Lipid Panel with Direct LDL reflex; Future  -     TSH, 3rd generation with Free T4 reflex; Future  7. Prostate cancer screening  -     PSA, Total Screen; Future         History of Present Illness     Patient presents to office today to establish care. He has PMH of GERD and chronic Hepatitis C that was treated with Epclusa. He is following with GI. He needs pre-op clearance for ventral hernia repair that is scheduled for . He is due for blood work. His blood pressure is elevated in the office today. States it has been elevated before while he has been at the doctor's office. Does not routinely monitor his blood pressure at home. He complains of some  restless leg symptoms and leg cramps at night. Denies any other concerns or complaints today.        Review of Systems   Constitutional:  Negative for fatigue and fever.   HENT:  Negative for congestion, postnasal drip and trouble swallowing.    Eyes:  Negative for visual disturbance.   Respiratory:  Negative for cough and shortness of breath.    Cardiovascular:  Negative for chest pain and palpitations.   Gastrointestinal:  Negative for abdominal pain and blood in stool.   Endocrine: Negative for cold intolerance and heat intolerance.   Genitourinary:  Negative for difficulty urinating and dysuria.   Musculoskeletal:  Positive for myalgias. Negative for gait problem.   Skin:  Negative for rash.   Neurological:  Negative for dizziness, syncope and headaches.   Hematological:  Negative for adenopathy.   Psychiatric/Behavioral:  Negative for behavioral problems.      Past Medical History:   Diagnosis Date   • Hard to intubate    • Hepatitis C      Past Surgical History:   Procedure Laterality Date   • HAND SURGERY     • IR PARACENTESIS  1/23/2023   • TN LAPS ABD PRTM&OMENTUM DX W/WO SPEC BR/WA SPX N/A 1/28/2023    Procedure: LAPAROSCOPY DIAGNOSTIC CONVERTED TO OPEN, LAPAROTOMY, REPAIR ENTEROTOMY X2, EXTENSIS LYSIS OF ADHESIONS, PERITONEAL BIOPSIES, PERITONEAL LAVAGE;  Surgeon: Lamine Elliott MD;  Location: [facility];  Service: General   • WISDOM TOOTH EXTRACTION       Family History   Problem Relation Age of Onset   • Diabetes Mother    • No Known Problems Father      Social History     Tobacco Use   • Smoking status: Former     Current packs/day: 0.25     Average packs/day: 0.3 packs/day for 35.0 years (8.8 ttl pk-yrs)     Types: Cigarettes     Passive exposure: Never   • Smokeless tobacco: Current   • Tobacco comments:     Vape daily and medical marijuana every night    Vaping Use   • Vaping status: Every Day   • Substances: Nicotine, THC, CBD, Flavoring   Substance and Sexual Activity   • Alcohol use: Not  "Currently     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   • Drug use: Yes     Frequency: 7.0 times per week     Types: Marijuana     Comment: medical marijuana   • Sexual activity: Not Currently     Partners: Female     Current Outpatient Medications on File Prior to Visit   Medication Sig   • acetaminophen (TYLENOL) 500 mg tablet Take 500 mg by mouth every 6 (six) hours as needed for mild pain   • famotidine (PEPCID) 20 mg tablet TAKE 1 TABLET BY MOUTH TWICE A DAY   • sildenafil (VIAGRA) 25 MG tablet Take 1-4 tablets by mouth 20 minutes prior to sexual activity. AVOID NITRATES AND ALPHA BLOCKERS. Do not exceed 4 tablets in 24 hours.   • Epclusa 400-100 MG tablet Take 1 tablet by mouth daily for 12 weeks   • omeprazole (PriLOSEC) 20 mg delayed release capsule TAKE 1 CAPSULE BY MOUTH EVERY DAY (Patient not taking: Reported on 8/14/2024)     Allergies   Allergen Reactions   • Penicillin V Rash   • Amoxicillin Rash     dysgeusia     Immunization History   Administered Date(s) Administered   • COVID-19 PFIZER VACCINE 0.3 ML IM 07/07/2021, 07/28/2021   • Hep A / Hep B 04/10/2014, 05/12/2014, 05/08/2015   • INFLUENZA 01/24/2023   • Influenza, recombinant, quadrivalent,injectable, preservative free 01/24/2023   • Pneumococcal Polysaccharide PPV23 07/29/2022   • Tdap 07/29/2022     Objective     /92 (BP Location: Left arm, Patient Position: Sitting, Cuff Size: Adult)   Pulse 80   Temp (!) 97.2 °F (36.2 °C) (Tympanic)   Resp 16   Ht 5' 11\" (1.803 m)   Wt 72.1 kg (159 lb)   SpO2 98%   BMI 22.18 kg/m²     Physical Exam  Vitals and nursing note reviewed.   Constitutional:       General: He is not in acute distress.     Appearance: Normal appearance. He is not ill-appearing.   HENT:      Head: Normocephalic and atraumatic.      Right Ear: Tympanic membrane, ear canal and external ear normal.      Left Ear: Tympanic membrane, ear canal and external ear normal.      Nose: Nose normal.      Mouth/Throat:      Mouth: Mucous membranes are " moist.   Eyes:      Conjunctiva/sclera: Conjunctivae normal.      Pupils: Pupils are equal, round, and reactive to light.   Cardiovascular:      Rate and Rhythm: Normal rate and regular rhythm.      Heart sounds: Normal heart sounds.   Pulmonary:      Effort: Pulmonary effort is normal.      Breath sounds: Normal breath sounds.   Abdominal:      General: Bowel sounds are normal.      Palpations: Abdomen is soft.   Musculoskeletal:         General: Normal range of motion.      Cervical back: Normal range of motion.   Lymphadenopathy:      Cervical: No cervical adenopathy.   Skin:     General: Skin is warm and dry.   Neurological:      Mental Status: He is alert and oriented to person, place, and time.   Psychiatric:         Mood and Affect: Mood normal.         Behavior: Behavior normal.

## 2024-08-15 ENCOUNTER — APPOINTMENT (OUTPATIENT)
Dept: LAB | Facility: CLINIC | Age: 54
End: 2024-08-15
Payer: COMMERCIAL

## 2024-08-15 DIAGNOSIS — Z00.00 HEALTHCARE MAINTENANCE: ICD-10-CM

## 2024-08-15 DIAGNOSIS — Z12.5 PROSTATE CANCER SCREENING: ICD-10-CM

## 2024-08-15 DIAGNOSIS — Z01.818 ENCOUNTER FOR PREADMISSION TESTING: ICD-10-CM

## 2024-08-15 LAB
ALBUMIN SERPL BCG-MCNC: 4.7 G/DL (ref 3.5–5)
ALP SERPL-CCNC: 51 U/L (ref 34–104)
ALT SERPL W P-5'-P-CCNC: 19 U/L (ref 7–52)
ANION GAP SERPL CALCULATED.3IONS-SCNC: 10 MMOL/L (ref 4–13)
AST SERPL W P-5'-P-CCNC: 18 U/L (ref 13–39)
BASOPHILS # BLD AUTO: 0.01 THOUSANDS/ÂΜL (ref 0–0.1)
BASOPHILS NFR BLD AUTO: 0 % (ref 0–1)
BILIRUB SERPL-MCNC: 0.87 MG/DL (ref 0.2–1)
BUN SERPL-MCNC: 7 MG/DL (ref 5–25)
CALCIUM SERPL-MCNC: 9.9 MG/DL (ref 8.4–10.2)
CHLORIDE SERPL-SCNC: 100 MMOL/L (ref 96–108)
CHOLEST SERPL-MCNC: 185 MG/DL
CO2 SERPL-SCNC: 26 MMOL/L (ref 21–32)
CREAT SERPL-MCNC: 0.88 MG/DL (ref 0.6–1.3)
EOSINOPHIL # BLD AUTO: 0.26 THOUSAND/ÂΜL (ref 0–0.61)
EOSINOPHIL NFR BLD AUTO: 4 % (ref 0–6)
ERYTHROCYTE [DISTWIDTH] IN BLOOD BY AUTOMATED COUNT: 12.3 % (ref 11.6–15.1)
GFR SERPL CREATININE-BSD FRML MDRD: 98 ML/MIN/1.73SQ M
GLUCOSE P FAST SERPL-MCNC: 99 MG/DL (ref 65–99)
HCT VFR BLD AUTO: 47.9 % (ref 36.5–49.3)
HDLC SERPL-MCNC: 43 MG/DL
HGB BLD-MCNC: 16.4 G/DL (ref 12–17)
IMM GRANULOCYTES # BLD AUTO: 0.01 THOUSAND/UL (ref 0–0.2)
IMM GRANULOCYTES NFR BLD AUTO: 0 % (ref 0–2)
LDLC SERPL CALC-MCNC: 121 MG/DL (ref 0–100)
LYMPHOCYTES # BLD AUTO: 2.14 THOUSANDS/ÂΜL (ref 0.6–4.47)
LYMPHOCYTES NFR BLD AUTO: 36 % (ref 14–44)
MCH RBC QN AUTO: 35.3 PG (ref 26.8–34.3)
MCHC RBC AUTO-ENTMCNC: 34.2 G/DL (ref 31.4–37.4)
MCV RBC AUTO: 103 FL (ref 82–98)
MONOCYTES # BLD AUTO: 0.37 THOUSAND/ÂΜL (ref 0.17–1.22)
MONOCYTES NFR BLD AUTO: 6 % (ref 4–12)
NEUTROPHILS # BLD AUTO: 3.19 THOUSANDS/ÂΜL (ref 1.85–7.62)
NEUTS SEG NFR BLD AUTO: 54 % (ref 43–75)
NRBC BLD AUTO-RTO: 0 /100 WBCS
PLATELET # BLD AUTO: 174 THOUSANDS/UL (ref 149–390)
PMV BLD AUTO: 11.8 FL (ref 8.9–12.7)
POTASSIUM SERPL-SCNC: 3.9 MMOL/L (ref 3.5–5.3)
PROT SERPL-MCNC: 8.2 G/DL (ref 6.4–8.4)
PSA SERPL-MCNC: 0.47 NG/ML (ref 0–4)
RBC # BLD AUTO: 4.65 MILLION/UL (ref 3.88–5.62)
SODIUM SERPL-SCNC: 136 MMOL/L (ref 135–147)
TRIGL SERPL-MCNC: 103 MG/DL
TSH SERPL DL<=0.05 MIU/L-ACNC: 0.84 UIU/ML (ref 0.45–4.5)
WBC # BLD AUTO: 5.98 THOUSAND/UL (ref 4.31–10.16)

## 2024-08-15 PROCEDURE — G0103 PSA SCREENING: HCPCS

## 2024-08-15 PROCEDURE — 85025 COMPLETE CBC W/AUTO DIFF WBC: CPT

## 2024-08-15 PROCEDURE — 84443 ASSAY THYROID STIM HORMONE: CPT

## 2024-08-15 PROCEDURE — 36415 COLL VENOUS BLD VENIPUNCTURE: CPT

## 2024-08-15 PROCEDURE — 80061 LIPID PANEL: CPT

## 2024-08-15 PROCEDURE — 80053 COMPREHEN METABOLIC PANEL: CPT

## 2024-08-16 ENCOUNTER — TELEPHONE (OUTPATIENT)
Age: 54
End: 2024-08-16

## 2024-08-16 NOTE — TELEPHONE ENCOUNTER
General surgery called to inform that they were going to fax over a clearance form for the doctor to complete

## 2024-08-20 NOTE — H&P (VIEW-ONLY)
Assessment/Plan:   Vijay Barrera is a 53 y.o.male who is here for   Chief Complaint   Patient presents with    Pre-op Exam     Patient is here today for a H&P prior to his central hernia repair with  on 24.      Fibrotic liver from prior Hep c that is now treated.      Plan:   Patient does have incisional ventral hernia on examination with mild discomfort and no constipation. Patient does have hepatomegaly and chronic hepatitis c untreated. He has finished is Hep c treatment. Viral load is negative. We can move forward with surgical repair of ventral hernia robotically with mesh. Patient also vapes and we did discuss necessity of quitting for long term success of hernia repair. Continue follow up with GI.       Post Op Pain Management:       Preoperative Clearance: medical - cleared per PCP       Imagin23  1.  Overall significantly improved findings in the peritoneum with near-complete resolution of previous abdominopelvic ascites and no new/worsening findings of peritonitis. There is trace residual upper abdominal ascites as well as a discrete   thick-walled collection in the left upper quadrant measuring up to 2.5 cm.  2.  Mild wall thickening of the descending colon in the rectum which could be reactive to the peritoneal process or represents a nonspecific proctocolitis.  3.  Resolved pleural effusions.    In preparation for this visit all relevant and known prior office notes, prior consultations, emergency room visits, blood work results, and imaging studies were personally reviewed.  A total of  30 minutes was spent reviewing all of this information,caring for this patient, providing differential diagnosis, instructions for management, counseling and coordination of care.  This also includes planning surgical intervention where indicated.    Patient understands hernia occurrence or re-occurrence risk is higher in a diabetic, tobacco user, with elevated BMIs.    _____________________________________________      HPI:  Vijay Barrera is a 53 y.o.male who was referred for evaluation of Pre-op Exam (Patient is here today for a H&P prior to his central hernia repair with  on 09/11/24. )  .    Currently complaining of  initial Incisional Hernia worse with bending, lifting, standing, no nausea and no vomiting, regular bowel movement. Duration of pain or symptoms:  Intermittent and no pain     Vijay is a 52-year-old gentleman who was hospitalized January 21 through February 3 after he presented with abdominal distention pain and fever.  He was noted to have a nodular liver on CAT scan with a normal size spleen interestingly.  He was also noted to have ascites that appeared to be loculated.  Paracentesis revealed suspicion for secondary peritonitis.  He did eventually undergo laparoscopy converted to open laparotomy.  He did have peritoneal biopsies performed.  A source for his secondary peritonitis was not identified at time of laparotomy.  Peritoneal biopsies revealed rare AFB.  However follow-up testing was negative for AFB. He was followed closely by infectious disease.  He did have a negative quant interferon gold so was not felt that this was related to tuberculosis.  He did have a low SAAG and a high protein in the fluid.To GI it was a relatively confusing picture as they do not typically see ascites treated surgically however the operative report states that he had loculated fluid collections and abscesses in the abdomen.  Fortunately he is made a very nice recovery.  Whether this fluid in the abdomen was related to his liver or not is not entirely clear.  Seems like his ascites has essentially resolved without diuretics.  I suspect he does have cirrhosis especially given chronic hepatitis C in addition to history of alcohol use/abuse and nodular liver noted on CAT scan.    5/6/24: Patient states he has minimal discomfort with hernia and no constipation.  "    GI recommendations: \"However to try to confirm this further I would recommend checking an ultrasound elastography.  Also given the unusual presentation I think he would benefit from meeting one of our hepatologist.  I will put in a consultation for hepatology.\"    US elastography: \"ultrasound elastography did reveal a moderate amount of scarring (fibrosis) in the liver but did not confirm cirrhosis of the liver as suggested by the CAT scan performed in March.\"    MELD 3.0: 8 at 1/31/2024  8:50 AM  MELD-Na: 8 at 1/31/2024  8:50 AM  Calculated from:  Serum Creatinine: 0.86 mg/dL (Using min of 1 mg/dL) at 1/31/2024  8:50 AM  Serum Sodium: 136 mmol/L at 1/31/2024  8:50 AM  Total Bilirubin: 1.16 mg/dL at 1/31/2024  8:50 AM  Serum Albumin: 5.0 g/dL (Using max of 3.5 g/dL) at 1/31/2024  8:50 AM  INR(ratio): 1.05 at 1/31/2024  8:50 AM  Age at listing (hypothetical): 53 years  Sex: Male at 1/31/2024  8:50 AM    CT 11/2023:  ABDOMINAL WALL/INGUINAL REGIONS: Upper abdominal ventral hernia containing unobstructed loops of small and large bowel. Hernia measures 8.4 cm in the ML dimension and estimated to extend from the level of the umbilicus measuring 17.7 cm in the CC   dimension.    Colonoscopy and EGD 12/2023:  Final Diagnosis   A. Stomach, \"Stomach antral erythema,\" Biopsy:  - Antral mucosa with mild chronic inactive gastritis  - Negative for intestinal metaplasia  - Negative for curvilinear Helicobacter microorganisms on H&E     B. Gastroesophageal junction, \"Esophagus irregular Z-line at 40 cm biopsy,\" Biopsy:  - Fragments of gastroesophageal junction mucosa with moderate chronic inflammation  - Positive for intestinal metaplasia, supported by Alcian Blue/PAS histochemistry (see note)  - Squamous mucosa with reactive changes, including acanthosis and basal cell hyperplasia     C. Colon, “Mid transverse colon polyps x 2,” Biopsy:  - Fragments of tubular adenoma  - Negative for high grade dysplasia or carcinoma     D. " Colon, “Cecal polyp,” Biopsy:  - Benign polypoid fragment of colonic mucosa with prominent lymphoid aggregate  - Negative for dysplasia or carcinoma     E. Colon, “Proximal ascending colon polyp,” Biopsy:  - Tubular adenoma  - Negative for high grade dysplasia or carcinoma   RECOMMENDATION:    Repeat colonoscopy in 3 months     Inadequate bowel preparation         You will need a 2-day preparation for your next colonoscopy.    Prior abdominal surgery:   Yes    BMI: Body mass index is 21.9 kg/m².     Tobacco use:   Tobacco Use: High Risk (8/28/2024)    Patient History     Smoking Tobacco Use: Former     Smokeless Tobacco Use: Current     Passive Exposure: Never        Lab Results   Component Value Date    WBC 5.98 08/15/2024    HGB 16.4 08/15/2024    HCT 47.9 08/15/2024     (H) 08/15/2024     08/15/2024     Lab Results   Component Value Date    K 3.9 08/15/2024     08/15/2024    CO2 26 08/15/2024    BUN 7 08/15/2024    CREATININE 0.88 08/15/2024    GLUF 99 08/15/2024    CALCIUM 9.9 08/15/2024    CORRECTEDCA 8.8 02/01/2023    AST 18 08/15/2024    ALT 19 08/15/2024    ALKPHOS 51 08/15/2024    EGFR 98 08/15/2024     Lab Results   Component Value Date    INR 1.05 01/31/2024    INR 1.05 11/08/2023    INR 1.10 04/20/2023    PROTIME 13.6 01/31/2024    PROTIME 13.6 11/08/2023    PROTIME 14.2 04/20/2023       Smoking Status: Current Smoker    ROS:  General ROS: negative  negative for - chills, fatigue, fever or night sweats, weight loss  Respiratory ROS: no cough, shortness of breath, or wheezing  Cardiovascular ROS: no chest pain or dyspnea on exertion  Genito-Urinary ROS: no dysuria, trouble voiding, or hematuria  Musculoskeletal ROS: negative for - gait disturbance, joint pain or muscle pain  Neurological ROS: no TIA or stroke symptoms  Abdominal ROS: see HPI  GI ROS: see HPI  Skin ROS: no new rashes or lesions   Lymphatic ROS: no new adenopathy noted by pt.   GYN ROS: see HPI, no new GYN history or  bleeding noted  Psy ROS: no new mental or behavioral disturbances       Patient Active Problem List   Diagnosis    Hepatitis C    Acute peritonitis (HCC)    Hyponatremia    Difficult intubation    Mycobacterium, atypical    Hypomagnesemia    Pressure injury of upper back, stage 3 (HCC)    GERD (gastroesophageal reflux disease)    Pre-op examination    Ventral hernia without obstruction or gangrene    Healthcare maintenance    Prostate cancer screening    Elevated blood pressure reading in office without diagnosis of hypertension         Allergies: Penicillin v and Amoxicillin    Meds:  Current Outpatient Medications:     acetaminophen (TYLENOL) 500 mg tablet, Take 500 mg by mouth every 6 (six) hours as needed for mild pain, Disp: , Rfl:     famotidine (PEPCID) 20 mg tablet, TAKE 1 TABLET BY MOUTH TWICE A DAY, Disp: 180 tablet, Rfl: 2    omeprazole (PriLOSEC) 20 mg delayed release capsule, TAKE 1 CAPSULE BY MOUTH EVERY DAY, Disp: 90 capsule, Rfl: 1    sildenafil (VIAGRA) 25 MG tablet, Take 1-4 tablets by mouth 20 minutes prior to sexual activity. AVOID NITRATES AND ALPHA BLOCKERS. Do not exceed 4 tablets in 24 hours., Disp: , Rfl:     PMH:  Past Medical History:   Diagnosis Date    Hard to intubate     Hepatitis C        PSH:  Past Surgical History:   Procedure Laterality Date    HAND SURGERY      IR PARACENTESIS  1/23/2023    LA LAPS ABD PRTM&OMENTUM DX W/WO SPEC BR/WA SPX N/A 1/28/2023    Procedure: LAPAROSCOPY DIAGNOSTIC CONVERTED TO OPEN, LAPAROTOMY, REPAIR ENTEROTOMY X2, EXTENSIS LYSIS OF ADHESIONS, PERITONEAL BIOPSIES, PERITONEAL LAVAGE;  Surgeon: Lamine Elliott MD;  Location: [facility];  Service: General    WISDOM TOOTH EXTRACTION         Family History   Problem Relation Age of Onset    Diabetes Mother     No Known Problems Father         reports that he has quit smoking. His smoking use included cigarettes. He has a 8.8 pack-year smoking history. He has never been exposed to tobacco smoke. He uses  smokeless tobacco. He reports that he does not currently use alcohol. He reports current drug use. Frequency: 7.00 times per week. Drug: Marijuana.    Vitals:    08/28/24 1550   BP: 142/90   Pulse: 77   Resp: 16   Temp: 97.9 °F (36.6 °C)   SpO2: 97%           PHYSICAL EXAM  General Appearance:    Alert, cooperative, no distress,    Head:    Normocephalic without obvious abnormality   Eyes:    PERRL, conjunctiva/corneas clear,      Neck:   Supple, no adenopathy, no JVD   Back:     Symmetric, no spinal or CVA tenderness   Lungs:     Clear to auscultation bilaterally, no wheezing or rhonchi   Heart:    Regular rate and rhythm, S1 and S2 normal, no murmur   Abdomen:      liver enlarged  - edge below right costal margin: 3 cm liver edge palpable 3 cm below costal margin.    incisional hernia  The hernia is, easily reducible, there is diastasis recti but the scar is very thin.   Extremities:   Extremities normal. No clubbing, cyanosis or edema   Psych:   Normal Affect, AOx3.    Neurologic:  Skin:   CNII-XII intact. Strength symmetric, speech intact    Warm, dry, intact, no visible rashes or lesions                   Informed consent for procedure was personally discussed, reviewed, and signed by Dr. Mckinney. Discussion by Dr. Mckinney was carried out regarding risks, benefits, and alternatives with the patient. Risks include but are not limited to:  bleeding, infection, and delayed wound healing or an open wound, pulmonary embolus, leaks from bowel or bile ducts or other viscus, transfusions, death.  Discussed in further detail the more common complications and their rates of occurrence.   was used if necessary.  Patient expressed understanding of the issues discussed and wished/consented to proceed.  All questions were answered by Dr. Mckinney.    Discussion performed between patient and the provider signing below.     Signature:   Hoa Reynolds PA-C    Date: 8/28/2024 Time: 3:58 PM

## 2024-08-20 NOTE — PROGRESS NOTES
Assessment/Plan:   Vijay Barrera is a 53 y.o.male who is here for   Chief Complaint   Patient presents with    Pre-op Exam     Patient is here today for a H&P prior to his central hernia repair with  on 24.      Fibrotic liver from prior Hep c that is now treated.      Plan:   Patient does have incisional ventral hernia on examination with mild discomfort and no constipation. Patient does have hepatomegaly and chronic hepatitis c untreated. He has finished is Hep c treatment. Viral load is negative. We can move forward with surgical repair of ventral hernia robotically with mesh. Patient also vapes and we did discuss necessity of quitting for long term success of hernia repair. Continue follow up with GI.       Post Op Pain Management:       Preoperative Clearance: medical - cleared per PCP       Imagin23  1.  Overall significantly improved findings in the peritoneum with near-complete resolution of previous abdominopelvic ascites and no new/worsening findings of peritonitis. There is trace residual upper abdominal ascites as well as a discrete   thick-walled collection in the left upper quadrant measuring up to 2.5 cm.  2.  Mild wall thickening of the descending colon in the rectum which could be reactive to the peritoneal process or represents a nonspecific proctocolitis.  3.  Resolved pleural effusions.    In preparation for this visit all relevant and known prior office notes, prior consultations, emergency room visits, blood work results, and imaging studies were personally reviewed.  A total of  30 minutes was spent reviewing all of this information,caring for this patient, providing differential diagnosis, instructions for management, counseling and coordination of care.  This also includes planning surgical intervention where indicated.    Patient understands hernia occurrence or re-occurrence risk is higher in a diabetic, tobacco user, with elevated BMIs.    _____________________________________________      HPI:  Vijay Barrera is a 53 y.o.male who was referred for evaluation of Pre-op Exam (Patient is here today for a H&P prior to his central hernia repair with  on 09/11/24. )  .    Currently complaining of  initial Incisional Hernia worse with bending, lifting, standing, no nausea and no vomiting, regular bowel movement. Duration of pain or symptoms:  Intermittent and no pain     Vijay is a 52-year-old gentleman who was hospitalized January 21 through February 3 after he presented with abdominal distention pain and fever.  He was noted to have a nodular liver on CAT scan with a normal size spleen interestingly.  He was also noted to have ascites that appeared to be loculated.  Paracentesis revealed suspicion for secondary peritonitis.  He did eventually undergo laparoscopy converted to open laparotomy.  He did have peritoneal biopsies performed.  A source for his secondary peritonitis was not identified at time of laparotomy.  Peritoneal biopsies revealed rare AFB.  However follow-up testing was negative for AFB. He was followed closely by infectious disease.  He did have a negative quant interferon gold so was not felt that this was related to tuberculosis.  He did have a low SAAG and a high protein in the fluid.To GI it was a relatively confusing picture as they do not typically see ascites treated surgically however the operative report states that he had loculated fluid collections and abscesses in the abdomen.  Fortunately he is made a very nice recovery.  Whether this fluid in the abdomen was related to his liver or not is not entirely clear.  Seems like his ascites has essentially resolved without diuretics.  I suspect he does have cirrhosis especially given chronic hepatitis C in addition to history of alcohol use/abuse and nodular liver noted on CAT scan.    5/6/24: Patient states he has minimal discomfort with hernia and no constipation.  "    GI recommendations: \"However to try to confirm this further I would recommend checking an ultrasound elastography.  Also given the unusual presentation I think he would benefit from meeting one of our hepatologist.  I will put in a consultation for hepatology.\"    US elastography: \"ultrasound elastography did reveal a moderate amount of scarring (fibrosis) in the liver but did not confirm cirrhosis of the liver as suggested by the CAT scan performed in March.\"    MELD 3.0: 8 at 1/31/2024  8:50 AM  MELD-Na: 8 at 1/31/2024  8:50 AM  Calculated from:  Serum Creatinine: 0.86 mg/dL (Using min of 1 mg/dL) at 1/31/2024  8:50 AM  Serum Sodium: 136 mmol/L at 1/31/2024  8:50 AM  Total Bilirubin: 1.16 mg/dL at 1/31/2024  8:50 AM  Serum Albumin: 5.0 g/dL (Using max of 3.5 g/dL) at 1/31/2024  8:50 AM  INR(ratio): 1.05 at 1/31/2024  8:50 AM  Age at listing (hypothetical): 53 years  Sex: Male at 1/31/2024  8:50 AM    CT 11/2023:  ABDOMINAL WALL/INGUINAL REGIONS: Upper abdominal ventral hernia containing unobstructed loops of small and large bowel. Hernia measures 8.4 cm in the ML dimension and estimated to extend from the level of the umbilicus measuring 17.7 cm in the CC   dimension.    Colonoscopy and EGD 12/2023:  Final Diagnosis   A. Stomach, \"Stomach antral erythema,\" Biopsy:  - Antral mucosa with mild chronic inactive gastritis  - Negative for intestinal metaplasia  - Negative for curvilinear Helicobacter microorganisms on H&E     B. Gastroesophageal junction, \"Esophagus irregular Z-line at 40 cm biopsy,\" Biopsy:  - Fragments of gastroesophageal junction mucosa with moderate chronic inflammation  - Positive for intestinal metaplasia, supported by Alcian Blue/PAS histochemistry (see note)  - Squamous mucosa with reactive changes, including acanthosis and basal cell hyperplasia     C. Colon, “Mid transverse colon polyps x 2,” Biopsy:  - Fragments of tubular adenoma  - Negative for high grade dysplasia or carcinoma     D. " Colon, “Cecal polyp,” Biopsy:  - Benign polypoid fragment of colonic mucosa with prominent lymphoid aggregate  - Negative for dysplasia or carcinoma     E. Colon, “Proximal ascending colon polyp,” Biopsy:  - Tubular adenoma  - Negative for high grade dysplasia or carcinoma   RECOMMENDATION:    Repeat colonoscopy in 3 months     Inadequate bowel preparation         You will need a 2-day preparation for your next colonoscopy.    Prior abdominal surgery:   Yes    BMI: Body mass index is 21.9 kg/m².     Tobacco use:   Tobacco Use: High Risk (8/28/2024)    Patient History     Smoking Tobacco Use: Former     Smokeless Tobacco Use: Current     Passive Exposure: Never        Lab Results   Component Value Date    WBC 5.98 08/15/2024    HGB 16.4 08/15/2024    HCT 47.9 08/15/2024     (H) 08/15/2024     08/15/2024     Lab Results   Component Value Date    K 3.9 08/15/2024     08/15/2024    CO2 26 08/15/2024    BUN 7 08/15/2024    CREATININE 0.88 08/15/2024    GLUF 99 08/15/2024    CALCIUM 9.9 08/15/2024    CORRECTEDCA 8.8 02/01/2023    AST 18 08/15/2024    ALT 19 08/15/2024    ALKPHOS 51 08/15/2024    EGFR 98 08/15/2024     Lab Results   Component Value Date    INR 1.05 01/31/2024    INR 1.05 11/08/2023    INR 1.10 04/20/2023    PROTIME 13.6 01/31/2024    PROTIME 13.6 11/08/2023    PROTIME 14.2 04/20/2023       Smoking Status: Current Smoker    ROS:  General ROS: negative  negative for - chills, fatigue, fever or night sweats, weight loss  Respiratory ROS: no cough, shortness of breath, or wheezing  Cardiovascular ROS: no chest pain or dyspnea on exertion  Genito-Urinary ROS: no dysuria, trouble voiding, or hematuria  Musculoskeletal ROS: negative for - gait disturbance, joint pain or muscle pain  Neurological ROS: no TIA or stroke symptoms  Abdominal ROS: see HPI  GI ROS: see HPI  Skin ROS: no new rashes or lesions   Lymphatic ROS: no new adenopathy noted by pt.   GYN ROS: see HPI, no new GYN history or  bleeding noted  Psy ROS: no new mental or behavioral disturbances       Patient Active Problem List   Diagnosis    Hepatitis C    Acute peritonitis (HCC)    Hyponatremia    Difficult intubation    Mycobacterium, atypical    Hypomagnesemia    Pressure injury of upper back, stage 3 (HCC)    GERD (gastroesophageal reflux disease)    Pre-op examination    Ventral hernia without obstruction or gangrene    Healthcare maintenance    Prostate cancer screening    Elevated blood pressure reading in office without diagnosis of hypertension         Allergies: Penicillin v and Amoxicillin    Meds:  Current Outpatient Medications:     acetaminophen (TYLENOL) 500 mg tablet, Take 500 mg by mouth every 6 (six) hours as needed for mild pain, Disp: , Rfl:     famotidine (PEPCID) 20 mg tablet, TAKE 1 TABLET BY MOUTH TWICE A DAY, Disp: 180 tablet, Rfl: 2    omeprazole (PriLOSEC) 20 mg delayed release capsule, TAKE 1 CAPSULE BY MOUTH EVERY DAY, Disp: 90 capsule, Rfl: 1    sildenafil (VIAGRA) 25 MG tablet, Take 1-4 tablets by mouth 20 minutes prior to sexual activity. AVOID NITRATES AND ALPHA BLOCKERS. Do not exceed 4 tablets in 24 hours., Disp: , Rfl:     PMH:  Past Medical History:   Diagnosis Date    Hard to intubate     Hepatitis C        PSH:  Past Surgical History:   Procedure Laterality Date    HAND SURGERY      IR PARACENTESIS  1/23/2023    WI LAPS ABD PRTM&OMENTUM DX W/WO SPEC BR/WA SPX N/A 1/28/2023    Procedure: LAPAROSCOPY DIAGNOSTIC CONVERTED TO OPEN, LAPAROTOMY, REPAIR ENTEROTOMY X2, EXTENSIS LYSIS OF ADHESIONS, PERITONEAL BIOPSIES, PERITONEAL LAVAGE;  Surgeon: Lamine Elliott MD;  Location: [facility];  Service: General    WISDOM TOOTH EXTRACTION         Family History   Problem Relation Age of Onset    Diabetes Mother     No Known Problems Father         reports that he has quit smoking. His smoking use included cigarettes. He has a 8.8 pack-year smoking history. He has never been exposed to tobacco smoke. He uses  smokeless tobacco. He reports that he does not currently use alcohol. He reports current drug use. Frequency: 7.00 times per week. Drug: Marijuana.    Vitals:    08/28/24 1550   BP: 142/90   Pulse: 77   Resp: 16   Temp: 97.9 °F (36.6 °C)   SpO2: 97%           PHYSICAL EXAM  General Appearance:    Alert, cooperative, no distress,    Head:    Normocephalic without obvious abnormality   Eyes:    PERRL, conjunctiva/corneas clear,      Neck:   Supple, no adenopathy, no JVD   Back:     Symmetric, no spinal or CVA tenderness   Lungs:     Clear to auscultation bilaterally, no wheezing or rhonchi   Heart:    Regular rate and rhythm, S1 and S2 normal, no murmur   Abdomen:      liver enlarged  - edge below right costal margin: 3 cm liver edge palpable 3 cm below costal margin.    incisional hernia  The hernia is, easily reducible, there is diastasis recti but the scar is very thin.   Extremities:   Extremities normal. No clubbing, cyanosis or edema   Psych:   Normal Affect, AOx3.    Neurologic:  Skin:   CNII-XII intact. Strength symmetric, speech intact    Warm, dry, intact, no visible rashes or lesions                   Informed consent for procedure was personally discussed, reviewed, and signed by Dr. Mckinney. Discussion by Dr. Mckinney was carried out regarding risks, benefits, and alternatives with the patient. Risks include but are not limited to:  bleeding, infection, and delayed wound healing or an open wound, pulmonary embolus, leaks from bowel or bile ducts or other viscus, transfusions, death.  Discussed in further detail the more common complications and their rates of occurrence.   was used if necessary.  Patient expressed understanding of the issues discussed and wished/consented to proceed.  All questions were answered by Dr. Mckinney.    Discussion performed between patient and the provider signing below.     Signature:   Hoa Reynolds PA-C    Date: 8/28/2024 Time: 3:58 PM

## 2024-08-28 ENCOUNTER — OFFICE VISIT (OUTPATIENT)
Dept: SURGERY | Facility: CLINIC | Age: 54
End: 2024-08-28
Payer: COMMERCIAL

## 2024-08-28 VITALS
SYSTOLIC BLOOD PRESSURE: 142 MMHG | OXYGEN SATURATION: 97 % | DIASTOLIC BLOOD PRESSURE: 90 MMHG | WEIGHT: 157 LBS | TEMPERATURE: 97.9 F | RESPIRATION RATE: 16 BRPM | HEIGHT: 71 IN | HEART RATE: 77 BPM | BODY MASS INDEX: 21.98 KG/M2

## 2024-08-28 DIAGNOSIS — K43.2 INCISIONAL HERNIA, WITHOUT OBSTRUCTION OR GANGRENE: Primary | ICD-10-CM

## 2024-08-28 PROCEDURE — 99211 OFF/OP EST MAY X REQ PHY/QHP: CPT | Performed by: PHYSICIAN ASSISTANT

## 2024-09-06 RX ORDER — MULTIVIT-MIN/IRON FUM/FOLIC AC 7.5 MG-4
1 TABLET ORAL DAILY
COMMUNITY

## 2024-09-06 NOTE — PRE-PROCEDURE INSTRUCTIONS
Pre-Surgery Instructions:   Medication Instructions    acetaminophen (TYLENOL) 500 mg tablet Uses PRN- OK to take day of surgery    famotidine (PEPCID) 20 mg tablet Take day of surgery.    Ferrous Sulfate (IRON PO) Hold day of surgery.    Misc Natural Products (Joint Health) CAPS Stop taking 5 days prior to surgery.    Multiple Vitamins-Minerals (multivitamin with minerals) tablet Stop taking 5 days prior to surgery.    sildenafil (VIAGRA) 25 MG tablet Uses PRN- DO NOT take day of surgery   Medication instructions for day surgery reviewed. Please use only a sip of water to take your instructed medications. Avoid all over the counter vitamins, supplements and NSAIDS for one week prior to surgery per anesthesia guidelines. Tylenol is ok to take as needed.     You will receive a call one business day prior to surgery with an arrival time and hospital directions. If your surgery is scheduled on a Monday, the hospital will be calling you on the Friday prior to your surgery. If you have not heard from anyone by 8pm, please call the hospital supervisor through the hospital  at [phone]. ([place] [phone] or [place] [phone]).    Do not eat or drink anything after midnight the night before your surgery, including candy, mints, lifesavers, or chewing gum. Do not drink alcohol 24hrs before your surgery. Try not to smoke at least 24hrs before your surgery.       Follow the pre surgery showering instructions as listed in the “My Surgical Experience Booklet” or otherwise provided by your surgeon's office. Do not use a blade to shave the surgical area 1 week before surgery. It is okay to use a clean electric clippers up to 24 hours before surgery. Do not apply any lotions, creams, including makeup, cologne, deodorant, or perfumes after showering on the day of your surgery. Do not use dry shampoo, hair spray, hair gel, or any type of hair products.     No contact lenses, eye make-up, or artificial eyelashes.  Remove nail polish, including gel polish, and any artificial, gel, or acrylic nails if possible. Remove all jewelry including rings and body piercing jewelry.     Wear causal clothing that is easy to take on and off. Consider your type of surgery.    Keep any valuables, jewelry, piercings at home. Please bring any specially ordered equipment (sling, braces) if indicated.    Arrange for a responsible person to drive you to and from the hospital on the day of your surgery. Please confirm the visitor policy for the day of your procedure when you receive your phone call with an arrival time.     Call the surgeon's office with any new illnesses, exposures, or additional questions prior to surgery.    Please reference your “My Surgical Experience Booklet” for additional information to prepare for your upcoming surgery.

## 2024-09-09 ENCOUNTER — ANESTHESIA EVENT (OUTPATIENT)
Dept: PERIOP | Facility: HOSPITAL | Age: 54
DRG: 354 | End: 2024-09-09
Payer: COMMERCIAL

## 2024-09-11 ENCOUNTER — HOSPITAL ENCOUNTER (INPATIENT)
Facility: HOSPITAL | Age: 54
LOS: 1 days | Discharge: HOME | DRG: 354 | End: 2024-09-12
Attending: SURGERY | Admitting: SURGERY
Payer: COMMERCIAL

## 2024-09-11 ENCOUNTER — ANESTHESIA (OUTPATIENT)
Dept: PERIOP | Facility: HOSPITAL | Age: 54
DRG: 354 | End: 2024-09-11
Payer: COMMERCIAL

## 2024-09-11 DIAGNOSIS — K43.9 VENTRAL HERNIA WITHOUT OBSTRUCTION OR GANGRENE: ICD-10-CM

## 2024-09-11 DIAGNOSIS — K43.2 INCISIONAL HERNIA, WITHOUT OBSTRUCTION OR GANGRENE: Primary | ICD-10-CM

## 2024-09-11 LAB
ANION GAP SERPL CALCULATED.3IONS-SCNC: 5 MMOL/L (ref 4–13)
BUN SERPL-MCNC: 9 MG/DL (ref 5–25)
CALCIUM SERPL-MCNC: 9.9 MG/DL (ref 8.4–10.2)
CHLORIDE SERPL-SCNC: 100 MMOL/L (ref 96–108)
CO2 SERPL-SCNC: 31 MMOL/L (ref 21–32)
CREAT SERPL-MCNC: 0.97 MG/DL (ref 0.6–1.3)
GFR SERPL CREATININE-BSD FRML MDRD: 88 ML/MIN/1.73SQ M
GLUCOSE P FAST SERPL-MCNC: 129 MG/DL (ref 65–99)
GLUCOSE SERPL-MCNC: 129 MG/DL (ref 65–140)
POTASSIUM SERPL-SCNC: 3.9 MMOL/L (ref 3.5–5.3)
SODIUM SERPL-SCNC: 136 MMOL/L (ref 135–147)

## 2024-09-11 PROCEDURE — 80048 BASIC METABOLIC PNL TOTAL CA: CPT | Performed by: SURGERY

## 2024-09-11 PROCEDURE — 0WUF0JZ SUPPLEMENT ABDOMINAL WALL WITH SYNTHETIC SUBSTITUTE, OPEN APPROACH: ICD-10-PCS | Performed by: SURGERY

## 2024-09-11 PROCEDURE — C1781 MESH (IMPLANTABLE): HCPCS | Performed by: SURGERY

## 2024-09-11 PROCEDURE — 0JN80ZZ RELEASE ABDOMEN SUBCUTANEOUS TISSUE AND FASCIA, OPEN APPROACH: ICD-10-PCS | Performed by: SURGERY

## 2024-09-11 PROCEDURE — 49595 RPR AA HRN 1ST > 10 RDC: CPT | Performed by: SURGERY

## 2024-09-11 PROCEDURE — C9290 INJ, BUPIVACAINE LIPOSOME: HCPCS

## 2024-09-11 PROCEDURE — 88302 TISSUE EXAM BY PATHOLOGIST: CPT | Performed by: PATHOLOGY

## 2024-09-11 DEVICE — PHASIX MESH, 6" X 8" (15.2 CM X 20.3 CM), RECTANGLE
Type: IMPLANTABLE DEVICE | Site: ABDOMEN | Status: FUNCTIONAL
Brand: PHASIX

## 2024-09-11 RX ORDER — FAMOTIDINE 20 MG/1
20 TABLET, FILM COATED ORAL 2 TIMES DAILY
Status: DISCONTINUED | OUTPATIENT
Start: 2024-09-11 | End: 2024-09-12 | Stop reason: HOSPADM

## 2024-09-11 RX ORDER — LIDOCAINE HYDROCHLORIDE 20 MG/ML
INJECTION, SOLUTION EPIDURAL; INFILTRATION; INTRACAUDAL; PERINEURAL AS NEEDED
Status: DISCONTINUED | OUTPATIENT
Start: 2024-09-11 | End: 2024-09-11

## 2024-09-11 RX ORDER — SODIUM CHLORIDE 9 MG/ML
125 INJECTION, SOLUTION INTRAVENOUS CONTINUOUS
Status: DISCONTINUED | OUTPATIENT
Start: 2024-09-11 | End: 2024-09-11

## 2024-09-11 RX ORDER — ONDANSETRON 2 MG/ML
INJECTION INTRAMUSCULAR; INTRAVENOUS AS NEEDED
Status: DISCONTINUED | OUTPATIENT
Start: 2024-09-11 | End: 2024-09-11

## 2024-09-11 RX ORDER — FENTANYL CITRATE/PF 50 MCG/ML
25 SYRINGE (ML) INJECTION
Status: DISCONTINUED | OUTPATIENT
Start: 2024-09-11 | End: 2024-09-11 | Stop reason: HOSPADM

## 2024-09-11 RX ORDER — PROPOFOL 10 MG/ML
INJECTION, EMULSION INTRAVENOUS AS NEEDED
Status: DISCONTINUED | OUTPATIENT
Start: 2024-09-11 | End: 2024-09-11

## 2024-09-11 RX ORDER — GABAPENTIN 100 MG/1
100 CAPSULE ORAL 3 TIMES DAILY
Status: DISCONTINUED | OUTPATIENT
Start: 2024-09-11 | End: 2024-09-12 | Stop reason: HOSPADM

## 2024-09-11 RX ORDER — DEXAMETHASONE SODIUM PHOSPHATE 10 MG/ML
INJECTION, SOLUTION INTRAMUSCULAR; INTRAVENOUS AS NEEDED
Status: DISCONTINUED | OUTPATIENT
Start: 2024-09-11 | End: 2024-09-11

## 2024-09-11 RX ORDER — SODIUM CHLORIDE, SODIUM LACTATE, POTASSIUM CHLORIDE, CALCIUM CHLORIDE 600; 310; 30; 20 MG/100ML; MG/100ML; MG/100ML; MG/100ML
125 INJECTION, SOLUTION INTRAVENOUS CONTINUOUS
Status: DISCONTINUED | OUTPATIENT
Start: 2024-09-11 | End: 2024-09-12

## 2024-09-11 RX ORDER — KETAMINE HCL IN NACL, ISO-OSM 100MG/10ML
SYRINGE (ML) INJECTION AS NEEDED
Status: DISCONTINUED | OUTPATIENT
Start: 2024-09-11 | End: 2024-09-11

## 2024-09-11 RX ORDER — ONDANSETRON 2 MG/ML
4 INJECTION INTRAMUSCULAR; INTRAVENOUS ONCE AS NEEDED
Status: DISCONTINUED | OUTPATIENT
Start: 2024-09-11 | End: 2024-09-11 | Stop reason: HOSPADM

## 2024-09-11 RX ORDER — ACETAMINOPHEN 325 MG/1
975 TABLET ORAL EVERY 6 HOURS SCHEDULED
Status: DISCONTINUED | OUTPATIENT
Start: 2024-09-11 | End: 2024-09-12 | Stop reason: HOSPADM

## 2024-09-11 RX ORDER — BUPIVACAINE HYDROCHLORIDE 2.5 MG/ML
INJECTION, SOLUTION EPIDURAL; INFILTRATION; INTRACAUDAL AS NEEDED
Status: DISCONTINUED | OUTPATIENT
Start: 2024-09-11 | End: 2024-09-11 | Stop reason: HOSPADM

## 2024-09-11 RX ORDER — FENTANYL CITRATE 50 UG/ML
INJECTION, SOLUTION INTRAMUSCULAR; INTRAVENOUS AS NEEDED
Status: DISCONTINUED | OUTPATIENT
Start: 2024-09-11 | End: 2024-09-11

## 2024-09-11 RX ORDER — HYDROMORPHONE HCL/PF 1 MG/ML
0.2 SYRINGE (ML) INJECTION
Status: DISCONTINUED | OUTPATIENT
Start: 2024-09-11 | End: 2024-09-12 | Stop reason: HOSPADM

## 2024-09-11 RX ORDER — ROCURONIUM BROMIDE 10 MG/ML
INJECTION, SOLUTION INTRAVENOUS AS NEEDED
Status: DISCONTINUED | OUTPATIENT
Start: 2024-09-11 | End: 2024-09-11

## 2024-09-11 RX ORDER — OXYCODONE HYDROCHLORIDE 5 MG/1
5 TABLET ORAL EVERY 4 HOURS PRN
Status: DISCONTINUED | OUTPATIENT
Start: 2024-09-11 | End: 2024-09-12 | Stop reason: HOSPADM

## 2024-09-11 RX ORDER — ACETAMINOPHEN 10 MG/ML
1000 INJECTION, SOLUTION INTRAVENOUS ONCE
Status: COMPLETED | OUTPATIENT
Start: 2024-09-11 | End: 2024-09-11

## 2024-09-11 RX ORDER — HYDROMORPHONE HCL/PF 1 MG/ML
0.5 SYRINGE (ML) INJECTION
Status: DISCONTINUED | OUTPATIENT
Start: 2024-09-11 | End: 2024-09-11 | Stop reason: HOSPADM

## 2024-09-11 RX ORDER — HYDROMORPHONE HCL/PF 1 MG/ML
SYRINGE (ML) INJECTION AS NEEDED
Status: DISCONTINUED | OUTPATIENT
Start: 2024-09-11 | End: 2024-09-11

## 2024-09-11 RX ORDER — MAGNESIUM HYDROXIDE 1200 MG/15ML
LIQUID ORAL AS NEEDED
Status: DISCONTINUED | OUTPATIENT
Start: 2024-09-11 | End: 2024-09-11 | Stop reason: HOSPADM

## 2024-09-11 RX ORDER — ONDANSETRON 2 MG/ML
4 INJECTION INTRAMUSCULAR; INTRAVENOUS EVERY 4 HOURS PRN
Status: DISCONTINUED | OUTPATIENT
Start: 2024-09-11 | End: 2024-09-12 | Stop reason: HOSPADM

## 2024-09-11 RX ORDER — CEFAZOLIN SODIUM 1 G/50ML
1000 SOLUTION INTRAVENOUS ONCE
Status: COMPLETED | OUTPATIENT
Start: 2024-09-11 | End: 2024-09-11

## 2024-09-11 RX ORDER — MIDAZOLAM HYDROCHLORIDE 2 MG/2ML
INJECTION, SOLUTION INTRAMUSCULAR; INTRAVENOUS AS NEEDED
Status: DISCONTINUED | OUTPATIENT
Start: 2024-09-11 | End: 2024-09-11

## 2024-09-11 RX ORDER — METHOCARBAMOL 500 MG/1
500 TABLET, FILM COATED ORAL EVERY 6 HOURS PRN
Status: DISCONTINUED | OUTPATIENT
Start: 2024-09-11 | End: 2024-09-12 | Stop reason: HOSPADM

## 2024-09-11 RX ADMIN — METHOCARBAMOL 500 MG: 500 TABLET ORAL at 18:57

## 2024-09-11 RX ADMIN — ROCURONIUM BROMIDE 10 MG: 10 INJECTION, SOLUTION INTRAVENOUS at 12:28

## 2024-09-11 RX ADMIN — SODIUM CHLORIDE 0.4 MCG/KG/HR: 9 INJECTION, SOLUTION INTRAVENOUS at 13:18

## 2024-09-11 RX ADMIN — FENTANYL CITRATE 50 MCG: 50 INJECTION INTRAMUSCULAR; INTRAVENOUS at 12:41

## 2024-09-11 RX ADMIN — FENTANYL CITRATE 25 MCG: 50 INJECTION INTRAMUSCULAR; INTRAVENOUS at 15:52

## 2024-09-11 RX ADMIN — FENTANYL CITRATE 25 MCG: 50 INJECTION INTRAMUSCULAR; INTRAVENOUS at 16:17

## 2024-09-11 RX ADMIN — ROCURONIUM BROMIDE 30 MG: 10 INJECTION, SOLUTION INTRAVENOUS at 12:46

## 2024-09-11 RX ADMIN — GABAPENTIN 100 MG: 100 CAPSULE ORAL at 20:48

## 2024-09-11 RX ADMIN — MIDAZOLAM 2 MG: 1 INJECTION INTRAMUSCULAR; INTRAVENOUS at 12:05

## 2024-09-11 RX ADMIN — GABAPENTIN 100 MG: 100 CAPSULE ORAL at 17:01

## 2024-09-11 RX ADMIN — Medication 10 MG: at 12:31

## 2024-09-11 RX ADMIN — ROCURONIUM BROMIDE 10 MG: 10 INJECTION, SOLUTION INTRAVENOUS at 13:31

## 2024-09-11 RX ADMIN — SODIUM CHLORIDE, SODIUM LACTATE, POTASSIUM CHLORIDE, AND CALCIUM CHLORIDE 125 ML/HR: .6; .31; .03; .02 INJECTION, SOLUTION INTRAVENOUS at 16:46

## 2024-09-11 RX ADMIN — HYDROMORPHONE HYDROCHLORIDE 0.5 MG: 1 INJECTION, SOLUTION INTRAMUSCULAR; INTRAVENOUS; SUBCUTANEOUS at 12:43

## 2024-09-11 RX ADMIN — Medication 20 MG: at 12:12

## 2024-09-11 RX ADMIN — SODIUM CHLORIDE: 0.9 INJECTION, SOLUTION INTRAVENOUS at 13:59

## 2024-09-11 RX ADMIN — DEXAMETHASONE SODIUM PHOSPHATE 10 MG: 10 INJECTION INTRAMUSCULAR; INTRAVENOUS at 12:17

## 2024-09-11 RX ADMIN — OXYCODONE HYDROCHLORIDE 5 MG: 5 TABLET ORAL at 18:57

## 2024-09-11 RX ADMIN — FENTANYL CITRATE 50 MCG: 50 INJECTION INTRAMUSCULAR; INTRAVENOUS at 12:32

## 2024-09-11 RX ADMIN — ACETAMINOPHEN 975 MG: 325 TABLET ORAL at 17:01

## 2024-09-11 RX ADMIN — ACETAMINOPHEN 1000 MG: 10 INJECTION INTRAVENOUS at 12:23

## 2024-09-11 RX ADMIN — ONDANSETRON 4 MG: 2 INJECTION INTRAMUSCULAR; INTRAVENOUS at 12:17

## 2024-09-11 RX ADMIN — SUGAMMADEX 200 MG: 100 INJECTION, SOLUTION INTRAVENOUS at 15:13

## 2024-09-11 RX ADMIN — PROPOFOL 200 MG: 10 INJECTION, EMULSION INTRAVENOUS at 12:12

## 2024-09-11 RX ADMIN — FENTANYL CITRATE 100 MCG: 50 INJECTION INTRAMUSCULAR; INTRAVENOUS at 12:09

## 2024-09-11 RX ADMIN — ROCURONIUM BROMIDE 40 MG: 10 INJECTION, SOLUTION INTRAVENOUS at 12:12

## 2024-09-11 RX ADMIN — HYDROMORPHONE HYDROCHLORIDE 0.5 MG: 1 INJECTION, SOLUTION INTRAMUSCULAR; INTRAVENOUS; SUBCUTANEOUS at 13:09

## 2024-09-11 RX ADMIN — Medication 10 MG: at 13:11

## 2024-09-11 RX ADMIN — ROCURONIUM BROMIDE 20 MG: 10 INJECTION, SOLUTION INTRAVENOUS at 12:57

## 2024-09-11 RX ADMIN — FAMOTIDINE 20 MG: 20 TABLET, FILM COATED ORAL at 17:01

## 2024-09-11 RX ADMIN — SODIUM CHLORIDE 125 ML/HR: 0.9 INJECTION, SOLUTION INTRAVENOUS at 09:54

## 2024-09-11 RX ADMIN — CEFAZOLIN SODIUM 1000 MG: 1 SOLUTION INTRAVENOUS at 12:05

## 2024-09-11 RX ADMIN — FENTANYL CITRATE 25 MCG: 50 INJECTION INTRAMUSCULAR; INTRAVENOUS at 16:05

## 2024-09-11 RX ADMIN — LIDOCAINE HYDROCHLORIDE 40 MG: 20 INJECTION, SOLUTION EPIDURAL; INFILTRATION; INTRACAUDAL at 12:12

## 2024-09-11 RX ADMIN — Medication 10 MG: at 13:32

## 2024-09-11 NOTE — INTERVAL H&P NOTE
H&P reviewed. After examining the patient I find no changes in the patients condition since the H&P had been written.    Vitals:    09/11/24 0942   BP: 153/85   Pulse: 61   Resp: 16   Temp: 98.1 °F (36.7 °C)   SpO2: 99%       Upon review of the previous surigcal notes, pt has a frozen abdomen by report.  Will not want to risk intraoperative bowel injury, so will proceed with open repair with mesh. Pt is aware and agrees to the current plan.     Informed consent for procedure was personally discussed, reviewed, and signed by Dr. Mckinney. Discussion by Dr. Mckinney was carried out regarding risks, benefits, and alternatives with the patient.   Risks include but are not limited to:  bleeding, infection, and delayed wound healing or an open wound, pulmonary embolus, leaks from or injury to the bowel or bile ducts or other viscus or blood vessels, possible need for transfusions, and death.  If the patient was at a higher average risk due to comorbidities including but not limited to: smoking, obesity, diabetes, this was also discussed that the patient was at a higher than average risk for postoperative complications or intraoperative issues altering the course of events.    This also included the risk of mesh use during the course of the surgery if indicated, the short-term and long-term implications of using mesh including the risk of bowel fistula, multiple operations or failure of the mesh.      Discussion was also carried out regarding the need for additional procedures as indicated during the course of the surgery which may not have been explicitly discussed prior to the procedure.  Discussed in further detail the more common complications and their rates of occurrence.      A  was used if necessary.      Patient expressed understanding of the issues discussed and wished/consented to proceed.  All questions were answered by Dr. Mckinney and Dr. Mckinney personally reviewed this consent as dictated  above.    Discussion performed between patient and the provider signing below.     Signature:   Marly Bedoya MD  Date: 9/11/2024 Time: 11:59 AM

## 2024-09-11 NOTE — QUICK NOTE
Postop Check    Procedure: Open ventral hernia repair with mesh.     Subjective:  No acute distress.  Resting comfortably in bed.  Patient reports pain is well-controlled.  Tolerating clear liquid diet.  Denies nausea, vomiting, fevers, chills, shortness of breath, chest pain.  Overall feels well after surgery.     Objective  Vitals:    09/11/24 1617 09/11/24 1633 09/11/24 1647 09/11/24 1648   BP: 161/95 154/90 160/100 160/100   Pulse: 86 72  85   Resp: 18  17 17   Temp: 97.8 °F (36.6 °C)  98.1 °F (36.7 °C) 98.1 °F (36.7 °C)   TempSrc:       SpO2: 93%   93%   Weight:       Height:             GENERAL: No acute distress, resting comfortably in bed.  CV: Regular rate, appears well-perfused  LUNGS: Nonlabored respirations, normal work of breathing  ABDOMEN: Abdomen soft, appropriate tenderness, nondistended.  Mepilex in place with no strikethrough.  No evidence of any hematoma surrounding the incision or any increased swelling.  MSK: No edema bilaterally.  NEURO: Alert and oriented x 3.    Assessment and Plan:  Status post open ventral hernia repair with mesh.  Patient is overall doing well and is hemodynamically stable.      -IV fluids  -Incentive spirometry  -Analgesia  -Monitor and replete electrolytes    Austin Ritter MD

## 2024-09-11 NOTE — PLAN OF CARE
Problem: PAIN - ADULT  Goal: Verbalizes/displays adequate comfort level or baseline comfort level  Description: Interventions:  - Encourage patient to monitor pain and request assistance  - Assess pain using appropriate pain scale  - Administer analgesics based on type and severity of pain and evaluate response  - Implement non-pharmacological measures as appropriate and evaluate response  - Consider cultural and social influences on pain and pain management  - Notify physician/advanced practitioner if interventions unsuccessful or patient reports new pain  Outcome: Progressing     Problem: INFECTION - ADULT  Goal: Absence or prevention of progression during hospitalization  Description: INTERVENTIONS:  - Assess and monitor for signs and symptoms of infection  - Monitor lab/diagnostic results  - Monitor all insertion sites, i.e. indwelling lines, tubes, and drains  - Monitor endotracheal if appropriate and nasal secretions for changes in amount and color  - [place] appropriate cooling/warming therapies per order  - Administer medications as ordered  - Instruct and encourage patient and family to use good hand hygiene technique  - Identify and instruct in appropriate isolation precautions for identified infection/condition  Outcome: Progressing  Goal: Absence of fever/infection during neutropenic period  Description: INTERVENTIONS:  - Monitor WBC    Outcome: Progressing     Problem: SAFETY ADULT  Goal: Patient will remain free of falls  Description: INTERVENTIONS:  - Educate patient/family on patient safety including physical limitations  - Instruct patient to call for assistance with activity   - Consult OT/PT to assist with strengthening/mobility   - Keep Call bell within reach  - Keep bed low and locked with side rails adjusted as appropriate  - Keep care items and personal belongings within reach  - Initiate and maintain comfort rounds  - Make Fall Risk Sign visible to staff  - Offer Toileting every  Hours,  in advance of need  - Initiate/Maintain alarm  - Obtain necessary fall risk management equipment:   - Apply yellow socks and bracelet for high fall risk patients  - Consider moving patient to room near nurses station  Outcome: Progressing  Goal: Maintain or return to baseline ADL function  Description: INTERVENTIONS:  -  Assess patient's ability to carry out ADLs; assess patient's baseline for ADL function and identify physical deficits which impact ability to perform ADLs (bathing, care of mouth/teeth, toileting, grooming, dressing, etc.)  - Assess/evaluate cause of self-care deficits   - Assess range of motion  - Assess patient's mobility; develop plan if impaired  - Assess patient's need for assistive devices and provide as appropriate  - Encourage maximum independence but intervene and supervise when necessary  - Involve family in performance of ADLs  - Assess for home care needs following discharge   - Consider OT consult to assist with ADL evaluation and planning for discharge  - Provide patient education as appropriate  Outcome: Progressing  Goal: Maintains/Returns to pre admission functional level  Description: INTERVENTIONS:  - Perform AM-PAC 6 Click Basic Mobility/ Daily Activity assessment daily.  - Set and communicate daily mobility goal to care team and patient/family/caregiver.   - Collaborate with rehabilitation services on mobility goals if consulted  - Perform Range of Motion  times a day.  - Reposition patient every  hours.  - Dangle patient  times a day  - Stand patient times a day  - Ambulate patient  times a day  - Out of bed to chair  times a day   - Out of bed for meals times a day  - Out of bed for toileting  - Record patient progress and toleration of activity level   Outcome: Progressing     Problem: DISCHARGE PLANNING  Goal: Discharge to home or other facility with appropriate resources  Description: INTERVENTIONS:  - Identify barriers to discharge w/patient and caregiver  - Arrange for  needed discharge resources and transportation as appropriate  - Identify discharge learning needs (meds, wound care, etc.)  - Arrange for interpretive services to assist at discharge as needed  - Refer to Case Management Department for coordinating discharge planning if the patient needs post-hospital services based on physician/advanced practitioner order or complex needs related to functional status, cognitive ability, or social support system  Outcome: Progressing     Problem: Knowledge Deficit  Goal: Patient/family/caregiver demonstrates understanding of disease process, treatment plan, medications, and discharge instructions  Description: Complete learning assessment and assess knowledge base.  Interventions:  - Provide teaching at level of understanding  - Provide teaching via preferred learning methods  Outcome: Progressing

## 2024-09-11 NOTE — ANESTHESIA POSTPROCEDURE EVALUATION
Post-Op Assessment Note    CV Status:  Stable  Pain Score: 0    Pain management: adequate       Mental Status:  Alert and awake   Hydration Status:  Euvolemic   PONV Controlled:  Controlled   Airway Patency:  Patent and adequate  Two or more mitigation strategies used for obstructive sleep apnea   Post Op Vitals Reviewed: Yes    No anethesia notable event occurred.    Staff: CRNA               BP   153/90   Temp 97.6 °F (36.4 °C) (09/11/24 1530)    Pulse 80   Resp 20   SpO2 95

## 2024-09-11 NOTE — INTERVAL H&P NOTE
H&P reviewed. After examining the patient I find no changes in the patients condition since the H&P had been written.    Vitals:    09/11/24 0942   BP: 153/85   Pulse: 61   Resp: 16   Temp: 98.1 °F (36.7 °C)   SpO2: 99%       Patient is aware that we will do a diagnostic laparoscopy.  If he continues to have ascites we may choose an alternative technique or not repair the hernia at all.  He understands this will be a diagnostic study then followed by a potential laparoscopic/robotic/open ventral hernia repair with mesh.  He understands the use of mesh may be feasible in the presence of ascites and we may have to do a primary closure which has a higher recurrence rate.  This will depend on the degree of ascites he still has.  He understands and agrees to the plan.

## 2024-09-11 NOTE — ANESTHESIA PREPROCEDURE EVALUATION
Procedure:  HERNIA REPAIR LAP, PSB MESH PSB ROBOTIC (Abdomen)  REPAIR HERNIA INCISIONAL (Abdomen)    Relevant Problems   ANESTHESIA   (+) Difficult intubation (Successful VL, see old record)      CARDIO (within normal limits)      ENDO (within normal limits)      GI/HEPATIC   (+) GERD (gastroesophageal reflux disease) (Controlled on medication)   (+) Hepatitis C (Now treated by GI)      PULMONARY  Nicotine vape        Physical Exam    Airway    Mallampati score: I  TM Distance: >3 FB  Neck ROM: full     Dental    lower dentures    Cardiovascular  Cardiovascular exam normal    Pulmonary  Pulmonary exam normal     Other Findings        Anesthesia Plan  ASA Score- 2     Anesthesia Type- general with ASA Monitors.         Additional Monitors:     Airway Plan: ETT.    Comment: Will use glidescope to secure airway .       Plan Factors-    Chart reviewed.    Patient summary reviewed.                  Induction- intravenous.    Postoperative Plan-         Informed Consent- Anesthetic plan and risks discussed with patient.

## 2024-09-11 NOTE — OP NOTE
EXPLORATORY LAPARTOMY, LYSIS OF ADHESIONS, RETRORECTUS HERNIA REPAIR, VENTRAL HERNIA REPAIR 15 CM, CICATRIX REPAIR  Postoperative Note  PATIENT NAME: Vijay Barrera  : 1970  MRN: [id number]  [facility] OR ROOM 08    Surgery Date: 2024      Consent:  The risks, benefits, and alternatives to the surgery were discussed with the patient and with the family prior to surgery, personally by Dr. Mckinney.  If the consent was obtained by the physician assistant or other representative, the consent was reviewed once again personally by the operating physician.  Common complications particular for this procedure as well as unusual complications were discussed, including but not limited to:  bleeding, wound infection, prolonged wound healing, open wounds, reoperation, leak from the bowel or viscus, leak from the bile duct or injury to adjacent or other organs or blood vessels in the abdomen.  A  was used if necessary.  The patient expressed understanding of the issues discussed and wished and consented to the procedure to proceed.  All questions were answered.  Dr. Mckinney personally discussed the informed consent with this patient.        Pre operative diagnosis:  Ventral hernia without obstruction or gangrene [K43.9]    Operative Indications:  Symptomatic Ventral Hernia    Operative Findings:  Hernia defect, with wide diastases.    Retrorectus repair with intact retrorectus and phasic's in the retrorectus space  15 cm defect by 10 cm defect but this was able to be closed using extensive dissection of the retrorectus layer.      Hernia final  fascia defect size:  (cm) 15  (Prior to opening any fascia and in aggregate)     Post operative diagnosis:  Post-Op Diagnosis Codes:     * Ventral hernia without obstruction or gangrene [K43.9]    Procedure:   Procedure(s):  EXPLORATORY LAPARTOMY, LYSIS OF ADHESIONS, RETRORECTUS HERNIA REPAIR, VENTRAL HERNIA REPAIR 15 CM, CICATRIX REPAIR    Surgeons and Role:     *  Marly Mckinney MD - Primary     * Austin Ritter MD - Assisting    The assistant was medically necessary for surgical safety the case including suturing, retraction, and hemostasis. A qualified resident was available.  I provided direct and immediate supervision.  I was present for the entire procedure.     Drains:  * No LDAs found *    Specimens:  ID Type Source Tests Collected by Time Destination   1 : hernia sac and cicatrix Tissue Soft Tissue, Other TISSUE EXAM Marly Mckinney MD 2024 [id number]        Estimated Blood Loss:   200 mL    Anesthesia Type:   General     Procedure:  The patient was seen in the Holding Room. The risks, benefits, complications, treatment options, and expected outcomes were discussed with the patient. The possibilities of reaction to medication, pulmonary aspiration, perforation of viscus, bleeding, recurrent infection, the need for additional procedures,The risk of bowel injury, failure to diagnose a condition, and creating a complication requiring transfusion or operation were discussed with the patient. The patient concurred with the proposed plan, giving informed consent.  The site of surgery properly noted/marked. The patient was taken to Operating Room and identified as Vijay Barrera. Staff verified patient name, , and procedure.   A Time Out was held and the above information confirmed.    The patient was placed supine.  After establishing general anesthesia, the abdomen was prepped and draped in standard fashion. Local anesthesia was used at the incision.     A vertical midline incision was made.  The cicatrix was excised using a wide elliptical incision.    Dissection was carried out around the hernia sac.  This was carried out down to the fascia.  The hernia sac was identified.    Exploratory laparotomy was carried out.  The liver did have some nodularity but was not as severe as anticipated.  The stomach and small bowel appeared normal.  There were dense adhesions  right to the cicatrix and to the midline which were carefully lysed using Metzenbaum scissors and cautery where appropriate.  A fairly extensive lysis of adhesions was carried out.  The upper abdomen was indeed very densely adhesed as described in the previous note but the lower abdomen was fairly free of any adhesions.  Some small bowel interloop adhesions were also lysed.    Anterior fascial flaps were created using electrocautery and blunt dissection, taking care of preserving the perforators were possible. The hernia sac was then freed up and its contents reduced.  The hernia sac was excised.      Posterior adhesions to the fascia were lysed using scissors and cautery where appropriate.  This allowed for sufficient posterior fascia freedom for closure.    After further consideration, a retrorectus repair was decided upon.    The anterior and posterior fascia were split on either side of the incision using scissors, knife and cautery where appropriate.  Sufficient flaps were created in the retrorectus space as far laterally as possible on each side.  This was traversed all the way to the lateral aspect of the rectus muscle bilaterally.  Some perforators were sacrificed to allow for adequate mobility.      The midline incision was connected superiorly and inferiorly with the posterior fascia.. A tension-free repair was carried out by adequate flap mobilization superiorly, inferiorly, medially, and laterally.    The posterior fascia was closed using a #1 Prolene suture in a running fashion in a 4-1 technique.    An onlay mesh was used in the retrorectus space, securing it using carefully placed interrupted tacking sutures.  This was a 15 x 20 cm phasic's mesh cut to trim around below the umbilicus on each side.    The rectus muscle and anterior fascia were laid over the mesh to help hold it in place.  The wound was irrigated.  The anterior fascia was closed using a #1 PDS running suture in a 4-1 fashion x 2.    The  "wound was irrigated.  The subcutaneous layers were closed, attaching the subcutaneous tissues to the fascia, using interrupted 2-0 Vicryl suture, and then 3-0 Vicryl suture.     The skin was closed using : Staples      Type of mesh used  Phasix onlay in the retrorectus space      The wound was dressed and the patient tolerated the procedure well.  An abdominal binder was placed at the conclusion of the procedure.      Sponge count and needle count and instrument count were correct x2, and RFA wanding for sponges was also negative at the end of the procedure prior to closure.      Some portions of this records may have been generated with voice recognition software. There may be translation, syntax,  or grammatical errors. Occasional wrong word or \"sound-a-like\" substitutions may have occurred due to the inherent limitations of the voice recognition software. Read the chart carefully and recognize, using context, where substations may have occurred. If you have any questions, please contact the dictating provider for clarification or correction, as needed.     Complications: None    Condition: Stable to PACU    SIGNATURE: Marly Bedoya MD   DATE: September 11, 2024   TIME: 3:05 PM    "

## 2024-09-12 VITALS
HEIGHT: 71 IN | WEIGHT: 158.95 LBS | OXYGEN SATURATION: 96 % | SYSTOLIC BLOOD PRESSURE: 151 MMHG | HEART RATE: 87 BPM | RESPIRATION RATE: 18 BRPM | DIASTOLIC BLOOD PRESSURE: 96 MMHG | TEMPERATURE: 98.1 F | BODY MASS INDEX: 22.25 KG/M2

## 2024-09-12 PROBLEM — K43.2 INCISIONAL HERNIA: Status: RESOLVED | Noted: 2024-09-11 | Resolved: 2024-09-12

## 2024-09-12 LAB
ANION GAP SERPL CALCULATED.3IONS-SCNC: 8 MMOL/L (ref 4–13)
BASOPHILS # BLD AUTO: 0.02 THOUSANDS/ÂΜL (ref 0–0.1)
BASOPHILS NFR BLD AUTO: 0 % (ref 0–1)
BUN SERPL-MCNC: 10 MG/DL (ref 5–25)
CALCIUM SERPL-MCNC: 8.8 MG/DL (ref 8.4–10.2)
CHLORIDE SERPL-SCNC: 105 MMOL/L (ref 96–108)
CO2 SERPL-SCNC: 24 MMOL/L (ref 21–32)
CREAT SERPL-MCNC: 0.82 MG/DL (ref 0.6–1.3)
EOSINOPHIL # BLD AUTO: 0 THOUSAND/ÂΜL (ref 0–0.61)
EOSINOPHIL NFR BLD AUTO: 0 % (ref 0–6)
ERYTHROCYTE [DISTWIDTH] IN BLOOD BY AUTOMATED COUNT: 11.9 % (ref 11.6–15.1)
GFR SERPL CREATININE-BSD FRML MDRD: 100 ML/MIN/1.73SQ M
GLUCOSE SERPL-MCNC: 117 MG/DL (ref 65–140)
HCT VFR BLD AUTO: 37.8 % (ref 36.5–49.3)
HGB BLD-MCNC: 13.1 G/DL (ref 12–17)
IMM GRANULOCYTES # BLD AUTO: 0.05 THOUSAND/UL (ref 0–0.2)
IMM GRANULOCYTES NFR BLD AUTO: 0 % (ref 0–2)
LYMPHOCYTES # BLD AUTO: 2.04 THOUSANDS/ÂΜL (ref 0.6–4.47)
LYMPHOCYTES NFR BLD AUTO: 17 % (ref 14–44)
MAGNESIUM SERPL-MCNC: 1.5 MG/DL (ref 1.9–2.7)
MCH RBC QN AUTO: 35.2 PG (ref 26.8–34.3)
MCHC RBC AUTO-ENTMCNC: 34.7 G/DL (ref 31.4–37.4)
MCV RBC AUTO: 102 FL (ref 82–98)
MONOCYTES # BLD AUTO: 0.93 THOUSAND/ÂΜL (ref 0.17–1.22)
MONOCYTES NFR BLD AUTO: 8 % (ref 4–12)
NEUTROPHILS # BLD AUTO: 9.2 THOUSANDS/ÂΜL (ref 1.85–7.62)
NEUTS SEG NFR BLD AUTO: 75 % (ref 43–75)
NRBC BLD AUTO-RTO: 0 /100 WBCS
PHOSPHATE SERPL-MCNC: 3.2 MG/DL (ref 2.7–4.5)
PLATELET # BLD AUTO: 163 THOUSANDS/UL (ref 149–390)
PMV BLD AUTO: 10.7 FL (ref 8.9–12.7)
POTASSIUM SERPL-SCNC: 4.1 MMOL/L (ref 3.5–5.3)
RBC # BLD AUTO: 3.72 MILLION/UL (ref 3.88–5.62)
SODIUM SERPL-SCNC: 137 MMOL/L (ref 135–147)
WBC # BLD AUTO: 12.24 THOUSAND/UL (ref 4.31–10.16)

## 2024-09-12 PROCEDURE — 83735 ASSAY OF MAGNESIUM: CPT

## 2024-09-12 PROCEDURE — 84100 ASSAY OF PHOSPHORUS: CPT

## 2024-09-12 PROCEDURE — 80048 BASIC METABOLIC PNL TOTAL CA: CPT

## 2024-09-12 PROCEDURE — 99231 SBSQ HOSP IP/OBS SF/LOW 25: CPT

## 2024-09-12 PROCEDURE — 85025 COMPLETE CBC W/AUTO DIFF WBC: CPT

## 2024-09-12 PROCEDURE — NC001 PR NO CHARGE: Performed by: SURGERY

## 2024-09-12 RX ORDER — OXYCODONE HYDROCHLORIDE 5 MG/1
5 TABLET ORAL EVERY 4 HOURS PRN
Qty: 20 TABLET | Refills: 0 | Status: SHIPPED | OUTPATIENT
Start: 2024-09-12 | End: 2024-09-17

## 2024-09-12 RX ORDER — METHOCARBAMOL 500 MG/1
500 TABLET, FILM COATED ORAL EVERY 6 HOURS PRN
Qty: 20 TABLET | Refills: 0 | Status: SHIPPED | OUTPATIENT
Start: 2024-09-12 | End: 2024-09-17

## 2024-09-12 RX ADMIN — ACETAMINOPHEN 975 MG: 325 TABLET ORAL at 00:08

## 2024-09-12 RX ADMIN — METHOCARBAMOL 500 MG: 500 TABLET ORAL at 04:11

## 2024-09-12 RX ADMIN — FAMOTIDINE 20 MG: 20 TABLET, FILM COATED ORAL at 08:09

## 2024-09-12 RX ADMIN — SODIUM CHLORIDE, SODIUM LACTATE, POTASSIUM CHLORIDE, AND CALCIUM CHLORIDE 125 ML/HR: .6; .31; .03; .02 INJECTION, SOLUTION INTRAVENOUS at 00:10

## 2024-09-12 RX ADMIN — ACETAMINOPHEN 975 MG: 325 TABLET ORAL at 05:45

## 2024-09-12 RX ADMIN — GABAPENTIN 100 MG: 100 CAPSULE ORAL at 08:09

## 2024-09-12 NOTE — DISCHARGE SUMMARY
Discharge Summary - General Surgery   Vijay Barrera 53 y.o. male MRN: [id number]  Unit/Bed#: E5 -01 Encounter: [id number]    Admission Date: 9/11/2024     Discharge Date: 9/12/2024    Admitting Diagnosis: Ventral hernia without obstruction or gangrene [K43.9]    Discharge Diagnosis: Status post open ventral hernia repair with mesh.    Attending and Service: Dr. Mckinney, general surgery    Hospital Course: Vijay Barrera is a 53-year-old male who presented for elective repair of a ventral hernia.  Patient is now status post open ventral hernia repair with mesh on 9/11.  Patient tolerated the procedure well without immediate complications.  He continued to progress well overnight with adequate pain control on current regiment.  He denied any nausea, vomiting, fevers, chills, shortness of breath, chest pain.  He is able to tolerate a regular diet prior to being discharged.  Patient overall progressed well throughout his hospitalization and was deemed medically stable for discharge on 9/12.  He was given both written and verbal return precautions.  All patient's questions were answered satisfactorily.     On discharge, the patient is instructed to follow-up with the patient's primary care provider within the next 2 weeks to review the events of the recent hospitalization.  Patient is instructed to follow-up with the general surgery service on 9/23/2024 for his postoperative appointment.    Condition at Discharge: good, stable    Discharge instructions/Information to patient and family:   See after visit summary for information provided to patient and family.      Provisions for Follow-Up Care:  See after visit summary for information related to follow-up care and any pertinent home health orders.      Disposition: Home    Planned Readmission: No    Discharge Statement   I spent 25 minutes discharging the patient. This time was spent on the day of discharge. I had direct contact with the patient on the day of  discharge. Additional documentation is required if more than 30 minutes were spent on discharge.     Discharge Medications:  See after visit summary for reconciled discharge medications provided to patient and family.    Austin Ritter MD  9/12/2024  11:17 AM

## 2024-09-12 NOTE — UTILIZATION REVIEW
Notification of Unplanned, Urgent, or   Emergency Inpatient Admission   AUTHORIZATION REQUEST   [place] [address]  Tax ID: [id number]  NPI: [id number]  Place of Service: Acute Care Hospital  Admission Level of Care: Inpatient  Place of Service Code: 21     Attending Physician Information  Attending Name and NPI#: Marly Bedoya Md [[id number]]  Phone: [phone]     Admission Information  Inpatient Admission Date/Time: 9/11/24  5:13 PM  Discharge Date/Time: No discharge date for patient encounter.  Admitting Diagnosis Code/Description:  Ventral hernia without obstruction or gangrene [K43.9]     Utilization Review Contact  Anna Rosado Utilization   Phone: [phone]  Fax: [phone]  Email: [email or web address]  Contact for approvals/pending authorizations, clinical reviews, and discharge.     Physician Advisory Services Contact  Medical Necessity Denial & [employer]ogx-sr-Inue Discussion  Phone: [phone]  Fax: [phone]  Email: [email or web address]     DISCHARGE SUPPORT TEAM:  For Patients Discharge Needs & Updates  Phone: [phone] opt. 2 Fax: [phone]  Email: [email or web address]

## 2024-09-12 NOTE — PLAN OF CARE
Problem: PAIN - ADULT  Goal: Verbalizes/displays adequate comfort level or baseline comfort level  Description: Interventions:  - Encourage patient to monitor pain and request assistance  - Assess pain using appropriate pain scale  - Administer analgesics based on type and severity of pain and evaluate response  - Implement non-pharmacological measures as appropriate and evaluate response  - Consider cultural and social influences on pain and pain management  - Notify physician/advanced practitioner if interventions unsuccessful or patient reports new pain  Outcome: Progressing     Problem: INFECTION - ADULT  Goal: Absence or prevention of progression during hospitalization  Description: INTERVENTIONS:  - Assess and monitor for signs and symptoms of infection  - Monitor lab/diagnostic results  - Monitor all insertion sites, i.e. indwelling lines, tubes, and drains  - Monitor endotracheal if appropriate and nasal secretions for changes in amount and color  - Lena appropriate cooling/warming therapies per order  - Administer medications as ordered  - Instruct and encourage patient and family to use good hand hygiene technique  - Identify and instruct in appropriate isolation precautions for identified infection/condition  Outcome: Progressing  Goal: Absence of fever/infection during neutropenic period  Description: INTERVENTIONS:  - Monitor WBC    Outcome: Progressing     Problem: SAFETY ADULT  Goal: Patient will remain free of falls  Description: INTERVENTIONS:  - Educate patient/family on patient safety including physical limitations  - Instruct patient to call for assistance with activity   - Consult OT/PT to assist with strengthening/mobility   - Keep Call bell within reach  - Keep bed low and locked with side rails adjusted as appropriate  - Keep care items and personal belongings within reach  - Initiate and maintain comfort rounds  - Make Fall Risk Sign visible to staff  - Offer Toileting every 2-4  Hours, in advance of need  - Initiate/Maintain bed and chair alarm  - Obtain necessary fall risk management equipment: nonskid socks   - Apply yellow socks and bracelet for high fall risk patients  - Consider moving patient to room near nurses station  Outcome: Progressing  Goal: Maintain or return to baseline ADL function  Description: INTERVENTIONS:  -  Assess patient's ability to carry out ADLs; assess patient's baseline for ADL function and identify physical deficits which impact ability to perform ADLs (bathing, care of mouth/teeth, toileting, grooming, dressing, etc.)  - Assess/evaluate cause of self-care deficits   - Assess range of motion  - Assess patient's mobility; develop plan if impaired  - Assess patient's need for assistive devices and provide as appropriate  - Encourage maximum independence but intervene and supervise when necessary  - Involve family in performance of ADLs  - Assess for home care needs following discharge   - Consider OT consult to assist with ADL evaluation and planning for discharge  - Provide patient education as appropriate  Outcome: Progressing  Goal: Maintains/Returns to pre admission functional level  Description: INTERVENTIONS:  - Perform AM-PAC 6 Click Basic Mobility/ Daily Activity assessment daily.  - Set and communicate daily mobility goal to care team and patient/family/caregiver.   - Collaborate with rehabilitation services on mobility goals if consulted  - Perform Range of Motion 3 times a day.  - Reposition patient every 3 hours.  - Dangle patient 3 times a day  - Stand patient 3 times a day  - Ambulate patient 3 times a day  - Out of bed to chair 3 times a day   - Out of bed for meals 3 times a day  - Out of bed for toileting  - Record patient progress and toleration of activity level   Outcome: Progressing     Problem: DISCHARGE PLANNING  Goal: Discharge to home or other facility with appropriate resources  Description: INTERVENTIONS:  - Identify barriers to  discharge w/patient and caregiver  - Arrange for needed discharge resources and transportation as appropriate  - Identify discharge learning needs (meds, wound care, etc.)  - Arrange for interpretive services to assist at discharge as needed  - Refer to Case Management Department for coordinating discharge planning if the patient needs post-hospital services based on physician/advanced practitioner order or complex needs related to functional status, cognitive ability, or social support system  Outcome: Progressing     Problem: Knowledge Deficit  Goal: Patient/family/caregiver demonstrates understanding of disease process, treatment plan, medications, and discharge instructions  Description: Complete learning assessment and assess knowledge base.  Interventions:  - Provide teaching at level of understanding  - Provide teaching via preferred learning methods  Outcome: Progressing

## 2024-09-12 NOTE — PLAN OF CARE
Problem: PAIN - ADULT  Goal: Verbalizes/displays adequate comfort level or baseline comfort level  Description: Interventions:  - Encourage patient to monitor pain and request assistance  - Assess pain using appropriate pain scale  - Administer analgesics based on type and severity of pain and evaluate response  - Implement non-pharmacological measures as appropriate and evaluate response  - Consider cultural and social influences on pain and pain management  - Notify physician/advanced practitioner if interventions unsuccessful or patient reports new pain  Outcome: Progressing     Problem: INFECTION - ADULT  Goal: Absence or prevention of progression during hospitalization  Description: INTERVENTIONS:  - Assess and monitor for signs and symptoms of infection  - Monitor lab/diagnostic results  - Monitor all insertion sites, i.e. indwelling lines, tubes, and drains  - Monitor endotracheal if appropriate and nasal secretions for changes in amount and color  - Alvarado appropriate cooling/warming therapies per order  - Administer medications as ordered  - Instruct and encourage patient and family to use good hand hygiene technique  - Identify and instruct in appropriate isolation precautions for identified infection/condition  Outcome: Progressing  Goal: Absence of fever/infection during neutropenic period  Description: INTERVENTIONS:  - Monitor WBC    Outcome: Progressing     Problem: SAFETY ADULT  Goal: Patient will remain free of falls  Description: INTERVENTIONS:  - Educate patient/family on patient safety including physical limitations  - Instruct patient to call for assistance with activity   - Consult OT/PT to assist with strengthening/mobility   - Keep Call bell within reach  - Keep bed low and locked with side rails adjusted as appropriate  - Keep care items and personal belongings within reach  - Initiate and maintain comfort rounds  - Make Fall Risk Sign visible to staff  - Apply yellow socks and bracelet  for high fall risk patients  - Consider moving patient to room near nurses station  Outcome: Progressing  Goal: Maintain or return to baseline ADL function  Description: INTERVENTIONS:  -  Assess patient's ability to carry out ADLs; assess patient's baseline for ADL function and identify physical deficits which impact ability to perform ADLs (bathing, care of mouth/teeth, toileting, grooming, dressing, etc.)  - Assess/evaluate cause of self-care deficits   - Assess range of motion  - Assess patient's mobility; develop plan if impaired  - Assess patient's need for assistive devices and provide as appropriate  - Encourage maximum independence but intervene and supervise when necessary  - Involve family in performance of ADLs  - Assess for home care needs following discharge   - Consider OT consult to assist with ADL evaluation and planning for discharge  - Provide patient education as appropriate  Outcome: Progressing  Goal: Maintains/Returns to pre admission functional level  Description: INTERVENTIONS:  - Perform AM-PAC 6 Click Basic Mobility/ Daily Activity assessment daily.  - Set and communicate daily mobility goal to care team and patient/family/caregiver.   - Collaborate with rehabilitation services on mobility goals if consulted  - Perform Range of Motion 3 times a day.  - Reposition patient every 3 hours.  - Dangle patient 3 times a day  - Stand patient 3 times a day  - Ambulate patient 3 times a day  - Out of bed to chair 3 times a day   - Out of bed for meals 3 times a day  - Out of bed for toileting  - Record patient progress and toleration of activity level   Outcome: Progressing     Problem: DISCHARGE PLANNING  Goal: Discharge to home or other facility with appropriate resources  Description: INTERVENTIONS:  - Identify barriers to discharge w/patient and caregiver  - Arrange for needed discharge resources and transportation as appropriate  - Identify discharge learning needs (meds, wound care, etc.)  -  Arrange for interpretive services to assist at discharge as needed  - Refer to Case Management Department for coordinating discharge planning if the patient needs post-hospital services based on physician/advanced practitioner order or complex needs related to functional status, cognitive ability, or social support system  Outcome: Progressing     Problem: Knowledge Deficit  Goal: Patient/family/caregiver demonstrates understanding of disease process, treatment plan, medications, and discharge instructions  Description: Complete learning assessment and assess knowledge base.  Interventions:  - Provide teaching at level of understanding  - Provide teaching via preferred learning methods  Outcome: Progressing

## 2024-09-12 NOTE — ASSESSMENT & PLAN NOTE
53-year-old male with ventral hernia s/p open ventral hernia repair with mesh complicated by postop hematoma.  Postop check revealed no active hematoma, abdomen soft, no overlying skin changes.    -Possible discharge today 9/12, if tolerating diet  -Advance diet as tolerated  -D/C IVF  -Continue current pain management  -Pain and nausea control as needed  -DVT prophylaxis  -Continue maintenance IV fluids  -Incentive spirometry use  -Follow-up morning labs and trend

## 2024-09-12 NOTE — PROGRESS NOTES
"Progress Note - Surgery-General   Name: Vijay Barrera 53 y.o. male I MRN: [id number]  Unit/Bed#: E5 -01 I Date of Admission: 9/11/2024   Date of Service: 9/11/2024 I Hospital Day: 0     Assessment & Plan  Incisional hernia  53-year-old male with ventral hernia s/p open ventral hernia repair with mesh complicated by postop hematoma.  Postop check revealed no active hematoma, abdomen soft, no overlying skin changes.    -Possible discharge today 9/12, if tolerating diet  -Advance diet as tolerated  -D/C IVF  -Continue current pain management  -Pain and nausea control as needed  -DVT prophylaxis  -Continue maintenance IV fluids  -Incentive spirometry use  -Follow-up morning labs and trend    Please contact the SecureChat role for the Surgery-General service with any questions/concerns.    History of Present Illness   No acute overnight events. Patient is having tolerable amount of incision site pain that he rates a 3/10 without radiation. He denies N/V, fevers or chills.    Objective      Visit Vitals  /89 (BP Location: Left arm)   Pulse 68   Temp 98.6 °F (37 °C) (Oral)   Resp 16   Ht 5' 11\" (1.803 m)   Wt 72.1 kg (158 lb 15.2 oz)   SpO2 95%   BMI 22.17 kg/m²   Smoking Status Former   BSA 1.91 m²             I/O         09/10 0701  09/11 0700 09/11 0701  09/12 0700    I.V. (mL/kg)  1900 (26.4)    Total Intake(mL/kg)  1900 (26.4)    Urine (mL/kg/hr)  550    Blood  200    Total Output  750    Net  +1150                Lines/Drains/Airways       Active Status       None                  Physical Exam  Constitutional:       General: He is not in acute distress.     Appearance: Normal appearance. He is normal weight. He is not ill-appearing, toxic-appearing or diaphoretic.   Cardiovascular:      Rate and Rhythm: Normal rate and regular rhythm.      Pulses: Normal pulses.      Heart sounds: Normal heart sounds. No murmur heard.     No gallop.   Pulmonary:      Effort: Pulmonary effort is normal. No respiratory " distress.      Breath sounds: Normal breath sounds. No wheezing.   Abdominal:      General: Abdomen is flat. There is no distension.      Palpations: Abdomen is soft. There is no mass.      Tenderness: There is abdominal tenderness (Appropriate amount of TTP around incision site). There is no guarding or rebound.      Hernia: No hernia is present.   Skin:     General: Skin is warm and dry.   Neurological:      General: No focal deficit present.      Mental Status: He is alert and oriented to person, place, and time.          Lab Results:   Recent Labs     09/11/24  0948 09/12/24  0514   WBC  --  12.24*   HGB  --  13.1   PLT  --  163   SODIUM 136 137   K 3.9 4.1    105   CO2 31 24   BUN 9 10   CREATININE 0.97 0.82   GLUC 129 117   CALCIUM 9.9 8.8   MG  --  1.5*   PHOS  --  3.2      Imaging Review: No pertinent imaging studies reviewed.  Other Studies: No additional pertinent studies reviewed.    VTE Pharmacologic Prophylaxis: Sequential compression device (Venodyne)   VTE Mechanical Prophylaxis: sequential compression device

## 2024-09-12 NOTE — UTILIZATION REVIEW
NOTIFICATION OF ADMISSION DISCHARGE   This is a Notification of Discharge from [facility]. Please be advised that this patient has been discharge from our facility. Below you will find the admission and discharge date and time including the patient’s disposition.   UTILIZATION REVIEW CONTACT:  Anna Rosado  Utilization   Network Utilization Review Department  Phone: [phone] x carefully listen to the prompts. All voicemails are confidential.  Email: [email or web address]     ADMISSION INFORMATION  PRESENTATION DATE: 9/11/2024  9:24 AM  OBERVATION ADMISSION DATE: N/A  INPATIENT ADMISSION DATE: 9/11/24  5:13 PM   DISCHARGE DATE: 9/12/2024  2:17 PM   DISPOSITION:Home/Self Care    Network Utilization Review Department  ATTENTION: Please call with any questions or concerns to [phone] and carefully listen to the prompts so that you are directed to the right person. All voicemails are confidential.   For Discharge needs, contact Care Management DC Support Team at [phone] opt. 2  Send all requests for admission clinical reviews, approved or denied determinations and any other requests to dedicated fax number below belonging to the campus where the patient is receiving treatment. List of dedicated fax numbers for the Facilities:  FACILITY NAME UR FAX NUMBER   ADMISSION DENIALS (Administrative/Medical Necessity) [phone]   DISCHARGE SUPPORT TEAM ([facility]) [phone]   PARENT CHILD HEALTH (Maternity/NICU/Pediatrics) [phone]   [facility] [phone]   [facility] [phone]   [facility] [phone]   [facility] [phone]   [facility] [phone]   [facility] [phone]   [facility] [phone]   [facility] [phone]    [facility] [phone]   [facility] [phone]   [facility] [phone]   [facility] [phone]

## 2024-09-12 NOTE — PLAN OF CARE
Problem: PAIN - ADULT  Goal: Verbalizes/displays adequate comfort level or baseline comfort level  Description: Interventions:  - Encourage patient to monitor pain and request assistance  - Assess pain using appropriate pain scale  - Administer analgesics based on type and severity of pain and evaluate response  - Implement non-pharmacological measures as appropriate and evaluate response  - Consider cultural and social influences on pain and pain management  - Notify physician/advanced practitioner if interventions unsuccessful or patient reports new pain  9/12/2024 1405 by Jez Chirinos RN  Outcome: Adequate for Discharge  9/12/2024 0735 by Jez Chirinos RN  Outcome: Progressing     Problem: INFECTION - ADULT  Goal: Absence or prevention of progression during hospitalization  Description: INTERVENTIONS:  - Assess and monitor for signs and symptoms of infection  - Monitor lab/diagnostic results  - Monitor all insertion sites, i.e. indwelling lines, tubes, and drains  - Monitor endotracheal if appropriate and nasal secretions for changes in amount and color  - [place] appropriate cooling/warming therapies per order  - Administer medications as ordered  - Instruct and encourage patient and family to use good hand hygiene technique  - Identify and instruct in appropriate isolation precautions for identified infection/condition  9/12/2024 1405 by Jez Chirinos RN  Outcome: Adequate for Discharge  9/12/2024 0735 by Jez Chirinos RN  Outcome: Progressing  Goal: Absence of fever/infection during neutropenic period  Description: INTERVENTIONS:  - Monitor WBC    9/12/2024 1405 by Jez Chirinos RN  Outcome: Adequate for Discharge  9/12/2024 0735 by Jez Chirinos RN  Outcome: Progressing     Problem: SAFETY ADULT  Goal: Patient will remain free of falls  Description: INTERVENTIONS:  - Educate patient/family on patient safety including physical limitations  - Instruct patient to call for assistance  with activity   - Consult OT/PT to assist with strengthening/mobility   - Keep Call bell within reach  - Keep bed low and locked with side rails adjusted as appropriate  - Keep care items and personal belongings within reach  - Initiate and maintain comfort rounds  - Make Fall Risk Sign visible to staff  - Apply yellow socks and bracelet for high fall risk patients  - Consider moving patient to room near nurses station  9/12/2024 1405 by Jez Chirinos RN  Outcome: Adequate for Discharge  9/12/2024 0735 by Jez Chirinos RN  Outcome: Progressing  Goal: Maintain or return to baseline ADL function  Description: INTERVENTIONS:  -  Assess patient's ability to carry out ADLs; assess patient's baseline for ADL function and identify physical deficits which impact ability to perform ADLs (bathing, care of mouth/teeth, toileting, grooming, dressing, etc.)  - Assess/evaluate cause of self-care deficits   - Assess range of motion  - Assess patient's mobility; develop plan if impaired  - Assess patient's need for assistive devices and provide as appropriate  - Encourage maximum independence but intervene and supervise when necessary  - Involve family in performance of ADLs  - Assess for home care needs following discharge   - Consider OT consult to assist with ADL evaluation and planning for discharge  - Provide patient education as appropriate  9/12/2024 1405 by Jez Chirinos RN  Outcome: Adequate for Discharge  9/12/2024 0735 by Jez Chirinos RN  Outcome: Progressing  Goal: Maintains/Returns to pre admission functional level  Description: INTERVENTIONS:  - Perform AM-PAC 6 Click Basic Mobility/ Daily Activity assessment daily.  - Set and communicate daily mobility goal to care team and patient/family/caregiver.   - Collaborate with rehabilitation services on mobility goals if consulted  - Perform Range of Motion 3 times a day.  - Reposition patient every 3 hours.  - Dangle patient 3 times a day  - Stand patient  3 times a day  - Ambulate patient 3 times a day  - Out of bed to chair 3 times a day   - Out of bed for meals 3 times a day  - Out of bed for toileting  - Record patient progress and toleration of activity level   9/12/2024 1405 by Jez Chirinos RN  Outcome: Adequate for Discharge  9/12/2024 0735 by Jez Chirinos RN  Outcome: Progressing     Problem: DISCHARGE PLANNING  Goal: Discharge to home or other facility with appropriate resources  Description: INTERVENTIONS:  - Identify barriers to discharge w/patient and caregiver  - Arrange for needed discharge resources and transportation as appropriate  - Identify discharge learning needs (meds, wound care, etc.)  - Arrange for interpretive services to assist at discharge as needed  - Refer to Case Management Department for coordinating discharge planning if the patient needs post-hospital services based on physician/advanced practitioner order or complex needs related to functional status, cognitive ability, or social support system  9/12/2024 1405 by Jez Chirinos RN  Outcome: Adequate for Discharge  9/12/2024 0735 by Jez Chirinos RN  Outcome: Progressing     Problem: Knowledge Deficit  Goal: Patient/family/caregiver demonstrates understanding of disease process, treatment plan, medications, and discharge instructions  Description: Complete learning assessment and assess knowledge base.  Interventions:  - Provide teaching at level of understanding  - Provide teaching via preferred learning methods  9/12/2024 1405 by Jez Chirinos RN  Outcome: Adequate for Discharge  9/12/2024 0735 by Jez Chirinos RN  Outcome: Progressing

## 2024-09-12 NOTE — UTILIZATION REVIEW
Initial Clinical Review    Elective OP surgical procedure    Elective outpatient procedure, converted to inpatient admission due to operative findings of 15 cm hernia defect, Exploratory laparotomy was carried out, The upper abdomen was indeed very densely adhesed, the lower abdomen was fairly free of any adhesions.  Retrorectus repair was decided upon.      .      Age/Sex: 53 y.o. male    Surgery Date:  9/11/24    Procedure: EXPLORATORY LAPARTOMY, LYSIS OF ADHESIONS, RETRORECTUS HERNIA REPAIR, VENTRAL HERNIA REPAIR 15 CM, CICATRIX REPAIR       Anesthesia:   general    Operative Findings:   Hernia defect, with wide diastases.     Retrorectus repair with intact retrorectus and phasic's in the retrorectus space  15 cm defect by 10 cm defect but this was able to be closed using extensive dissection of the retrorectus layer.    POD#1 Progress Note:   9/12    Continue post op care.  Continue  pain control as needed.    TTP  at abdominal  incision site.  Some abdominal  tenderness.    IVF  d/c today.   Monitor  labs.   Continue current meds/treatment plan.    Admission Orders: Date/Time/Statement:   Admission Orders (From admission, onward)       Ordered        09/11/24 1713  INPATIENT ADMISSION  Once                          Orders Placed This Encounter   Procedures    INPATIENT ADMISSION     Standing Status:   Standing     Number of Occurrences:   1     Order Specific Question:   Level of Care     Answer:   Med Surg [16]     Order Specific Question:   Estimated length of stay     Answer:   Inpatient Only Surgery       Vital Signs (last 3 days)       Date/Time Temp Pulse Resp BP MAP (mmHg) SpO2 O2 Device Patient Position - Orthostatic VS Pain    09/12/24 11:49:28 98.1 °F (36.7 °C) 87 18 151/96 114 96 % None (Room air) Sitting --    09/12/24 1122 -- -- -- -- -- -- -- -- 6    09/12/24 0818 -- -- -- -- -- -- None (Room air) -- No Pain    09/12/24 07:56:23 98.1 °F (36.7 °C) -- -- 160/98 119 -- -- Lying --    09/12/24  07:55:07 98.1 °F (36.7 °C) 91 20 156/110 125 97 % None (Room air) Lying --    09/12/24 0545 -- -- -- -- -- -- -- -- 4 09/12/24 0008 -- -- -- -- -- -- -- -- 4 09/11/24 23:18:50 98.6 °F (37 °C) 68 16 136/89 105 95 % None (Room air) Lying --    09/11/24 20:17:52 98.9 °F (37.2 °C) 69 18 136/89 105 96 % -- -- --    09/11/24 18:57:24 -- 72 -- 157/97 117 95 % -- -- --    09/11/24 1857 -- -- -- -- -- -- -- -- 7 09/11/24 1701 -- -- -- -- -- -- -- -- 4 09/11/24 1700 -- -- -- -- -- -- -- -- 4 09/11/24 16:48:39 98.1 °F (36.7 °C) 85 17 160/100 120 93 % -- -- --    09/11/24 16:47:59 98.1 °F (36.7 °C) -- 17 160/100 120 -- -- -- --    09/11/24 1633 -- 72 -- 154/90 -- -- -- -- --    09/11/24 1617 97.8 °F (36.6 °C) 86 18 161/95 -- 93 % None (Room air) -- 5 09/11/24 1605 -- -- -- -- -- -- -- -- 6 09/11/24 1602 -- 80 15 158/98 -- 94 % None (Room air) -- 3    09/11/24 1552 -- -- -- -- -- -- -- -- 6 09/11/24 1545 -- 82 14 156/94 -- 95 % None (Room air) -- --    09/11/24 1533 -- 84 13 153/90 117 96 % None (Room air) -- --    09/11/24 1530 99.1 °F (37.3 °C) 85 12 167/103 -- 95 % None (Room air) -- No Pain    09/11/24 1141 -- -- -- -- -- -- -- -- No Pain    09/11/24 0942 98.1 °F (36.7 °C) 61 16 153/85 -- 99 % None (Room air) -- --    09/11/24 0939 -- -- -- -- -- -- -- -- No Pain          Weight (last 2 days)       Date/Time Weight    09/11/24 0939 72.1 (158.95)            Pertinent Labs/Diagnostic Test Results:   Radiology:      Results from last 7 days   Lab Units 09/12/24  0514   WBC Thousand/uL 12.24*   HEMOGLOBIN g/dL 13.1   HEMATOCRIT % 37.8   PLATELETS Thousands/uL 163   TOTAL NEUT ABS Thousands/µL 9.20*         Results from last 7 days   Lab Units 09/12/24  0514 09/11/24  0948   SODIUM mmol/L 137 136   POTASSIUM mmol/L 4.1 3.9   CHLORIDE mmol/L 105 100   CO2 mmol/L 24 31   ANION GAP mmol/L 8 5   BUN mg/dL 10 9   CREATININE mg/dL 0.82 0.97   EGFR ml/min/1.73sq m 100 88   CALCIUM mg/dL 8.8 9.9   MAGNESIUM mg/dL  1.5*  --    PHOSPHORUS mg/dL 3.2  --              Results from last 7 days   Lab Units 09/12/24  0514 09/11/24  0948   GLUCOSE RANDOM mg/dL 117 129                     Diet:   lo residue  lo  fiber      Mobility:  OOB as  tolerated    DVT Prophylaxis:  SCD's    Medications/Pain Control:   Scheduled Medications:  acetaminophen, 975 mg, Oral, Q6H JAMSHID  famotidine, 20 mg, Oral, BID  gabapentin, 100 mg, Oral, TID      Continuous IV Infusions:  IVF  125/hr - d/c   9/12     PRN Meds:  HYDROmorphone, 0.2 mg, Intravenous, Q3H PRN  methocarbamol, 500 mg, Oral, Q6H PRN  ondansetron, 4 mg, Intravenous, Q4H PRN  oxyCODONE, 5 mg, Oral, Q4H PRN  oxyCODONE, 2.5 mg, Oral, Q4H PRN        Network Utilization Review Department  ATTENTION: Please call with any questions or concerns to [phone] and carefully listen to the prompts so that you are directed to the right person. All voicemails are confidential.   For Discharge needs, contact Care Management DC Support Team at [phone] opt. 2  Send all requests for admission clinical reviews, approved or denied determinations and any other requests to dedicated fax number below belonging to the campus where the patient is receiving treatment. List of dedicated fax numbers for the Facilities:  FACILITY NAME UR FAX NUMBER   ADMISSION DENIALS (Administrative/Medical Necessity) [phone]   DISCHARGE SUPPORT TEAM (NETWORK) [phone]   PARENT CHILD HEALTH (Maternity/NICU/Pediatrics) [phone]   [facility] [phone]   [facility] [phone]   [facility] [phone]   [facility] [phone]   [facility] [phone]   [facility] [phone]   [facility] [phone]   [facility] [phone]   [facility] [phone]   [facility]  [facility] [phone]   [facility] [phone]   [facility] [phone]

## 2024-09-12 NOTE — DISCHARGE INSTR - AVS FIRST PAGE
Acute Care Surgery Discharge Instructions    Please follow-up as instructed. If you do not already have a follow-up appointment, please call the office when you leave to schedule an appointment to be seen in 2 weeks for post-operative re-evaluation.    Activity:  - No lifting greater than 10 pounds or strenuous physical activity or exercise until evaluated at your outpatient follow up.   - Walking and normal light activities are encouraged.  - Normal daily activities including climbing steps are okay.  - No driving until no longer using pain medications.    Return to work:    - You may return to work in after evaluation at your outpatient follow up if you are feeling well enough.    Diet:    - You may resume your normal diet.    Wound Care:  - May shower daily. No tub baths or swimming until cleared by your surgeon.  - Wash incision gently with soap and water and pat dry.  - Do not apply any creams or ointments unless instructed to do so by your surgeon.  - You may apply ice as needed (no longer than 20 minutes at a time) for the first 48 hours.  - Bruising is not unusual and will go away with a little time. You may apply a warm, moist compress that may help the bruising resolve quicker.  - You may remove the dressings the day after surgery (unless otherwise instructed). Leave any skin tapes (steri-strips) on the incision(s) in place until they fall off on their own. Any new dressings are optional.    Medications:    - You may resume all of your regular medications after discharge unless otherwise instructed. Please refer to your discharge medication list for further details.  - Please take the pain medications as directed.  - You are encouraged to use non-narcotic pain medications first and whenever possible. Reserve the use of narcotic pain medication for moderate to severe pain not controlled by non-narcotic medications.  - No driving while taking narcotic pain medications.  - You may become constipated,  especially if taking pain medications. You may take any over the counter stool softeners or laxatives as needed. Examples: Milk of Magnesia, Colace, Senna.    Additional Instructions:  - If you have any questions or concerns after discharge please call the office.  - Call office or return to ER if fever greater than 101, chills, persistent nausea/vomiting, worsening/uncontrollable pain, and/or increasing redness or purulent/foul smelling drainage from incision(s).

## 2024-09-13 ENCOUNTER — TRANSITIONAL CARE MANAGEMENT (OUTPATIENT)
Dept: FAMILY MEDICINE CLINIC | Facility: CLINIC | Age: 54
End: 2024-09-13

## 2024-09-13 ENCOUNTER — TELEPHONE (OUTPATIENT)
Dept: SURGERY | Facility: CLINIC | Age: 54
End: 2024-09-13

## 2024-09-13 PROBLEM — Z00.00 HEALTHCARE MAINTENANCE: Status: RESOLVED | Noted: 2024-08-14 | Resolved: 2024-09-13

## 2024-09-13 PROBLEM — Z12.5 PROSTATE CANCER SCREENING: Status: RESOLVED | Noted: 2024-08-14 | Resolved: 2024-09-13

## 2024-09-13 NOTE — TELEPHONE ENCOUNTER
Contacted patient to see how he is feeling post op.  Patient states he is doing good.  Advised patient we will see him post on on 09/23.

## 2024-09-17 PROCEDURE — 88302 TISSUE EXAM BY PATHOLOGIST: CPT | Performed by: PATHOLOGY

## 2024-09-18 ENCOUNTER — TELEPHONE (OUTPATIENT)
Dept: SURGERY | Facility: CLINIC | Age: 54
End: 2024-09-18

## 2024-09-18 NOTE — TELEPHONE ENCOUNTER
----- Message from Marilynn BECKWITH sent at 9/17/2024  3:20 PM EDT -----    ----- Message -----  From: Hoa Reynolds PA-C  Sent: 9/17/2024   1:10 PM EDT  To: General Surgery [facility]    Please let him know his path shows a normal hernia sac from his repair. No abnormalities.

## 2024-09-18 NOTE — TELEPHONE ENCOUNTER
Message left for patient advised pathology results came back normal. He is scheduled for a post op appointment 9/23/24 and these results will be reviewed,

## 2024-09-19 NOTE — PROGRESS NOTES
Assessment/Plan:   Vijay Barrera is a 53 y.o.male who comes in today for postoperative check after :EXPLORATORY LAPARTOMY, LYSIS OF ADHESIONS, RETRORECTUS HERNIA REPAIR, VENTRAL HERNIA REPAIR 15 CM, CICATRIX REPAIR with Dr. Mckinney on 9/11/24.    Pathology: Reviewed with patient, all questions answered.   Final Diagnosis   A. Ventral hernia and cicatrix, repair:  - Benign mesothelial-lined fibromembranous tissue      - Benign skin with scar tissue     Patient is very pleased with the outcome of their surgery.     Postoperative restrictions reviewed, including specific lifting and exercise restrictions. All questions answered.       ______________________________________________________  HPI:  Vijay Barrera is a 53 y.o.male who comes in today for postoperative check after recent EXPLORATORY LAPARTOMY, LYSIS OF ADHESIONS, RETRORECTUS HERNIA REPAIR, VENTRAL HERNIA REPAIR 15 CM, CICATRIX REPAIR with Dr. Mckinney on 9/11/24.    Currently doing well without problems, no fever or chills,no nausea and no vomiting. No chest pain or trouble breathing.     Reports no issues.    ROS:  General ROS: negative for - chills, fatigue, fever or night sweats, weight loss  Respiratory ROS: no cough, shortness of breath, or wheezing  Cardiovascular ROS: no chest pain or dyspnea on exertion  Genito-Urinary ROS: no dysuria, trouble voiding, or hematuria  Musculoskeletal ROS: negative for - gait disturbance, joint pain or muscle pain  Neurological ROS: no TIA or stroke symptoms  GI ROS: see HPI  Skin ROS: no new rashes or lesions   Lymphatic ROS: no new adenopathy noted by pt.   GYN ROS: see HPI, no new GYN history or bleeding noted  Psy ROS: no new mental or behavioral disturbances       Patient Active Problem List   Diagnosis    Hyponatremia    Difficult intubation    Mycobacterium, atypical    Hypomagnesemia    GERD (gastroesophageal reflux disease)    Pre-op examination    Ventral hernia without obstruction or gangrene    Elevated  blood pressure reading in office without diagnosis of hypertension         Penicillin v and Amoxicillin      Current Outpatient Medications:     acetaminophen (TYLENOL) 500 mg tablet, Take 500 mg by mouth every 6 (six) hours as needed for mild pain, Disp: , Rfl:     famotidine (PEPCID) 20 mg tablet, TAKE 1 TABLET BY MOUTH TWICE A DAY (Patient taking differently: Take 20 mg by mouth 2 (two) times a day), Disp: 180 tablet, Rfl: 2    Ferrous Sulfate (IRON PO), Take 1 tablet by mouth in the morning, Disp: , Rfl:     Misc Natural Products (Joint Health) CAPS, Take 1 capsule by mouth in the morning, Disp: , Rfl:     Multiple Vitamins-Minerals (multivitamin with minerals) tablet, Take 1 tablet by mouth daily, Disp: , Rfl:     omeprazole (PriLOSEC) 20 mg delayed release capsule, TAKE 1 CAPSULE BY MOUTH EVERY DAY (Patient not taking: Reported on 9/23/2024), Disp: 90 capsule, Rfl: 1    sildenafil (VIAGRA) 25 MG tablet, 25 mg, Disp: , Rfl:     Past Medical History:   Diagnosis Date    Hard to intubate     Hepatitis C        Past Surgical History:   Procedure Laterality Date    COLONOSCOPY      HAND SURGERY Right     IR PARACENTESIS  01/23/2023    RI LAPS ABD PRTM&OMENTUM DX W/WO SPEC BR/WA SPX N/A 01/28/2023    Procedure: LAPAROSCOPY DIAGNOSTIC CONVERTED TO OPEN, LAPAROTOMY, REPAIR ENTEROTOMY X2, EXTENSIS LYSIS OF ADHESIONS, PERITONEAL BIOPSIES, PERITONEAL LAVAGE;  Surgeon: Lamine Elliott MD;  Location: [facility];  Service: General    RI RPR AA HERNIA 1ST < 3 CM REDUCIBLE N/A 9/11/2024    Procedure: EXPLORATORY LAPARTOMY, LYSIS OF ADHESIONS, RETRORECTUS HERNIA REPAIR, VENTRAL HERNIA REPAIR 15 CM, CICATRIX REPAIR;  Surgeon: Marly Mckinney MD;  Location: [facility];  Service: General    WISDOM TOOTH EXTRACTION         Family History   Problem Relation Age of Onset    Diabetes Mother     No Known Problems Father         reports that he has quit smoking. His smoking use included cigarettes. He has a 8.8 pack-year smoking  history. He has never been exposed to tobacco smoke. He uses smokeless tobacco. He reports that he does not currently use alcohol. He reports current drug use. Frequency: 7.00 times per week. Drug: Marijuana.    Vitals:    09/23/24 0807   BP: 144/98   Pulse: 73   Temp: 98.5 °F (36.9 °C)   SpO2: 99%       PHYSICAL EXAM  General: normal, cooperative, no distress  Abdominal: soft, nondistended, or nontender  Incision: clean, dry, and intact and healing well; staples removed entirely with no issues.       Hoa Voss PA-C    Date: 9/23/2024 Time: 8:25 AM

## 2024-09-23 ENCOUNTER — OFFICE VISIT (OUTPATIENT)
Dept: SURGERY | Facility: CLINIC | Age: 54
End: 2024-09-23
Payer: COMMERCIAL

## 2024-09-23 VITALS
DIASTOLIC BLOOD PRESSURE: 98 MMHG | WEIGHT: 159.2 LBS | BODY MASS INDEX: 22.29 KG/M2 | HEART RATE: 73 BPM | SYSTOLIC BLOOD PRESSURE: 144 MMHG | TEMPERATURE: 98.5 F | OXYGEN SATURATION: 99 % | HEIGHT: 71 IN

## 2024-09-23 DIAGNOSIS — Z09 POSTOP CHECK: Primary | ICD-10-CM

## 2024-09-23 PROBLEM — K65.0 ACUTE PERITONITIS (HCC): Status: RESOLVED | Noted: 2023-01-22 | Resolved: 2024-09-23

## 2024-09-23 PROBLEM — B19.20 HEPATITIS C: Status: RESOLVED | Noted: 2023-01-21 | Resolved: 2024-09-23

## 2024-09-23 PROBLEM — L89.103 PRESSURE INJURY OF UPPER BACK, STAGE 3 (HCC): Status: RESOLVED | Noted: 2023-02-16 | Resolved: 2024-09-23

## 2024-09-23 PROCEDURE — 99211 OFF/OP EST MAY X REQ PHY/QHP: CPT | Performed by: PHYSICIAN ASSISTANT

## 2024-12-05 ENCOUNTER — TELEPHONE (OUTPATIENT)
Dept: GASTROENTEROLOGY | Facility: CLINIC | Age: 54
End: 2024-12-05

## 2024-12-05 NOTE — TELEPHONE ENCOUNTER
Patient has recall in from 2023, This patient needs an EGD. Are we suppose to be scheduling the EGD or should we do an office visit follow up first then do the EGD?

## (undated) DEVICE — PMI DISPOSABLE PUNCTURE CLOSURE DEVICE / SUTURE GRASPER: Brand: PMI

## (undated) DEVICE — SUT SILK 3-0 SH CR/8 18 IN C013D

## (undated) DEVICE — DRAPE SHEET X-LG

## (undated) DEVICE — PLUMEPEN PRO 10FT

## (undated) DEVICE — ELECTRODE LAP L HOOK E-Z CLEAN 33CM -0020

## (undated) DEVICE — SINGLE PORT MANIFOLD: Brand: NEPTUNE 2

## (undated) DEVICE — 3M™ TEGADERM™ TRANSPARENT FILM DRESSING FRAME STYLE, 1624W, 2-3/8 IN X 2-3/4 IN (6 CM X 7 CM), 100/CT 4CT/CASE: Brand: 3M™ TEGADERM™

## (undated) DEVICE — SUT PDS II 0 CT-1 36 IN Z346H

## (undated) DEVICE — ABDOMINAL PAD: Brand: DERMACEA

## (undated) DEVICE — ASTOUND STANDARD SURGICAL GOWN, XL: Brand: CONVERTORS

## (undated) DEVICE — PACK PBDS LAP CHOLE RF

## (undated) DEVICE — CHLORAPREP HI-LITE 26ML ORANGE

## (undated) DEVICE — TROCAR: Brand: KII FIOS FIRST ENTRY

## (undated) DEVICE — [HIGH FLOW INSUFFLATOR,  DO NOT USE IF PACKAGE IS DAMAGED,  KEEP DRY,  KEEP AWAY FROM SUNLIGHT,  PROTECT FROM HEAT AND RADIOACTIVE SOURCES.]: Brand: PNEUMOSURE

## (undated) DEVICE — ENDOPATH 5 MM GRASPERS WITH RATCHET HANDLES: Brand: ENDOPATH

## (undated) DEVICE — DRESSING MEPILEX AG BORDER POST-OP 4 X 10 IN

## (undated) DEVICE — PROXIMATE SKIN STAPLERS (35 WIDE) CONTAINS 35 STAINLESS STEEL STAPLES (FIXED HEAD): Brand: PROXIMATE

## (undated) DEVICE — SCD SEQUENTIAL COMPRESSION COMFORT SLEEVE MEDIUM KNEE LENGTH: Brand: KENDALL SCD

## (undated) DEVICE — SUT PROLENE 0 CT-1 30 IN 8424H

## (undated) DEVICE — TUBING SMOKE EVAC W/FILTRATION DEVICE PLUMEPORT ACTIV

## (undated) DEVICE — SUT SILK 2-0 30 IN A305H

## (undated) DEVICE — INTENDED FOR TISSUE SEPARATION, AND OTHER PROCEDURES THAT REQUIRE A SHARP SURGICAL BLADE TO PUNCTURE OR CUT.: Brand: BARD-PARKER SAFETY BLADES SIZE 15, STERILE

## (undated) DEVICE — GLOVE SRG BIOGEL 6.5

## (undated) DEVICE — TROCAR 11MM KIT OPTICAL SEPARATOR SYSTEM

## (undated) DEVICE — DRAPE EQUIPMENT RF WAND

## (undated) DEVICE — SUT SILK 3-0 PS-1 18 IN 1684G

## (undated) DEVICE — 3M™ STERI-STRIP™ COMPOUND BENZOIN TINCTURE 40 BAGS/CARTON 4 CARTONS/CASE C1544: Brand: 3M™ STERI-STRIP™

## (undated) DEVICE — GARMENT,MEDLINE,DVT,INT,CALF,FOAM,MED: Brand: MEDLINE

## (undated) DEVICE — SUT MONOCRYL 4-0 PS-2 27 IN Y426H

## (undated) DEVICE — GAUZE SPONGES,USP TYPE VII GAUZE, 12 PLY: Brand: CURITY

## (undated) DEVICE — DISPOSABLE OR TOWEL: Brand: CARDINAL HEALTH

## (undated) DEVICE — SUT PDS II 1 CT-1 27 IN Z347H

## (undated) DEVICE — SUT VICRYL 2-0 SH 27 IN UNDYED J417H

## (undated) DEVICE — TELFA NON-ADHERENT ABSORBENT DRESSING: Brand: TELFA

## (undated) DEVICE — INTENDED FOR TISSUE SEPARATION, AND OTHER PROCEDURES THAT REQUIRE A SHARP SURGICAL BLADE TO PUNCTURE OR CUT.: Brand: BARD-PARKER ® CARBON RIB-BACK BLADES

## (undated) DEVICE — BETHLEHEM UNIVERSAL MINOR GEN: Brand: CARDINAL HEALTH

## (undated) DEVICE — SUT PDS II 1 CT-1 27 IN Z341H

## (undated) DEVICE — STERISTRIP 1/2 X 4IN

## (undated) DEVICE — LAPAROSCOPIC SCISSORS: Brand: EPIX LAPAROSCOPIC SCISSORS

## (undated) DEVICE — SUT CHROMIC 3-0 SH 27 IN G122H

## (undated) DEVICE — SUT STRATAFIX SMTR PDS PLUS 1 CT-1 30CM SXPP1A435

## (undated) DEVICE — EXOFIN PRECISION PEN HIGH VISCOSITY TOPICAL SKIN ADHESIVE: Brand: EXOFIN PRECISION PEN, 1G

## (undated) DEVICE — BINDER ABDOMINAL 30-45 IN

## (undated) DEVICE — GLOVE INDICATOR PI UNDERGLOVE SZ 6.5 BLUE

## (undated) DEVICE — NEEDLE 25G X 1 1/2

## (undated) DEVICE — SUT VICRYL 3-0 REEL 54 IN J285G

## (undated) DEVICE — ENDOPATH XCEL BLADELESS TROCARS WITH STABILITY SLEEVES: Brand: ENDOPATH XCEL

## (undated) DEVICE — 4-PORT MANIFOLD: Brand: NEPTUNE 2

## (undated) DEVICE — SWABSTCK, BENZOIN TINCTURE, 1/PK, STRL: Brand: APLICARE

## (undated) DEVICE — SUT PDS II 2-0 CT-2 27 IN Z333H

## (undated) DEVICE — GAUZE SPONGES,8 PLY: Brand: CURITY

## (undated) DEVICE — POOLE SUCTION HANDLE: Brand: CARDINAL HEALTH

## (undated) DEVICE — 3M™ STERI-STRIP™ REINFORCED ADHESIVE SKIN CLOSURES, R1547, 1/2 IN X 4 IN (12 MM X 100 MM), 6 STRIPS/ENVELOPE: Brand: 3M™ STERI-STRIP™

## (undated) DEVICE — 5 MM CURVED DISSECTORS WITH MONOPOLAR CAUTERY: Brand: ENDOPATH

## (undated) DEVICE — GLOVE SRG BIOGEL 8

## (undated) DEVICE — INSUFFLATION NEEDLE: Brand: SURGINEEDLE

## (undated) DEVICE — NEEDLE HYPO 23G X 1-1/2 IN